# Patient Record
Sex: MALE | Race: WHITE | Employment: OTHER | URBAN - METROPOLITAN AREA
[De-identification: names, ages, dates, MRNs, and addresses within clinical notes are randomized per-mention and may not be internally consistent; named-entity substitution may affect disease eponyms.]

---

## 2019-02-19 ENCOUNTER — TELEPHONE (OUTPATIENT)
Dept: ADMINISTRATIVE | Age: 79
End: 2019-02-19

## 2019-02-19 RX ORDER — METOPROLOL SUCCINATE 25 MG/1
25 TABLET, EXTENDED RELEASE ORAL DAILY
Qty: 90 TABLET | Refills: 3 | Status: SHIPPED | OUTPATIENT
Start: 2019-02-19 | End: 2019-06-17 | Stop reason: SDUPTHER

## 2019-04-02 ENCOUNTER — OFFICE VISIT (OUTPATIENT)
Dept: CARDIOLOGY CLINIC | Age: 79
End: 2019-04-02
Payer: MEDICARE

## 2019-04-02 VITALS
BODY MASS INDEX: 29.78 KG/M2 | SYSTOLIC BLOOD PRESSURE: 140 MMHG | HEART RATE: 69 BPM | HEIGHT: 70 IN | DIASTOLIC BLOOD PRESSURE: 90 MMHG | WEIGHT: 208 LBS | RESPIRATION RATE: 16 BRPM

## 2019-04-02 DIAGNOSIS — I25.10 CORONARY ARTERY DISEASE INVOLVING NATIVE CORONARY ARTERY OF NATIVE HEART WITHOUT ANGINA PECTORIS: Primary | ICD-10-CM

## 2019-04-02 PROCEDURE — 93000 ELECTROCARDIOGRAM COMPLETE: CPT | Performed by: INTERNAL MEDICINE

## 2019-04-02 PROCEDURE — 99213 OFFICE O/P EST LOW 20 MIN: CPT | Performed by: INTERNAL MEDICINE

## 2019-04-02 NOTE — PROGRESS NOTES
Nanda Miramontes  1940  Date of Service: 4/2/2019    Patient Active Problem List    Diagnosis Date Noted    HTN (hypertension)     Aortic stenosis      Overview Note:     A. Bioprosthetic AVR (1/27/09 by Dr. Julio Peoples).  Coronary artery disease      Overview Note:     B. Cardiac catheterization (1/26/09). Left main ostial 80%. i. LAD mid to distal 90%. ii. RCA ostial 80%. C. CABG (1/27/09 by Dr. Julio Peoples). i. MATUTE-LAD. ii. SVG-OM.  iii. SVG-RCA.  Atrial fibrillation (HCC)      Overview Note:     Postop      Abnormal EKG      Overview Note:     EKG demonstrating chronic Q waves in leads III and AVF. Stress test (December 1, 2003) Negative for either ischemia or myocardial infarction. Ejection fraction 60%.       Left atrial dilatation      Overview Note:     Mild      Mitral regurgitation      Overview Note:     Trace      SBE (subacute bacterial endocarditis) prophylaxis candidate        Social History     Socioeconomic History    Marital status:      Spouse name: None    Number of children: None    Years of education: None    Highest education level: None   Occupational History    None   Social Needs    Financial resource strain: None    Food insecurity:     Worry: None     Inability: None    Transportation needs:     Medical: None     Non-medical: None   Tobacco Use    Smoking status: Never Smoker    Smokeless tobacco: Never Used   Substance and Sexual Activity    Alcohol use: Yes     Comment: one drink per day occassionally    Drug use: No    Sexual activity: None   Lifestyle    Physical activity:     Days per week: None     Minutes per session: None    Stress: None   Relationships    Social connections:     Talks on phone: None     Gets together: None     Attends Oriental orthodox service: None     Active member of club or organization: None     Attends meetings of clubs or organizations: None     Relationship status: None    Intimate partner violence:     Fear of bilaterally. No use of accessory muscles. symmetrical excursion. ABDOMEN:  Soft, non-tender. Normal bowel sounds. EXTREMITIES:  Full ROM x 4. No bilateral lower extremity edema. Good distal pulses. EYES:  Extraocular muscles intact. PERRL. Normal lids & conjunctiva. ENT:  Nares are clear & not bleeding. Moist mucosa. Normal lips formation. No external masses   NEURO: no tremors, full ROM x 4, EOMI. SKIN:  Warm, dry and intact. Normal turgor. EKG: Sinus rhythm, 69 bpm, nl axis, nonspecific ST - T wave changes. Inferior q-waves      Assessment:   1. Coronary artery disease as outlined above. No symptoms of recurring ischemia at this time. 2. AS - AVR  3. Paroxysmal atrial fibrillation (post-op). maintaining sinus rhythm at this time. 4. Hypertension, not well controled at this time. He states that he missed his medications yesterday and has not taken them yet today. 5. Hypercholesterolemia  6. 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 Recommendations:  1. He is following the cholesterol with Dr. Naomi Velez. 2. He will resume his medications. Thank you for allowing me to participate in your patient's care.       [id number], [address] [facility]  Interventional Cardiology

## 2019-06-17 RX ORDER — METOPROLOL SUCCINATE 25 MG/1
25 TABLET, EXTENDED RELEASE ORAL DAILY
Qty: 90 TABLET | Refills: 3 | Status: SHIPPED
Start: 2019-06-17 | End: 2020-04-09 | Stop reason: SDUPTHER

## 2020-04-09 RX ORDER — METOPROLOL SUCCINATE 25 MG/1
25 TABLET, EXTENDED RELEASE ORAL DAILY
Qty: 90 TABLET | Refills: 3 | Status: SHIPPED
Start: 2020-04-09 | End: 2021-04-26 | Stop reason: SDUPTHER

## 2020-05-05 ENCOUNTER — TELEPHONE (OUTPATIENT)
Dept: CARDIOLOGY CLINIC | Age: 80
End: 2020-05-05

## 2020-06-12 ENCOUNTER — OFFICE VISIT (OUTPATIENT)
Dept: CARDIOLOGY CLINIC | Age: 80
End: 2020-06-12
Payer: MEDICARE

## 2020-06-12 VITALS
WEIGHT: 214.2 LBS | BODY MASS INDEX: 30.67 KG/M2 | SYSTOLIC BLOOD PRESSURE: 142 MMHG | HEART RATE: 67 BPM | HEIGHT: 70 IN | RESPIRATION RATE: 16 BRPM | DIASTOLIC BLOOD PRESSURE: 85 MMHG

## 2020-06-12 PROCEDURE — 99214 OFFICE O/P EST MOD 30 MIN: CPT | Performed by: INTERNAL MEDICINE

## 2020-06-12 PROCEDURE — 93000 ELECTROCARDIOGRAM COMPLETE: CPT | Performed by: INTERNAL MEDICINE

## 2020-06-12 RX ORDER — HYDROCHLOROTHIAZIDE 25 MG/1
25 TABLET ORAL EVERY MORNING
Qty: 90 TABLET | Refills: 3 | Status: SHIPPED | OUTPATIENT
Start: 2020-06-12 | End: 2021-06-28

## 2020-06-12 NOTE — PROGRESS NOTES
Shakir Harper  1940  Date of Service: 6/12/2020    Patient Active Problem List    Diagnosis Date Noted    HTN (hypertension)     Aortic stenosis      Overview Note:     A. Bioprosthetic AVR (1/27/09 by Dr. Sorin Reveles).  Coronary artery disease      Overview Note:     B. Cardiac catheterization (1/26/09). Left main ostial 80%. i. LAD mid to distal 90%. ii. RCA ostial 80%. C. CABG (1/27/09 by Dr. Sorin Reveles). i. MATUTE-LAD. ii. SVG-OM.  iii. SVG-RCA.  Atrial fibrillation (HCC)      Overview Note:     Postop      Abnormal EKG      Overview Note:     EKG demonstrating chronic Q waves in leads III and AVF. Stress test (December 1, 2003) Negative for either ischemia or myocardial infarction. Ejection fraction 60%.       Left atrial dilatation      Overview Note:     Mild      Mitral regurgitation      Overview Note:     Trace      SBE (subacute bacterial endocarditis) prophylaxis candidate        Social History     Socioeconomic History    Marital status:      Spouse name: None    Number of children: None    Years of education: None    Highest education level: None   Occupational History    None   Social Needs    Financial resource strain: None    Food insecurity     Worry: None     Inability: None    Transportation needs     Medical: None     Non-medical: None   Tobacco Use    Smoking status: Never Smoker    Smokeless tobacco: Never Used   Substance and Sexual Activity    Alcohol use: Yes     Comment: one drink per day occassionally    Drug use: No    Sexual activity: None   Lifestyle    Physical activity     Days per week: None     Minutes per session: None    Stress: None   Relationships    Social connections     Talks on phone: None     Gets together: None     Attends Lutheran service: None     Active member of club or organization: None     Attends meetings of clubs or organizations: None     Relationship status: None    Intimate partner violence     Fear of current or ex partner: None     Emotionally abused: None     Physically abused: None     Forced sexual activity: None   Other Topics Concern    None   Social History Narrative    None       Current Outpatient Medications   Medication Sig Dispense Refill    metoprolol succinate (TOPROL XL) 25 MG extended release tablet Take 1 tablet by mouth daily 90 tablet 3    timolol (TIMOPTIC) 0.5 % ophthalmic solution       aspirin 81 MG EC tablet Take 81 mg by mouth daily.  atorvastatin (LIPITOR) 20 MG tablet Take 20 mg by mouth daily        No current facility-administered medications for this visit. Allergies   Allergen Reactions    Zocor [Simvastatin]      Muscle aches       Chief Complaint:  Zarina Chacon is here today for follow up and management/recomendations for CAD, AS, AVR, HTN. History of Present Illness: Zarina Chacon states that He does house work, yard work, goes up the stairs & goes shopping. He shoveled snow this past winter as well. He denies any chest discomfort, dyspnea on exertion, orthopnea/PND. He has mild chronic lower extremity edema. He denies any palpitations or presyncopal symptoms. REVIEW OF SYSTEMS:  As above. Patient does not complain of any fever, chills, nausea, vomiting or diarrhea. No focal, motor or neurological deficits. No changes in his/her vision, hearing, bowel or bladder habits. He is not known to have a history of thyroid problems. No recent nose bleeds. PHYSICAL EXAM:  Vitals:    06/12/20 0944 06/12/20 0951   BP: (!) 142/70 138/68   Pulse: 67    Resp: 16    Weight: 214 lb 3.2 oz (97.2 kg)    Height: 5' 10\" (1.778 m)        GENERAL:  He is alert and oriented x 3, communicates well, in no distress. NECK:  No masses, trachea is mid position. Supple, full ROM, no JVD or bruits. No palpable thyromegaly or lymphadenopathy. HEART:  distant Regular rate and rhythm. Normal S1 and S2. There is an S4 gallop and a I/VI systolic murmur.     LUNGS:  Clear to auscultation bilaterally. No use of accessory muscles. symmetrical excursion. ABDOMEN:  Soft, non-tender. Normal bowel sounds. EXTREMITIES:  Full ROM x 4. Mild bilateral lower extremity edema. Good distal pulses. EYES:  Extraocular muscles intact. PERRL. Normal lids & conjunctiva. ENT:  Nares are clear & not bleeding. Moist mucosa. Normal lips formation. No external masses   NEURO: no tremors, full ROM x 4, EOMI. SKIN:  Warm, dry and intact. Normal turgor. EKG: Sinus rhythm, 67 bpm, nl axis, nonspecific ST - T wave changes. Inferior q-waves      Assessment:   1. Coronary artery disease as outlined above. No symptoms of recurring ischemia at this time. 2. AS - AVR  3. Paroxysmal atrial fibrillation (post-op). maintaining sinus rhythm at this time. 4. Hypertension, not well controled at this time. 5. Mild LE edema  6. Hypercholesterolemia  7. 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 Recommendations:  1. He is following the cholesterol with Dr. Ana Hudson. 2. HCTZ 25 mg qd  3. BMP      Thank you for allowing me to participate in your patient's care.       [id number], [address] [facility]  Interventional Cardiology

## 2020-06-18 ENCOUNTER — HOSPITAL ENCOUNTER (OUTPATIENT)
Age: 80
Discharge: HOME | End: 2020-06-18
Payer: MEDICARE

## 2020-06-18 LAB
ANION GAP SERPL CALCULATED.3IONS-SCNC: 13 MMOL/L (ref 7–16)
BUN BLDV-MCNC: 25 MG/DL (ref 8–23)
CALCIUM SERPL-MCNC: 9.4 MG/DL (ref 8.6–10.2)
CHLORIDE BLD-SCNC: 103 MMOL/L (ref 98–107)
CO2: 24 MMOL/L (ref 22–29)
CREAT SERPL-MCNC: 1.1 MG/DL (ref 0.7–1.2)
GFR AFRICAN AMERICAN: >60
GFR NON-AFRICAN AMERICAN: >60 ML/MIN/1.73
GLUCOSE BLD-MCNC: 106 MG/DL (ref 74–99)
POTASSIUM SERPL-SCNC: 4 MMOL/L (ref 3.5–5)
SODIUM BLD-SCNC: 140 MMOL/L (ref 132–146)

## 2020-06-18 PROCEDURE — 36415 COLL VENOUS BLD VENIPUNCTURE: CPT

## 2020-06-18 PROCEDURE — 80048 BASIC METABOLIC PNL TOTAL CA: CPT

## 2020-10-14 ENCOUNTER — HOSPITAL ENCOUNTER (OUTPATIENT)
Age: 80
Discharge: HOME | End: 2020-10-16
Payer: MEDICARE

## 2020-10-14 PROCEDURE — 85651 RBC SED RATE NONAUTOMATED: CPT

## 2020-10-14 PROCEDURE — 86140 C-REACTIVE PROTEIN: CPT

## 2020-10-15 LAB
C-REACTIVE PROTEIN: 0.2 MG/DL (ref 0–0.4)
SEDIMENTATION RATE, ERYTHROCYTE: 20 MM/HR (ref 0–15)

## 2021-04-26 RX ORDER — METOPROLOL SUCCINATE 25 MG/1
25 TABLET, EXTENDED RELEASE ORAL DAILY
Qty: 90 TABLET | Refills: 3 | Status: SHIPPED
Start: 2021-04-26 | End: 2022-06-07 | Stop reason: SDUPTHER

## 2021-06-28 RX ORDER — HYDROCHLOROTHIAZIDE 25 MG/1
TABLET ORAL
Qty: 90 TABLET | Refills: 3 | Status: SHIPPED
Start: 2021-06-28 | End: 2022-08-15 | Stop reason: SDUPTHER

## 2022-06-07 RX ORDER — METOPROLOL SUCCINATE 25 MG/1
25 TABLET, EXTENDED RELEASE ORAL DAILY
Qty: 90 TABLET | Refills: 0 | Status: SHIPPED
Start: 2022-06-07 | End: 2022-09-09

## 2022-07-27 ENCOUNTER — OFFICE VISIT (OUTPATIENT)
Dept: CARDIOLOGY CLINIC | Age: 82
End: 2022-07-27
Payer: MEDICARE

## 2022-07-27 VITALS
DIASTOLIC BLOOD PRESSURE: 85 MMHG | HEART RATE: 65 BPM | HEIGHT: 70 IN | SYSTOLIC BLOOD PRESSURE: 140 MMHG | RESPIRATION RATE: 16 BRPM | WEIGHT: 194.6 LBS | BODY MASS INDEX: 27.86 KG/M2

## 2022-07-27 DIAGNOSIS — I25.10 CORONARY ARTERY DISEASE INVOLVING NATIVE CORONARY ARTERY OF NATIVE HEART WITHOUT ANGINA PECTORIS: Primary | ICD-10-CM

## 2022-07-27 DIAGNOSIS — I10 PRIMARY HYPERTENSION: ICD-10-CM

## 2022-07-27 PROCEDURE — 99214 OFFICE O/P EST MOD 30 MIN: CPT | Performed by: INTERNAL MEDICINE

## 2022-07-27 PROCEDURE — 1123F ACP DISCUSS/DSCN MKR DOCD: CPT | Performed by: INTERNAL MEDICINE

## 2022-07-27 PROCEDURE — 93000 ELECTROCARDIOGRAM COMPLETE: CPT | Performed by: INTERNAL MEDICINE

## 2022-07-27 RX ORDER — LOSARTAN POTASSIUM 25 MG/1
12.5 TABLET ORAL DAILY
Qty: 90 TABLET | Refills: 3 | Status: SHIPPED | OUTPATIENT
Start: 2022-07-27

## 2022-07-27 NOTE — PROGRESS NOTES
Veda Elliottden  1940  Date of Service: 7/27/2022    Patient Active Problem List    Diagnosis Date Noted    HTN (hypertension)     Aortic stenosis      Overview Note:     Bioprosthetic AVR (1/27/09 by Dr. Darryle Carton). Coronary artery disease      Overview Note:     Cardiac catheterization (1/26/09). Left main ostial 80%. LAD mid to distal 90%. RCA ostial 80%. CABG (1/27/09 by Dr. Darryle Carton). MATUTE-LAD. SVG-OM. SVG-RCA. Atrial fibrillation ([address]      Overview Note:     Postop      Abnormal EKG      Overview Note:     EKG demonstrating chronic Q waves in leads III and AVF. Stress test (December 1, 2003) Negative for either ischemia or myocardial infarction. Ejection fraction 60%. Left atrial dilatation      Overview Note:     Mild      Mitral regurgitation      Overview Note:     Trace      SBE (subacute bacterial endocarditis) prophylaxis candidate        Social History     Socioeconomic History    Marital status:    Occupational History    Occupation: RETIRED-    Tobacco Use    Smoking status: Never    Smokeless tobacco: Never   Vaping Use    Vaping Use: Never used   Substance and Sexual Activity    Alcohol use: Yes     Comment: one drink per day occassionally    Drug use: No    Sexual activity: Not Currently       Current Outpatient Medications   Medication Sig Dispense Refill    Coenzyme Q10 (CO Q 10 PO) Take by mouth daily      metoprolol succinate (TOPROL XL) 25 MG extended release tablet Take 1 tablet by mouth daily 90 tablet 0    hydroCHLOROthiazide (HYDRODIURIL) 25 MG tablet TAKE ONE TABLET BY MOUTH EVERY DAY 90 tablet 3    ibuprofen (ADVIL;MOTRIN) 200 MG tablet Take 200 mg by mouth as needed for Pain      Omega-3 Fatty Acids (FISH OIL) 1000 MG CAPS Take 3,000 mg by mouth daily      timolol (TIMOPTIC) 0.5 % ophthalmic solution       aspirin 81 MG EC tablet Take 81 mg by mouth daily. pregabalin (LYRICA) 25 MG capsule Take 1 capsule by mouth every 30 days for 30 days. (Patient not taking: Reported on 7/27/2022) 30 capsule 3    predniSONE (DELTASONE) 10 MG tablet 40mg x 7 days, 20mg x 7 days, 10mg x 7 days, 5mg x 7 days (Patient not taking: Reported on 7/27/2022) 53 tablet 0    omeprazole (PRILOSEC) 40 MG delayed release capsule Take 1 capsule by mouth daily (Patient not taking: Reported on 7/27/2022) 30 capsule 3    Tiotropium Bromide-Olodaterol 2.5-2.5 MCG/ACT AERS Inhale 2 puffs into the lungs daily (Patient not taking: Reported on 7/27/2022) 4 g 2    albuterol sulfate HFA (PROAIR HFA) 108 (90 Base) MCG/ACT inhaler Inhale 2 puffs into the lungs every 6 hours as needed for Wheezing (Patient not taking: Reported on 7/27/2022) 1 Inhaler 3     No current facility-administered medications for this visit. Allergies   Allergen Reactions    Zocor [Simvastatin]      Muscle aches       Chief Complaint:  Tree Barker is here today for follow up and management/recomendations for CAD, AS, AVR, HTN. History of Present Illness: Tree Barker states that He does house work, yard work, goes up the stairs & goes shopping. He denies any chest discomfort, dyspnea on exertion, orthopnea/PND. He denies any palpitations or presyncopal symptoms. REVIEW OF SYSTEMS:  As above. Patient does not complain of any fever, chills, nausea, vomiting or diarrhea. No focal, motor or neurological deficits. No changes in his/her vision, hearing, bowel or bladder habits. He is not known to have a history of thyroid problems. No recent nose bleeds. PHYSICAL EXAM:  Vitals:    07/27/22 1036 07/27/22 1112   BP: (!) 138/90 (!) 140/85   Pulse: 65    Resp: 16    Weight: 194 lb 9.6 oz (88.3 kg)    Height: 5' 10\" (1.778 m)        GENERAL:  He is alert and oriented x 3, communicates well, in no distress. NECK:  No masses, trachea is mid position. Supple, full ROM, no JVD or bruits. No palpable thyromegaly or lymphadenopathy. HEART:  Regular rate and rhythm. Normal S1 and S2.  There is an S4 gallop and a I/VI systolic ejection murmur. LUNGS:  Clear to auscultation bilaterally. No use of accessory muscles. symmetrical excursion. Good air movement. ABDOMEN:  Soft, non-tender. Normal bowel sounds. EXTREMITIES:  Full ROM x 4. No bilateral lower extremity edema. Good distal pulses. EYES:  Extraocular muscles intact. PERRL. Normal lids & conjunctiva. ENT:  Nares are clear & not bleeding. Moist mucosa. Normal lips formation. No external masses   NEURO: no tremors, full ROM x 4, EOMI. SKIN:  Warm, dry and intact. Normal turgor. EKG: Sinus rhythm, 65 bpm, nl axis, nonspecific ST - T wave changes. Inferior q-waves      Assessment:   Coronary artery disease as outlined above. No symptoms of recurring ischemia today. AS - AVR  Paroxysmal atrial fibrillation (post-op). maintaining sinus rhythm at this time. Hypertension, not well controled at this time. Mild LE edema  Hypercholesterolemia  Hx of asbestos exposure. Recommendations:  He is following the cholesterol with Dr. Miguel Campos. Losartan 12.5 mg daily  BMP      Thank you for allowing me to participate in your patient's care.       [id number], [address] [facility]  Interventional Cardiology

## 2022-08-15 RX ORDER — HYDROCHLOROTHIAZIDE 25 MG/1
TABLET ORAL
Qty: 90 TABLET | Refills: 3 | Status: SHIPPED | OUTPATIENT
Start: 2022-08-15

## 2022-09-09 RX ORDER — METOPROLOL SUCCINATE 25 MG/1
TABLET, EXTENDED RELEASE ORAL
Qty: 90 TABLET | Refills: 3 | Status: SHIPPED | OUTPATIENT
Start: 2022-09-09

## 2023-08-21 RX ORDER — HYDROCHLOROTHIAZIDE 25 MG/1
TABLET ORAL
Qty: 90 TABLET | Refills: 0 | Status: SHIPPED | OUTPATIENT
Start: 2023-08-21

## 2023-09-25 RX ORDER — METOPROLOL SUCCINATE 25 MG/1
TABLET, EXTENDED RELEASE ORAL
Qty: 90 TABLET | Refills: 3 | Status: SHIPPED | OUTPATIENT
Start: 2023-09-25

## 2023-10-03 RX ORDER — LOSARTAN POTASSIUM 25 MG/1
12.5 TABLET ORAL DAILY
Qty: 45 TABLET | Refills: 0 | Status: SHIPPED | OUTPATIENT
Start: 2023-10-03

## 2023-12-18 RX ORDER — HYDROCHLOROTHIAZIDE 25 MG/1
TABLET ORAL
Qty: 90 TABLET | Refills: 0 | OUTPATIENT
Start: 2023-12-18

## 2024-02-07 ENCOUNTER — APPOINTMENT (OUTPATIENT)
Age: 84
DRG: 871 | End: 2024-02-07
Payer: MEDICARE

## 2024-02-07 ENCOUNTER — APPOINTMENT (OUTPATIENT)
Dept: CT IMAGING | Age: 84
DRG: 871 | End: 2024-02-07
Payer: MEDICARE

## 2024-02-07 ENCOUNTER — APPOINTMENT (OUTPATIENT)
Dept: GENERAL RADIOLOGY | Age: 84
DRG: 871 | End: 2024-02-07
Payer: MEDICARE

## 2024-02-07 ENCOUNTER — HOSPITAL ENCOUNTER (INPATIENT)
Age: 84
LOS: 5 days | Discharge: ANOTHER HOSPITAL | DRG: 871 | End: 2024-02-12
Attending: STUDENT IN AN ORGANIZED HEALTH CARE EDUCATION/TRAINING PROGRAM | Admitting: FAMILY MEDICINE
Payer: MEDICARE

## 2024-02-07 ENCOUNTER — APPOINTMENT (OUTPATIENT)
Dept: ULTRASOUND IMAGING | Age: 84
DRG: 871 | End: 2024-02-07
Payer: MEDICARE

## 2024-02-07 DIAGNOSIS — R93.89 ABNORMAL CT SCAN: ICD-10-CM

## 2024-02-07 DIAGNOSIS — I21.4 NSTEMI (NON-ST ELEVATED MYOCARDIAL INFARCTION) (HCC): ICD-10-CM

## 2024-02-07 DIAGNOSIS — U07.1 COVID-19: ICD-10-CM

## 2024-02-07 DIAGNOSIS — R65.21 SEPTIC SHOCK (HCC): Primary | ICD-10-CM

## 2024-02-07 DIAGNOSIS — J96.01 ACUTE RESPIRATORY FAILURE WITH HYPOXIA (HCC): ICD-10-CM

## 2024-02-07 DIAGNOSIS — J18.9 PNEUMONIA DUE TO INFECTIOUS ORGANISM, UNSPECIFIED LATERALITY, UNSPECIFIED PART OF LUNG: ICD-10-CM

## 2024-02-07 DIAGNOSIS — A41.9 SEPTIC SHOCK (HCC): Primary | ICD-10-CM

## 2024-02-07 PROBLEM — Z95.3 HISTORY OF AORTIC VALVE REPLACEMENT WITH BIOPROSTHETIC VALVE: Status: ACTIVE | Noted: 2024-02-07

## 2024-02-07 PROBLEM — J12.82 PNEUMONIA DUE TO COVID-19 VIRUS: Status: ACTIVE | Noted: 2024-02-07

## 2024-02-07 LAB
ALBUMIN SERPL-MCNC: 3.7 G/DL (ref 3.5–5.2)
ALBUMIN SERPL-MCNC: 3.7 G/DL (ref 3.5–5.2)
ALP SERPL-CCNC: 71 U/L (ref 40–129)
ALP SERPL-CCNC: 85 U/L (ref 40–129)
ALT SERPL-CCNC: 12 U/L (ref 0–40)
ALT SERPL-CCNC: 45 U/L (ref 0–40)
ANION GAP SERPL CALCULATED.3IONS-SCNC: 15 MMOL/L (ref 7–16)
ANION GAP SERPL CALCULATED.3IONS-SCNC: 18 MMOL/L (ref 7–16)
AST SERPL-CCNC: 35 U/L (ref 0–39)
AST SERPL-CCNC: 97 U/L (ref 0–39)
B PARAP IS1001 DNA NPH QL NAA+NON-PROBE: NOT DETECTED
B PERT DNA SPEC QL NAA+PROBE: NOT DETECTED
B.E.: -11 MMOL/L (ref -3–3)
B.E.: -3.2 MMOL/L (ref -3–3)
BACTERIA URNS QL MICRO: ABNORMAL
BASOPHILS # BLD: 0.02 K/UL (ref 0–0.2)
BASOPHILS NFR BLD: 0 % (ref 0–2)
BILIRUB DIRECT SERPL-MCNC: 0.3 MG/DL (ref 0–0.3)
BILIRUB SERPL-MCNC: 1.2 MG/DL (ref 0–1.2)
BILIRUB SERPL-MCNC: 1.6 MG/DL (ref 0–1.2)
BILIRUB UR QL STRIP: NEGATIVE
BNP SERPL-MCNC: 2132 PG/ML (ref 0–450)
BUN SERPL-MCNC: 25 MG/DL (ref 6–23)
BUN SERPL-MCNC: 27 MG/DL (ref 6–23)
C PNEUM DNA NPH QL NAA+NON-PROBE: NOT DETECTED
CA-I BLD-SCNC: 1.05 MMOL/L (ref 1.15–1.33)
CALCIUM SERPL-MCNC: 7.8 MG/DL (ref 8.6–10.2)
CALCIUM SERPL-MCNC: 8.4 MG/DL (ref 8.6–10.2)
CASTS #/AREA URNS LPF: ABNORMAL /LPF
CHLORIDE SERPL-SCNC: 106 MMOL/L (ref 98–107)
CHLORIDE SERPL-SCNC: 108 MMOL/L (ref 98–107)
CHOLEST SERPL-MCNC: 169 MG/DL
CLARITY UR: CLEAR
CO2 SERPL-SCNC: 16 MMOL/L (ref 22–29)
CO2 SERPL-SCNC: 19 MMOL/L (ref 22–29)
COHB: 0.1 % (ref 0–1.5)
COHB: 0.9 % (ref 0–1.5)
COLOR UR: YELLOW
COMMENT: ABNORMAL
CORTIS SERPL-MCNC: 183.7 UG/DL (ref 2.7–18.4)
CREAT SERPL-MCNC: 1.2 MG/DL (ref 0.7–1.2)
CREAT SERPL-MCNC: 1.5 MG/DL (ref 0.7–1.2)
CRITICAL: ABNORMAL
CRITICAL: ABNORMAL
CRP SERPL HS-MCNC: 71 MG/L (ref 0–5)
D DIMER: 1538 NG/ML DDU (ref 0–232)
DATE ANALYZED: ABNORMAL
DATE ANALYZED: ABNORMAL
DATE OF COLLECTION: ABNORMAL
DATE OF COLLECTION: ABNORMAL
ECHO AO ASC DIAM: 3.2 CM
ECHO AO ASCENDING AORTA INDEX: 1.55 CM/M2
ECHO AO ROOT DIAM: 3.5 CM
ECHO AO ROOT INDEX: 1.69 CM/M2
ECHO AO SINUS VALSALVA DIAM: 2.6 CM
ECHO AO SINUS VALSALVA INDEX: 1.26 CM/M2
ECHO AV AREA PEAK VELOCITY: 1.1 CM2
ECHO AV AREA VTI: 1.2 CM2
ECHO AV AREA/BSA PEAK VELOCITY: 0.5 CM2/M2
ECHO AV AREA/BSA VTI: 0.6 CM2/M2
ECHO AV CUSP MM: 1.7 CM
ECHO AV MEAN GRADIENT: 13 MMHG
ECHO AV MEAN VELOCITY: 1.7 M/S
ECHO AV PEAK GRADIENT: 22 MMHG
ECHO AV PEAK VELOCITY: 2.3 M/S
ECHO AV VELOCITY RATIO: 0.39
ECHO AV VTI: 43.6 CM
ECHO BSA: 2.05 M2
ECHO EST RA PRESSURE: 8 MMHG
ECHO LA DIAMETER INDEX: 2.08 CM/M2
ECHO LA DIAMETER: 4.3 CM
ECHO LA TO AORTIC ROOT RATIO: 1.23
ECHO LA VOL A-L A2C: 130 ML (ref 18–58)
ECHO LA VOL A-L A4C: 100 ML (ref 18–58)
ECHO LA VOL MOD A2C: 123 ML (ref 18–58)
ECHO LA VOL MOD A4C: 95 ML (ref 18–58)
ECHO LA VOLUME AREA LENGTH: 114 ML
ECHO LA VOLUME INDEX A-L A2C: 63 ML/M2 (ref 16–34)
ECHO LA VOLUME INDEX A-L A4C: 48 ML/M2 (ref 16–34)
ECHO LA VOLUME INDEX AREA LENGTH: 55 ML/M2 (ref 16–34)
ECHO LA VOLUME INDEX MOD A2C: 59 ML/M2 (ref 16–34)
ECHO LA VOLUME INDEX MOD A4C: 46 ML/M2 (ref 16–34)
ECHO LV E' LATERAL VELOCITY: 11 CM/S
ECHO LV E' SEPTAL VELOCITY: 12 CM/S
ECHO LV EDV A2C: 71 ML
ECHO LV EDV A4C: 36 ML
ECHO LV EDV BP: 54 ML (ref 67–155)
ECHO LV EDV INDEX A4C: 17 ML/M2
ECHO LV EDV INDEX BP: 26 ML/M2
ECHO LV EDV NDEX A2C: 34 ML/M2
ECHO LV EF PHYSICIAN: 60 %
ECHO LV EJECTION FRACTION A2C: 66 %
ECHO LV EJECTION FRACTION A4C: 60 %
ECHO LV EJECTION FRACTION BIPLANE: 66 % (ref 55–100)
ECHO LV ESV A2C: 24 ML
ECHO LV ESV A4C: 14 ML
ECHO LV ESV BP: 18 ML (ref 22–58)
ECHO LV ESV INDEX A2C: 12 ML/M2
ECHO LV ESV INDEX A4C: 7 ML/M2
ECHO LV ESV INDEX BP: 9 ML/M2
ECHO LV FRACTIONAL SHORTENING: 32 % (ref 28–44)
ECHO LV INTERNAL DIMENSION DIASTOLE INDEX: 1.79 CM/M2
ECHO LV INTERNAL DIMENSION DIASTOLIC: 3.7 CM (ref 4.2–5.9)
ECHO LV INTERNAL DIMENSION SYSTOLIC INDEX: 1.21 CM/M2
ECHO LV INTERNAL DIMENSION SYSTOLIC: 2.5 CM
ECHO LV IVSD: 1.4 CM (ref 0.6–1)
ECHO LV IVSS: 1.6 CM
ECHO LV MASS 2D: 156.7 G (ref 88–224)
ECHO LV MASS INDEX 2D: 75.7 G/M2 (ref 49–115)
ECHO LV POSTERIOR WALL DIASTOLIC: 1.1 CM (ref 0.6–1)
ECHO LV POSTERIOR WALL SYSTOLIC: 1.4 CM
ECHO LV RELATIVE WALL THICKNESS RATIO: 0.59
ECHO LVOT AV VTI INDEX: 0.37
ECHO LVOT DIAM: 2 CM
ECHO LVOT MEAN GRADIENT: 2 MMHG
ECHO LVOT PEAK GRADIENT: 3 MMHG
ECHO LVOT PEAK VELOCITY: 0.9 M/S
ECHO LVOT STROKE VOLUME INDEX: 24.7 ML/M2
ECHO LVOT SV: 51.2 ML
ECHO LVOT VTI: 16.3 CM
ECHO MV AREA VTI: 1.6 CM2
ECHO MV E DECELERATION TIME (DT): 115.4 MS
ECHO MV LVOT VTI INDEX: 1.98
ECHO MV MAX VELOCITY: 1.6 M/S
ECHO MV MEAN GRADIENT: 4 MMHG
ECHO MV MEAN VELOCITY: 0.8 M/S
ECHO MV PEAK GRADIENT: 10 MMHG
ECHO MV VTI: 32.3 CM
ECHO PV MAX VELOCITY: 0.9 M/S
ECHO PV MEAN GRADIENT: 2 MMHG
ECHO PV MEAN VELOCITY: 0.6 M/S
ECHO PV PEAK GRADIENT: 3 MMHG
ECHO PV VTI: 14.5 CM
ECHO RIGHT VENTRICULAR SYSTOLIC PRESSURE (RVSP): 33 MMHG
ECHO RV INTERNAL DIMENSION: 3.6 CM
ECHO RV TAPSE: 0.6 CM (ref 1.7–?)
ECHO TV REGURGITANT MAX VELOCITY: 2.52 M/S
ECHO TV REGURGITANT PEAK GRADIENT: 25 MMHG
EOSINOPHIL # BLD: 0.02 K/UL (ref 0.05–0.5)
EOSINOPHILS RELATIVE PERCENT: 0 % (ref 0–6)
ERYTHROCYTE [DISTWIDTH] IN BLOOD BY AUTOMATED COUNT: 15.1 % (ref 11.5–15)
ERYTHROCYTE [DISTWIDTH] IN BLOOD BY AUTOMATED COUNT: 15.3 % (ref 11.5–15)
FLUAV RNA NPH QL NAA+NON-PROBE: NOT DETECTED
FLUBV RNA NPH QL NAA+NON-PROBE: NOT DETECTED
GFR SERPL CREATININE-BSD FRML MDRD: 45 ML/MIN/1.73M2
GFR SERPL CREATININE-BSD FRML MDRD: >60 ML/MIN/1.73M2
GLUCOSE BLD-MCNC: 132 MG/DL (ref 74–99)
GLUCOSE BLD-MCNC: 207 MG/DL (ref 74–99)
GLUCOSE SERPL-MCNC: 117 MG/DL (ref 74–99)
GLUCOSE SERPL-MCNC: 201 MG/DL (ref 74–99)
GLUCOSE UR STRIP-MCNC: NEGATIVE MG/DL
HADV DNA NPH QL NAA+NON-PROBE: NOT DETECTED
HCO3: 11.6 MMOL/L (ref 22–26)
HCO3: 19.6 MMOL/L (ref 22–26)
HCOV 229E RNA NPH QL NAA+NON-PROBE: NOT DETECTED
HCOV HKU1 RNA NPH QL NAA+NON-PROBE: NOT DETECTED
HCOV NL63 RNA NPH QL NAA+NON-PROBE: NOT DETECTED
HCOV OC43 RNA NPH QL NAA+NON-PROBE: NOT DETECTED
HCT VFR BLD AUTO: 37.9 % (ref 37–54)
HCT VFR BLD AUTO: 38.8 % (ref 37–54)
HDLC SERPL-MCNC: 42 MG/DL
HGB BLD-MCNC: 12.3 G/DL (ref 12.5–16.5)
HGB BLD-MCNC: 12.4 G/DL (ref 12.5–16.5)
HGB UR QL STRIP.AUTO: ABNORMAL
HHB: 1.6 % (ref 0–5)
HHB: 3.8 % (ref 0–5)
HMPV RNA NPH QL NAA+NON-PROBE: NOT DETECTED
HPIV1 RNA NPH QL NAA+NON-PROBE: NOT DETECTED
HPIV2 RNA NPH QL NAA+NON-PROBE: NOT DETECTED
HPIV3 RNA NPH QL NAA+NON-PROBE: NOT DETECTED
HPIV4 RNA NPH QL NAA+NON-PROBE: NOT DETECTED
IMM GRANULOCYTES # BLD AUTO: 0.04 K/UL (ref 0–0.58)
IMM GRANULOCYTES NFR BLD: 0 % (ref 0–5)
INFLUENZA A BY PCR: NOT DETECTED
INFLUENZA B BY PCR: NOT DETECTED
KETONES UR STRIP-MCNC: NEGATIVE MG/DL
LAB: ABNORMAL
LAB: ABNORMAL
LACTATE BLDV-SCNC: 2.8 MMOL/L (ref 0.5–1.9)
LACTATE BLDV-SCNC: 2.8 MMOL/L (ref 0.5–1.9)
LACTATE BLDV-SCNC: 3 MMOL/L (ref 0.5–1.9)
LACTATE BLDV-SCNC: 4.3 MMOL/L (ref 0.5–1.9)
LACTATE BLDV-SCNC: 4.7 MMOL/L (ref 0.5–2.2)
LDLC SERPL CALC-MCNC: 118 MG/DL
LEFT VENTRICULAR EJECTION FRACTION HIGH VALUE: 60 %
LEFT VENTRICULAR EJECTION FRACTION MODE: NORMAL
LEUKOCYTE ESTERASE UR QL STRIP: ABNORMAL
LYMPHOCYTES NFR BLD: 0.23 K/UL (ref 1.5–4)
LYMPHOCYTES RELATIVE PERCENT: 2 % (ref 20–42)
Lab: 1055
Lab: 228
M PNEUMO DNA NPH QL NAA+NON-PROBE: NOT DETECTED
MAGNESIUM SERPL-MCNC: 1.5 MG/DL (ref 1.6–2.6)
MCH RBC QN AUTO: 31 PG (ref 26–35)
MCH RBC QN AUTO: 31 PG (ref 26–35)
MCHC RBC AUTO-ENTMCNC: 32 G/DL (ref 32–34.5)
MCHC RBC AUTO-ENTMCNC: 32.5 G/DL (ref 32–34.5)
MCV RBC AUTO: 95.5 FL (ref 80–99.9)
MCV RBC AUTO: 97 FL (ref 80–99.9)
METHB: 0.3 % (ref 0–1.5)
METHB: 0.3 % (ref 0–1.5)
MODE: ABNORMAL
MODE: ABNORMAL
MONOCYTES NFR BLD: 0.16 K/UL (ref 0.1–0.95)
MONOCYTES NFR BLD: 2 % (ref 2–12)
NEUTROPHILS NFR BLD: 96 % (ref 43–80)
NEUTS SEG NFR BLD: 10.04 K/UL (ref 1.8–7.3)
NITRITE UR QL STRIP: POSITIVE
O2 CONTENT: 14.2 ML/DL
O2 CONTENT: 16.2 ML/DL
O2 SATURATION: 96.2 % (ref 92–98.5)
O2 SATURATION: 98.4 % (ref 92–98.5)
O2HB: 95 % (ref 94–97)
O2HB: 98 % (ref 94–97)
OPERATOR ID: 166
OPERATOR ID: ABNORMAL
PARTIAL THROMBOPLASTIN TIME: 29.2 SEC (ref 24.5–35.1)
PARTIAL THROMBOPLASTIN TIME: 57.4 SEC (ref 24.5–35.1)
PARTIAL THROMBOPLASTIN TIME: 65.8 SEC (ref 24.5–35.1)
PATIENT TEMP: 37 C
PATIENT TEMP: 37 C
PCO2: 18.4 MMHG (ref 35–45)
PCO2: 28.7 MMHG (ref 35–45)
PH BLOOD GAS: 7.42 (ref 7.35–7.45)
PH BLOOD GAS: 7.45 (ref 7.35–7.45)
PH UR STRIP: 5.5 [PH] (ref 5–9)
PLATELET # BLD AUTO: 166 K/UL (ref 130–450)
PLATELET # BLD AUTO: 192 K/UL (ref 130–450)
PMV BLD AUTO: 10.5 FL (ref 7–12)
PMV BLD AUTO: 10.6 FL (ref 7–12)
PO2: 133.5 MMHG (ref 75–100)
PO2: 80.5 MMHG (ref 75–100)
POTASSIUM SERPL-SCNC: 3.4 MMOL/L (ref 3.5–5)
POTASSIUM SERPL-SCNC: 4.3 MMOL/L (ref 3.5–5)
PROCALCITONIN SERPL-MCNC: 28.59 NG/ML (ref 0–0.08)
PROT SERPL-MCNC: 6.5 G/DL (ref 6.4–8.3)
PROT SERPL-MCNC: 6.5 G/DL (ref 6.4–8.3)
PROT UR STRIP-MCNC: 100 MG/DL
RBC # BLD AUTO: 3.97 M/UL (ref 3.8–5.8)
RBC # BLD AUTO: 4 M/UL (ref 3.8–5.8)
RBC # BLD: NORMAL 10*6/UL
RBC #/AREA URNS HPF: ABNORMAL /HPF
RSV BY PCR: NOT DETECTED
RSV RNA NPH QL NAA+NON-PROBE: NOT DETECTED
RV+EV RNA NPH QL NAA+NON-PROBE: NOT DETECTED
SARS-COV-2 RDRP RESP QL NAA+PROBE: DETECTED
SARS-COV-2 RNA NPH QL NAA+NON-PROBE: NOT DETECTED
SODIUM SERPL-SCNC: 140 MMOL/L (ref 132–146)
SODIUM SERPL-SCNC: 142 MMOL/L (ref 132–146)
SOURCE, BLOOD GAS: ABNORMAL
SOURCE, BLOOD GAS: ABNORMAL
SP GR UR STRIP: 1.01 (ref 1–1.03)
SPECIMEN DESCRIPTION: ABNORMAL
SPECIMEN DESCRIPTION: NORMAL
SPECIMEN SOURCE: NORMAL
THB: 10.1 G/DL (ref 11.5–16.5)
THB: 12.1 G/DL (ref 11.5–16.5)
TIME ANALYZED: 1059
TIME ANALYZED: 233
TRIGL SERPL-MCNC: 45 MG/DL
TROPONIN I SERPL HS-MCNC: 1036 NG/L (ref 0–11)
TROPONIN I SERPL HS-MCNC: 1270 NG/L (ref 0–11)
TROPONIN I SERPL HS-MCNC: 1287 NG/L (ref 0–11)
TROPONIN I SERPL HS-MCNC: 1338 NG/L (ref 0–11)
TROPONIN I SERPL HS-MCNC: 1458 NG/L (ref 0–11)
UROBILINOGEN UR STRIP-ACNC: 0.2 EU/DL (ref 0–1)
VLDLC SERPL CALC-MCNC: 9 MG/DL
WBC #/AREA URNS HPF: ABNORMAL /HPF
WBC OTHER # BLD: 10.5 K/UL (ref 4.5–11.5)
WBC OTHER # BLD: 26.1 K/UL (ref 4.5–11.5)

## 2024-02-07 PROCEDURE — 82330 ASSAY OF CALCIUM: CPT

## 2024-02-07 PROCEDURE — 93005 ELECTROCARDIOGRAM TRACING: CPT | Performed by: STUDENT IN AN ORGANIZED HEALTH CARE EDUCATION/TRAINING PROGRAM

## 2024-02-07 PROCEDURE — 02HV33Z INSERTION OF INFUSION DEVICE INTO SUPERIOR VENA CAVA, PERCUTANEOUS APPROACH: ICD-10-PCS

## 2024-02-07 PROCEDURE — 2500000003 HC RX 250 WO HCPCS: Performed by: INTERNAL MEDICINE

## 2024-02-07 PROCEDURE — 99291 CRITICAL CARE FIRST HOUR: CPT

## 2024-02-07 PROCEDURE — 82248 BILIRUBIN DIRECT: CPT

## 2024-02-07 PROCEDURE — 82962 GLUCOSE BLOOD TEST: CPT

## 2024-02-07 PROCEDURE — 6360000002 HC RX W HCPCS: Performed by: INTERNAL MEDICINE

## 2024-02-07 PROCEDURE — 6360000004 HC RX CONTRAST MEDICATION: Performed by: NURSE PRACTITIONER

## 2024-02-07 PROCEDURE — 93005 ELECTROCARDIOGRAM TRACING: CPT

## 2024-02-07 PROCEDURE — 51798 US URINE CAPACITY MEASURE: CPT

## 2024-02-07 PROCEDURE — 80061 LIPID PANEL: CPT

## 2024-02-07 PROCEDURE — APPSS180 APP SPLIT SHARED TIME > 60 MINUTES: Performed by: NURSE PRACTITIONER

## 2024-02-07 PROCEDURE — 70450 CT HEAD/BRAIN W/O DYE: CPT

## 2024-02-07 PROCEDURE — 36556 INSERT NON-TUNNEL CV CATH: CPT

## 2024-02-07 PROCEDURE — 87077 CULTURE AEROBIC IDENTIFY: CPT

## 2024-02-07 PROCEDURE — 80053 COMPREHEN METABOLIC PANEL: CPT

## 2024-02-07 PROCEDURE — 3E033XZ INTRODUCTION OF VASOPRESSOR INTO PERIPHERAL VEIN, PERCUTANEOUS APPROACH: ICD-10-PCS

## 2024-02-07 PROCEDURE — 2500000003 HC RX 250 WO HCPCS

## 2024-02-07 PROCEDURE — 81001 URINALYSIS AUTO W/SCOPE: CPT

## 2024-02-07 PROCEDURE — 94664 DEMO&/EVAL PT USE INHALER: CPT

## 2024-02-07 PROCEDURE — 6360000002 HC RX W HCPCS: Performed by: STUDENT IN AN ORGANIZED HEALTH CARE EDUCATION/TRAINING PROGRAM

## 2024-02-07 PROCEDURE — 76705 ECHO EXAM OF ABDOMEN: CPT

## 2024-02-07 PROCEDURE — 84484 ASSAY OF TROPONIN QUANT: CPT

## 2024-02-07 PROCEDURE — 6370000000 HC RX 637 (ALT 250 FOR IP): Performed by: NURSE PRACTITIONER

## 2024-02-07 PROCEDURE — 85379 FIBRIN DEGRADATION QUANT: CPT

## 2024-02-07 PROCEDURE — 84145 PROCALCITONIN (PCT): CPT

## 2024-02-07 PROCEDURE — 82533 TOTAL CORTISOL: CPT

## 2024-02-07 PROCEDURE — 86140 C-REACTIVE PROTEIN: CPT

## 2024-02-07 PROCEDURE — 83880 ASSAY OF NATRIURETIC PEPTIDE: CPT

## 2024-02-07 PROCEDURE — 87502 INFLUENZA DNA AMP PROBE: CPT

## 2024-02-07 PROCEDURE — 6360000002 HC RX W HCPCS: Performed by: NURSE PRACTITIONER

## 2024-02-07 PROCEDURE — 87634 RSV DNA/RNA AMP PROBE: CPT

## 2024-02-07 PROCEDURE — 99222 1ST HOSP IP/OBS MODERATE 55: CPT | Performed by: SURGERY

## 2024-02-07 PROCEDURE — 85025 COMPLETE CBC W/AUTO DIFF WBC: CPT

## 2024-02-07 PROCEDURE — 93005 ELECTROCARDIOGRAM TRACING: CPT | Performed by: NURSE PRACTITIONER

## 2024-02-07 PROCEDURE — 87899 AGENT NOS ASSAY W/OPTIC: CPT

## 2024-02-07 PROCEDURE — 2580000003 HC RX 258: Performed by: NURSE PRACTITIONER

## 2024-02-07 PROCEDURE — 74177 CT ABD & PELVIS W/CONTRAST: CPT

## 2024-02-07 PROCEDURE — 36620 INSERTION CATHETER ARTERY: CPT

## 2024-02-07 PROCEDURE — 51702 INSERT TEMP BLADDER CATH: CPT

## 2024-02-07 PROCEDURE — 6370000000 HC RX 637 (ALT 250 FOR IP): Performed by: STUDENT IN AN ORGANIZED HEALTH CARE EDUCATION/TRAINING PROGRAM

## 2024-02-07 PROCEDURE — 2580000003 HC RX 258

## 2024-02-07 PROCEDURE — 87081 CULTURE SCREEN ONLY: CPT

## 2024-02-07 PROCEDURE — 83735 ASSAY OF MAGNESIUM: CPT

## 2024-02-07 PROCEDURE — 96365 THER/PROPH/DIAG IV INF INIT: CPT

## 2024-02-07 PROCEDURE — 94640 AIRWAY INHALATION TREATMENT: CPT

## 2024-02-07 PROCEDURE — 96374 THER/PROPH/DIAG INJ IV PUSH: CPT

## 2024-02-07 PROCEDURE — 0202U NFCT DS 22 TRGT SARS-COV-2: CPT

## 2024-02-07 PROCEDURE — 85027 COMPLETE CBC AUTOMATED: CPT

## 2024-02-07 PROCEDURE — 82805 BLOOD GASES W/O2 SATURATION: CPT

## 2024-02-07 PROCEDURE — 87086 URINE CULTURE/COLONY COUNT: CPT

## 2024-02-07 PROCEDURE — C8929 TTE W OR WO FOL WCON,DOPPLER: HCPCS

## 2024-02-07 PROCEDURE — C9113 INJ PANTOPRAZOLE SODIUM, VIA: HCPCS | Performed by: NURSE PRACTITIONER

## 2024-02-07 PROCEDURE — 2580000003 HC RX 258: Performed by: STUDENT IN AN ORGANIZED HEALTH CARE EDUCATION/TRAINING PROGRAM

## 2024-02-07 PROCEDURE — 71275 CT ANGIOGRAPHY CHEST: CPT

## 2024-02-07 PROCEDURE — 71045 X-RAY EXAM CHEST 1 VIEW: CPT

## 2024-02-07 PROCEDURE — A4216 STERILE WATER/SALINE, 10 ML: HCPCS | Performed by: NURSE PRACTITIONER

## 2024-02-07 PROCEDURE — 96361 HYDRATE IV INFUSION ADD-ON: CPT

## 2024-02-07 PROCEDURE — 6360000002 HC RX W HCPCS

## 2024-02-07 PROCEDURE — 93306 TTE W/DOPPLER COMPLETE: CPT | Performed by: INTERNAL MEDICINE

## 2024-02-07 PROCEDURE — C1751 CATH, INF, PER/CENT/MIDLINE: HCPCS

## 2024-02-07 PROCEDURE — 85730 THROMBOPLASTIN TIME PARTIAL: CPT

## 2024-02-07 PROCEDURE — 96360 HYDRATION IV INFUSION INIT: CPT

## 2024-02-07 PROCEDURE — 99223 1ST HOSP IP/OBS HIGH 75: CPT | Performed by: INTERNAL MEDICINE

## 2024-02-07 PROCEDURE — 82306 VITAMIN D 25 HYDROXY: CPT

## 2024-02-07 PROCEDURE — 2000000000 HC ICU R&B

## 2024-02-07 PROCEDURE — 87040 BLOOD CULTURE FOR BACTERIA: CPT

## 2024-02-07 PROCEDURE — 87449 NOS EACH ORGANISM AG IA: CPT

## 2024-02-07 PROCEDURE — 36140 INTRO NDL ICATH UPR/LXTR ART: CPT

## 2024-02-07 PROCEDURE — 2500000003 HC RX 250 WO HCPCS: Performed by: STUDENT IN AN ORGANIZED HEALTH CARE EDUCATION/TRAINING PROGRAM

## 2024-02-07 PROCEDURE — 96375 TX/PRO/DX INJ NEW DRUG ADDON: CPT

## 2024-02-07 PROCEDURE — 83605 ASSAY OF LACTIC ACID: CPT

## 2024-02-07 PROCEDURE — 6360000004 HC RX CONTRAST MEDICATION: Performed by: PHYSICIAN ASSISTANT

## 2024-02-07 PROCEDURE — 87635 SARS-COV-2 COVID-19 AMP PRB: CPT

## 2024-02-07 PROCEDURE — 2580000003 HC RX 258: Performed by: INTERNAL MEDICINE

## 2024-02-07 RX ORDER — CALCIUM GLUCONATE 94 MG/ML
1000 INJECTION, SOLUTION INTRAVENOUS ONCE
Status: DISCONTINUED | OUTPATIENT
Start: 2024-02-07 | End: 2024-02-07 | Stop reason: SDUPTHER

## 2024-02-07 RX ORDER — SODIUM CHLORIDE 0.9 % (FLUSH) 0.9 %
5-40 SYRINGE (ML) INJECTION EVERY 12 HOURS SCHEDULED
Status: DISCONTINUED | OUTPATIENT
Start: 2024-02-07 | End: 2024-02-12 | Stop reason: HOSPADM

## 2024-02-07 RX ORDER — ONDANSETRON 4 MG/1
4 TABLET, ORALLY DISINTEGRATING ORAL EVERY 8 HOURS PRN
Status: DISCONTINUED | OUTPATIENT
Start: 2024-02-07 | End: 2024-02-12 | Stop reason: HOSPADM

## 2024-02-07 RX ORDER — INSULIN LISPRO 100 [IU]/ML
0-4 INJECTION, SOLUTION INTRAVENOUS; SUBCUTANEOUS EVERY 6 HOURS
Status: DISCONTINUED | OUTPATIENT
Start: 2024-02-07 | End: 2024-02-11

## 2024-02-07 RX ORDER — DEXAMETHASONE SODIUM PHOSPHATE 10 MG/ML
10 INJECTION INTRAMUSCULAR; INTRAVENOUS EVERY 24 HOURS
Status: DISCONTINUED | OUTPATIENT
Start: 2024-02-12 | End: 2024-02-08 | Stop reason: DRUGHIGH

## 2024-02-07 RX ORDER — HEPARIN SODIUM 1000 [USP'U]/ML
4000 INJECTION, SOLUTION INTRAVENOUS; SUBCUTANEOUS ONCE
Status: COMPLETED | OUTPATIENT
Start: 2024-02-07 | End: 2024-02-07

## 2024-02-07 RX ORDER — ONDANSETRON 2 MG/ML
4 INJECTION INTRAMUSCULAR; INTRAVENOUS EVERY 6 HOURS PRN
Status: DISCONTINUED | OUTPATIENT
Start: 2024-02-07 | End: 2024-02-12 | Stop reason: HOSPADM

## 2024-02-07 RX ORDER — 0.9 % SODIUM CHLORIDE 0.9 %
1000 INTRAVENOUS SOLUTION INTRAVENOUS ONCE
Status: COMPLETED | OUTPATIENT
Start: 2024-02-07 | End: 2024-02-07

## 2024-02-07 RX ORDER — HYDROXYZINE HYDROCHLORIDE 10 MG/1
10 TABLET, FILM COATED ORAL ONCE
Status: DISCONTINUED | OUTPATIENT
Start: 2024-02-07 | End: 2024-02-07

## 2024-02-07 RX ORDER — CHOLECALCIFEROL (VITAMIN D3) 50 MCG
6000 TABLET ORAL DAILY
Status: DISCONTINUED | OUTPATIENT
Start: 2024-02-07 | End: 2024-02-12 | Stop reason: HOSPADM

## 2024-02-07 RX ORDER — ASCORBIC ACID 500 MG
500 TABLET ORAL DAILY
Status: DISCONTINUED | OUTPATIENT
Start: 2024-02-07 | End: 2024-02-12 | Stop reason: HOSPADM

## 2024-02-07 RX ORDER — ACETAMINOPHEN 650 MG/1
650 SUPPOSITORY RECTAL EVERY 6 HOURS PRN
Status: DISCONTINUED | OUTPATIENT
Start: 2024-02-07 | End: 2024-02-12 | Stop reason: HOSPADM

## 2024-02-07 RX ORDER — SODIUM CHLORIDE 0.9 % (FLUSH) 0.9 %
5-40 SYRINGE (ML) INJECTION PRN
Status: DISCONTINUED | OUTPATIENT
Start: 2024-02-07 | End: 2024-02-12 | Stop reason: HOSPADM

## 2024-02-07 RX ORDER — ACETAMINOPHEN 500 MG
1000 TABLET ORAL ONCE
Status: COMPLETED | OUTPATIENT
Start: 2024-02-07 | End: 2024-02-07

## 2024-02-07 RX ORDER — HEPARIN SODIUM 1000 [USP'U]/ML
2000 INJECTION, SOLUTION INTRAVENOUS; SUBCUTANEOUS PRN
Status: DISCONTINUED | OUTPATIENT
Start: 2024-02-07 | End: 2024-02-12 | Stop reason: HOSPADM

## 2024-02-07 RX ORDER — ACETAMINOPHEN 325 MG/1
650 TABLET ORAL EVERY 6 HOURS PRN
Status: DISCONTINUED | OUTPATIENT
Start: 2024-02-07 | End: 2024-02-12 | Stop reason: HOSPADM

## 2024-02-07 RX ORDER — SODIUM CHLORIDE 9 MG/ML
INJECTION, SOLUTION INTRAVENOUS PRN
Status: DISCONTINUED | OUTPATIENT
Start: 2024-02-07 | End: 2024-02-12 | Stop reason: HOSPADM

## 2024-02-07 RX ORDER — HEPARIN SODIUM 1000 [USP'U]/ML
4000 INJECTION, SOLUTION INTRAVENOUS; SUBCUTANEOUS PRN
Status: DISCONTINUED | OUTPATIENT
Start: 2024-02-07 | End: 2024-02-12 | Stop reason: HOSPADM

## 2024-02-07 RX ORDER — BUDESONIDE 0.5 MG/2ML
0.5 INHALANT ORAL
Status: DISCONTINUED | OUTPATIENT
Start: 2024-02-07 | End: 2024-02-12 | Stop reason: HOSPADM

## 2024-02-07 RX ORDER — POLYETHYLENE GLYCOL 3350 17 G/17G
17 POWDER, FOR SOLUTION ORAL DAILY PRN
Status: DISCONTINUED | OUTPATIENT
Start: 2024-02-07 | End: 2024-02-12 | Stop reason: HOSPADM

## 2024-02-07 RX ORDER — HALOPERIDOL 5 MG/ML
5 INJECTION INTRAMUSCULAR EVERY 6 HOURS PRN
Status: DISCONTINUED | OUTPATIENT
Start: 2024-02-07 | End: 2024-02-08

## 2024-02-07 RX ORDER — ASPIRIN 81 MG/1
324 TABLET, CHEWABLE ORAL ONCE
Status: COMPLETED | OUTPATIENT
Start: 2024-02-07 | End: 2024-02-07

## 2024-02-07 RX ORDER — DEXAMETHASONE SODIUM PHOSPHATE 10 MG/ML
10 INJECTION INTRAMUSCULAR; INTRAVENOUS EVERY 24 HOURS
Status: DISCONTINUED | OUTPATIENT
Start: 2024-02-12 | End: 2024-02-07

## 2024-02-07 RX ORDER — MAGNESIUM SULFATE IN WATER 40 MG/ML
2000 INJECTION, SOLUTION INTRAVENOUS ONCE
Status: COMPLETED | OUTPATIENT
Start: 2024-02-07 | End: 2024-02-07

## 2024-02-07 RX ORDER — SODIUM CHLORIDE, SODIUM LACTATE, POTASSIUM CHLORIDE, CALCIUM CHLORIDE 600; 310; 30; 20 MG/100ML; MG/100ML; MG/100ML; MG/100ML
INJECTION, SOLUTION INTRAVENOUS CONTINUOUS
Status: DISCONTINUED | OUTPATIENT
Start: 2024-02-07 | End: 2024-02-11

## 2024-02-07 RX ORDER — HEPARIN SODIUM 10000 [USP'U]/100ML
5-30 INJECTION, SOLUTION INTRAVENOUS CONTINUOUS
Status: DISCONTINUED | OUTPATIENT
Start: 2024-02-07 | End: 2024-02-12 | Stop reason: HOSPADM

## 2024-02-07 RX ORDER — IPRATROPIUM BROMIDE AND ALBUTEROL SULFATE 2.5; .5 MG/3ML; MG/3ML
1 SOLUTION RESPIRATORY (INHALATION)
Status: DISCONTINUED | OUTPATIENT
Start: 2024-02-07 | End: 2024-02-12 | Stop reason: HOSPADM

## 2024-02-07 RX ORDER — DEXAMETHASONE SODIUM PHOSPHATE 10 MG/ML
10 INJECTION INTRAMUSCULAR; INTRAVENOUS 2 TIMES DAILY
Status: DISCONTINUED | OUTPATIENT
Start: 2024-02-07 | End: 2024-02-07

## 2024-02-07 RX ORDER — FENTANYL CITRATE 50 UG/ML
50 INJECTION, SOLUTION INTRAMUSCULAR; INTRAVENOUS ONCE
Status: COMPLETED | OUTPATIENT
Start: 2024-02-07 | End: 2024-02-07

## 2024-02-07 RX ORDER — DEXAMETHASONE SODIUM PHOSPHATE 10 MG/ML
10 INJECTION INTRAMUSCULAR; INTRAVENOUS ONCE
Status: COMPLETED | OUTPATIENT
Start: 2024-02-07 | End: 2024-02-07

## 2024-02-07 RX ORDER — ASPIRIN 81 MG/1
81 TABLET, CHEWABLE ORAL DAILY
Status: DISCONTINUED | OUTPATIENT
Start: 2024-02-08 | End: 2024-02-12 | Stop reason: HOSPADM

## 2024-02-07 RX ORDER — DEXAMETHASONE SODIUM PHOSPHATE 10 MG/ML
10 INJECTION INTRAMUSCULAR; INTRAVENOUS EVERY 12 HOURS
Status: DISCONTINUED | OUTPATIENT
Start: 2024-02-07 | End: 2024-02-08

## 2024-02-07 RX ORDER — CALCIUM GLUCONATE 20 MG/ML
1000 INJECTION, SOLUTION INTRAVENOUS ONCE
Status: COMPLETED | OUTPATIENT
Start: 2024-02-07 | End: 2024-02-07

## 2024-02-07 RX ORDER — CHOLECALCIFEROL (VITAMIN D3) 50 MCG
2000 TABLET ORAL DAILY
Status: DISCONTINUED | OUTPATIENT
Start: 2024-02-14 | End: 2024-02-12 | Stop reason: HOSPADM

## 2024-02-07 RX ORDER — POTASSIUM CHLORIDE 7.45 MG/ML
10 INJECTION INTRAVENOUS ONCE
Status: COMPLETED | OUTPATIENT
Start: 2024-02-07 | End: 2024-02-07

## 2024-02-07 RX ORDER — ARFORMOTEROL TARTRATE 15 UG/2ML
15 SOLUTION RESPIRATORY (INHALATION)
Status: DISCONTINUED | OUTPATIENT
Start: 2024-02-07 | End: 2024-02-12 | Stop reason: HOSPADM

## 2024-02-07 RX ADMIN — IPRATROPIUM BROMIDE AND ALBUTEROL SULFATE 1 DOSE: 2.5; .5 SOLUTION RESPIRATORY (INHALATION) at 17:05

## 2024-02-07 RX ADMIN — PIPERACILLIN AND TAZOBACTAM 4500 MG: 4; .5 INJECTION, POWDER, FOR SOLUTION INTRAVENOUS at 02:11

## 2024-02-07 RX ADMIN — PIPERACILLIN AND TAZOBACTAM 4500 MG: 4; .5 INJECTION, POWDER, FOR SOLUTION INTRAVENOUS at 17:27

## 2024-02-07 RX ADMIN — SODIUM CHLORIDE, PRESERVATIVE FREE 40 MG: 5 INJECTION INTRAVENOUS at 11:19

## 2024-02-07 RX ADMIN — SODIUM BICARBONATE: 84 INJECTION, SOLUTION INTRAVENOUS at 05:03

## 2024-02-07 RX ADMIN — SODIUM CHLORIDE 1000 ML: 9 INJECTION, SOLUTION INTRAVENOUS at 01:52

## 2024-02-07 RX ADMIN — CALCIUM GLUCONATE 1000 MG: 20 INJECTION, SOLUTION INTRAVENOUS at 11:06

## 2024-02-07 RX ADMIN — SODIUM CHLORIDE, POTASSIUM CHLORIDE, SODIUM LACTATE AND CALCIUM CHLORIDE: 600; 310; 30; 20 INJECTION, SOLUTION INTRAVENOUS at 11:54

## 2024-02-07 RX ADMIN — PIPERACILLIN AND TAZOBACTAM 4500 MG: 4; .5 INJECTION, POWDER, FOR SOLUTION INTRAVENOUS at 11:31

## 2024-02-07 RX ADMIN — BUDESONIDE 500 MCG: 0.5 SUSPENSION RESPIRATORY (INHALATION) at 12:41

## 2024-02-07 RX ADMIN — OXYCODONE HYDROCHLORIDE AND ACETAMINOPHEN 500 MG: 500 TABLET ORAL at 11:19

## 2024-02-07 RX ADMIN — SODIUM BICARBONATE 50 MEQ: 84 INJECTION INTRAVENOUS at 02:05

## 2024-02-07 RX ADMIN — ARFORMOTEROL TARTRATE 15 MCG: 15 SOLUTION RESPIRATORY (INHALATION) at 19:55

## 2024-02-07 RX ADMIN — FENTANYL CITRATE 50 MCG: 50 INJECTION, SOLUTION INTRAMUSCULAR; INTRAVENOUS at 09:26

## 2024-02-07 RX ADMIN — IPRATROPIUM BROMIDE AND ALBUTEROL SULFATE 1 DOSE: 2.5; .5 SOLUTION RESPIRATORY (INHALATION) at 12:41

## 2024-02-07 RX ADMIN — SODIUM CHLORIDE 10 MCG/MIN: 9 INJECTION, SOLUTION INTRAVENOUS at 01:49

## 2024-02-07 RX ADMIN — BUDESONIDE 500 MCG: 0.5 SUSPENSION RESPIRATORY (INHALATION) at 19:56

## 2024-02-07 RX ADMIN — HEPARIN SODIUM 4000 UNITS: 1000 INJECTION INTRAVENOUS; SUBCUTANEOUS at 05:00

## 2024-02-07 RX ADMIN — SODIUM CHLORIDE, PRESERVATIVE FREE 10 ML: 5 INJECTION INTRAVENOUS at 11:23

## 2024-02-07 RX ADMIN — ACETAMINOPHEN 1000 MG: 500 TABLET ORAL at 00:59

## 2024-02-07 RX ADMIN — SODIUM CHLORIDE 1000 ML: 9 INJECTION, SOLUTION INTRAVENOUS at 00:58

## 2024-02-07 RX ADMIN — DEXAMETHASONE SODIUM PHOSPHATE 10 MG: 10 INJECTION INTRAMUSCULAR; INTRAVENOUS at 15:42

## 2024-02-07 RX ADMIN — ARFORMOTEROL TARTRATE 15 MCG: 15 SOLUTION RESPIRATORY (INHALATION) at 12:36

## 2024-02-07 RX ADMIN — VANCOMYCIN HYDROCHLORIDE 2000 MG: 10 INJECTION, POWDER, LYOPHILIZED, FOR SOLUTION INTRAVENOUS at 04:51

## 2024-02-07 RX ADMIN — SODIUM CHLORIDE, PRESERVATIVE FREE 10 ML: 5 INJECTION INTRAVENOUS at 20:53

## 2024-02-07 RX ADMIN — DEXMEDETOMIDINE 1.3 MCG/KG/HR: 100 INJECTION, SOLUTION INTRAVENOUS at 20:52

## 2024-02-07 RX ADMIN — DEXAMETHASONE SODIUM PHOSPHATE 10 MG: 10 INJECTION INTRAMUSCULAR; INTRAVENOUS at 03:56

## 2024-02-07 RX ADMIN — IOPAMIDOL 75 ML: 755 INJECTION, SOLUTION INTRAVENOUS at 03:10

## 2024-02-07 RX ADMIN — POTASSIUM CHLORIDE 10 MEQ: 7.46 INJECTION, SOLUTION INTRAVENOUS at 03:49

## 2024-02-07 RX ADMIN — INSULIN LISPRO 1 UNITS: 100 INJECTION, SOLUTION INTRAVENOUS; SUBCUTANEOUS at 11:48

## 2024-02-07 RX ADMIN — SODIUM CHLORIDE, PRESERVATIVE FREE 40 MG: 5 INJECTION INTRAVENOUS at 22:06

## 2024-02-07 RX ADMIN — HEPARIN SODIUM 11 UNITS/KG/HR: 10000 INJECTION, SOLUTION INTRAVENOUS at 05:03

## 2024-02-07 RX ADMIN — DEXMEDETOMIDINE 0.4 MCG/KG/HR: 100 INJECTION, SOLUTION INTRAVENOUS at 18:13

## 2024-02-07 RX ADMIN — WATER 100 MG: 1 INJECTION INTRAMUSCULAR; INTRAVENOUS; SUBCUTANEOUS at 03:43

## 2024-02-07 RX ADMIN — ASPIRIN 81 MG CHEWABLE TABLET 324 MG: 81 TABLET CHEWABLE at 04:50

## 2024-02-07 RX ADMIN — MAGNESIUM SULFATE HEPTAHYDRATE 2000 MG: 40 INJECTION, SOLUTION INTRAVENOUS at 03:52

## 2024-02-07 RX ADMIN — Medication 6000 UNITS: at 11:20

## 2024-02-07 RX ADMIN — PERFLUTREN 1.5 ML: 6.52 INJECTION, SUSPENSION INTRAVENOUS at 14:51

## 2024-02-07 RX ADMIN — IPRATROPIUM BROMIDE AND ALBUTEROL SULFATE 1 DOSE: 2.5; .5 SOLUTION RESPIRATORY (INHALATION) at 19:56

## 2024-02-07 RX ADMIN — HALOPERIDOL LACTATE 5 MG: 5 INJECTION, SOLUTION INTRAMUSCULAR at 17:18

## 2024-02-07 NOTE — ED PROVIDER NOTES
[facility] EMERGENCY DEPARTMENT  EMERGENCY DEPARTMENT ENCOUNTER        Pt Name: Shiv Lee  MRN: [id number]  Birthdate 1940  Date of evaluation: 2/7/2024  Provider: Columba Barnett DO  PCP: Gilson Ramon III, DO  Note Started: 12:17 AM EST 2/7/24    CHIEF COMPLAINT       Chief Complaint   Patient presents with    Shortness of Breath     Hypoxic for ems, arrived on nrb. Possible pneumonia/sepsis? Shortness of breath off and on today, patient states today it felt like he was having a panic attack.        HISTORY OF PRESENT ILLNESS: 1 or more Elements   History From: Patient    Limitations to history : None  Social Determinants : None    Shiv Lee is a 83 y.o. male who presents for SOB. He started having shortness of breath earlier today. He states he felt like he had a panic attack. He was 84% on his normal 2L via nasal cannula. Per EMS he was recently diagnosed with pneumonia, but patient denies being on antibiotics. He admits to some dysuria recently. He was febrile at 101 per EMS.   Denies any chills, n/v, headache, dizziness, vision changes, neck tenderness or stiffness, weakness, cp, palpitations, leg swelling/tenderness, cough, abd pain, hematuria, diarrhea, constipation, bloody stools.    Nursing Notes were all reviewed and agreed with or any disagreements were addressed in the HPI.    ROS:   Pertinent positives and negatives are stated within HPI, all other systems reviewed and are negative.      --------------------------------------------- PAST HISTORY ---------------------------------------------  Past Medical History:  has a past medical history of Abnormal EKG, Aortic stenosis, Atrial fibrillation (HCC), Coronary artery disease, HTN (hypertension), Left atrial dilatation, Mitral regurgitation, and SBE (subacute bacterial endocarditis) prophylaxis candidate.    Past Surgical History:  has a past surgical history that includes Diagnostic Cardiac

## 2024-02-07 NOTE — ACP (ADVANCE CARE PLANNING)
Advance Care Planning   Healthcare Decision Maker:    Primary Decision Maker: Scott Lee spouse - [phone]    Click here to complete Healthcare Decision Makers including selection of the Healthcare Decision Maker Relationship (ie \"Primary\").

## 2024-02-07 NOTE — PATIENT CARE CONFERENCE
.  Intensive Care Daily Quality Rounding Checklist      ICU Team Members: bedside nurse, charge nurse, RT, clinical pharmacist, , residents    ICU Day #: NUMBER: 1    Intubation Date:      Ventilator Day #:     Central Line Insertion Date: February 7        Day #: NUMBER: 1        Indication: CVCIndication: Hemodynamically unstable requiring volume resuscitation     Arterial Line Insertion Date: February 7      Day #: NUMBER: 1    Temporary Hemodialysis Catheter Insertion Date:        Day #     DVT Prophylaxis: heparin drip    GI Prophylaxis:  protonix    Reese Catheter Insertion Date:         Day #:       Indications:       Continued need (if yes, reason documented and discussed with physician): NA    Skin Issues/ Wounds and ordered treatment discussed on rounds: Y    Goals/ Plans for the Day:  labs, sitter at bedside for patient safety    Reviewed plan and goals for day with patient and/or representative: No family at bedside

## 2024-02-07 NOTE — H&P
12:25 AM    LYMPHSABS 0.23 02/07/2024 12:25 AM    EOSABS 0.02 02/07/2024 12:25 AM    BASOSABS 0.02 02/07/2024 12:25 AM     CMP:    Lab Results   Component Value Date/Time     02/07/2024 04:57 AM    K 4.3 02/07/2024 04:57 AM     02/07/2024 04:57 AM    CO2 16 02/07/2024 04:57 AM    BUN 27 02/07/2024 04:57 AM    CREATININE 1.5 02/07/2024 04:57 AM    GFRAA >60 06/18/2020 09:49 AM    LABGLOM 45 02/07/2024 04:57 AM    GLUCOSE 201 02/07/2024 04:57 AM    PROT 6.5 02/07/2024 04:57 AM    LABALBU 3.7 02/07/2024 04:57 AM    CALCIUM 7.8 02/07/2024 04:57 AM    BILITOT 1.6 02/07/2024 04:57 AM    ALKPHOS 85 02/07/2024 04:57 AM    AST 97 02/07/2024 04:57 AM    ALT 45 02/07/2024 04:57 AM       Imaging:  CXR: Atherosclerotic disease and cardiomegaly.  Prominent interstitial markings on left greater than right pleural effusions.  Favor edema however infection could have a similar appearance.  Possible nodular density in the left upper lung.    CT head: No acute intracranial abnormality.    CTA pulmonary abd/pelvis: No evidence for pulmonary artery embolism to the lobar level.  Evaluation beyond this is nondiagnostic due to respiratory motion infarct.  Will advise close clinical follow-up or could consider repeat examination.  Interstitial/groundglass opacities in the lower left lung of questionable significance but may be flecked infectious/inflammatory process.  Gallbladder wall thickening versus pericholecystic fluid.  Correlate for cholecystitis.  Bladder wall thickening with perivesicular stranding.  Findings could reflect cystitis.  Correlate with urinalysis.  Trace free fluid in the pelvis.  Small hiatal hernia.  Severe atherosclerotic calcification which causes narrowing of the major abdominal aortic branch vessels.  There may be a small left ventricle apical aneurysm/pseudoaneurysm.    EKG:  I've personally reviewed the patient's EKG:  Sinus rhythm first-degree    Telemetry:  I've personally reviewed the patient's

## 2024-02-08 ENCOUNTER — APPOINTMENT (OUTPATIENT)
Dept: GENERAL RADIOLOGY | Age: 84
DRG: 871 | End: 2024-02-08
Payer: MEDICARE

## 2024-02-08 ENCOUNTER — APPOINTMENT (OUTPATIENT)
Dept: NUCLEAR MEDICINE | Age: 84
DRG: 871 | End: 2024-02-08
Payer: MEDICARE

## 2024-02-08 PROBLEM — Z51.5 PALLIATIVE CARE ENCOUNTER: Status: ACTIVE | Noted: 2024-02-08

## 2024-02-08 PROBLEM — Z71.89 GOALS OF CARE, COUNSELING/DISCUSSION: Status: ACTIVE | Noted: 2024-02-08

## 2024-02-08 LAB
25(OH)D3 SERPL-MCNC: 22.6 NG/ML (ref 30–100)
ALBUMIN SERPL-MCNC: 3.6 G/DL (ref 3.5–5.2)
ALP SERPL-CCNC: 59 U/L (ref 40–129)
ALT SERPL-CCNC: 45 U/L (ref 0–40)
ANION GAP SERPL CALCULATED.3IONS-SCNC: 17 MMOL/L (ref 7–16)
ANION GAP SERPL CALCULATED.3IONS-SCNC: 18 MMOL/L (ref 7–16)
AST SERPL-CCNC: 84 U/L (ref 0–39)
BASOPHILS # BLD: 0 K/UL (ref 0–0.2)
BASOPHILS NFR BLD: 0 % (ref 0–2)
BILIRUB SERPL-MCNC: 1 MG/DL (ref 0–1.2)
BUN SERPL-MCNC: 37 MG/DL (ref 6–23)
BUN SERPL-MCNC: 44 MG/DL (ref 6–23)
CALCIUM SERPL-MCNC: 8 MG/DL (ref 8.6–10.2)
CALCIUM SERPL-MCNC: 8.3 MG/DL (ref 8.6–10.2)
CHLORIDE SERPL-SCNC: 107 MMOL/L (ref 98–107)
CHLORIDE SERPL-SCNC: 108 MMOL/L (ref 98–107)
CO2 SERPL-SCNC: 18 MMOL/L (ref 22–29)
CO2 SERPL-SCNC: 19 MMOL/L (ref 22–29)
CREAT SERPL-MCNC: 2.2 MG/DL (ref 0.7–1.2)
CREAT SERPL-MCNC: 2.3 MG/DL (ref 0.7–1.2)
EKG ATRIAL RATE: 40 BPM
EKG ATRIAL RATE: 81 BPM
EKG ATRIAL RATE: 98 BPM
EKG P AXIS: 65 DEGREES
EKG P-R INTERVAL: 224 MS
EKG P-R INTERVAL: 320 MS
EKG Q-T INTERVAL: 356 MS
EKG Q-T INTERVAL: 428 MS
EKG Q-T INTERVAL: 488 MS
EKG QRS DURATION: 90 MS
EKG QRS DURATION: 94 MS
EKG QRS DURATION: 94 MS
EKG QTC CALCULATION (BAZETT): 397 MS
EKG QTC CALCULATION (BAZETT): 454 MS
EKG QTC CALCULATION (BAZETT): 497 MS
EKG R AXIS: 49 DEGREES
EKG R AXIS: 64 DEGREES
EKG R AXIS: 84 DEGREES
EKG T AXIS: -30 DEGREES
EKG T AXIS: 14 DEGREES
EKG T AXIS: 49 DEGREES
EKG VENTRICULAR RATE: 40 BPM
EKG VENTRICULAR RATE: 81 BPM
EKG VENTRICULAR RATE: 98 BPM
EOSINOPHIL # BLD: 0 K/UL (ref 0.05–0.5)
EOSINOPHILS RELATIVE PERCENT: 0 % (ref 0–6)
ERYTHROCYTE [DISTWIDTH] IN BLOOD BY AUTOMATED COUNT: 15.5 % (ref 11.5–15)
GFR SERPL CREATININE-BSD FRML MDRD: 27 ML/MIN/1.73M2
GFR SERPL CREATININE-BSD FRML MDRD: 29 ML/MIN/1.73M2
GLUCOSE BLD-MCNC: 159 MG/DL (ref 74–99)
GLUCOSE BLD-MCNC: 167 MG/DL (ref 74–99)
GLUCOSE BLD-MCNC: 168 MG/DL (ref 74–99)
GLUCOSE BLD-MCNC: 191 MG/DL (ref 74–99)
GLUCOSE BLD-MCNC: 193 MG/DL (ref 74–99)
GLUCOSE SERPL-MCNC: 175 MG/DL (ref 74–99)
GLUCOSE SERPL-MCNC: 198 MG/DL (ref 74–99)
HCT VFR BLD AUTO: 36.7 % (ref 37–54)
HGB BLD-MCNC: 12 G/DL (ref 12.5–16.5)
L PNEUMO1 AG UR QL IA.RAPID: NEGATIVE
LACTATE BLDV-SCNC: 2.7 MMOL/L (ref 0.5–1.9)
LACTATE BLDV-SCNC: 3.6 MMOL/L (ref 0.5–1.9)
LACTATE BLDV-SCNC: 4.8 MMOL/L (ref 0.5–1.9)
LYMPHOCYTES NFR BLD: 1.31 K/UL (ref 1.5–4)
LYMPHOCYTES RELATIVE PERCENT: 7 % (ref 20–42)
MAGNESIUM SERPL-MCNC: 2.3 MG/DL (ref 1.6–2.6)
MCH RBC QN AUTO: 30.8 PG (ref 26–35)
MCHC RBC AUTO-ENTMCNC: 32.7 G/DL (ref 32–34.5)
MCV RBC AUTO: 94.3 FL (ref 80–99.9)
MICROORGANISM SPEC CULT: NORMAL
MONOCYTES NFR BLD: 0.33 K/UL (ref 0.1–0.95)
MONOCYTES NFR BLD: 2 % (ref 2–12)
NEUTROPHILS NFR BLD: 91 % (ref 43–80)
NEUTS SEG NFR BLD: 17.26 K/UL (ref 1.8–7.3)
PARTIAL THROMBOPLASTIN TIME: 61.6 SEC (ref 24.5–35.1)
PHOSPHATE SERPL-MCNC: 5 MG/DL (ref 2.5–4.5)
PLATELET # BLD AUTO: 144 K/UL (ref 130–450)
PMV BLD AUTO: 10.3 FL (ref 7–12)
POTASSIUM SERPL-SCNC: 3.8 MMOL/L (ref 3.5–5)
POTASSIUM SERPL-SCNC: 4.1 MMOL/L (ref 3.5–5)
PROT SERPL-MCNC: 6.5 G/DL (ref 6.4–8.3)
RBC # BLD AUTO: 3.89 M/UL (ref 3.8–5.8)
RBC # BLD: ABNORMAL 10*6/UL
S PNEUM AG SPEC QL: POSITIVE
SODIUM SERPL-SCNC: 143 MMOL/L (ref 132–146)
SODIUM SERPL-SCNC: 144 MMOL/L (ref 132–146)
SPECIMEN DESCRIPTION: NORMAL
SPECIMEN SOURCE: ABNORMAL
TROPONIN I SERPL HS-MCNC: 1019 NG/L (ref 0–11)
TROPONIN I SERPL HS-MCNC: 652 NG/L (ref 0–11)
TROPONIN I SERPL HS-MCNC: 779 NG/L (ref 0–11)
TROPONIN I SERPL HS-MCNC: 889 NG/L (ref 0–11)
WBC OTHER # BLD: 18.9 K/UL (ref 4.5–11.5)

## 2024-02-08 PROCEDURE — 84100 ASSAY OF PHOSPHORUS: CPT

## 2024-02-08 PROCEDURE — 99232 SBSQ HOSP IP/OBS MODERATE 35: CPT | Performed by: NURSE PRACTITIONER

## 2024-02-08 PROCEDURE — 2580000003 HC RX 258: Performed by: INTERNAL MEDICINE

## 2024-02-08 PROCEDURE — 85025 COMPLETE CBC W/AUTO DIFF WBC: CPT

## 2024-02-08 PROCEDURE — 6360000002 HC RX W HCPCS

## 2024-02-08 PROCEDURE — 71045 X-RAY EXAM CHEST 1 VIEW: CPT

## 2024-02-08 PROCEDURE — 2700000000 HC OXYGEN THERAPY PER DAY

## 2024-02-08 PROCEDURE — 3430000000 HC RX DIAGNOSTIC RADIOPHARMACEUTICAL: Performed by: RADIOLOGY

## 2024-02-08 PROCEDURE — 85730 THROMBOPLASTIN TIME PARTIAL: CPT

## 2024-02-08 PROCEDURE — 83735 ASSAY OF MAGNESIUM: CPT

## 2024-02-08 PROCEDURE — 2500000003 HC RX 250 WO HCPCS: Performed by: INTERNAL MEDICINE

## 2024-02-08 PROCEDURE — 6370000000 HC RX 637 (ALT 250 FOR IP): Performed by: NURSE PRACTITIONER

## 2024-02-08 PROCEDURE — C9113 INJ PANTOPRAZOLE SODIUM, VIA: HCPCS | Performed by: NURSE PRACTITIONER

## 2024-02-08 PROCEDURE — 2580000003 HC RX 258: Performed by: NURSE PRACTITIONER

## 2024-02-08 PROCEDURE — 93010 ELECTROCARDIOGRAM REPORT: CPT | Performed by: INTERNAL MEDICINE

## 2024-02-08 PROCEDURE — 99233 SBSQ HOSP IP/OBS HIGH 50: CPT | Performed by: INTERNAL MEDICINE

## 2024-02-08 PROCEDURE — 84484 ASSAY OF TROPONIN QUANT: CPT

## 2024-02-08 PROCEDURE — 80048 BASIC METABOLIC PNL TOTAL CA: CPT

## 2024-02-08 PROCEDURE — 99232 SBSQ HOSP IP/OBS MODERATE 35: CPT | Performed by: SURGERY

## 2024-02-08 PROCEDURE — 2000000000 HC ICU R&B

## 2024-02-08 PROCEDURE — 6360000002 HC RX W HCPCS: Performed by: STUDENT IN AN ORGANIZED HEALTH CARE EDUCATION/TRAINING PROGRAM

## 2024-02-08 PROCEDURE — 94640 AIRWAY INHALATION TREATMENT: CPT

## 2024-02-08 PROCEDURE — 78226 HEPATOBILIARY SYSTEM IMAGING: CPT

## 2024-02-08 PROCEDURE — 6360000002 HC RX W HCPCS: Performed by: INTERNAL MEDICINE

## 2024-02-08 PROCEDURE — 83605 ASSAY OF LACTIC ACID: CPT

## 2024-02-08 PROCEDURE — 2580000003 HC RX 258

## 2024-02-08 PROCEDURE — A4216 STERILE WATER/SALINE, 10 ML: HCPCS | Performed by: NURSE PRACTITIONER

## 2024-02-08 PROCEDURE — 82962 GLUCOSE BLOOD TEST: CPT

## 2024-02-08 PROCEDURE — 6360000002 HC RX W HCPCS: Performed by: NURSE PRACTITIONER

## 2024-02-08 PROCEDURE — 6370000000 HC RX 637 (ALT 250 FOR IP)

## 2024-02-08 PROCEDURE — 80053 COMPREHEN METABOLIC PANEL: CPT

## 2024-02-08 PROCEDURE — A9537 TC99M MEBROFENIN: HCPCS | Performed by: RADIOLOGY

## 2024-02-08 RX ORDER — HYDRALAZINE HYDROCHLORIDE 20 MG/ML
10 INJECTION INTRAMUSCULAR; INTRAVENOUS EVERY 6 HOURS PRN
Status: DISCONTINUED | OUTPATIENT
Start: 2024-02-08 | End: 2024-02-12 | Stop reason: HOSPADM

## 2024-02-08 RX ORDER — DEXAMETHASONE SODIUM PHOSPHATE 10 MG/ML
10 INJECTION INTRAMUSCULAR; INTRAVENOUS DAILY
Status: DISCONTINUED | OUTPATIENT
Start: 2024-02-09 | End: 2024-02-09

## 2024-02-08 RX ORDER — TIMOLOL MALEATE 5 MG/ML
1 SOLUTION/ DROPS OPHTHALMIC DAILY
Status: DISCONTINUED | OUTPATIENT
Start: 2024-02-09 | End: 2024-02-12 | Stop reason: HOSPADM

## 2024-02-08 RX ORDER — METOPROLOL SUCCINATE 25 MG/1
25 TABLET, EXTENDED RELEASE ORAL DAILY
Status: DISCONTINUED | OUTPATIENT
Start: 2024-02-08 | End: 2024-02-08

## 2024-02-08 RX ORDER — METOPROLOL SUCCINATE 25 MG/1
25 TABLET, EXTENDED RELEASE ORAL DAILY
Status: DISCONTINUED | OUTPATIENT
Start: 2024-02-08 | End: 2024-02-12 | Stop reason: HOSPADM

## 2024-02-08 RX ORDER — SODIUM CHLORIDE, SODIUM LACTATE, POTASSIUM CHLORIDE, AND CALCIUM CHLORIDE .6; .31; .03; .02 G/100ML; G/100ML; G/100ML; G/100ML
1000 INJECTION, SOLUTION INTRAVENOUS ONCE
Status: COMPLETED | OUTPATIENT
Start: 2024-02-08 | End: 2024-02-08

## 2024-02-08 RX ADMIN — SODIUM CHLORIDE, PRESERVATIVE FREE 10 ML: 5 INJECTION INTRAVENOUS at 08:01

## 2024-02-08 RX ADMIN — SODIUM CHLORIDE, POTASSIUM CHLORIDE, SODIUM LACTATE AND CALCIUM CHLORIDE 1000 ML: 600; 310; 30; 20 INJECTION, SOLUTION INTRAVENOUS at 12:57

## 2024-02-08 RX ADMIN — IPRATROPIUM BROMIDE AND ALBUTEROL SULFATE 1 DOSE: 2.5; .5 SOLUTION RESPIRATORY (INHALATION) at 15:58

## 2024-02-08 RX ADMIN — BUDESONIDE 500 MCG: 0.5 SUSPENSION RESPIRATORY (INHALATION) at 20:52

## 2024-02-08 RX ADMIN — HEPARIN SODIUM 11 UNITS/KG/HR: 10000 INJECTION, SOLUTION INTRAVENOUS at 08:04

## 2024-02-08 RX ADMIN — SODIUM CHLORIDE, POTASSIUM CHLORIDE, SODIUM LACTATE AND CALCIUM CHLORIDE: 600; 310; 30; 20 INJECTION, SOLUTION INTRAVENOUS at 15:03

## 2024-02-08 RX ADMIN — DEXMEDETOMIDINE 1.1 MCG/KG/HR: 100 INJECTION, SOLUTION INTRAVENOUS at 01:28

## 2024-02-08 RX ADMIN — DEXMEDETOMIDINE 0.7 MCG/KG/HR: 100 INJECTION, SOLUTION INTRAVENOUS at 16:44

## 2024-02-08 RX ADMIN — ARFORMOTEROL TARTRATE 15 MCG: 15 SOLUTION RESPIRATORY (INHALATION) at 20:51

## 2024-02-08 RX ADMIN — SODIUM CHLORIDE, POTASSIUM CHLORIDE, SODIUM LACTATE AND CALCIUM CHLORIDE: 600; 310; 30; 20 INJECTION, SOLUTION INTRAVENOUS at 22:12

## 2024-02-08 RX ADMIN — DEXAMETHASONE SODIUM PHOSPHATE 10 MG: 10 INJECTION INTRAMUSCULAR; INTRAVENOUS at 04:10

## 2024-02-08 RX ADMIN — SODIUM CHLORIDE, PRESERVATIVE FREE 10 ML: 5 INJECTION INTRAVENOUS at 21:01

## 2024-02-08 RX ADMIN — IPRATROPIUM BROMIDE AND ALBUTEROL SULFATE 1 DOSE: 2.5; .5 SOLUTION RESPIRATORY (INHALATION) at 20:52

## 2024-02-08 RX ADMIN — PIPERACILLIN AND TAZOBACTAM 4500 MG: 4; .5 INJECTION, POWDER, FOR SOLUTION INTRAVENOUS at 01:29

## 2024-02-08 RX ADMIN — DEXMEDETOMIDINE 1.5 MCG/KG/HR: 100 INJECTION, SOLUTION INTRAVENOUS at 05:04

## 2024-02-08 RX ADMIN — METOPROLOL SUCCINATE 25 MG: 25 TABLET, EXTENDED RELEASE ORAL at 14:35

## 2024-02-08 RX ADMIN — Medication 6 MILLICURIE: at 09:24

## 2024-02-08 RX ADMIN — PIPERACILLIN AND TAZOBACTAM 4500 MG: 4; .5 INJECTION, POWDER, FOR SOLUTION INTRAVENOUS at 07:57

## 2024-02-08 RX ADMIN — PIPERACILLIN AND TAZOBACTAM 4500 MG: 4; .5 INJECTION, POWDER, FOR SOLUTION INTRAVENOUS at 16:45

## 2024-02-08 RX ADMIN — SODIUM CHLORIDE, PRESERVATIVE FREE 40 MG: 5 INJECTION INTRAVENOUS at 20:59

## 2024-02-08 RX ADMIN — IPRATROPIUM BROMIDE AND ALBUTEROL SULFATE 1 DOSE: 2.5; .5 SOLUTION RESPIRATORY (INHALATION) at 13:27

## 2024-02-08 RX ADMIN — HYDRALAZINE HYDROCHLORIDE 10 MG: 20 INJECTION, SOLUTION INTRAMUSCULAR; INTRAVENOUS at 16:48

## 2024-02-08 RX ADMIN — SODIUM CHLORIDE, POTASSIUM CHLORIDE, SODIUM LACTATE AND CALCIUM CHLORIDE: 600; 310; 30; 20 INJECTION, SOLUTION INTRAVENOUS at 05:05

## 2024-02-08 RX ADMIN — DEXMEDETOMIDINE 0.7 MCG/KG/HR: 100 INJECTION, SOLUTION INTRAVENOUS at 22:55

## 2024-02-08 RX ADMIN — DEXMEDETOMIDINE 0.9 MCG/KG/HR: 100 INJECTION, SOLUTION INTRAVENOUS at 12:34

## 2024-02-08 RX ADMIN — SODIUM CHLORIDE, PRESERVATIVE FREE 40 MG: 5 INJECTION INTRAVENOUS at 07:54

## 2024-02-08 RX ADMIN — DEXMEDETOMIDINE 1.5 MCG/KG/HR: 100 INJECTION, SOLUTION INTRAVENOUS at 09:10

## 2024-02-08 NOTE — PATIENT CARE CONFERENCE
Intensive Care Daily Quality Rounding Checklist        ICU Team Members: Dr. Andrade, Resident Dr Greer, Bedside nurse, Charge nurse, Respiratory therapist, Clinical pharmacist       ICU Day #: NUMBER: 2     Intubation Date:  n/a     Ventilator Day #: n/a     Central Line Insertion Date: February 7                                                    Day #: NUMBER: 2                                                    Indication: CVCIndication: Hemodynamically unstable requiring volume resuscitation      Arterial Line Insertion Date: February 7                             Day #: NUMBER: 2     Temporary Hemodialysis Catheter Insertion Date:  n/a                             Day # n/a     DVT Prophylaxis: heparin drip    GI Prophylaxis:  protonix     Reese Catheter Insertion Date:  2/7/2024                                        Day #: 2                             Indications: retention                             Continued need (if yes, reason documented and discussed with physician): n/a     Skin Issues/ Wounds and ordered treatment discussed on rounds: Yes     Goals/ Plans for the Day:  Monitor labs and vitals, treat/replace as needed.  Sitter at bedside for patient safety.  Consult to nephrology for MIGDALIA.  Wean steroids.     Reviewed plan and goals for day with patient and/or representative: No family at bedside

## 2024-02-09 ENCOUNTER — APPOINTMENT (OUTPATIENT)
Dept: GENERAL RADIOLOGY | Age: 84
DRG: 871 | End: 2024-02-09
Payer: MEDICARE

## 2024-02-09 LAB
ALBUMIN SERPL-MCNC: 3.6 G/DL (ref 3.5–5.2)
ALP SERPL-CCNC: 88 U/L (ref 40–129)
ALT SERPL-CCNC: 44 U/L (ref 0–40)
ANION GAP SERPL CALCULATED.3IONS-SCNC: 15 MMOL/L (ref 7–16)
ANION GAP SERPL CALCULATED.3IONS-SCNC: 17 MMOL/L (ref 7–16)
AST SERPL-CCNC: 60 U/L (ref 0–39)
BASOPHILS # BLD: 0 K/UL (ref 0–0.2)
BASOPHILS NFR BLD: 0 % (ref 0–2)
BILIRUB SERPL-MCNC: 0.9 MG/DL (ref 0–1.2)
BUN SERPL-MCNC: 51 MG/DL (ref 6–23)
CALCIUM SERPL-MCNC: 8.1 MG/DL (ref 8.6–10.2)
CALCIUM SERPL-MCNC: 8.5 MG/DL (ref 8.6–10.2)
CHLORIDE SERPL-SCNC: 107 MMOL/L (ref 98–107)
CHLORIDE SERPL-SCNC: 108 MMOL/L (ref 98–107)
CO2 SERPL-SCNC: 19 MMOL/L (ref 22–29)
CO2 SERPL-SCNC: 21 MMOL/L (ref 22–29)
CREAT SERPL-MCNC: 2.3 MG/DL (ref 0.7–1.2)
CREAT SERPL-MCNC: 2.3 MG/DL (ref 0.7–1.2)
EOSINOPHIL # BLD: 0 K/UL (ref 0.05–0.5)
EOSINOPHILS RELATIVE PERCENT: 0 % (ref 0–6)
ERYTHROCYTE [DISTWIDTH] IN BLOOD BY AUTOMATED COUNT: 15.6 % (ref 11.5–15)
GFR SERPL CREATININE-BSD FRML MDRD: 27 ML/MIN/1.73M2
GFR SERPL CREATININE-BSD FRML MDRD: 28 ML/MIN/1.73M2
GLUCOSE BLD-MCNC: 114 MG/DL (ref 74–99)
GLUCOSE BLD-MCNC: 122 MG/DL (ref 74–99)
GLUCOSE BLD-MCNC: 125 MG/DL (ref 74–99)
GLUCOSE BLD-MCNC: 140 MG/DL (ref 74–99)
GLUCOSE SERPL-MCNC: 122 MG/DL (ref 74–99)
GLUCOSE SERPL-MCNC: 147 MG/DL (ref 74–99)
HCT VFR BLD AUTO: 35.1 % (ref 37–54)
HGB BLD-MCNC: 11.7 G/DL (ref 12.5–16.5)
LACTATE BLDV-SCNC: 1.8 MMOL/L (ref 0.5–1.9)
LACTATE BLDV-SCNC: 2.7 MMOL/L (ref 0.5–1.9)
LYMPHOCYTES NFR BLD: 0.46 K/UL (ref 1.5–4)
LYMPHOCYTES RELATIVE PERCENT: 3 % (ref 20–42)
MAGNESIUM SERPL-MCNC: 2.2 MG/DL (ref 1.6–2.6)
MAGNESIUM SERPL-MCNC: 2.4 MG/DL (ref 1.6–2.6)
MCH RBC QN AUTO: 31.2 PG (ref 26–35)
MCHC RBC AUTO-ENTMCNC: 33.3 G/DL (ref 32–34.5)
MCV RBC AUTO: 93.6 FL (ref 80–99.9)
MICROORGANISM SPEC CULT: ABNORMAL
MONOCYTES NFR BLD: 0.77 K/UL (ref 0.1–0.95)
MONOCYTES NFR BLD: 4 % (ref 2–12)
NEUTROPHILS NFR BLD: 93 % (ref 43–80)
NEUTS SEG NFR BLD: 16.47 K/UL (ref 1.8–7.3)
PARTIAL THROMBOPLASTIN TIME: 49.8 SEC (ref 24.5–35.1)
PARTIAL THROMBOPLASTIN TIME: 64.1 SEC (ref 24.5–35.1)
PARTIAL THROMBOPLASTIN TIME: 73 SEC (ref 24.5–35.1)
PHOSPHATE SERPL-MCNC: 3.8 MG/DL (ref 2.5–4.5)
PHOSPHATE SERPL-MCNC: 4.3 MG/DL (ref 2.5–4.5)
PLATELET # BLD AUTO: 150 K/UL (ref 130–450)
PMV BLD AUTO: 11.3 FL (ref 7–12)
POTASSIUM SERPL-SCNC: 3.2 MMOL/L (ref 3.5–5)
PROT SERPL-MCNC: 6.3 G/DL (ref 6.4–8.3)
RBC # BLD AUTO: 3.75 M/UL (ref 3.8–5.8)
RBC # BLD: ABNORMAL 10*6/UL
SODIUM SERPL-SCNC: 143 MMOL/L (ref 132–146)
SODIUM SERPL-SCNC: 144 MMOL/L (ref 132–146)
SPECIMEN DESCRIPTION: ABNORMAL
TROPONIN I SERPL HS-MCNC: 639 NG/L (ref 0–11)
TROPONIN I SERPL HS-MCNC: 665 NG/L (ref 0–11)
WBC OTHER # BLD: 17.7 K/UL (ref 4.5–11.5)

## 2024-02-09 PROCEDURE — 97530 THERAPEUTIC ACTIVITIES: CPT

## 2024-02-09 PROCEDURE — C9113 INJ PANTOPRAZOLE SODIUM, VIA: HCPCS | Performed by: NURSE PRACTITIONER

## 2024-02-09 PROCEDURE — 84484 ASSAY OF TROPONIN QUANT: CPT

## 2024-02-09 PROCEDURE — 80048 BASIC METABOLIC PNL TOTAL CA: CPT

## 2024-02-09 PROCEDURE — 6360000002 HC RX W HCPCS: Performed by: NURSE PRACTITIONER

## 2024-02-09 PROCEDURE — 97165 OT EVAL LOW COMPLEX 30 MIN: CPT

## 2024-02-09 PROCEDURE — 82962 GLUCOSE BLOOD TEST: CPT

## 2024-02-09 PROCEDURE — 6360000002 HC RX W HCPCS

## 2024-02-09 PROCEDURE — 2580000003 HC RX 258: Performed by: NURSE PRACTITIONER

## 2024-02-09 PROCEDURE — 97161 PT EVAL LOW COMPLEX 20 MIN: CPT

## 2024-02-09 PROCEDURE — 99231 SBSQ HOSP IP/OBS SF/LOW 25: CPT | Performed by: NURSE PRACTITIONER

## 2024-02-09 PROCEDURE — 37799 UNLISTED PX VASCULAR SURGERY: CPT

## 2024-02-09 PROCEDURE — 2500000003 HC RX 250 WO HCPCS: Performed by: INTERNAL MEDICINE

## 2024-02-09 PROCEDURE — 2580000003 HC RX 258

## 2024-02-09 PROCEDURE — 85025 COMPLETE CBC W/AUTO DIFF WBC: CPT

## 2024-02-09 PROCEDURE — 6360000002 HC RX W HCPCS: Performed by: INTERNAL MEDICINE

## 2024-02-09 PROCEDURE — 94640 AIRWAY INHALATION TREATMENT: CPT

## 2024-02-09 PROCEDURE — 71045 X-RAY EXAM CHEST 1 VIEW: CPT

## 2024-02-09 PROCEDURE — A4216 STERILE WATER/SALINE, 10 ML: HCPCS | Performed by: NURSE PRACTITIONER

## 2024-02-09 PROCEDURE — 85730 THROMBOPLASTIN TIME PARTIAL: CPT

## 2024-02-09 PROCEDURE — 2000000000 HC ICU R&B

## 2024-02-09 PROCEDURE — 83735 ASSAY OF MAGNESIUM: CPT

## 2024-02-09 PROCEDURE — 99233 SBSQ HOSP IP/OBS HIGH 50: CPT | Performed by: INTERNAL MEDICINE

## 2024-02-09 PROCEDURE — 6370000000 HC RX 637 (ALT 250 FOR IP): Performed by: INTERNAL MEDICINE

## 2024-02-09 PROCEDURE — 83605 ASSAY OF LACTIC ACID: CPT

## 2024-02-09 PROCEDURE — 2580000003 HC RX 258: Performed by: INTERNAL MEDICINE

## 2024-02-09 PROCEDURE — 80053 COMPREHEN METABOLIC PANEL: CPT

## 2024-02-09 PROCEDURE — 6370000000 HC RX 637 (ALT 250 FOR IP): Performed by: NURSE PRACTITIONER

## 2024-02-09 PROCEDURE — 84100 ASSAY OF PHOSPHORUS: CPT

## 2024-02-09 PROCEDURE — 92610 EVALUATE SWALLOWING FUNCTION: CPT | Performed by: SPEECH-LANGUAGE PATHOLOGIST

## 2024-02-09 PROCEDURE — 2700000000 HC OXYGEN THERAPY PER DAY

## 2024-02-09 PROCEDURE — 6360000002 HC RX W HCPCS: Performed by: STUDENT IN AN ORGANIZED HEALTH CARE EDUCATION/TRAINING PROGRAM

## 2024-02-09 RX ORDER — FUROSEMIDE 10 MG/ML
80 INJECTION INTRAMUSCULAR; INTRAVENOUS ONCE
Status: COMPLETED | OUTPATIENT
Start: 2024-02-09 | End: 2024-02-09

## 2024-02-09 RX ORDER — DEXAMETHASONE SODIUM PHOSPHATE 10 MG/ML
6 INJECTION INTRAMUSCULAR; INTRAVENOUS DAILY
Status: DISCONTINUED | OUTPATIENT
Start: 2024-02-10 | End: 2024-02-12 | Stop reason: HOSPADM

## 2024-02-09 RX ORDER — POTASSIUM CHLORIDE 29.8 MG/ML
20 INJECTION INTRAVENOUS
Status: COMPLETED | OUTPATIENT
Start: 2024-02-09 | End: 2024-02-09

## 2024-02-09 RX ADMIN — ARFORMOTEROL TARTRATE 15 MCG: 15 SOLUTION RESPIRATORY (INHALATION) at 20:21

## 2024-02-09 RX ADMIN — HEPARIN SODIUM 13 UNITS/KG/HR: 10000 INJECTION, SOLUTION INTRAVENOUS at 07:20

## 2024-02-09 RX ADMIN — PIPERACILLIN AND TAZOBACTAM 4500 MG: 4; .5 INJECTION, POWDER, FOR SOLUTION INTRAVENOUS at 11:03

## 2024-02-09 RX ADMIN — ARFORMOTEROL TARTRATE 15 MCG: 15 SOLUTION RESPIRATORY (INHALATION) at 08:47

## 2024-02-09 RX ADMIN — POTASSIUM CHLORIDE 20 MEQ: 29.8 INJECTION, SOLUTION INTRAVENOUS at 08:32

## 2024-02-09 RX ADMIN — HEPARIN SODIUM 2000 UNITS: 1000 INJECTION INTRAVENOUS; SUBCUTANEOUS at 05:36

## 2024-02-09 RX ADMIN — POTASSIUM CHLORIDE 20 MEQ: 29.8 INJECTION, SOLUTION INTRAVENOUS at 06:36

## 2024-02-09 RX ADMIN — DEXAMETHASONE SODIUM PHOSPHATE 10 MG: 10 INJECTION INTRAMUSCULAR; INTRAVENOUS at 07:29

## 2024-02-09 RX ADMIN — IPRATROPIUM BROMIDE AND ALBUTEROL SULFATE 1 DOSE: 2.5; .5 SOLUTION RESPIRATORY (INHALATION) at 08:47

## 2024-02-09 RX ADMIN — TIMOLOL MALEATE 1 DROP: 5 SOLUTION OPHTHALMIC at 08:05

## 2024-02-09 RX ADMIN — BUDESONIDE 500 MCG: 0.5 SUSPENSION RESPIRATORY (INHALATION) at 08:47

## 2024-02-09 RX ADMIN — IPRATROPIUM BROMIDE AND ALBUTEROL SULFATE 1 DOSE: 2.5; .5 SOLUTION RESPIRATORY (INHALATION) at 20:21

## 2024-02-09 RX ADMIN — SODIUM CHLORIDE, PRESERVATIVE FREE 10 ML: 5 INJECTION INTRAVENOUS at 07:29

## 2024-02-09 RX ADMIN — SODIUM CHLORIDE, PRESERVATIVE FREE 10 ML: 5 INJECTION INTRAVENOUS at 22:01

## 2024-02-09 RX ADMIN — SODIUM CHLORIDE, PRESERVATIVE FREE 40 MG: 5 INJECTION INTRAVENOUS at 07:29

## 2024-02-09 RX ADMIN — IPRATROPIUM BROMIDE AND ALBUTEROL SULFATE 1 DOSE: 2.5; .5 SOLUTION RESPIRATORY (INHALATION) at 11:07

## 2024-02-09 RX ADMIN — FUROSEMIDE 80 MG: 10 INJECTION, SOLUTION INTRAMUSCULAR; INTRAVENOUS at 12:24

## 2024-02-09 RX ADMIN — PIPERACILLIN AND TAZOBACTAM 4500 MG: 4; .5 INJECTION, POWDER, FOR SOLUTION INTRAVENOUS at 01:47

## 2024-02-09 RX ADMIN — BUDESONIDE 500 MCG: 0.5 SUSPENSION RESPIRATORY (INHALATION) at 20:21

## 2024-02-09 RX ADMIN — SODIUM CHLORIDE, POTASSIUM CHLORIDE, SODIUM LACTATE AND CALCIUM CHLORIDE: 600; 310; 30; 20 INJECTION, SOLUTION INTRAVENOUS at 22:38

## 2024-02-09 RX ADMIN — SODIUM CHLORIDE, PRESERVATIVE FREE 40 MG: 5 INJECTION INTRAVENOUS at 22:01

## 2024-02-09 RX ADMIN — POTASSIUM CHLORIDE 20 MEQ: 29.8 INJECTION, SOLUTION INTRAVENOUS at 07:21

## 2024-02-09 RX ADMIN — DEXMEDETOMIDINE 0.7 MCG/KG/HR: 100 INJECTION, SOLUTION INTRAVENOUS at 05:32

## 2024-02-09 RX ADMIN — WATER 2000 MG: 1 INJECTION INTRAMUSCULAR; INTRAVENOUS; SUBCUTANEOUS at 12:09

## 2024-02-09 NOTE — PATIENT CARE CONFERENCE
.  Intensive Care Daily Quality Rounding Checklist      ICU Team Members: Dr. Andrade, Residents Kerry Moreland, Percy, & Elan, Bedside nurse, Charge nurse, Clinical pharmacist     ICU Day #: NUMBER: 3    Intubation Date:  n/a    Ventilator Day #: n/a    Central Line Insertion Date: February 7        Day #: NUMBER: 3        Indication: CVCIndication: Hemodynamically unstable requiring volume resuscitation     Arterial Line Insertion Date: February 7      Day #: NUMBER: 3    Temporary Hemodialysis Catheter Insertion Date:  n/a      Day # n/a    DVT Prophylaxis: Heparin gtt.    GI Prophylaxis: Protonix    Reese Catheter Insertion Date: February 7       Day #: 3      Indications: Urinary retention      Continued need (if yes, reason documented and discussed with physician): yes,     Skin Issues/ Wounds and ordered treatment discussed on rounds: none, SOS precautions.    Goals/ Plans for the Day: Monitor labs and vitals, treat/replace as needed. Continue sitter at bedside. Start rocephin for UTI/PNA. Wean precedex as able. PT/OT eval. For speech eval. Single dose lasix    Reviewed plan and goals for day with patient and/or representative: Yes

## 2024-02-10 ENCOUNTER — APPOINTMENT (OUTPATIENT)
Dept: GENERAL RADIOLOGY | Age: 84
DRG: 871 | End: 2024-02-10
Payer: MEDICARE

## 2024-02-10 LAB
ALBUMIN SERPL-MCNC: 3.6 G/DL (ref 3.5–5.2)
ALP SERPL-CCNC: 85 U/L (ref 40–129)
ALT SERPL-CCNC: 39 U/L (ref 0–40)
ANION GAP SERPL CALCULATED.3IONS-SCNC: 13 MMOL/L (ref 7–16)
ANION GAP SERPL CALCULATED.3IONS-SCNC: 15 MMOL/L (ref 7–16)
ANION GAP SERPL CALCULATED.3IONS-SCNC: 17 MMOL/L (ref 7–16)
AST SERPL-CCNC: 41 U/L (ref 0–39)
B.E.: -3.4 MMOL/L (ref -3–3)
BASOPHILS # BLD: 0.02 K/UL (ref 0–0.2)
BASOPHILS NFR BLD: 0 % (ref 0–2)
BILIRUB SERPL-MCNC: 0.8 MG/DL (ref 0–1.2)
BUN SERPL-MCNC: 55 MG/DL (ref 6–23)
BUN SERPL-MCNC: 57 MG/DL (ref 6–23)
BUN SERPL-MCNC: 58 MG/DL (ref 6–23)
CALCIUM SERPL-MCNC: 8.3 MG/DL (ref 8.6–10.2)
CALCIUM SERPL-MCNC: 8.6 MG/DL (ref 8.6–10.2)
CALCIUM SERPL-MCNC: 8.7 MG/DL (ref 8.6–10.2)
CHLORIDE SERPL-SCNC: 107 MMOL/L (ref 98–107)
CHLORIDE SERPL-SCNC: 108 MMOL/L (ref 98–107)
CHLORIDE SERPL-SCNC: 108 MMOL/L (ref 98–107)
CO2 SERPL-SCNC: 21 MMOL/L (ref 22–29)
CO2 SERPL-SCNC: 23 MMOL/L (ref 22–29)
CO2 SERPL-SCNC: 24 MMOL/L (ref 22–29)
COHB: 0.7 % (ref 0–1.5)
CREAT SERPL-MCNC: 2.4 MG/DL (ref 0.7–1.2)
CREAT SERPL-MCNC: 2.4 MG/DL (ref 0.7–1.2)
CREAT SERPL-MCNC: 2.5 MG/DL (ref 0.7–1.2)
CRITICAL: ABNORMAL
DATE ANALYZED: ABNORMAL
DATE OF COLLECTION: ABNORMAL
EOSINOPHIL # BLD: 0 K/UL (ref 0.05–0.5)
EOSINOPHILS RELATIVE PERCENT: 0 % (ref 0–6)
ERYTHROCYTE [DISTWIDTH] IN BLOOD BY AUTOMATED COUNT: 15.6 % (ref 11.5–15)
GFR SERPL CREATININE-BSD FRML MDRD: 25 ML/MIN/1.73M2
GFR SERPL CREATININE-BSD FRML MDRD: 26 ML/MIN/1.73M2
GFR SERPL CREATININE-BSD FRML MDRD: 27 ML/MIN/1.73M2
GLUCOSE BLD-MCNC: 124 MG/DL (ref 74–99)
GLUCOSE BLD-MCNC: 138 MG/DL (ref 74–99)
GLUCOSE SERPL-MCNC: 122 MG/DL (ref 74–99)
GLUCOSE SERPL-MCNC: 126 MG/DL (ref 74–99)
GLUCOSE SERPL-MCNC: 151 MG/DL (ref 74–99)
HCO3: 18.6 MMOL/L (ref 22–26)
HCT VFR BLD AUTO: 33.7 % (ref 37–54)
HGB BLD-MCNC: 11.1 G/DL (ref 12.5–16.5)
HHB: 4.4 % (ref 0–5)
IMM GRANULOCYTES # BLD AUTO: 0.39 K/UL (ref 0–0.58)
IMM GRANULOCYTES NFR BLD: 2 % (ref 0–5)
LAB: ABNORMAL
LYMPHOCYTES NFR BLD: 0.66 K/UL (ref 1.5–4)
LYMPHOCYTES RELATIVE PERCENT: 4 % (ref 20–42)
Lab: 2245
MAGNESIUM SERPL-MCNC: 2.2 MG/DL (ref 1.6–2.6)
MCH RBC QN AUTO: 30.8 PG (ref 26–35)
MCHC RBC AUTO-ENTMCNC: 32.9 G/DL (ref 32–34.5)
MCV RBC AUTO: 93.6 FL (ref 80–99.9)
METHB: 0.3 % (ref 0–1.5)
MODE: ABNORMAL
MONOCYTES NFR BLD: 1.02 K/UL (ref 0.1–0.95)
MONOCYTES NFR BLD: 5 % (ref 2–12)
NEUTROPHILS NFR BLD: 89 % (ref 43–80)
NEUTS SEG NFR BLD: 16.66 K/UL (ref 1.8–7.3)
O2 CONTENT: 16.4 ML/DL
O2 SATURATION: 95.6 % (ref 92–98.5)
O2HB: 94.6 % (ref 94–97)
OPERATOR ID: 2485
PARTIAL THROMBOPLASTIN TIME: 49.2 SEC (ref 24.5–35.1)
PARTIAL THROMBOPLASTIN TIME: 60.1 SEC (ref 24.5–35.1)
PARTIAL THROMBOPLASTIN TIME: 62.1 SEC (ref 24.5–35.1)
PATIENT TEMP: 37 C
PCO2: 25.3 MMHG (ref 35–45)
PH BLOOD GAS: 7.48 (ref 7.35–7.45)
PHOSPHATE SERPL-MCNC: 3.5 MG/DL (ref 2.5–4.5)
PLATELET # BLD AUTO: 180 K/UL (ref 130–450)
PMV BLD AUTO: 11.1 FL (ref 7–12)
PO2: 78.3 MMHG (ref 75–100)
POTASSIUM SERPL-SCNC: 3.7 MMOL/L (ref 3.5–5)
POTASSIUM SERPL-SCNC: 3.8 MMOL/L (ref 3.5–5)
POTASSIUM SERPL-SCNC: 4.2 MMOL/L (ref 3.5–5)
PROT SERPL-MCNC: 6.6 G/DL (ref 6.4–8.3)
RBC # BLD AUTO: 3.6 M/UL (ref 3.8–5.8)
SODIUM SERPL-SCNC: 145 MMOL/L (ref 132–146)
SODIUM SERPL-SCNC: 145 MMOL/L (ref 132–146)
SODIUM SERPL-SCNC: 146 MMOL/L (ref 132–146)
SOURCE, BLOOD GAS: ABNORMAL
THB: 12.3 G/DL (ref 11.5–16.5)
TIME ANALYZED: 2251
WBC OTHER # BLD: 18.8 K/UL (ref 4.5–11.5)

## 2024-02-10 PROCEDURE — 85730 THROMBOPLASTIN TIME PARTIAL: CPT

## 2024-02-10 PROCEDURE — 6360000002 HC RX W HCPCS: Performed by: INTERNAL MEDICINE

## 2024-02-10 PROCEDURE — 2580000003 HC RX 258: Performed by: INTERNAL MEDICINE

## 2024-02-10 PROCEDURE — 85025 COMPLETE CBC W/AUTO DIFF WBC: CPT

## 2024-02-10 PROCEDURE — 80053 COMPREHEN METABOLIC PANEL: CPT

## 2024-02-10 PROCEDURE — 6370000000 HC RX 637 (ALT 250 FOR IP): Performed by: NURSE PRACTITIONER

## 2024-02-10 PROCEDURE — 2580000003 HC RX 258: Performed by: NURSE PRACTITIONER

## 2024-02-10 PROCEDURE — 84100 ASSAY OF PHOSPHORUS: CPT

## 2024-02-10 PROCEDURE — 83735 ASSAY OF MAGNESIUM: CPT

## 2024-02-10 PROCEDURE — 6360000002 HC RX W HCPCS: Performed by: NURSE PRACTITIONER

## 2024-02-10 PROCEDURE — 94640 AIRWAY INHALATION TREATMENT: CPT

## 2024-02-10 PROCEDURE — C9113 INJ PANTOPRAZOLE SODIUM, VIA: HCPCS | Performed by: NURSE PRACTITIONER

## 2024-02-10 PROCEDURE — C9113 INJ PANTOPRAZOLE SODIUM, VIA: HCPCS | Performed by: INTERNAL MEDICINE

## 2024-02-10 PROCEDURE — 36592 COLLECT BLOOD FROM PICC: CPT

## 2024-02-10 PROCEDURE — 2060000000 HC ICU INTERMEDIATE R&B

## 2024-02-10 PROCEDURE — 82962 GLUCOSE BLOOD TEST: CPT

## 2024-02-10 PROCEDURE — 6370000000 HC RX 637 (ALT 250 FOR IP)

## 2024-02-10 PROCEDURE — 71045 X-RAY EXAM CHEST 1 VIEW: CPT

## 2024-02-10 PROCEDURE — A4216 STERILE WATER/SALINE, 10 ML: HCPCS | Performed by: INTERNAL MEDICINE

## 2024-02-10 PROCEDURE — 6360000002 HC RX W HCPCS: Performed by: STUDENT IN AN ORGANIZED HEALTH CARE EDUCATION/TRAINING PROGRAM

## 2024-02-10 PROCEDURE — 6360000002 HC RX W HCPCS

## 2024-02-10 PROCEDURE — 93005 ELECTROCARDIOGRAM TRACING: CPT | Performed by: INTERNAL MEDICINE

## 2024-02-10 PROCEDURE — 82805 BLOOD GASES W/O2 SATURATION: CPT

## 2024-02-10 PROCEDURE — 80048 BASIC METABOLIC PNL TOTAL CA: CPT

## 2024-02-10 PROCEDURE — 2700000000 HC OXYGEN THERAPY PER DAY

## 2024-02-10 PROCEDURE — 6370000000 HC RX 637 (ALT 250 FOR IP): Performed by: INTERNAL MEDICINE

## 2024-02-10 PROCEDURE — 2580000003 HC RX 258

## 2024-02-10 RX ORDER — POTASSIUM CHLORIDE 29.8 MG/ML
20 INJECTION INTRAVENOUS
Status: COMPLETED | OUTPATIENT
Start: 2024-02-10 | End: 2024-02-10

## 2024-02-10 RX ORDER — FUROSEMIDE 10 MG/ML
80 INJECTION INTRAMUSCULAR; INTRAVENOUS ONCE
Status: COMPLETED | OUTPATIENT
Start: 2024-02-10 | End: 2024-02-10

## 2024-02-10 RX ADMIN — ARFORMOTEROL TARTRATE 15 MCG: 15 SOLUTION RESPIRATORY (INHALATION) at 19:34

## 2024-02-10 RX ADMIN — WATER 2000 MG: 1 INJECTION INTRAMUSCULAR; INTRAVENOUS; SUBCUTANEOUS at 12:04

## 2024-02-10 RX ADMIN — SODIUM CHLORIDE, PRESERVATIVE FREE 40 MG: 5 INJECTION INTRAVENOUS at 20:59

## 2024-02-10 RX ADMIN — SODIUM CHLORIDE, PRESERVATIVE FREE 40 MG: 5 INJECTION INTRAVENOUS at 09:40

## 2024-02-10 RX ADMIN — SODIUM CHLORIDE, POTASSIUM CHLORIDE, SODIUM LACTATE AND CALCIUM CHLORIDE: 600; 310; 30; 20 INJECTION, SOLUTION INTRAVENOUS at 06:24

## 2024-02-10 RX ADMIN — DEXAMETHASONE SODIUM PHOSPHATE 6 MG: 10 INJECTION INTRAMUSCULAR; INTRAVENOUS at 09:40

## 2024-02-10 RX ADMIN — IPRATROPIUM BROMIDE AND ALBUTEROL SULFATE 1 DOSE: 2.5; .5 SOLUTION RESPIRATORY (INHALATION) at 12:29

## 2024-02-10 RX ADMIN — HEPARIN SODIUM 2000 UNITS: 1000 INJECTION INTRAVENOUS; SUBCUTANEOUS at 05:22

## 2024-02-10 RX ADMIN — TIMOLOL MALEATE 1 DROP: 5 SOLUTION OPHTHALMIC at 09:43

## 2024-02-10 RX ADMIN — IPRATROPIUM BROMIDE AND ALBUTEROL SULFATE 1 DOSE: 2.5; .5 SOLUTION RESPIRATORY (INHALATION) at 19:34

## 2024-02-10 RX ADMIN — HEPARIN SODIUM 13 UNITS/KG/HR: 10000 INJECTION, SOLUTION INTRAVENOUS at 05:14

## 2024-02-10 RX ADMIN — SODIUM CHLORIDE, PRESERVATIVE FREE 10 ML: 5 INJECTION INTRAVENOUS at 20:59

## 2024-02-10 RX ADMIN — SODIUM CHLORIDE, POTASSIUM CHLORIDE, SODIUM LACTATE AND CALCIUM CHLORIDE: 600; 310; 30; 20 INJECTION, SOLUTION INTRAVENOUS at 23:26

## 2024-02-10 RX ADMIN — ASPIRIN 81 MG CHEWABLE TABLET 81 MG: 81 TABLET CHEWABLE at 09:39

## 2024-02-10 RX ADMIN — FUROSEMIDE 80 MG: 10 INJECTION, SOLUTION INTRAMUSCULAR; INTRAVENOUS at 13:59

## 2024-02-10 RX ADMIN — IPRATROPIUM BROMIDE AND ALBUTEROL SULFATE 1 DOSE: 2.5; .5 SOLUTION RESPIRATORY (INHALATION) at 09:35

## 2024-02-10 RX ADMIN — ARFORMOTEROL TARTRATE 15 MCG: 15 SOLUTION RESPIRATORY (INHALATION) at 09:36

## 2024-02-10 RX ADMIN — POTASSIUM CHLORIDE 20 MEQ: 29.8 INJECTION, SOLUTION INTRAVENOUS at 06:28

## 2024-02-10 RX ADMIN — BUDESONIDE 500 MCG: 0.5 SUSPENSION RESPIRATORY (INHALATION) at 19:34

## 2024-02-10 RX ADMIN — OXYCODONE HYDROCHLORIDE AND ACETAMINOPHEN 500 MG: 500 TABLET ORAL at 09:44

## 2024-02-10 RX ADMIN — HYDRALAZINE HYDROCHLORIDE 10 MG: 20 INJECTION, SOLUTION INTRAMUSCULAR; INTRAVENOUS at 12:04

## 2024-02-10 RX ADMIN — Medication 6000 UNITS: at 09:39

## 2024-02-10 RX ADMIN — METOPROLOL SUCCINATE 25 MG: 25 TABLET, EXTENDED RELEASE ORAL at 09:40

## 2024-02-10 RX ADMIN — IPRATROPIUM BROMIDE AND ALBUTEROL SULFATE 1 DOSE: 2.5; .5 SOLUTION RESPIRATORY (INHALATION) at 16:38

## 2024-02-10 RX ADMIN — BUDESONIDE 500 MCG: 0.5 SUSPENSION RESPIRATORY (INHALATION) at 09:36

## 2024-02-10 NOTE — PATIENT CARE CONFERENCE
Intensive Care Daily Quality Rounding Checklist        ICU Team Members: Dr. Andrade, Residents Kerry Moreland, Percy, & Elan, Bedside nurse, Charge nurse, Clinical pharmacist      ICU Day #: NUMBER: 4     Intubation Date:  n/a     Ventilator Day #: n/a     Central Line Insertion Date: February 7                                                    Day #: NUMBER: 4                                                    Indication: CVCIndication: Hemodynamically unstable requiring volume resuscitation      Arterial Line Insertion Date: February 7                             Day #: NUMBER: 4     Temporary Hemodialysis Catheter Insertion Date:  n/a                             Day # n/a     DVT Prophylaxis: Heparin gtt.    GI Prophylaxis: Protonix     Reese Catheter Insertion Date: February 7                                        Day #: 4                             Indications: Urinary retention                             Continued need (if yes, reason documented and discussed with physician): yes,      Skin Issues/ Wounds and ordered treatment discussed on rounds: none, SOS precautions.     Goals/ Plans for the Day: Monitor labs and vitals, treat/replace as needed. rocephin for UTI/PNA. PT/OT eval. Order chest x-ray, transfer to IM     Reviewed plan and goals for day with patient and/or representative: Yes

## 2024-02-11 LAB
ALBUMIN SERPL-MCNC: 3.9 G/DL (ref 3.5–5.2)
ALP SERPL-CCNC: 58 U/L (ref 40–129)
ALT SERPL-CCNC: 34 U/L (ref 0–40)
ANION GAP SERPL CALCULATED.3IONS-SCNC: 16 MMOL/L (ref 7–16)
AST SERPL-CCNC: 32 U/L (ref 0–39)
BASOPHILS # BLD: 0.04 K/UL (ref 0–0.2)
BASOPHILS NFR BLD: 0 % (ref 0–2)
BILIRUB SERPL-MCNC: 1 MG/DL (ref 0–1.2)
BNP SERPL-MCNC: ABNORMAL PG/ML (ref 0–450)
BUN SERPL-MCNC: 58 MG/DL (ref 6–23)
CALCIUM SERPL-MCNC: 9 MG/DL (ref 8.6–10.2)
CHLORIDE SERPL-SCNC: 107 MMOL/L (ref 98–107)
CO2 SERPL-SCNC: 25 MMOL/L (ref 22–29)
CREAT SERPL-MCNC: 2.4 MG/DL (ref 0.7–1.2)
CRP SERPL HS-MCNC: 49 MG/L (ref 0–5)
EOSINOPHIL # BLD: 0 K/UL (ref 0.05–0.5)
EOSINOPHILS RELATIVE PERCENT: 0 % (ref 0–6)
ERYTHROCYTE [DISTWIDTH] IN BLOOD BY AUTOMATED COUNT: 15.8 % (ref 11.5–15)
GFR SERPL CREATININE-BSD FRML MDRD: 27 ML/MIN/1.73M2
GLUCOSE BLD-MCNC: 121 MG/DL (ref 74–99)
GLUCOSE SERPL-MCNC: 124 MG/DL (ref 74–99)
HCT VFR BLD AUTO: 33.3 % (ref 37–54)
HGB BLD-MCNC: 10.9 G/DL (ref 12.5–16.5)
IMM GRANULOCYTES # BLD AUTO: 0.42 K/UL (ref 0–0.58)
IMM GRANULOCYTES NFR BLD: 3 % (ref 0–5)
LACTATE BLDV-SCNC: 1.7 MMOL/L (ref 0.5–1.9)
LYMPHOCYTES NFR BLD: 0.83 K/UL (ref 1.5–4)
LYMPHOCYTES RELATIVE PERCENT: 5 % (ref 20–42)
MAGNESIUM SERPL-MCNC: 2.2 MG/DL (ref 1.6–2.6)
MCH RBC QN AUTO: 30.5 PG (ref 26–35)
MCHC RBC AUTO-ENTMCNC: 32.7 G/DL (ref 32–34.5)
MCV RBC AUTO: 93.3 FL (ref 80–99.9)
MONOCYTES NFR BLD: 1.17 K/UL (ref 0.1–0.95)
MONOCYTES NFR BLD: 7 % (ref 2–12)
NEUTROPHILS NFR BLD: 85 % (ref 43–80)
NEUTS SEG NFR BLD: 14.14 K/UL (ref 1.8–7.3)
PARTIAL THROMBOPLASTIN TIME: 65.1 SEC (ref 24.5–35.1)
PHOSPHATE SERPL-MCNC: 4.3 MG/DL (ref 2.5–4.5)
PLATELET # BLD AUTO: 204 K/UL (ref 130–450)
PMV BLD AUTO: 11.3 FL (ref 7–12)
POTASSIUM SERPL-SCNC: 3.6 MMOL/L (ref 3.5–5)
PROT SERPL-MCNC: 6.8 G/DL (ref 6.4–8.3)
RBC # BLD AUTO: 3.57 M/UL (ref 3.8–5.8)
SODIUM SERPL-SCNC: 148 MMOL/L (ref 132–146)
TROPONIN I SERPL HS-MCNC: 686 NG/L (ref 0–11)
WBC OTHER # BLD: 16.6 K/UL (ref 4.5–11.5)

## 2024-02-11 PROCEDURE — 2060000000 HC ICU INTERMEDIATE R&B

## 2024-02-11 PROCEDURE — 83605 ASSAY OF LACTIC ACID: CPT

## 2024-02-11 PROCEDURE — 2580000003 HC RX 258: Performed by: INTERNAL MEDICINE

## 2024-02-11 PROCEDURE — 93005 ELECTROCARDIOGRAM TRACING: CPT | Performed by: INTERNAL MEDICINE

## 2024-02-11 PROCEDURE — 6360000002 HC RX W HCPCS: Performed by: INTERNAL MEDICINE

## 2024-02-11 PROCEDURE — 84484 ASSAY OF TROPONIN QUANT: CPT

## 2024-02-11 PROCEDURE — 83880 ASSAY OF NATRIURETIC PEPTIDE: CPT

## 2024-02-11 PROCEDURE — 99291 CRITICAL CARE FIRST HOUR: CPT | Performed by: INTERNAL MEDICINE

## 2024-02-11 PROCEDURE — 6370000000 HC RX 637 (ALT 250 FOR IP): Performed by: INTERNAL MEDICINE

## 2024-02-11 PROCEDURE — A4216 STERILE WATER/SALINE, 10 ML: HCPCS | Performed by: INTERNAL MEDICINE

## 2024-02-11 PROCEDURE — C9113 INJ PANTOPRAZOLE SODIUM, VIA: HCPCS | Performed by: INTERNAL MEDICINE

## 2024-02-11 PROCEDURE — 85730 THROMBOPLASTIN TIME PARTIAL: CPT

## 2024-02-11 PROCEDURE — 2700000000 HC OXYGEN THERAPY PER DAY

## 2024-02-11 PROCEDURE — 82962 GLUCOSE BLOOD TEST: CPT

## 2024-02-11 PROCEDURE — 80053 COMPREHEN METABOLIC PANEL: CPT

## 2024-02-11 PROCEDURE — 94640 AIRWAY INHALATION TREATMENT: CPT

## 2024-02-11 PROCEDURE — 83735 ASSAY OF MAGNESIUM: CPT

## 2024-02-11 PROCEDURE — 86140 C-REACTIVE PROTEIN: CPT

## 2024-02-11 PROCEDURE — 36592 COLLECT BLOOD FROM PICC: CPT

## 2024-02-11 PROCEDURE — 85025 COMPLETE CBC W/AUTO DIFF WBC: CPT

## 2024-02-11 PROCEDURE — 84100 ASSAY OF PHOSPHORUS: CPT

## 2024-02-11 RX ORDER — CLOPIDOGREL BISULFATE 75 MG/1
300 TABLET ORAL DAILY
Status: DISCONTINUED | OUTPATIENT
Start: 2024-02-11 | End: 2024-02-11

## 2024-02-11 RX ORDER — POTASSIUM CHLORIDE 20 MEQ/1
20 TABLET, EXTENDED RELEASE ORAL ONCE
Status: COMPLETED | OUTPATIENT
Start: 2024-02-11 | End: 2024-02-11

## 2024-02-11 RX ORDER — CLOPIDOGREL BISULFATE 75 MG/1
75 TABLET ORAL DAILY
Status: DISCONTINUED | OUTPATIENT
Start: 2024-02-12 | End: 2024-02-12 | Stop reason: HOSPADM

## 2024-02-11 RX ORDER — CLOPIDOGREL BISULFATE 75 MG/1
300 TABLET ORAL ONCE
Status: COMPLETED | OUTPATIENT
Start: 2024-02-11 | End: 2024-02-11

## 2024-02-11 RX ADMIN — ARFORMOTEROL TARTRATE 15 MCG: 15 SOLUTION RESPIRATORY (INHALATION) at 08:43

## 2024-02-11 RX ADMIN — ARFORMOTEROL TARTRATE 15 MCG: 15 SOLUTION RESPIRATORY (INHALATION) at 20:02

## 2024-02-11 RX ADMIN — Medication 6000 UNITS: at 09:18

## 2024-02-11 RX ADMIN — HEPARIN SODIUM 15 UNITS/KG/HR: 10000 INJECTION, SOLUTION INTRAVENOUS at 01:39

## 2024-02-11 RX ADMIN — BUDESONIDE 500 MCG: 0.5 SUSPENSION RESPIRATORY (INHALATION) at 08:43

## 2024-02-11 RX ADMIN — DEXAMETHASONE SODIUM PHOSPHATE 6 MG: 10 INJECTION INTRAMUSCULAR; INTRAVENOUS at 09:18

## 2024-02-11 RX ADMIN — BUDESONIDE 500 MCG: 0.5 SUSPENSION RESPIRATORY (INHALATION) at 20:02

## 2024-02-11 RX ADMIN — METOPROLOL SUCCINATE 25 MG: 25 TABLET, EXTENDED RELEASE ORAL at 09:18

## 2024-02-11 RX ADMIN — HYDRALAZINE HYDROCHLORIDE 10 MG: 20 INJECTION, SOLUTION INTRAMUSCULAR; INTRAVENOUS at 01:27

## 2024-02-11 RX ADMIN — SODIUM CHLORIDE, PRESERVATIVE FREE 10 ML: 5 INJECTION INTRAVENOUS at 09:21

## 2024-02-11 RX ADMIN — IPRATROPIUM BROMIDE AND ALBUTEROL SULFATE 1 DOSE: 2.5; .5 SOLUTION RESPIRATORY (INHALATION) at 12:30

## 2024-02-11 RX ADMIN — ASPIRIN 81 MG CHEWABLE TABLET 81 MG: 81 TABLET CHEWABLE at 09:18

## 2024-02-11 RX ADMIN — IPRATROPIUM BROMIDE AND ALBUTEROL SULFATE 1 DOSE: 2.5; .5 SOLUTION RESPIRATORY (INHALATION) at 16:52

## 2024-02-11 RX ADMIN — CLOPIDOGREL BISULFATE 300 MG: 75 TABLET ORAL at 17:12

## 2024-02-11 RX ADMIN — SODIUM CHLORIDE, PRESERVATIVE FREE 40 MG: 5 INJECTION INTRAVENOUS at 21:45

## 2024-02-11 RX ADMIN — HEPARIN SODIUM 15 UNITS/KG/HR: 10000 INJECTION, SOLUTION INTRAVENOUS at 23:49

## 2024-02-11 RX ADMIN — IPRATROPIUM BROMIDE AND ALBUTEROL SULFATE 1 DOSE: 2.5; .5 SOLUTION RESPIRATORY (INHALATION) at 20:02

## 2024-02-11 RX ADMIN — IPRATROPIUM BROMIDE AND ALBUTEROL SULFATE 1 DOSE: 2.5; .5 SOLUTION RESPIRATORY (INHALATION) at 08:43

## 2024-02-11 RX ADMIN — SODIUM CHLORIDE, PRESERVATIVE FREE 10 ML: 5 INJECTION INTRAVENOUS at 21:46

## 2024-02-11 RX ADMIN — WATER 2000 MG: 1 INJECTION INTRAMUSCULAR; INTRAVENOUS; SUBCUTANEOUS at 12:09

## 2024-02-11 RX ADMIN — SODIUM CHLORIDE, PRESERVATIVE FREE 40 MG: 5 INJECTION INTRAVENOUS at 09:21

## 2024-02-11 RX ADMIN — POTASSIUM CHLORIDE 20 MEQ: 1500 TABLET, EXTENDED RELEASE ORAL at 15:09

## 2024-02-11 RX ADMIN — OXYCODONE HYDROCHLORIDE AND ACETAMINOPHEN 500 MG: 500 TABLET ORAL at 09:18

## 2024-02-11 RX ADMIN — TIMOLOL MALEATE 1 DROP: 5 SOLUTION OPHTHALMIC at 09:20

## 2024-02-12 ENCOUNTER — APPOINTMENT (OUTPATIENT)
Dept: GENERAL RADIOLOGY | Age: 84
End: 2024-02-12
Attending: INTERNAL MEDICINE
Payer: MEDICARE

## 2024-02-12 ENCOUNTER — HOSPITAL ENCOUNTER (INPATIENT)
Age: 84
LOS: 18 days | Discharge: HOME | End: 2024-03-01
Attending: INTERNAL MEDICINE | Admitting: INTERNAL MEDICINE
Payer: MEDICARE

## 2024-02-12 ENCOUNTER — APPOINTMENT (OUTPATIENT)
Dept: GENERAL RADIOLOGY | Age: 84
DRG: 871 | End: 2024-02-12
Payer: MEDICARE

## 2024-02-12 VITALS
HEART RATE: 100 BPM | BODY MASS INDEX: 28.55 KG/M2 | TEMPERATURE: 98.1 F | OXYGEN SATURATION: 93 % | RESPIRATION RATE: 18 BRPM | WEIGHT: 188.4 LBS | SYSTOLIC BLOOD PRESSURE: 125 MMHG | DIASTOLIC BLOOD PRESSURE: 78 MMHG | HEIGHT: 68 IN

## 2024-02-12 DIAGNOSIS — I21.3 ST ELEVATION MYOCARDIAL INFARCTION (STEMI), UNSPECIFIED ARTERY (HCC): ICD-10-CM

## 2024-02-12 DIAGNOSIS — R07.9 CHEST PAIN, UNSPECIFIED TYPE: ICD-10-CM

## 2024-02-12 LAB
ALBUMIN SERPL-MCNC: 3.7 G/DL (ref 3.5–5.2)
ALP SERPL-CCNC: 62 U/L (ref 40–129)
ALT SERPL-CCNC: 32 U/L (ref 0–40)
ANION GAP SERPL CALCULATED.3IONS-SCNC: 15 MMOL/L (ref 7–16)
AST SERPL-CCNC: 29 U/L (ref 0–39)
BASOPHILS # BLD: 0.05 K/UL (ref 0–0.2)
BASOPHILS NFR BLD: 0 % (ref 0–2)
BILIRUB SERPL-MCNC: 1 MG/DL (ref 0–1.2)
BNP SERPL-MCNC: 7966 PG/ML (ref 0–450)
BUN SERPL-MCNC: 61 MG/DL (ref 6–23)
CALCIUM SERPL-MCNC: 8.9 MG/DL (ref 8.6–10.2)
CHLORIDE SERPL-SCNC: 109 MMOL/L (ref 98–107)
CO2 SERPL-SCNC: 24 MMOL/L (ref 22–29)
CREAT SERPL-MCNC: 2.1 MG/DL (ref 0.7–1.2)
EKG ATRIAL RATE: 102 BPM
EKG ATRIAL RATE: 127 BPM
EKG P-R INTERVAL: 280 MS
EKG Q-T INTERVAL: 360 MS
EKG Q-T INTERVAL: 380 MS
EKG QRS DURATION: 106 MS
EKG QRS DURATION: 112 MS
EKG QTC CALCULATION (BAZETT): 469 MS
EKG QTC CALCULATION (BAZETT): 499 MS
EKG R AXIS: 68 DEGREES
EKG R AXIS: 98 DEGREES
EKG T AXIS: 109 DEGREES
EKG T AXIS: 99 DEGREES
EKG VENTRICULAR RATE: 102 BPM
EKG VENTRICULAR RATE: 104 BPM
EOSINOPHIL # BLD: 0.02 K/UL (ref 0.05–0.5)
EOSINOPHILS RELATIVE PERCENT: 0 % (ref 0–6)
ERYTHROCYTE [DISTWIDTH] IN BLOOD BY AUTOMATED COUNT: 15.9 % (ref 11.5–15)
GFR SERPL CREATININE-BSD FRML MDRD: 31 ML/MIN/1.73M2
GLUCOSE SERPL-MCNC: 121 MG/DL (ref 74–99)
HCT VFR BLD AUTO: 35 % (ref 37–54)
HGB BLD-MCNC: 11.2 G/DL (ref 12.5–16.5)
IMM GRANULOCYTES # BLD AUTO: 0.53 K/UL (ref 0–0.58)
IMM GRANULOCYTES NFR BLD: 4 % (ref 0–5)
LACTATE BLDV-SCNC: 1.4 MMOL/L (ref 0.5–1.9)
LYMPHOCYTES NFR BLD: 0.78 K/UL (ref 1.5–4)
LYMPHOCYTES RELATIVE PERCENT: 5 % (ref 20–42)
MAGNESIUM SERPL-MCNC: 2.4 MG/DL (ref 1.6–2.6)
MCH RBC QN AUTO: 30.8 PG (ref 26–35)
MCHC RBC AUTO-ENTMCNC: 32 G/DL (ref 32–34.5)
MCV RBC AUTO: 96.2 FL (ref 80–99.9)
MICROORGANISM SPEC CULT: NORMAL
MICROORGANISM SPEC CULT: NORMAL
MONOCYTES NFR BLD: 1.04 K/UL (ref 0.1–0.95)
MONOCYTES NFR BLD: 7 % (ref 2–12)
NEUTROPHILS NFR BLD: 84 % (ref 43–80)
NEUTS SEG NFR BLD: 12.31 K/UL (ref 1.8–7.3)
PARTIAL THROMBOPLASTIN TIME: 62.5 SEC (ref 24.5–35.1)
PHOSPHATE SERPL-MCNC: 3.9 MG/DL (ref 2.5–4.5)
PLATELET # BLD AUTO: 225 K/UL (ref 130–450)
PMV BLD AUTO: 11.3 FL (ref 7–12)
POTASSIUM SERPL-SCNC: 4 MMOL/L (ref 3.5–5)
PROT SERPL-MCNC: 6.8 G/DL (ref 6.4–8.3)
RBC # BLD AUTO: 3.64 M/UL (ref 3.8–5.8)
SERVICE CMNT-IMP: NORMAL
SERVICE CMNT-IMP: NORMAL
SODIUM SERPL-SCNC: 148 MMOL/L (ref 132–146)
SPECIMEN DESCRIPTION: NORMAL
SPECIMEN DESCRIPTION: NORMAL
TROPONIN I SERPL HS-MCNC: 657 NG/L (ref 0–11)
TROPONIN I SERPL HS-MCNC: 683 NG/L (ref 0–11)
TROPONIN I SERPL HS-MCNC: 731 NG/L (ref 0–11)
WBC OTHER # BLD: 14.7 K/UL (ref 4.5–11.5)

## 2024-02-12 PROCEDURE — 2000000000 HC ICU R&B

## 2024-02-12 PROCEDURE — 6360000002 HC RX W HCPCS: Performed by: INTERNAL MEDICINE

## 2024-02-12 PROCEDURE — 93010 ELECTROCARDIOGRAM REPORT: CPT | Performed by: INTERNAL MEDICINE

## 2024-02-12 PROCEDURE — 2700000000 HC OXYGEN THERAPY PER DAY

## 2024-02-12 PROCEDURE — 84484 ASSAY OF TROPONIN QUANT: CPT

## 2024-02-12 PROCEDURE — C9113 INJ PANTOPRAZOLE SODIUM, VIA: HCPCS | Performed by: FAMILY MEDICINE

## 2024-02-12 PROCEDURE — 80053 COMPREHEN METABOLIC PANEL: CPT

## 2024-02-12 PROCEDURE — 83735 ASSAY OF MAGNESIUM: CPT

## 2024-02-12 PROCEDURE — 36415 COLL VENOUS BLD VENIPUNCTURE: CPT

## 2024-02-12 PROCEDURE — 2580000003 HC RX 258: Performed by: FAMILY MEDICINE

## 2024-02-12 PROCEDURE — 97530 THERAPEUTIC ACTIVITIES: CPT

## 2024-02-12 PROCEDURE — 94640 AIRWAY INHALATION TREATMENT: CPT

## 2024-02-12 PROCEDURE — 99291 CRITICAL CARE FIRST HOUR: CPT | Performed by: INTERNAL MEDICINE

## 2024-02-12 PROCEDURE — 83605 ASSAY OF LACTIC ACID: CPT

## 2024-02-12 PROCEDURE — 71045 X-RAY EXAM CHEST 1 VIEW: CPT

## 2024-02-12 PROCEDURE — 6360000002 HC RX W HCPCS: Performed by: FAMILY MEDICINE

## 2024-02-12 PROCEDURE — A4216 STERILE WATER/SALINE, 10 ML: HCPCS | Performed by: FAMILY MEDICINE

## 2024-02-12 PROCEDURE — 6370000000 HC RX 637 (ALT 250 FOR IP): Performed by: FAMILY MEDICINE

## 2024-02-12 PROCEDURE — 93005 ELECTROCARDIOGRAM TRACING: CPT | Performed by: INTERNAL MEDICINE

## 2024-02-12 PROCEDURE — 97535 SELF CARE MNGMENT TRAINING: CPT

## 2024-02-12 PROCEDURE — 85730 THROMBOPLASTIN TIME PARTIAL: CPT

## 2024-02-12 PROCEDURE — 85025 COMPLETE CBC W/AUTO DIFF WBC: CPT

## 2024-02-12 PROCEDURE — 6370000000 HC RX 637 (ALT 250 FOR IP): Performed by: INTERNAL MEDICINE

## 2024-02-12 PROCEDURE — 2580000003 HC RX 258: Performed by: INTERNAL MEDICINE

## 2024-02-12 PROCEDURE — A4216 STERILE WATER/SALINE, 10 ML: HCPCS | Performed by: INTERNAL MEDICINE

## 2024-02-12 PROCEDURE — 83880 ASSAY OF NATRIURETIC PEPTIDE: CPT

## 2024-02-12 PROCEDURE — 84100 ASSAY OF PHOSPHORUS: CPT

## 2024-02-12 PROCEDURE — C9113 INJ PANTOPRAZOLE SODIUM, VIA: HCPCS | Performed by: INTERNAL MEDICINE

## 2024-02-12 RX ORDER — HEPARIN SODIUM 1000 [USP'U]/ML
4000 INJECTION, SOLUTION INTRAVENOUS; SUBCUTANEOUS PRN
Status: DISCONTINUED | OUTPATIENT
Start: 2024-02-12 | End: 2024-02-14

## 2024-02-12 RX ORDER — HEPARIN SODIUM 1000 [USP'U]/ML
2000 INJECTION, SOLUTION INTRAVENOUS; SUBCUTANEOUS PRN
Status: CANCELLED | OUTPATIENT
Start: 2024-02-12

## 2024-02-12 RX ORDER — ARFORMOTEROL TARTRATE 15 UG/2ML
15 SOLUTION RESPIRATORY (INHALATION)
Status: DISCONTINUED | OUTPATIENT
Start: 2024-02-12 | End: 2024-03-01 | Stop reason: HOSPADM

## 2024-02-12 RX ORDER — ONDANSETRON 4 MG/1
4 TABLET, ORALLY DISINTEGRATING ORAL EVERY 8 HOURS PRN
Status: CANCELLED | OUTPATIENT
Start: 2024-02-12

## 2024-02-12 RX ORDER — SODIUM CHLORIDE 9 MG/ML
INJECTION, SOLUTION INTRAVENOUS PRN
Status: CANCELLED | OUTPATIENT
Start: 2024-02-12

## 2024-02-12 RX ORDER — SODIUM CHLORIDE 0.9 % (FLUSH) 0.9 %
5-40 SYRINGE (ML) INJECTION PRN
Status: DISCONTINUED | OUTPATIENT
Start: 2024-02-12 | End: 2024-03-01 | Stop reason: HOSPADM

## 2024-02-12 RX ORDER — DEXAMETHASONE SODIUM PHOSPHATE 10 MG/ML
6 INJECTION INTRAMUSCULAR; INTRAVENOUS DAILY
Status: CANCELLED | OUTPATIENT
Start: 2024-02-13

## 2024-02-12 RX ORDER — DEXAMETHASONE SODIUM PHOSPHATE 10 MG/ML
6 INJECTION INTRAMUSCULAR; INTRAVENOUS DAILY
Status: DISCONTINUED | OUTPATIENT
Start: 2024-02-13 | End: 2024-02-14

## 2024-02-12 RX ORDER — ACETAMINOPHEN 325 MG/1
650 TABLET ORAL EVERY 6 HOURS PRN
Status: CANCELLED | OUTPATIENT
Start: 2024-02-12

## 2024-02-12 RX ORDER — SODIUM CHLORIDE 0.9 % (FLUSH) 0.9 %
5-40 SYRINGE (ML) INJECTION EVERY 12 HOURS SCHEDULED
Status: DISCONTINUED | OUTPATIENT
Start: 2024-02-12 | End: 2024-03-01 | Stop reason: HOSPADM

## 2024-02-12 RX ORDER — SODIUM CHLORIDE 0.9 % (FLUSH) 0.9 %
5-40 SYRINGE (ML) INJECTION EVERY 12 HOURS SCHEDULED
Status: CANCELLED | OUTPATIENT
Start: 2024-02-12

## 2024-02-12 RX ORDER — CHOLECALCIFEROL (VITAMIN D3) 50 MCG
6000 TABLET ORAL DAILY
Status: COMPLETED | OUTPATIENT
Start: 2024-02-13 | End: 2024-02-13

## 2024-02-12 RX ORDER — HYDRALAZINE HYDROCHLORIDE 20 MG/ML
10 INJECTION INTRAMUSCULAR; INTRAVENOUS EVERY 6 HOURS PRN
Status: DISCONTINUED | OUTPATIENT
Start: 2024-02-12 | End: 2024-03-01 | Stop reason: HOSPADM

## 2024-02-12 RX ORDER — BUDESONIDE 0.5 MG/2ML
0.5 INHALANT ORAL
Status: CANCELLED | OUTPATIENT
Start: 2024-02-12

## 2024-02-12 RX ORDER — CHOLECALCIFEROL (VITAMIN D3) 50 MCG
6000 TABLET ORAL DAILY
Status: CANCELLED | OUTPATIENT
Start: 2024-02-13 | End: 2024-02-14

## 2024-02-12 RX ORDER — CHOLECALCIFEROL (VITAMIN D3) 50 MCG
2000 TABLET ORAL DAILY
Status: DISCONTINUED | OUTPATIENT
Start: 2024-02-14 | End: 2024-02-20

## 2024-02-12 RX ORDER — IPRATROPIUM BROMIDE AND ALBUTEROL SULFATE 2.5; .5 MG/3ML; MG/3ML
1 SOLUTION RESPIRATORY (INHALATION)
Status: DISCONTINUED | OUTPATIENT
Start: 2024-02-12 | End: 2024-02-25

## 2024-02-12 RX ORDER — SODIUM CHLORIDE 0.9 % (FLUSH) 0.9 %
5-40 SYRINGE (ML) INJECTION PRN
Status: CANCELLED | OUTPATIENT
Start: 2024-02-12

## 2024-02-12 RX ORDER — CHOLECALCIFEROL (VITAMIN D3) 50 MCG
2000 TABLET ORAL DAILY
Status: CANCELLED | OUTPATIENT
Start: 2024-02-14

## 2024-02-12 RX ORDER — ONDANSETRON 4 MG/1
4 TABLET, ORALLY DISINTEGRATING ORAL EVERY 8 HOURS PRN
Status: DISCONTINUED | OUTPATIENT
Start: 2024-02-12 | End: 2024-03-01 | Stop reason: HOSPADM

## 2024-02-12 RX ORDER — CLOPIDOGREL BISULFATE 75 MG/1
75 TABLET ORAL DAILY
Status: CANCELLED | OUTPATIENT
Start: 2024-02-13

## 2024-02-12 RX ORDER — ASCORBIC ACID 500 MG
500 TABLET ORAL DAILY
Status: CANCELLED | OUTPATIENT
Start: 2024-02-13

## 2024-02-12 RX ORDER — CLOPIDOGREL BISULFATE 75 MG/1
75 TABLET ORAL DAILY
Status: DISCONTINUED | OUTPATIENT
Start: 2024-02-13 | End: 2024-03-01 | Stop reason: HOSPADM

## 2024-02-12 RX ORDER — ASPIRIN 81 MG/1
81 TABLET, CHEWABLE ORAL DAILY
Status: CANCELLED | OUTPATIENT
Start: 2024-02-13

## 2024-02-12 RX ORDER — METOPROLOL SUCCINATE 25 MG/1
25 TABLET, EXTENDED RELEASE ORAL DAILY
Status: DISCONTINUED | OUTPATIENT
Start: 2024-02-13 | End: 2024-03-01 | Stop reason: HOSPADM

## 2024-02-12 RX ORDER — ACETAMINOPHEN 325 MG/1
650 TABLET ORAL EVERY 6 HOURS PRN
Status: DISCONTINUED | OUTPATIENT
Start: 2024-02-12 | End: 2024-02-15

## 2024-02-12 RX ORDER — BUDESONIDE 0.5 MG/2ML
0.5 INHALANT ORAL
Status: DISCONTINUED | OUTPATIENT
Start: 2024-02-12 | End: 2024-03-01 | Stop reason: HOSPADM

## 2024-02-12 RX ORDER — METOPROLOL SUCCINATE 25 MG/1
25 TABLET, EXTENDED RELEASE ORAL DAILY
Status: CANCELLED | OUTPATIENT
Start: 2024-02-13

## 2024-02-12 RX ORDER — ASPIRIN 81 MG/1
81 TABLET, CHEWABLE ORAL DAILY
Status: DISCONTINUED | OUTPATIENT
Start: 2024-02-13 | End: 2024-03-01 | Stop reason: HOSPADM

## 2024-02-12 RX ORDER — ACETAMINOPHEN 650 MG/1
650 SUPPOSITORY RECTAL EVERY 6 HOURS PRN
Status: CANCELLED | OUTPATIENT
Start: 2024-02-12

## 2024-02-12 RX ORDER — ONDANSETRON 2 MG/ML
4 INJECTION INTRAMUSCULAR; INTRAVENOUS EVERY 6 HOURS PRN
Status: DISCONTINUED | OUTPATIENT
Start: 2024-02-12 | End: 2024-03-01 | Stop reason: HOSPADM

## 2024-02-12 RX ORDER — ASCORBIC ACID 500 MG
500 TABLET ORAL DAILY
Status: DISCONTINUED | OUTPATIENT
Start: 2024-02-13 | End: 2024-02-19

## 2024-02-12 RX ORDER — IPRATROPIUM BROMIDE AND ALBUTEROL SULFATE 2.5; .5 MG/3ML; MG/3ML
1 SOLUTION RESPIRATORY (INHALATION)
Status: CANCELLED | OUTPATIENT
Start: 2024-02-12

## 2024-02-12 RX ORDER — ONDANSETRON 2 MG/ML
4 INJECTION INTRAMUSCULAR; INTRAVENOUS EVERY 6 HOURS PRN
Status: CANCELLED | OUTPATIENT
Start: 2024-02-12

## 2024-02-12 RX ORDER — ARFORMOTEROL TARTRATE 15 UG/2ML
15 SOLUTION RESPIRATORY (INHALATION)
Status: CANCELLED | OUTPATIENT
Start: 2024-02-12

## 2024-02-12 RX ORDER — ACETAMINOPHEN 650 MG/1
650 SUPPOSITORY RECTAL EVERY 6 HOURS PRN
Status: DISCONTINUED | OUTPATIENT
Start: 2024-02-12 | End: 2024-02-15

## 2024-02-12 RX ORDER — TIMOLOL MALEATE 5 MG/ML
1 SOLUTION/ DROPS OPHTHALMIC DAILY
Status: DISCONTINUED | OUTPATIENT
Start: 2024-02-13 | End: 2024-03-01 | Stop reason: HOSPADM

## 2024-02-12 RX ORDER — TIMOLOL MALEATE 5 MG/ML
1 SOLUTION/ DROPS OPHTHALMIC DAILY
Status: CANCELLED | OUTPATIENT
Start: 2024-02-13

## 2024-02-12 RX ORDER — HEPARIN SODIUM 10000 [USP'U]/100ML
5-30 INJECTION, SOLUTION INTRAVENOUS CONTINUOUS
Status: DISCONTINUED | OUTPATIENT
Start: 2024-02-12 | End: 2024-02-14

## 2024-02-12 RX ORDER — POLYETHYLENE GLYCOL 3350 17 G/17G
17 POWDER, FOR SOLUTION ORAL DAILY PRN
Status: DISCONTINUED | OUTPATIENT
Start: 2024-02-12 | End: 2024-02-15

## 2024-02-12 RX ORDER — HEPARIN SODIUM 1000 [USP'U]/ML
2000 INJECTION, SOLUTION INTRAVENOUS; SUBCUTANEOUS PRN
Status: DISCONTINUED | OUTPATIENT
Start: 2024-02-12 | End: 2024-02-14

## 2024-02-12 RX ORDER — POLYETHYLENE GLYCOL 3350 17 G/17G
17 POWDER, FOR SOLUTION ORAL DAILY PRN
Status: CANCELLED | OUTPATIENT
Start: 2024-02-12

## 2024-02-12 RX ORDER — HYDRALAZINE HYDROCHLORIDE 20 MG/ML
10 INJECTION INTRAMUSCULAR; INTRAVENOUS EVERY 6 HOURS PRN
Status: CANCELLED | OUTPATIENT
Start: 2024-02-12

## 2024-02-12 RX ORDER — HEPARIN SODIUM 10000 [USP'U]/100ML
5-30 INJECTION, SOLUTION INTRAVENOUS CONTINUOUS
Status: CANCELLED | OUTPATIENT
Start: 2024-02-12

## 2024-02-12 RX ORDER — SODIUM CHLORIDE 9 MG/ML
INJECTION, SOLUTION INTRAVENOUS PRN
Status: DISCONTINUED | OUTPATIENT
Start: 2024-02-12 | End: 2024-03-01 | Stop reason: HOSPADM

## 2024-02-12 RX ORDER — HEPARIN SODIUM 1000 [USP'U]/ML
4000 INJECTION, SOLUTION INTRAVENOUS; SUBCUTANEOUS PRN
Status: CANCELLED | OUTPATIENT
Start: 2024-02-12

## 2024-02-12 RX ADMIN — ARFORMOTEROL TARTRATE 15 MCG: 15 SOLUTION RESPIRATORY (INHALATION) at 08:20

## 2024-02-12 RX ADMIN — BUDESONIDE 500 MCG: 0.5 SUSPENSION RESPIRATORY (INHALATION) at 08:20

## 2024-02-12 RX ADMIN — CLOPIDOGREL BISULFATE 75 MG: 75 TABLET ORAL at 08:04

## 2024-02-12 RX ADMIN — WATER 2000 MG: 1 INJECTION INTRAMUSCULAR; INTRAVENOUS; SUBCUTANEOUS at 10:28

## 2024-02-12 RX ADMIN — ARFORMOTEROL TARTRATE 15 MCG: 15 SOLUTION RESPIRATORY (INHALATION) at 20:07

## 2024-02-12 RX ADMIN — PANTOPRAZOLE SODIUM 40 MG: 40 INJECTION, POWDER, FOR SOLUTION INTRAVENOUS at 21:39

## 2024-02-12 RX ADMIN — Medication 6000 UNITS: at 08:04

## 2024-02-12 RX ADMIN — DEXAMETHASONE SODIUM PHOSPHATE 6 MG: 10 INJECTION INTRAMUSCULAR; INTRAVENOUS at 08:04

## 2024-02-12 RX ADMIN — HEPARIN SODIUM 15 UNITS/KG/HR: 10000 INJECTION, SOLUTION INTRAVENOUS at 19:30

## 2024-02-12 RX ADMIN — OXYCODONE HYDROCHLORIDE AND ACETAMINOPHEN 500 MG: 500 TABLET ORAL at 08:04

## 2024-02-12 RX ADMIN — HEPARIN SODIUM 15 UNITS/KG/HR: 10000 INJECTION, SOLUTION INTRAVENOUS at 17:33

## 2024-02-12 RX ADMIN — Medication 10 ML: at 21:40

## 2024-02-12 RX ADMIN — IPRATROPIUM BROMIDE AND ALBUTEROL SULFATE 1 DOSE: 2.5; .5 SOLUTION RESPIRATORY (INHALATION) at 12:23

## 2024-02-12 RX ADMIN — SODIUM CHLORIDE, PRESERVATIVE FREE 10 ML: 5 INJECTION INTRAVENOUS at 08:04

## 2024-02-12 RX ADMIN — IPRATROPIUM BROMIDE AND ALBUTEROL SULFATE 1 DOSE: 2.5; .5 SOLUTION RESPIRATORY (INHALATION) at 08:20

## 2024-02-12 RX ADMIN — BUDESONIDE INHALATION 500 MCG: 0.5 SUSPENSION RESPIRATORY (INHALATION) at 20:07

## 2024-02-12 RX ADMIN — IPRATROPIUM BROMIDE AND ALBUTEROL SULFATE 1 DOSE: 2.5; .5 SOLUTION RESPIRATORY (INHALATION) at 15:50

## 2024-02-12 RX ADMIN — SODIUM CHLORIDE, PRESERVATIVE FREE 40 MG: 5 INJECTION INTRAVENOUS at 08:04

## 2024-02-12 RX ADMIN — ASPIRIN 81 MG CHEWABLE TABLET 81 MG: 81 TABLET CHEWABLE at 08:04

## 2024-02-12 RX ADMIN — IPRATROPIUM BROMIDE AND ALBUTEROL SULFATE 1 DOSE: 2.5; .5 SOLUTION RESPIRATORY (INHALATION) at 20:07

## 2024-02-12 ASSESSMENT — PAIN SCALES - GENERAL
PAINLEVEL_OUTOF10: 0
PAINLEVEL_OUTOF10: 0

## 2024-02-12 NOTE — CARE COORDINATION
Social Work/Discharge Planning:  Chart reviewed.  Patient on 15 liters high flow oxygen.  Patient waiting for a bed at [facility].  Ambulance form in soft chart.  Will continue to follow.  Electronically signed by NENO Guevara on 2/12/2024 at 2:27 PM    
Transition of Care-Call placed to wife Scott d/t patient being in droplet plus isolation. Patient is independent from home in a raised ranch style home, several steps up to main floor. Patient wears 2L of oxygen from [facility]-also has a nebulizer. He follows Dr. Zaman outpatient. He does own a cane-does not use per wife. PCP is Dr. Gilson Ramon III, preferred pharmacy is [facility]. No history of HHC or SNF. Discharge plan is home once medically stable. Patient weaned from Levo, requiring 3L of oxygen.    Stephanie GARNICAN, RN  [facility]     
Transition of Care-Patient required sedation last night, remains on Precedex, still requiring Heparin gtt. No further intervention from Gen Surgery. Nephrology consulted today for MIGDALIA. Therapy to see once medically stable.    Stephanie VAUGHN, RN  [facility]     
Transition of Care-Rocephin started for pneumonia. Speech to see, remains NPO. Sitter continues. PT/OT asked to see patient today-sedation was stopped at 1143 today. Patient is from home, anticipate need for placement. CM following.    Stephanie GARNICAN, RN  [facility]     
Normal rate, regular rhythm.  Heart sounds S1, S2.  No murmurs, rubs or gallops.

## 2024-02-12 NOTE — DISCHARGE INSTRUCTIONS
weight gain of 3 pounds or more in 1 day         OR 5 pounds or more in one week  More shortness of breath  More swelling of your stomach, legs, ankles or feet  Feeling more tired, No energy  Dry hacky cough  Dizziness  More chest pain / discomfort       (CALL 564 IF ANY OF THE FOLLOWING OCCURS  Chest pain (not relieved with nitroglycerine, if you have been prescribed this medication)  Severe shortness of breath  Faint / Pass out  Confusion / cannot think clearly  If symptoms get worse           SMOKING - TOBACCO USE:  * IF YOU SMOKE - STOP!  Kick the habit.  [facility] is offered at [facility] and [facility]. Call [phone] extension [phone] for more information.             ACTIVITY:   (Ask your doctor when you will be able to return to work and before starting any exercise program.  Do not drive unless unless your doctor has given you permission to do so).  Start light exercise.  Even if you can only do a small amount, exercise will help you get stronger, have more energy, help manage your weight and decrease  stress. Walking is an easy way to get exercise. Start out slowly and  increase the amount you walk as tolerated  If you become short of breath, dizzy or have chest pain; stop, sit down, and rest.  If you feel \"wiped out\" the day after you exercise, walk at a slower pace or for a shorter distance. You can gradually increase the pace or amount of time.            (Do not exercise right after a meal or in extreme temperatures, such as above 85 degrees, if the air is really humid, or wind chill is less than 20 degrees)                                             ADDITIONAL INFORMATION:  Avoid getting sick from colds and the flu. Stay away from friends or family that you know may have a contagious illness  Get plenty of rest   Get a flu shot each year.  Get a pneumococcal vaccine shot. If you have had one before, ask your

## 2024-02-12 NOTE — CONSULTS
[facility] [place]   Inpatient CHF Nurse Navigator Consult      Cardiologist: Dr Esther Lee is a 83 y.o. (1940) male with a history of HFpEF, most recent EF:    02/07/2024 EF 60%     Patient was awake and alert, sitting in the chair during the consultation and is agreeable to heart failure education. He is on a heparin drip. He is waiting for urgent transfer to [place] for cardiac intervention. Full consultation will be completed upon transfer.      Barriers identified during consult contributing to HF Hospitalization:  [] Limited medication adherence   [] Poor health literacy, education regarding HF medications provided   [] Pill box provided to patient  [] Difficulty affording medications  [] Difficulty obtaining/ managing medications  [] Prescription assistance information given     [] Not weighing themselves daily  [x] Weight log provided for easy monitoring  [] Scale provided     [] Not following low sodium diet  [] Food insecurity   [x] 2 gram sodium diet education provided   [] Low sodium recipes provided  [] Sodium free seasoning provided   [] Low sodium meal delivery options given to patient  [] Dietician consulted     [] Lack of transportation to appointments     [] Depression, given chronic illness  [] Primary team notified     [] Goals of care need addressed  [] Palliative care consulted     [] CHF CHW consulted, to assist with         Chart Reviewed:  Diet: ADULT DIET; Dysphagia - Soft and Bite Sized   Daily Weights: Patient Vitals for the past 96 hrs (Last 3 readings):   Weight   02/12/24 0800 85.5 kg (188 lb 6.4 oz)   02/11/24 0439 90.6 kg (199 lb 11.8 oz)   02/10/24 0200 95.4 kg (210 lb 5.1 oz)     I/O:   Intake/Output Summary (Last 24 hours) at 2/12/2024 1218  Last data filed at 2/12/2024 0639  Gross per 24 hour   Intake 2000 ml   Output 3050 ml   Net -1050 ml       [] Nursing staff/manager notified of inaccurate guillen weights or I/O    Discharge 
   Please note the following information was obtained from extensive chart review and from the patient via telephone due to patient being in strict isolation due to COVID-19 in efforts to preserve PPE.      INPATIENT CARDIOLOGY CONSULT     Reason for Consult:  NSTEMI    Cardiologist: Dr. Og    Requesting Physician:  Dr. Barnett    Date of Consultation: 2/7/2024    HISTORY OF PRESENT ILLNESS:     Shiv Lee is an 83-year-old  male known to [facility] cardiology.  His last office visit was 7/20/2022 with Dr. Santana.    PMH:Paroxysmal atrial fibrillation, CAD, s/p CABG with LIMA-LAD, SVG-OM, SVG- RCA, AS, s/p AVR with Bioprosthetic AVR (1/27/09 by Dr. Villa), HTN, Hypercholesterolemia.     Patient presented to the emergency department via EMS from home for shortness of breath and concern for possible sepsis.  Per EMS patient was saturating at 84 to 85% on room air.  He was placed on a nonrebreather.  He was tachycardic, febrile, and hypotensive with blood pressure in the 80s to 90s systolic.  Patient continued to be extremely hypotensive despite treatment with IV fluids and became bradycardic.  Patient was resuscitated with further IV fluids, given antibiotics, and several doses of atropine, epi, bicarb and a Levophed drip was started.  Central line and arterial line were placed.  Admitted to ICU.     [facility] ED: 2/7/2024, 0000, via EMS, arrival complaint pneumonia, possible sepsis.  VITALS: T99.5, RR 15, P108, BP 91/62, SpO2 96% on 10 L of oxygen with nonrebreather.  BMI 29.6.   LABS: , K4.3, BUN 26 7, CR 1.5, mag 1.5, lactic acid 4.7, glucose 207, ALT 45, AST 97, bilirubin 1.6, WBCs to 26.1, Hgb 12.4, H CT 38.8, .  Pro-BNP: 2,132  Troponin: 1,458  COVID-positive  ER treatments: Tylenol 1000 mg oral,  mg oral, Rocephin 2000 mg IV, Decadron 10 mg IV, Vibramycin 100 mg IV heparin 4000 units IV, heparin drip at 11 units/kg/h IV, Solu-Cortef 100 mg IV, magnesium sulfate 2000 mg IV, Levophed 
  Palliative Care Department  [phone]  Palliative Care Initial Consult  Provider ANA Rich CNP    Shiv Lee  [phone]  Hospital Day: 1  Date of Initial Consult: 2/7/2024  Referring Provider: LAIZA Wagner  Palliative Medicine was consulted for assistance with: Goals of care    HPI:   Shiv Lee is a 83 y.o. with a medical history of aortic stenosis, A-fib, CAD, hypertension, mitral regurgitation and subacute bacterial endocarditis who was admitted on 2/7/2024 from home with a CHIEF COMPLAINT of shortness of breath.  Patient wears 2 L O2 via NC at baseline.  Patient was found to be 84% in ED.  Patient has recent diagnosis of pneumonia but was not on any antibiotics.  Patient was noted to be extremely hypotensive despite treatment with IV fluids and became bradycardic.  Patient was started on Levophed and admitted to ICU for further medical management.  Ultrasound gallbladder showing gallbladder wall thickening with no evidence of cholelithiasis.  CTA pulmonary revealing interstitial/groundglass opacities in the lower left lung.  CT head negative for any acute intracranial abnormality.  Surgery consulted for gallbladder thickening concerning for cholecystitis.  HIDA pending.  Per surgery patient is a very poor surgical candidate secondary to his extensive heart history alone. Cardiology consulted for NSTEMI.  Heparin drip started.  Palliative medicine was consulted for goals of care.  ASSESSMENT/PLAN:     Pertinent Hospital Diagnoses     Septic shock-likely related to COVID-19  NSTEMI  MIGDALIA  Cholecystitis      Palliative Care Encounter / Counseling Regarding Goals of Care  Please see detailed goals of care discussion as below  At this time, Shiv Lee, Does Not have capacity for medical decision-making.  Capacity is time limited and situation/question specific  During encounter Scott was surrogate medical decision-maker  Outcome of goals of care meeting:   Continue full code 
Critical Care Admit/Consult Note     Patient - Shiv Lee   MRN -  [id number]   Acct # - [id number]   - 1940      Date of Admission -  2024 12:00 AM  Date of evaluation -  2024   Hospital Day - 0    ADMIT/CONSULT DETAILS     Reason for Admit/Consult   Septic Shock    Consulting Service/Physician   Consulting - Karyn Brock MD  Primary Care Physician - Gilson Ramon III, DO         HPI   Mr. Lee presented to the ED today for evaluation of dyspnea that has been progressive since [place] reporting that he was diagnosed with pneumonia around that time. 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 He had previously been followed by Dr. Lazar last seen by him in 2020 for evaluation of dyspnea/chronic cough noted to have drop in lung volumes and referred for chest CT but he was lost to follow up after numerous attempts to reach him.  He re-established with Dr. Zaman on 24 reporting ongoing non-productive coughing fits, nasal congestion and exertional dyspnea since his pneumonia diagnosis. PFTs done showing spirometry was normal with signs of moderate restrictive lung disease with moderate decrease of diffusion capacity. He was found to be hypoxic to 86% after 1 minute walk test and set up for home oxygen, continued on breztri, and albuterol with flutter valve.     His wife reports that he has been increasingly dyspneic for the past few days. His inhalers failed to improve his symptoms.  She grew concerned when he was too weak to stand and called EMS to transport him for evaluation who found him febrile 101F hypoxic at 84% on his normal 2L placing him on NRB for transport. Patient was distressed reporting it felt like he was having a panic attack.  In the ED he became bradycardic to 40s and hypotensive 59/35 treated with atropine and epi push. 
GENERAL SURGERY  CONSULT NOTE  2/7/2024    Physician Consulted: Dr. Contreras  Reason for Consult: Cholecystitis  Referring Physician: Dr. Moreland    HPI  Shiv Lee is a 83 y.o. male with PMH A-fib, aortic stenosis s/p AVR w/ bioprosthetic valve (2009), CAD s/p CABG  who presented to the ED with chief complaint shortness of breath. Stated he felt like he had a panic attack. He was hypotensive, hypoxic, tachycardic, and febrile on arrival. He was administered IV fluids with minimal response and started becoming bradycardic. He received several doses of atropine, epi, and bicarb and was initiated on levophed. Troponins were elevated concerning for NSTEMI and patient was started on heparin drip as well. He received vanc/zosyn and was admitted to the ICU for further management. Patient somewhat confused but answers questions appropriately. Reports he does not have any vomiting, abdominal pain, change in his bowel habits or abdominal pain with eating.     CT A/P obtained on arrival demonstrating thickened gallbladder wall. RUQ US with same findings, however difficult to tell if due to inflammation. There is no cholelithiasis. HIDA has been ordered. LA 2.8 from 4.7. Trops remain elevated, most recently 1287. ALT 45, AST 97, Tbili 1.6, fractionated bili is pending. WBC 26.1.     Past Medical History:   Diagnosis Date    Abnormal EKG     Aortic stenosis     Atrial fibrillation (HCC)     Postop    Coronary artery disease     HTN (hypertension)     Left atrial dilatation     Mild    Mitral regurgitation     Trace    SBE (subacute bacterial endocarditis) prophylaxis candidate        Past Surgical History:   Procedure Laterality Date    AORTIC VALVE SURGERY  1/27/09    Dr. Villa    CORONARY ARTERY BYPASS GRAFT  1/27/09    DIAGNOSTIC CARDIAC CATH LAB PROCEDURE  1/26/09    EYE SURGERY  2016       Medications Prior to Admission:    Prior to Admission medications    Medication Sig Start Date End Date Taking? Authorizing Provider 
ARB administration versus ischemic ATN due to profound hypotension due to hemodynamic shock and possibly contrast associated MIGDALIA  To obtain NICOM and to increase IV fluids    Lactic acidosis, 2/2 septic shock  Hemodynamic shock, septic and hypovolemic, resolved, off vasopressors  -----------------------------------------------------------------------  CAD status post CABG, now with NSTEMI  Respiratory alkalosis with high anion gap metabolic acidosis (lactic acidosis)  Pneumonia, on piperacillin-tazobactam  S/p AVR  COVID-19, on dexamethasone  Nutrition, n.p.o.    Plan:    Obtain NICOM  Increase lactated Ringer's to 125 cc/hour  Obtain urine indices  Monitor renal function    Thank very much Kaelyn Miller, and Dr. Andrade for allowing us to participate in the care of Mr. Lee.

## 2024-02-12 NOTE — PROGRESS NOTES
[facility]   Progress note      Subjective:    More interactive  Denies any acute complaints  Still feeling cold but improved over the past couple of days  No sitter at bedside today    Objective:    BP (!) 157/82   Pulse 96   Temp 97.3 °F (36.3 °C) (Infrared)   Resp 24   Ht 1.727 m (5' 8\")   Wt 93.6 kg (206 lb 5.6 oz)   SpO2 (!) 87%   BMI 31.38 kg/m²     In: 4264.9 [I.V.:3866.5]  Out: 3415   In: 4264.9   Out: 3415 [Urine:3415]    General appearance: NAD, more interactive  HEENT: AT/NC, MMM  Neck: FROM, supple  Lungs: Coarse breath sounds bilateral lung fields  CV: RRR, no MRGs  Vasc: Radial pulses 2+  Abdomen: Soft, non-tender; no masses or HSM  Extremities: No peripheral edema or digital cyanosis  Skin: no rash, lesions or ulcers    Recent Labs     02/07/24  0457 02/08/24  0412 02/09/24  0412   WBC 26.1* 18.9* 17.7*   HGB 12.4* 12.0* 11.7*   HCT 38.8 36.7* 35.1*    144 150       Recent Labs     02/08/24  1652 02/09/24  0412 02/09/24  1445    144 143   K 3.8 3.2* 4.8   * 108* 107   CO2 19* 19* 21*   BUN 44* 45* 51*   CREATININE 2.2* 2.3* 2.3*   CALCIUM 8.3* 8.1* 8.5*       Assessment:    Principal Problem:    Septic shock (HCC)  Active Problems:    NSTEMI (non-ST elevated myocardial infarction) (HCC)    Pneumonia due to organism    COVID-19    Acute respiratory failure with hypoxia (HCC)    History of aortic valve replacement with bioprosthetic valve    Palliative care encounter    Goals of care, counseling/discussion  Resolved Problems:    * No resolved hospital problems. *      Plan:    83-year-old male with a history of aortic stenosis, A-fib and hypertension presents to the ED with complaints of shortness of breath and is admitted to telemetry unit with     Septic shock-likely related to COVID-19  -Decadron 6 mg daily  -Vitamin D, zinc, vitamin C  -Monitor O2 saturations and supplement oxygen to keep saturations greater than 93%, wean as necessary, incentive 
    INPATIENT CARDIOLOGY FOLLOW-UP    Name: Shiv Lee    Age: 83 y.o.    Date of Admission: 2/7/2024 12:00 AM    Date of Service: 2/11/2024    Primary Cardiologist: Dr. Santana    Chief Complaint: Follow-up for NSTEMI    Interim History:  Last night had episode of hypoxia followed by bradycardia and accelerated idioventricular rhythm around 11:30 PM that spontaneously recovered.  Had an EKG done at that time that showed inferoposterior STEMI.  I was not notified of these findings.  This morning around 620 patient had another episode of bradycardia with brief AIVR and in recovery.    Patient denies chest pain or shortness of breath.    Review of Systems:   Negative except as described above    Problem List:  Patient Active Problem List   Diagnosis    Aortic stenosis    Coronary artery disease    Atrial fibrillation (HCC)    Abnormal EKG    Left atrial dilatation    Mitral regurgitation    SBE (subacute bacterial endocarditis) prophylaxis candidate    HTN (hypertension)    Septic shock (HCC)    NSTEMI (non-ST elevated myocardial infarction) (HCC)    Pneumonia due to organism    COVID-19    Acute respiratory failure with hypoxia (HCC)    History of aortic valve replacement with bioprosthetic valve    Palliative care encounter    Goals of care, counseling/discussion       Current Medications:    Current Facility-Administered Medications:     potassium chloride (KLOR-CON M) extended release tablet 20 mEq, 20 mEq, Oral, Once, Erin Kc MD    cefTRIAXone (ROCEPHIN) 2,000 mg in sterile water 20 mL IV syringe, 2,000 mg, IntraVENous, Q24H, Yudith Andrade MD, 2,000 mg at 02/10/24 1204    dexAMETHasone (DECADRON) injection 6 mg, 6 mg, IntraVENous, Daily, 6 mg at 02/11/24 0918 **FOLLOWED BY** [DISCONTINUED] dexAMETHasone (DECADRON) injection 10 mg, 10 mg, IntraVENous, Q24H, Yudith Andrade MD    timolol (TIMOPTIC) 0.5 % ophthalmic solution 1 drop, 1 drop, Both Eyes, Daily, Yudith Andrade MD, 1 drop at 
    INPATIENT CARDIOLOGY FOLLOW-UP    Name: Shiv Lee    Age: 83 y.o.    Date of Admission: 2/7/2024 12:00 AM    Date of Service: 2/12/2024    Primary Cardiologist: Dr. Santana    Chief Complaint: Follow-up for NSTEMI    Interim History:  Was transferred out of ICU yesterday.  Denies chest pain or shortness of breath.  On 15 L high flow.  Repeat EKG today still showing inferior ST elevations and lateral ST depressions.  Troponins went from 686 yesterday to 731 today.    Review of Systems:   Negative except as described above    Problem List:  Patient Active Problem List   Diagnosis    Aortic stenosis    Coronary artery disease    Atrial fibrillation (HCC)    Abnormal EKG    Left atrial dilatation    Mitral regurgitation    SBE (subacute bacterial endocarditis) prophylaxis candidate    HTN (hypertension)    Septic shock ([facility])    NSTEMI (non-ST elevated myocardial infarction) ([facility])    Pneumonia due to organism    COVID-19    Acute respiratory failure with hypoxia ([facility])    History of aortic valve replacement with bioprosthetic valve    Palliative care encounter    Goals of care, counseling/discussion       Current Medications:    Current Facility-Administered Medications:     cefTRIAXone (ROCEPHIN) 2,000 mg in sterile water 20 mL IV syringe, 2,000 mg, IntraVENous, Q24H, Mireya Coley DO, 2,000 mg at 02/12/24 1028    clopidogrel (PLAVIX) tablet 75 mg, 75 mg, Oral, Daily, Fredy Hutson MD, 75 mg at 02/12/24 0804    dexAMETHasone (DECADRON) injection 6 mg, 6 mg, IntraVENous, Daily, 6 mg at 02/12/24 0804 **FOLLOWED BY** [DISCONTINUED] dexAMETHasone (DECADRON) injection 10 mg, 10 mg, IntraVENous, Q24H, Yudith Andrade MD    timolol (TIMOPTIC) 0.5 % ophthalmic solution 1 drop, 1 drop, Both Eyes, Daily, Yudith Andrade MD, 1 drop at 02/11/24 0920    metoprolol succinate (TOPROL XL) extended release tablet 25 mg, 25 mg, Oral, Daily, Yudith Andrade MD, 25 mg at 02/11/24 0918    hydrALAZINE 
    INPATIENT CARDIOLOGY FOLLOW-UP    Name: Shiv Lee    Age: 83 y.o.    Date of Admission: 2/7/2024 12:00 AM    Date of Service: 2/8/2024    Chief Complaint: Follow-up for NSTEMI, shock, CAD, AVR    Interim History:  No new overnight cardiac complaints. Currently with no chest pain, respiratory distress, or palpitations. SR on EKG and telemetry.    Review of Systems:   Cardiac: As per HPI  General: No fever, chills  Pulmonary: As per HPI  HEENT: No visual disturbances, difficult swallowing  GI: No nausea, vomiting  : No dysuria, hematuria  Endocrine: No thyroid disease or DM  Musculoskeletal: TOM x 4, no focal motor deficits  Skin: Intact, no rashes  Neuro: No headache, seizures  Psych: Currently with no depression, anxiety    Problem List:  Patient Active Problem List   Diagnosis    Aortic stenosis    Coronary artery disease    Atrial fibrillation (HCC)    Abnormal EKG    Left atrial dilatation    Mitral regurgitation    SBE (subacute bacterial endocarditis) prophylaxis candidate    HTN (hypertension)    Septic shock (HCC)    NSTEMI (non-ST elevated myocardial infarction) ([facility])    Pneumonia due to organism    COVID-19    Acute respiratory failure with hypoxia (HCC)    History of aortic valve replacement with bioprosthetic valve    Palliative care encounter    Goals of care, counseling/discussion       Allergies:  Allergies   Allergen Reactions    Ativan [Lorazepam]      Causes altered mental status    Zocor [Simvastatin]      Muscle aches       Current Medications:  Current Facility-Administered Medications   Medication Dose Route Frequency Provider Last Rate Last Admin    [START ON 2/9/2024] dexAMETHasone (DECADRON) injection 10 mg  10 mg IntraVENous Daily Kaelyn Greer MD        [START ON 2/9/2024] timolol (TIMOPTIC) 0.5 % ophthalmic solution 1 drop  1 drop Both Eyes Daily Yudith Andrade MD        metoprolol succinate (TOPROL XL) extended release tablet 25 mg  25 mg Oral Daily Kei Ansari MD   25 
    [facility]   Progress note      Subjective:    More interactive, on oxygen. States he doesn't feel well today. States he was up all night. Didn't sleep at all. He is tolerating his diet, but doesn't like what he is getting.   Denies any acute complaints  No sitter at bedside today    Objective:    /68   Pulse (!) 128   Temp 97.3 °F (36.3 °C) (Infrared)   Resp (!) 36   Ht 1.727 m (5' 8\")   Wt 95.4 kg (210 lb 5.1 oz)   SpO2 96%   BMI 31.98 kg/m²     In: 2794.8 [I.V.:2602.2]  Out: 5500   In: 2794.8   Out: 5500 [Urine:5500]    General appearance: NAD, more interactive  HEENT: AT/NC, MMM  Neck: FROM, supple  Lungs: Coarse breath sounds bilateral lung fields, on o2 via nc  CV: tachy, systolic murmur present  Vasc: Radial pulses 2+  Abdomen: Soft, non-tender; no masses or HSM  Extremities: No peripheral edema or digital cyanosis  Skin: no rash, lesions or ulcers    Recent Labs     02/08/24  0412 02/09/24  0412 02/10/24  0428   WBC 18.9* 17.7* 18.8*   HGB 12.0* 11.7* 11.1*   HCT 36.7* 35.1* 33.7*    150 180       Recent Labs     02/09/24  0412 02/09/24  1445 02/10/24  0428    143 146   K 3.2* 4.8 3.7   * 107 108*   CO2 19* 21* 21*   BUN 45* 51* 55*   CREATININE 2.3* 2.3* 2.5*   CALCIUM 8.1* 8.5* 8.3*       Assessment:    Principal Problem:    Septic shock (HCC)  Active Problems:    NSTEMI (non-ST elevated myocardial infarction) (HCC)    Pneumonia due to organism    COVID-19    Acute respiratory failure with hypoxia (HCC)    History of aortic valve replacement with bioprosthetic valve    Palliative care encounter    Goals of care, counseling/discussion  Resolved Problems:    * No resolved hospital problems. *      Plan:    83-year-old male with a history of aortic stenosis, A-fib and hypertension presents to the ED with complaints of shortness of breath and is admitted to telemetry unit with     Septic shock-likely related to COVID-19  -Decadron 6 mg daily  -Vitamin D, 
    [facility]   Progress note      Subjective:  Seen at bedside. Had lengthy discussion with both he and his wife regarding need for transfer.       Objective:    BP (!) 90/56   Pulse 99   Temp 98.1 °F (36.7 °C) (Oral)   Resp 18   Ht 1.727 m (5' 8\")   Wt 85.5 kg (188 lb 6.4 oz)   SpO2 94%   BMI 28.65 kg/m²     In: 2000 [I.V.:2000]  Out: 3050   In: 2000   Out: 3050 [Urine:3050]    General appearance: NAD, more interactive  HEENT: AT/NC, MMM, very [place]  Neck: FROM, supple  Lungs: Coarse breath sounds bilateral lung fields, on o2 via HFnc  CV: tachy, systolic murmur present  Vasc: Radial pulses 2+  Abdomen: Soft, non-tender; no masses or HSM  Extremities: No peripheral edema or digital cyanosis  Skin: no rash, lesions or ulcers    Recent Labs     02/10/24  0428 02/11/24  0533 02/12/24  0513   WBC 18.8* 16.6* 14.7*   HGB 11.1* 10.9* 11.2*   HCT 33.7* 33.3* 35.0*    204 225       Recent Labs     02/10/24  1730 02/11/24  0533 02/12/24  0513    148* 148*   K 3.8 3.6 4.0    107 109*   CO2 23 25 24   BUN 58* 58* 61*   CREATININE 2.4* 2.4* 2.1*   CALCIUM 8.7 9.0 8.9       Assessment:    Principal Problem:    Septic shock (HCC)  Active Problems:    NSTEMI (non-ST elevated myocardial infarction) (HCC)    Pneumonia due to organism    COVID-19    Acute respiratory failure with hypoxia (HCC)    History of aortic valve replacement with bioprosthetic valve    Palliative care encounter    Goals of care, counseling/discussion  Resolved Problems:    * No resolved hospital problems. *      Plan:    83-year-old male with a history of aortic stenosis, A-fib and hypertension presents to the ED with complaints of shortness of breath and is admitted to telemetry unit with     Septic shock-likely related to COVID-19  -Decadron 6 mg daily  -Vitamin D, zinc, vitamin C  -Monitor O2 saturations and supplement oxygen to keep saturations greater than 93%, wean as necessary, incentive spirometer, no 
    [facility]   Progress note      Subjective:    More interactive, on oxygen. States he doesn't feel well today. States he was up all night. Didn't sleep at all. He did request sleep aid, but wasn't given one by overnight np given oxygenation status. He is tolerating his diet, but doesn't like what he is getting.   Denies any acute complaints  No sitter at bedside today    Objective:    BP (!) 170/86   Pulse (!) 108   Temp 98.9 °F (37.2 °C) (Bladder)   Resp 24   Ht 1.727 m (5' 8\")   Wt 90.6 kg (199 lb 11.8 oz)   SpO2 94%   BMI 30.37 kg/m²     In: 5333.1 [I.V.:5281.7]  Out: 6950   In: 5333.1   Out: 6950 [Urine:6950]    General appearance: NAD, more interactive  HEENT: AT/NC, MMM  Neck: FROM, supple  Lungs: Coarse breath sounds bilateral lung fields, on o2 via nc  CV: tachy, systolic murmur present  Vasc: Radial pulses 2+  Abdomen: Soft, non-tender; no masses or HSM  Extremities: No peripheral edema or digital cyanosis  Skin: no rash, lesions or ulcers    Recent Labs     02/09/24  0412 02/10/24  0428 02/11/24  0533   WBC 17.7* 18.8* 16.6*   HGB 11.7* 11.1* 10.9*   HCT 35.1* 33.7* 33.3*    180 204       Recent Labs     02/10/24  1100 02/10/24  1730 02/11/24  0533    145 148*   K 4.2 3.8 3.6   * 107 107   CO2 24 23 25   BUN 57* 58* 58*   CREATININE 2.4* 2.4* 2.4*   CALCIUM 8.6 8.7 9.0       Assessment:    Principal Problem:    Septic shock (HCC)  Active Problems:    NSTEMI (non-ST elevated myocardial infarction) (HCC)    Pneumonia due to organism    COVID-19    Acute respiratory failure with hypoxia (HCC)    History of aortic valve replacement with bioprosthetic valve    Palliative care encounter    Goals of care, counseling/discussion  Resolved Problems:    * No resolved hospital problems. *      Plan:    83-year-old male with a history of aortic stenosis, A-fib and hypertension presents to the ED with complaints of shortness of breath and is admitted to telemetry unit with   
  Palliative Care Department  [phone]  Palliative Care Progress Note  Provider ANA Rich CNP    Shiv Lee  [id number]  Hospital Day: 3  Date of Initial Consult: 2/7/2024  Referring Provider: ALIZA Wagner  Palliative Medicine was consulted for assistance with: Goals of care    HPI:   Shiv Lee is a 83 y.o. with a medical history of aortic stenosis, A-fib, CAD, hypertension, mitral regurgitation and subacute bacterial endocarditis who was admitted on 2/7/2024 from home with a CHIEF COMPLAINT of shortness of breath. Patient wears 2 L O2 via NC at baseline.  Patient was found to be 84% in ED. Patient has recent diagnosis of pneumonia but was not on any antibiotics. Patient was noted to be extremely hypotensive despite treatment with IV fluids and became bradycardic. Patient was started on Levophed and admitted to ICU for further medical management. Ultrasound gallbladder showing gallbladder wall thickening with no evidence of cholelithiasis. CTA pulmonary revealing interstitial/groundglass opacities in the lower left lung. CT head negative for any acute intracranial abnormality. Surgery consulted for gallbladder thickening concerning for cholecystitis. HIDA pending.  Per surgery patient is a very poor surgical candidate secondary to his extensive heart history alone. Cardiology consulted for NSTEMI. Heparin drip started. Palliative medicine was consulted for goals of care.  ASSESSMENT/PLAN:     Pertinent Hospital Diagnoses     Septic shock-likely related to COVID-19  NSTEMI  MIGDALIA  Cholecystitis    Palliative Care Encounter / Counseling Regarding Goals of Care  Please see detailed goals of care discussion as below  At this time, Shiv Lee, Does Not have capacity for medical decision-making.  Capacity is time limited and situation/question specific  During encounter Scott was surrogate medical decision-maker  Outcome of goals of care meeting:   Continue full code and all 
  Palliative Care Department  [phone]  Palliative Care Progress Note  Provider ANA Rich CNP    Shiv Lee  [id number]  Hospital Day: 2  Date of Initial Consult: 2/7/2024  Referring Provider: ALIZA Wagner  Palliative Medicine was consulted for assistance with: Goals of care    HPI:   Shiv Lee is a 83 y.o. with a medical history of aortic stenosis, A-fib, CAD, hypertension, mitral regurgitation and subacute bacterial endocarditis who was admitted on 2/7/2024 from home with a CHIEF COMPLAINT of shortness of breath. Patient wears 2 L O2 via NC at baseline.  Patient was found to be 84% in ED. Patient has recent diagnosis of pneumonia but was not on any antibiotics. Patient was noted to be extremely hypotensive despite treatment with IV fluids and became bradycardic. Patient was started on Levophed and admitted to ICU for further medical management. Ultrasound gallbladder showing gallbladder wall thickening with no evidence of cholelithiasis. CTA pulmonary revealing interstitial/groundglass opacities in the lower left lung. CT head negative for any acute intracranial abnormality. Surgery consulted for gallbladder thickening concerning for cholecystitis. HIDA pending.  Per surgery patient is a very poor surgical candidate secondary to his extensive heart history alone. Cardiology consulted for NSTEMI. Heparin drip started. Palliative medicine was consulted for goals of care.  ASSESSMENT/PLAN:     Pertinent Hospital Diagnoses     Septic shock-likely related to COVID-19  NSTEMI  MIGDALIA  Cholecystitis    Palliative Care Encounter / Counseling Regarding Goals of Care  Please see detailed goals of care discussion as below  At this time, Shiv Lee, Does Not have capacity for medical decision-making.  Capacity is time limited and situation/question specific  During encounter Scott was surrogate medical decision-maker  Outcome of goals of care meeting:   Continue full code and all 
 CI HR MAP TPRI SVI TFC   Baseline 1.9 59 - - 32 58.6   Test 2.3 66 - - 37 60.0   % Change 21 10.9 - - 15.8 2.3   noninvasive -  start     Fluid responsive: 15.8%  
Comprehensive Nutrition Assessment    Type and Reason for Visit:  Initial, RD Nutrition Re-Screen/LOS (ICU LOS)    Nutrition Recommendations/Plan:   Continue NPO, monitor for recommendation per SLP, advance diet as medically feasible. Monitor for nutrition progression.     Malnutrition Assessment:  Malnutrition Status:  At risk for malnutrition (Comment) (02/09/24 4556)    Context:  Acute Illness     Findings of the 6 clinical characteristics of malnutrition:  Energy Intake:  Mild decrease in energy intake (Comment)  Weight Loss:  Unable to assess (d/t lack of actual wt hx per EMR)     Body Fat Loss:  Unable to assess (covid isolation)     Muscle Mass Loss:  Unable to assess (covid isolation)    Fluid Accumulation:  No significant fluid accumulation     Strength:  Not Performed    Nutrition Assessment:    Pt w/ septic shock likely r/t COVID-19, NSTEMI, MIGDALIA, acute metabolic encephalopathy. Hx CAD. Pt currently NPO, SLP consult pending, ADAT & will monitor for nutrition progression.    Nutrition Related Findings:    A&O X3, +2.4L, no edema, abd soft/obese, +BS, K+ 3.2, BUN/Cr 45/2.3   Wound Type: None       Current Nutrition Intake & Therapies:    Average Meal Intake: NPO  Average Supplements Intake: NPO  Diet NPO    Anthropometric Measures:  Height: 172.7 cm (5' 8\")  Ideal Body Weight (IBW): 154 lbs (70 kg)       Current Body Weight: 93.6 kg (206 lb 5.6 oz) (2/7), 134 % IBW. Weight Source: Not Specified  Current BMI (kg/m2): 31.4  Usual Body Weight:  (no actual EMR wt hx <1 year, 1/25/24 198# OV no method)                       BMI Categories: Obese Class 1 (BMI 30.0-34.9)    Estimated Daily Nutrient Needs:  Energy Requirements Based On: Formula  Weight Used for Energy Requirements: Current  Energy (kcal/day):   Weight Used for Protein Requirements: Ideal  Protein (g/day): 80-90 (1.2-1.3 as tolerated w/ MIGDALIA)  Method Used for Fluid Requirements: Other (Comment)  Fluid (ml/day): per critical care 
Department of Internal Medicine  Nephrology Attending Progress Note      Events reviewed.    SUBJECTIVE: We are following Mr. Lee for MIGDALIA.  Reports feeling better.    PHYSICAL EXAM:      Vitals:    VITALS:  /68   Pulse (!) 106   Temp 97.3 °F (36.3 °C) (Infrared)   Resp (!) 40   Ht 1.727 m (5' 8\")   Wt 95.4 kg (210 lb 5.1 oz)   SpO2 94%   BMI 31.98 kg/m²   24HR INTAKE/OUTPUT:    Intake/Output Summary (Last 24 hours) at 2/10/2024 1323  Last data filed at 2/10/2024 0628  Gross per 24 hour   Intake 2794.78 ml   Output 5500 ml   Net -2705.22 ml         Constitutional: Patient is lethargic  HEENT: Pupils equal reactive, mucous membranes are dry  Respiratory: Lungs are clear  Cardiovascular/Edema: Heart sounds are tachycardic  Gastrointestinal: Abdomen is soft  Neurologic: Patient lethargic, nonfocal  Skin: No lesions  Other: No edema    Scheduled Meds:   cefTRIAXone (ROCEPHIN) IV  2,000 mg IntraVENous Q24H    dexAMETHasone  6 mg IntraVENous Daily    timolol  1 drop Both Eyes Daily    metoprolol succinate  25 mg Oral Daily    aspirin  81 mg Oral Daily    sodium chloride flush  5-40 mL IntraVENous 2 times per day    pantoprazole (PROTONIX) 40 mg in sodium chloride (PF) 0.9 % 10 mL injection  40 mg IntraVENous Q12H    ipratropium 0.5 mg-albuterol 2.5 mg  1 Dose Inhalation Q4H WA RT    vitamin D  6,000 Units Oral Daily    Followed by    [START ON 2/14/2024] vitamin D  2,000 Units Oral Daily    insulin lispro  0-4 Units SubCUTAneous Q6H    ascorbic acid  500 mg Oral Daily    budesonide  0.5 mg Nebulization BID RT    arformoterol tartrate  15 mcg Nebulization BID RT     Continuous Infusions:   heparin (PORCINE) Infusion 15 Units/kg/hr (02/10/24 0527)    sodium chloride      lactated ringers IV soln 125 mL/hr at 02/10/24 0624     PRN Meds:.hydrALAZINE, heparin (porcine), heparin (porcine), polyethylene glycol, acetaminophen **OR** acetaminophen, ondansetron **OR** ondansetron, sodium chloride flush, sodium 
Full consultation to follow    MIGDALIA stage I, probably volume responsive prerenal MIGDALIA (poor intake, diuretics) in the setting of ARB administration versus ischemic ATN due to profound hypotension due to hemodynamic shock and possibly contrast associated MIGDALIA  To obtain NICOM and to increase IV fluids    Lactic acidosis, 2/2 septic shock  Hemodynamic shock, septic and hypovolemic, resolved, off vasopressors    Rashard Mathew MD    
GENERAL SURGERY  DAILY PROGRESS NOTE  2/8/2024  Chief Complaint   Patient presents with    Shortness of Breath     Hypoxic for ems, arrived on nrb. Possible pneumonia/sepsis? Shortness of breath off and on today, patient states today it felt like he was having a panic attack.          Subjective:  HIDA today. Patient required Haldol yesterday secondary to agitation and hallucinations. Now on Precedex.     Objective:  /70   Pulse 67   Temp 97.5 °F (36.4 °C) (Bladder)   Resp 20   Ht 1.727 m (5' 8\")   Wt 93.6 kg (206 lb 5.6 oz)   SpO2 93%   BMI 31.38 kg/m²       General Appearance:  confused, hallucinating  Skin:  Skin color, texture, turgor normal. No rashes or lesions.  Head/face:  NCAT  Eyes:  No gross abnormalities., PERRL, EOMI, and Sclera nonicteric  Lungs:  regular, no distress  Heart:  Heart regular rate and rhythm  Abdomen:  soft, non-tender, negative blanton sign, non-distended  Extremities: pulses present in all extremities    Assessment/Plan:  83 y.o. male  who presented with SOB, found to be in septic shock with possible NSTEMI on Hep gtt. Imaging performed with concern for possible cholecystitis secondary to gallbladder wall thickening.      - HIDA pending  - lactic acid has been uptrending; now 4.8 from 3  - Leukocytosis improved to 18.9 from 26  -Fractionated bili shows Tbili 1.6 with Dbili 0.3 making this likely an intrinsic liver problem  - Will continue to follow; pending HIDA results patient could require perc saulo tube but feels this is less likely since patient is asymptomatic  - GB wall thickening could be secondary to his cardiac disease and not primary thickening from cholecystitis    Discussed with Dr. Contreras    Electronically signed by Muna Oglesby DO on 2/8/2024 at 4:40 AM    Attending Physician Statement:    Chief Complaint:   Chief Complaint   Patient presents with    Shortness of Breath     Hypoxic for ems, arrived on nrb. Possible pneumonia/sepsis? Shortness of breath off 
HIDA reviewed. GB visualized. No signs of symptoms of acute cholecystitis. No need for surgical intervention at this time. Please call back with any questions.    Electronically signed by René Cohn MD on 2/8/2024 at 12:08 PM    
Message sent to Dr. Coley in regards to patient transferring downtown.   
NELLY Bhandari, was notified by ICU nurse manager Zoila that there is a bed available downtown.     Notified Dr. Coley to contact access center to facilitate transfer.   
Nurse to nurse called to Heike in CVICU. Patient and his wife updated on new room number and PAS to pickup at 1800.  
Occupational Therapy  OT BEDSIDE TREATMENT NOTE      Date:2024  Patient Name: Shiv Lee  MRN: [id number]  : 1940  Room: [address] [place] [employer], [place] [address]     Evaluating OT: Arelis Cintron, OTR/JASMINA - [id number]     Referring Provider: Flory Moreland DO  Specific Provider Orders/Date: \"OT eval and treat\" - 2024     Diagnosis: Abnormal CT scan [R93.89]  NSTEMI (non-ST elevated myocardial infarction) (Formerly [facility]) [I21.4]  Acute respiratory failure with hypoxia (Formerly [facility]) [J96.01]  Septic shock (Formerly [facility]) [A41.9, R65.21]  Pneumonia due to infectious organism, unspecified laterality, unspecified part of lung [J18.9]  COVID-19 [U07.1]      Pertinent Medical History: HTN, a-fib, CAD      Precautions: fall risk,  O2  high flow     Assessment of Current Deficits:    [x] Functional mobility             [x] ADLs          [x] Strength                  [] Cognition   [x] Functional transfers           [x] IADLs         [x] Safety Awareness   [x] Endurance   [] Fine Motor Coordination    [x] Balance      [] Vision/Perception   [x] Sensation    [] Gross Motor Coordination [] ROM          [] Delirium                  [] Motor Control      OT PLAN OF CARE   OT POC is based on physician orders, patient diagnosis, and results of clinical assessment.  Frequency/Duration 2-5 days/week for 2 weeks PRN   Specific OT Treatment Interventions to Include:   * Instruction/training on adapted ADL techniques and AE recommendations to increase functional independence within precautions       * Training on energy conservation strategies, correct breathing pattern and techniques to improve independence/tolerance for self-care routine  * Functional transfer/mobility training/DME recommendations for increased independence, safety, and fall prevention  * Patient/Family education to increase follow through with safety techniques and functional independence  * Recommendation of environmental modifications for increased safety with functional transfers/mobility and ADLs  * 
Patient admitted from 436 to , with the following belongings; 2 pillows, placed on monitor, patient oriented to room and unit visiting hours.  Patient guide at bedside, reviewed patient rights and responsibilities. MRSA nasal swab obtained.  Bed alarm on.    
Patient admitted from ER to 212, with the following belongings; none, placed on monitor, patient oriented to room and unit visiting hours.  Patient guide at bedside, reviewed patient rights and responsibilities. MRSA nasal swab obtained.  Bed alarm on.  Call light within reach.  Wife updated at bedside. Patient is confused and anxious. Repeatedly tries to pull at lines and tubes.   
Patient beginning to become agitated/combative with staff and attempts to pull at triple lumen, arterial line, carvalho catheter, and all monitor wires. Pt appears to be having visual hallucinations, as patient states that he is reaching for things that do not exist in room, such as \"dryer sheets\". Family and sitter present at bedside. Haldol administered per order for agitation. Within 15 minutes of administering medication, patient appears to have worsened agitation. Patient continuously attempting to pull at triple lumen, arterial line, carvalho catheter, and monitor wires, while also attempting to swing at and kick staff and family, while also attempting to get out of bed. Patient states \"I am having a panic attack.\" Family escorted out of room for safety. Additional staff members arrive to room to help de-escalade situation and prevent patient from harming self. Perfect serve message sent to Dr. Doe to notify of situation. Order obtained for precedex infusion. Precedex initiated, increased per Dr. Doe. Family return to room and updated on current plan of care. Patient repositioned in bed. Pt frequently scoots self to bottom of bed and swings legs out of bed with attempt to get out of bed. Frequent attempts to reorient patient unsuccessful. RN and sitter remain at bedside for patient safety.   
Physical Therapy  Facility/Department: [address] ICU  Physical Therapy Initial Assessment    Name: Shiv Lee  : 1940  MRN: [id number]  Date of Service: 2024        Patient Diagnosis(es): The primary encounter diagnosis was Septic shock (HCC). Diagnoses of COVID-19, Acute respiratory failure with hypoxia (HCC), Pneumonia due to infectious organism, unspecified laterality, unspecified part of lung, NSTEMI (non-ST elevated myocardial infarction) (HCC), and Abnormal CT scan were also pertinent to this visit.  Past Medical History:  has a past medical history of Abnormal EKG, Aortic stenosis, Atrial fibrillation (HCC), Coronary artery disease, HTN (hypertension), Left atrial dilatation, Mitral regurgitation, and SBE (subacute bacterial endocarditis) prophylaxis candidate.  Past Surgical History:  has a past surgical history that includes Diagnostic Cardiac Cath Lab Procedure (09); Coronary artery bypass graft (09); Aortic valve surgery (09); and eye surgery ().    Evaluating Therapist: Doreen Mcghee PT    Room #:  0212/0212-A  Diagnosis:  Abnormal CT scan [R93.89]  NSTEMI (non-ST elevated myocardial infarction) (HCC) [I21.4]  Acute respiratory failure with hypoxia (HCC) [J96.01]  Septic shock (HCC) [A41.9, R65.21]  Pneumonia due to infectious organism, unspecified laterality, unspecified part of lung [J18.9]  COVID-19 [U07.1]  PMHx/PSHx:  Afib, HTN  Precautions:  falls, O2, hard of hearing,      Social:  Pt [place]. Showers in basement level. Independent without device. Uses home O2.     Initial Evaluation  Date: 24 Treatment      Short Term/ Long Term   Goals   Was pt agreeable to Eval/treatment? Yes      Does pt have pain? No c/o pain     Bed Mobility  Rolling: mod assist  Supine to sit: mod assist  Sit to supine: mod assist  Scooting: mod assist  SBA   Transfers NT secondary to low O2 sat seated edge of bed  Min assist   Ambulation    NT  50 feet with ww with 
SPIRITUAL HEALTH SERVICES - [facility] Encounter    Name: Shiv Lee                  Referral: Routine Visit    Sacraments  Anointed (Last Rites): Yes  Apostolic [place]: Yes  Confession: No  Communion: No     Assessment:  Patient receptive to  visit.      Intervention:   provided spiritual support and sacramental ministry for patient.     Outcome:  Patient expressed gratitude and asked to keep them in prayer.    Plan:  Chaplains will remain available to offer spiritual and emotional support as needed.      Electronically signed by Chaplain Adrian, on 2/9/2024 at 1:00 PM.  Spiritual Care Department  [facility]  [phone]   
SPIRITUAL HEALTH SERVICES - [facility]  PROGRESS NOTE    Name: Shiv Lee                Confucianism: Holiness  Anointed (Last Rites): No    Referral: Routine Visit    Assessment:  Unable to assess patient.      Intervention:  Provided  contact information and prayer card.    Outcome:  None    Plan:  Chaplains will remain available to offer spiritual and emotional support as needed.      Electronically signed by Chaplain Kj, on 2/8/2024 at 12:23 PM.  Spiritual Care Department  [facility]  [phone]      
Spoke w/ Leonidas at [facility]. Patient to be transferred to CVICU room 3822. Provided phone number to call report [phone]. Informed RN of update and patient.   
nonrebreather for saturation 96%  Consult pulmonary  Consult ID  -Droplet plus isolation  -Encourage ISP use/self proning  -Follow inflammatory markers  Follow CXR:   Levophed drip with titration parameters to keep MAP greater than 65  Continue to trend lactic acid     NSTEMI  Monitor labs-troponins-1338>1458  Heparin drip  Consult cardiology  Nitro as needed chest pain     Acute kidney injury  Monitor labs-BUN/creatinine 27/1.5  Nephrology following  Sodium bicarb drip  Hold nephrotoxins     Cholecystitis  Continue IV antibiotics  Right upper quadrant ultrasound-pending  Monitor LFTs  -surgery following, for HIDA    2/8/2024  Sitter at bedside  Blood pressure is elevated on evaluation today  Lactate still 3.6, HIDA scan without evidence of cholecystitis  Strep pneumonia antigen positive and urine consistent with strep pneumoniae pneumonia-continue IV antibiotic treatment at discretion of infectious disease  Urine with gram-negative rods  Aggressive IV fluid hydration for sepsis  Caution with fluids in setting of NSTEMI, echocardiogram with ejection fraction 60%, bioprosthetic valve  Continue ICU care      Code Status: Full code  Consultants: Critical care, infectious disease, general surgery, palliative care, renal  DVT Prophylaxis   PT/OT  Discharge planning         I have spent a total time of 25 minutes of this patient encounter reviewing chart, labs, radiological reports coordinating care with interdisciplinary teams, face to face encounter with patient, providing counseling/education to patient/family, and formulating plan.       Karyn Brock MD  3:43 PM  2/8/2024   
A  Sit-to-Supine: Mod A   SBA in order to maximize patient's independence with ADLs, re-positioning, and other functional tasks.   Functional Transfers Not attempted secondary to low O2 saturations while seated at EOB (RN aware).  SBA   Functional Mobility Not assessed.  Min A with functional mobility (with device, as needed/appropriate) in order to maximize independence with ADLs/IADLs and other functional tasks.   Balance Sitting: Fair (at EOB)  Fair dynamic standing balance during completion of ADLs/IADLs and other functional tasks.   Activity Tolerance Limited secondary to respiratory status.  Patient will demonstrate Good understanding and consistent implementation of energy conservation techniques and work simplification techniques into ADL/IADL routines.   Visual/  Perceptual WFL     N/A   B UE Strength 4-/5  Patient will demonstrate 4+/5 B UE strength in order to maximize independence with ADLs/IADLs and functional transfers.     Additional Long-Term Goal: Patient will increase functional independence to PLOF in order to allow patient to live in least restrictive environment.      ROM: Additional Information:    R UE  WFL    L UE WFL      Hearing: [place]  Sensation: No c/o numbness/tingling in B UEs.  Tone: WFL  Edema: No    Comments: RN approved patient's participation in EOB/OOB activities. Upon arrival, patient supine in bed. Patient agreeable to participate in EOB/OOB activities. Once seated at EOB, patient's O2 saturation was initially 87%, but then decreased to 83% (while wearing 6L of O2 via high flow nasal cannula); RN was immediately notified and requested that O2 be increased to 10L via high flow nasal cannula. Patient's O2 saturations initially improved slightly/intermittently, but then decreased and remained in the low 80's; RN was again notified and patient was assisted back into bed. Once supine in bed, patient's O2 saturation was noted to be 78% (while still wearing 10L of O2 via high flow nasal 
Also note that there is bilateral  perinephric stranding which is a nonspecific finding.     Trace free fluid in the pelvis.     Small hiatal hernia.     Severe atherosclerotic calcification which causes narrowing of the major  abdominal aortic branch vessels.     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      BRIEF SUMMARY OF INITIAL CONSULT:    Briefly Mr. Lee he is a 83-year-old man with history of HTN, CAD status post CABG, aortic stenosis status post AVR with bioprosthetic valve, hyperlipidemia, PAF, asbestos exposure, who was recently admitted on December 2023 with pneumonia, who was admitted on February 7, 2024 after he was brought to the ER by EMS due to shortness of breath.  In the ER he was found to be hypotensive and septic shock as well as NSTEMI and COVID-19 positive.  On admission his creatinine level was 1.2 mg/dL but since then has rapidly increased up to 2.3 mg/dL, reason for this consultation.  His medications prior to admission included losartan, HCTZ.    Problems resolved:    Lactic acidosis, 2/2 septic shock    IMPRESSION/RECOMMENDATIONS:        MIGDALIA stage I, probably volume responsive prerenal MIGDALIA (poor intake, diuretics) in the setting of ARB administration versus ischemic ATN due to profound hypotension due to hemodynamic shock and possibly contrast associated MIGDALIA.  Nonoliguric renal function stable, creatinine has plateaued at 2.3-2.4 mg/dL.    Hemodynamic shock, septic and hypovolemic, resolved, off vasopressors  -----------------------------------------------------------------------  CAD status post CABG, now with NSTEMI  Respiratory alkalosis with high anion gap metabolic acidosis (lactic acidosis)  Strep Pneumonia, on piperacillin-tazobactam  S/p AVR  COVID-19, on dexamethasone  Worsening respiratory interstitial infiltrates?fluid vs infection      Plan:    Continue present medications  Replace K  Discontinue LR today  May use thiazide diuretic prn to avoid 
organism, unspecified laterality, unspecified part of lung     J18.9    Abnormal CT scan     R93.89             PATIENT REPORT/COMPLAINT: at times states he can't swallow but did not exhibit any issues eats quickly at times   RN cleared patient for participation in assessment     yes     PRIOR LEVEL OF SWALLOW FUNCTION:    PAST HISTORY OF OROPHARYNGEAL DYSPHAGIA?: none reported    Home diet: Regular consistency solids (IDDSI level 7) with  thin liquids (IDDSI level 0)  Current Diet Order:  Diet NPO    PROCEDURE:  Consistencies Administered During the Evaluation   Liquids: thin liquid   Solids:  Pureed  and Hard solid      Method of Intake:   cup, spoon  Self fed      Position:   Sitting in bed with head elevated above 75 degrees    CLINICAL ASSESSMENT:  Oral Stage:       Pt is able to achieve adequate cohesive bolus prep as evidenced by satisfactory mastication patterns, timely A-P bolus propulsion, and minimal oral residuals.      Pharyngeal Stage:    No signs of aspiration were noted during this evaluation however, silent aspiration cannot be ruled out at bedside.  If silent aspiration is suspected, a Videofluoroscopic Study of Swallowing (MBS) is recommended and requires a physician order.    Cognition:   Alert & Oriented x 2 and Follows 2 - step directions appropriate for this assessment    Oral Peripheral Examination   Generalized oral weakness    Current Respiratory Status    6 liters O2 via nasal cannula     Parameters of Speech Production  Respiration:  Adequate for speech production  Quality:   Within functional limits  Intensity: Within functional limits    Volitional Swallow: Present     Volitional Cough:  Present     Pain: No pain reported.    EDUCATION:   The Speech Language Pathologist (SLP) completed education regarding results of evaluation and that intervention is warranted at this time.  Learner: Patient  Education: Reviewed results and recommendations of this evaluation  Evaluation of Education:  
Decreased BS B/L, no wheezing  Cardiac: Regular rate and rhythm, +S1S2, 2/6 systolic murmur  Abdomen: Soft, nontender, +bowel sounds  Extremities: Moves all extremities x 4, no lower extremity edema  Neurologic: No focal motor deficits apparent, normal mood and affect, alert and oriented x 3    Intake/Output:    Intake/Output Summary (Last 24 hours) at 2/9/2024 1705  Last data filed at 2/9/2024 1600  Gross per 24 hour   Intake 4264.91 ml   Output 3415 ml   Net 849.91 ml     I/O this shift:  In: -   Out: 1800 [Urine:1800]    Laboratory Tests:  Recent Labs     02/08/24  1652 02/09/24  0412 02/09/24  1445    144 143   K 3.8 3.2* 4.8   * 108* 107   CO2 19* 19* 21*   BUN 44* 45* 51*   CREATININE 2.2* 2.3* 2.3*   GLUCOSE 175* 147* 122*   CALCIUM 8.3* 8.1* 8.5*     Lab Results   Component Value Date/Time    MG 2.4 02/09/2024 02:45 PM     Recent Labs     02/07/24  0457 02/07/24  1410 02/08/24  0412 02/09/24 0412   ALKPHOS 85  --  59 88   ALT 45*  --  45* 44*   AST 97*  --  84* 60*   PROT 6.5  --  6.5 6.3*   BILITOT 1.6*  --  1.0 0.9   BILIDIR  --  0.3  --   --    LABALBU 3.7  --  3.6 3.6     Recent Labs     02/07/24  0457 02/08/24  0412 02/09/24  0412   WBC 26.1* 18.9* 17.7*   RBC 4.00 3.89 3.75*   HGB 12.4* 12.0* 11.7*   HCT 38.8 36.7* 35.1*   MCV 97.0 94.3 93.6   MCH 31.0 30.8 31.2   MCHC 32.0 32.7 33.3   RDW 15.3* 15.5* 15.6*    144 150   MPV 10.6 10.3 11.3     No results found for: \"CKTOTAL\", \"CKMB\", \"CKMBINDEX\", \"TROPONINI\"  Recent Labs     02/08/24  0805 02/08/24  1440 02/08/24  2100 02/09/24  0245 02/09/24  0800   TROPHS 889* 779* 652* 639* 665*     No results found for: \"INR\", \"PROTIME\"  No results found for: \"TSHFT4\", \"TSH\"  No results found for: \"LABA1C\"  No results found for: \"EAG\"  Lab Results   Component Value Date    CHOL 169 02/07/2024     Lab Results   Component Value Date    TRIG 45 02/07/2024     Lab Results   Component Value Date    HDL 42 02/07/2024     Lab Results   Component Value 
dexamethasone      Plan:    Discontinue LR  Lasix 80 mg IV x 1 given  Continue to monitor renal function    Rashard Mathew MD    
present medications  Replace K  Discontinue LR today  May use thiazide diuretic prn to avoid hypernatremia  Continue to monitor renal function      Disscussed with RN  Erin Kc MD    
fluid in the pelvis.      Small hiatal hernia.      Severe atherosclerotic calcification which causes narrowing of the major   abdominal aortic branch vessels.      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         CT HEAD WO CONTRAST   Final Result   No acute intracranial abnormality.         XR CHEST PORTABLE   Final Result   1.  Atherosclerotic disease and cardiomegaly.  Prominent interstitial   markings and left greater than right pleural effusions.  Favor edema however   infection could have a similar appearance.      2.  Possible nodular density in the left upper lung.      RECOMMENDATION:   Routine chest CT suggested to evaluate the lung parenchyma after patient   recovers from current medical issues.               ASSESSMENT:     Shock - Septic Vs Cardiogenic  Acute Hypoxic Respiratory Failure  NSTEMI  Lactic Acidosis  MIGDALIA        PLAN:     Neuro  Altered Mental Status - Agitation  Acute Metabolic Encephalopathy'  CT Head 2/7 - No acute abnormalities  2/7 Halodol PRN,  Precedex infusion for agitation  2/9 Precedex 0.5 - wean off. More Alert and oriented.   2/10 off Precedex; awake, alert and oriented    Respiratory  Acute Hypoxic Respiratory Failure  COVID negative and Strep Pneumoniae positive  2/7 CTA Chest - No PE, LLL ground glass opacities  Wean oxygen as tolerated. Keep O2 sat 90-92%   Continue Brovana, Pulmicort, Duo Nebs  2/7 - Decadron 10 mg daily started, Vitamin C, Vitamin D  2/9 Worsening infiltrates on CXR, Decrease dexamethasone dose  2/10 chest x-ray revealing increased interstitial prominence    Cardiovascular  Shock  Septic vs cardiogenic  s/p 2 L bolus in ED - check NICOM  s/p epi/atropine push   Started on levo in ED - wean to MAP 65, Dexamethasone  2/7 Norepinephrine weaned off  NICOM  2/8 Continue Maintenance fluids - LR   2/10 on heparin drip and LR infusion  Lactic Acidosis-resolved  2/9 - 1.8 < 2.7 < 2.7 < 3.6 < 4.8  NSTEMI  EKG without ACS  Trop 
reviewed by me independently. I spoke with bedside nursing, therapists and consultants. Critical care services and times documented are independent of procedures and multidisciplinary rounds with Residents. Additionally comprehensive, multidisciplinary rounds were conducted with the MICU team. The case was discussed in detail and plans for care were established. Review of Residents documentation was conducted and revisions were made as appropriate. I agree with the above documented exam, problem list and plan of care.   Yudith Andrade MD   CCT excluding procedures :36'  
appropriate. I agree with the above documented exam, problem list and plan of care.I performed the substantive portion of the visit.   Yudith Andrade MD   CCT excluding procedures :38'

## 2024-02-12 NOTE — PLAN OF CARE
Problem: Discharge Planning  Goal: Discharge to home or other facility with appropriate resources  2/7/2024 2125 by Heike Samuel RN  Outcome: Not Progressing  2/7/2024 1942 by Radha Beatty RN  Outcome: Progressing     Problem: Safety - Adult  Goal: Free from fall injury  2/7/2024 2125 by Heike Samuel RN  Outcome: Not Progressing  2/7/2024 1942 by Radha Beatty RN  Outcome: Progressing  Flowsheets (Taken 2/7/2024 0800 by Heike Samuel RN)  Free From Fall Injury: Instruct family/caregiver on patient safety     Problem: Confusion  Goal: Confusion, delirium, dementia, or psychosis is improved or at baseline  2/7/2024 2125 by Heike Samuel RN  Outcome: Not Progressing  Flowsheets (Taken 2/7/2024 2000 by Radha Beatty RN)  Effect of thought disturbance (confusion, delirium, dementia, or psychosis) are managed with adequate functional status: Assess for contributors to thought disturbance, including medications, impaired vision or hearing, underlying metabolic abnormalities, dehydration, psychiatric diagnoses, notify LIP  2/7/2024 1942 by Radha Beatty RN  Outcome: Progressing  Flowsheets (Taken 2/7/2024 1630 by Heike Samuel RN)  Effect of thought disturbance (confusion, delirium, dementia, or psychosis) are managed with adequate functional status:   Assess for contributors to thought disturbance, including medications, impaired vision or hearing, underlying metabolic abnormalities, dehydration, psychiatric diagnoses, notify LIP   Provide frequent short contacts to provide reality reorientation, refocusing and direction   Decrease environmental stimuli, including noise as appropriate   If unable to ensure safety without constant attention obtain sitter and review sitter guidelines with assigned personnel   [place] high risk fall precautions, as indicated   Monitor and intervene to maintain adequate nutrition, hydration, elimination, sleep and activity     Problem: Discharge 
  Problem: Discharge Planning  Goal: Discharge to home or other facility with appropriate resources  2/7/2024 2125 by Heike Samuel RN  Outcome: Not Progressing  2/7/2024 1942 by Radha Beatty RN  Outcome: Progressing     Problem: Safety - Adult  Goal: Free from fall injury  2/7/2024 2125 by Heike Samuel RN  Outcome: Not Progressing  2/7/2024 1942 by Radha Beatty RN  Outcome: Progressing  Flowsheets (Taken 2/7/2024 0800 by Heike Samuel RN)  Free From Fall Injury: Instruct family/caregiver on patient safety     Problem: Confusion  Goal: Confusion, delirium, dementia, or psychosis is improved or at baseline  Description: INTERVENTIONS:  1. Assess for possible contributors to thought disturbance, including medications, impaired vision or hearing, underlying metabolic abnormalities, dehydration, psychiatric diagnoses, and notify attending LIP  2. [place] high risk fall precautions, as indicated  3. Provide frequent short contacts to provide reality reorientation, refocusing and direction  4. Decrease environmental stimuli, including noise as appropriate  5. Monitor and intervene to maintain adequate nutrition, hydration, elimination, sleep and activity  6. If unable to ensure safety without constant attention obtain sitter and review sitter guidelines with assigned personnel  7. Initiate Psychosocial CNS and Spiritual Care consult, as indicated  2/7/2024 2125 by Heike Samuel RN  Outcome: Not Progressing  Flowsheets (Taken 2/7/2024 2000 by Radha Beatty, RN)  Effect of thought disturbance (confusion, delirium, dementia, or psychosis) are managed with adequate functional status: Assess for contributors to thought disturbance, including medications, impaired vision or hearing, underlying metabolic abnormalities, dehydration, psychiatric diagnoses, notify LIP  2/7/2024 1942 by Radha Beatty RN  Outcome: Progressing  Flowsheets (Taken 2/7/2024 1630 by Heike Samuel, NELLY)  Effect of 
  Problem: Discharge Planning  Goal: Discharge to home or other facility with appropriate resources  2/9/2024 0102 by Chantell Adam RN  Outcome: Not Progressing  2/8/2024 2213 by Radha Beatty RN  Outcome: Progressing  2/8/2024 1700 by Vanessa Horne RN  Outcome: Progressing     Problem: Safety - Adult  Goal: Free from fall injury  2/9/2024 0102 by Chantell Adam RN  Outcome: Progressing  2/8/2024 2213 by Radha Beatty RN  Outcome: Progressing  Flowsheets (Taken 2/8/2024 2200)  Free From Fall Injury: Instruct family/caregiver on patient safety  2/8/2024 1700 by Vanessa Horne RN  Outcome: Progressing     Problem: Skin/Tissue Integrity  Goal: Absence of new skin breakdown  Description: 1.  Monitor for areas of redness and/or skin breakdown  2.  Assess vascular access sites hourly  3.  Every 4-6 hours minimum:  Change oxygen saturation probe site  4.  Every 4-6 hours:  If on nasal continuous positive airway pressure, respiratory therapy assess nares and determine need for appliance change or resting period.  2/9/2024 0102 by Chantell Adam RN  Outcome: Progressing  2/8/2024 2213 by Radha Beatty RN  Outcome: Progressing  2/8/2024 1700 by Vanessa Horne RN  Outcome: Progressing     Problem: Confusion  Goal: Confusion, delirium, dementia, or psychosis is improved or at baseline  Description: INTERVENTIONS:  1. Assess for possible contributors to thought disturbance, including medications, impaired vision or hearing, underlying metabolic abnormalities, dehydration, psychiatric diagnoses, and notify attending LIP  2. [place] high risk fall precautions, as indicated  3. Provide frequent short contacts to provide reality reorientation, refocusing and direction  4. Decrease environmental stimuli, including noise as appropriate  5. Monitor and intervene to maintain adequate nutrition, hydration, elimination, sleep and activity  6. If unable to ensure safety without constant attention obtain sitter and review 
  Problem: Discharge Planning  Goal: Discharge to home or other facility with appropriate resources  Outcome: Progressing     Problem: Safety - Adult  Goal: Free from fall injury  Outcome: Progressing     Problem: Skin/Tissue Integrity  Goal: Absence of new skin breakdown  Description: 1.  Monitor for areas of redness and/or skin breakdown  2.  Assess vascular access sites hourly  3.  Every 4-6 hours minimum:  Change oxygen saturation probe site  4.  Every 4-6 hours:  If on nasal continuous positive airway pressure, respiratory therapy assess nares and determine need for appliance change or resting period.  Outcome: Progressing     Problem: Confusion  Goal: Confusion, delirium, dementia, or psychosis is improved or at baseline  Description: INTERVENTIONS:  1. Assess for possible contributors to thought disturbance, including medications, impaired vision or hearing, underlying metabolic abnormalities, dehydration, psychiatric diagnoses, and notify attending LIP  2. [place] high risk fall precautions, as indicated  3. Provide frequent short contacts to provide reality reorientation, refocusing and direction  4. Decrease environmental stimuli, including noise as appropriate  5. Monitor and intervene to maintain adequate nutrition, hydration, elimination, sleep and activity  6. If unable to ensure safety without constant attention obtain sitter and review sitter guidelines with assigned personnel  7. Initiate Psychosocial CNS and Spiritual Care consult, as indicated  Outcome: Progressing     Problem: ABCDS Injury Assessment  Goal: Absence of physical injury  Outcome: Progressing     Problem: Pain  Goal: Verbalizes/displays adequate comfort level or baseline comfort level  Outcome: Progressing     Problem: Nutrition Deficit:  Goal: Optimize nutritional status  Outcome: Progressing     
  Problem: Discharge Planning  Goal: Discharge to home or other facility with appropriate resources  Outcome: Progressing     Problem: Safety - Adult  Goal: Free from fall injury  Outcome: Progressing     Problem: Skin/Tissue Integrity  Goal: Absence of new skin breakdown  Description: 1.  Monitor for areas of redness and/or skin breakdown  2.  Assess vascular access sites hourly  3.  Every 4-6 hours minimum:  Change oxygen saturation probe site  4.  Every 4-6 hours:  If on nasal continuous positive airway pressure, respiratory therapy assess nares and determine need for appliance change or resting period.  Outcome: Progressing     Problem: Confusion  Goal: Confusion, delirium, dementia, or psychosis is improved or at baseline  Description: INTERVENTIONS:  1. Assess for possible contributors to thought disturbance, including medications, impaired vision or hearing, underlying metabolic abnormalities, dehydration, psychiatric diagnoses, and notify attending LIP  2. [place] high risk fall precautions, as indicated  3. Provide frequent short contacts to provide reality reorientation, refocusing and direction  4. Decrease environmental stimuli, including noise as appropriate  5. Monitor and intervene to maintain adequate nutrition, hydration, elimination, sleep and activity  6. If unable to ensure safety without constant attention obtain sitter and review sitter guidelines with assigned personnel  7. Initiate Psychosocial CNS and Spiritual Care consult, as indicated  Outcome: Progressing     Problem: ABCDS Injury Assessment  Goal: Absence of physical injury  Outcome: Progressing     
  Problem: Discharge Planning  Goal: Discharge to home or other facility with appropriate resources  Outcome: Progressing     Problem: Safety - Adult  Goal: Free from fall injury  Outcome: Progressing     Problem: Skin/Tissue Integrity  Goal: Absence of new skin breakdown  Description: 1.  Monitor for areas of redness and/or skin breakdown  2.  Assess vascular access sites hourly  3.  Every 4-6 hours minimum:  Change oxygen saturation probe site  4.  Every 4-6 hours:  If on nasal continuous positive airway pressure, respiratory therapy assess nares and determine need for appliance change or resting period.  Outcome: Progressing     Problem: Confusion  Goal: Confusion, delirium, dementia, or psychosis is improved or at baseline  Description: INTERVENTIONS:  1. Assess for possible contributors to thought disturbance, including medications, impaired vision or hearing, underlying metabolic abnormalities, dehydration, psychiatric diagnoses, and notify attending LIP  2. [place] high risk fall precautions, as indicated  3. Provide frequent short contacts to provide reality reorientation, refocusing and direction  4. Decrease environmental stimuli, including noise as appropriate  5. Monitor and intervene to maintain adequate nutrition, hydration, elimination, sleep and activity  6. If unable to ensure safety without constant attention obtain sitter and review sitter guidelines with assigned personnel  7. Initiate Psychosocial CNS and Spiritual Care consult, as indicated  Outcome: Progressing     Problem: ABCDS Injury Assessment  Goal: Absence of physical injury  Outcome: Progressing     Problem: Pain  Goal: Verbalizes/displays adequate comfort level or baseline comfort level  Outcome: Progressing     Problem: Nutrition Deficit:  Goal: Optimize nutritional status  Outcome: Progressing     
  Problem: Discharge Planning  Goal: Discharge to home or other facility with appropriate resources  Outcome: Progressing     Problem: Safety - Adult  Goal: Free from fall injury  Outcome: Progressing     Problem: Skin/Tissue Integrity  Goal: Absence of new skin breakdown  Description: 1.  Monitor for areas of redness and/or skin breakdown  2.  Assess vascular access sites hourly  3.  Every 4-6 hours minimum:  Change oxygen saturation probe site  4.  Every 4-6 hours:  If on nasal continuous positive airway pressure, respiratory therapy assess nares and determine need for appliance change or resting period.  Outcome: Progressing     Problem: Confusion  Goal: Confusion, delirium, dementia, or psychosis is improved or at baseline  Description: INTERVENTIONS:  1. Assess for possible contributors to thought disturbance, including medications, impaired vision or hearing, underlying metabolic abnormalities, dehydration, psychiatric diagnoses, and notify attending LIP  2. [place] high risk fall precautions, as indicated  3. Provide frequent short contacts to provide reality reorientation, refocusing and direction  4. Decrease environmental stimuli, including noise as appropriate  5. Monitor and intervene to maintain adequate nutrition, hydration, elimination, sleep and activity  6. If unable to ensure safety without constant attention obtain sitter and review sitter guidelines with assigned personnel  7. Initiate Psychosocial CNS and Spiritual Care consult, as indicated  Outcome: Progressing     Problem: ABCDS Injury Assessment  Goal: Absence of physical injury  Outcome: Progressing     Problem: Pain  Goal: Verbalizes/displays adequate comfort level or baseline comfort level  Outcome: Progressing     Problem: Nutrition Deficit:  Goal: Optimize nutritional status  Outcome: Progressing     
Patient's chart updated to reflect:      .    - HF care plan, HF education points and HF discharge instructions.  -Orders: 2 gram sodium diet, daily weights, I/O.  -PCP and cardiology follow up appointments to be scheduled within 7 days of hospital discharge.  -CHF education session will be provided to the patient prior to hospital discharge.    Pooja Diamond RN   Heart Failure Navigator   
injury  2/10/2024 0008 by Jeet Myers RN  Outcome: Progressing  2/9/2024 1743 by Vanessa Horne, RN  Outcome: Progressing     Problem: Pain  Goal: Verbalizes/displays adequate comfort level or baseline comfort level  2/10/2024 0008 by Jeet Myers RN  Outcome: Progressing  2/9/2024 1743 by Vanessa Horne, RN  Outcome: Progressing     Problem: Nutrition Deficit:  Goal: Optimize nutritional status  2/10/2024 0008 by Jeet Myers, RN  Outcome: Progressing  2/9/2024 1743 by Vanessa Horne, RN  Outcome: Progressing

## 2024-02-13 PROBLEM — N18.9 ACUTE KIDNEY INJURY SUPERIMPOSED ON CHRONIC KIDNEY DISEASE (HCC): Status: ACTIVE | Noted: 2024-02-13

## 2024-02-13 PROBLEM — N17.9 ACUTE KIDNEY INJURY SUPERIMPOSED ON CHRONIC KIDNEY DISEASE (HCC): Status: ACTIVE | Noted: 2024-02-13

## 2024-02-13 PROBLEM — D64.9 CHRONIC ANEMIA: Status: ACTIVE | Noted: 2024-02-13

## 2024-02-13 PROBLEM — Z95.1 STATUS POST CORONARY ARTERY BYPASS GRAFT: Status: ACTIVE | Noted: 2024-02-13

## 2024-02-13 LAB
ALBUMIN SERPL-MCNC: 3.8 G/DL (ref 3.5–5.2)
ALP SERPL-CCNC: 70 U/L (ref 40–129)
ALT SERPL-CCNC: 39 U/L (ref 0–40)
ANION GAP SERPL CALCULATED.3IONS-SCNC: 16 MMOL/L (ref 7–16)
AST SERPL-CCNC: 34 U/L (ref 0–39)
BASOPHILS # BLD: 0 K/UL (ref 0–0.2)
BASOPHILS NFR BLD: 0 % (ref 0–2)
BILIRUB DIRECT SERPL-MCNC: 0.3 MG/DL (ref 0–0.3)
BILIRUB SERPL-MCNC: 1 MG/DL (ref 0–1.2)
BUN SERPL-MCNC: 59 MG/DL (ref 6–23)
CALCIUM SERPL-MCNC: 8.6 MG/DL (ref 8.6–10.2)
CHLORIDE SERPL-SCNC: 110 MMOL/L (ref 98–107)
CO2 SERPL-SCNC: 23 MMOL/L (ref 22–29)
CORTIS SERPL-MCNC: 1.3 UG/DL (ref 2.7–18.4)
CREAT SERPL-MCNC: 2 MG/DL (ref 0.7–1.2)
EKG ATRIAL RATE: 101 BPM
EKG P-R INTERVAL: 248 MS
EKG Q-T INTERVAL: 374 MS
EKG QRS DURATION: 116 MS
EKG QTC CALCULATION (BAZETT): 484 MS
EKG R AXIS: 61 DEGREES
EKG T AXIS: 123 DEGREES
EKG VENTRICULAR RATE: 101 BPM
EOSINOPHIL # BLD: 0 K/UL (ref 0.05–0.5)
EOSINOPHILS RELATIVE PERCENT: 0 % (ref 0–6)
ERYTHROCYTE [DISTWIDTH] IN BLOOD BY AUTOMATED COUNT: 16.2 % (ref 11.5–15)
GFR SERPL CREATININE-BSD FRML MDRD: 33 ML/MIN/1.73M2
GLUCOSE SERPL-MCNC: 127 MG/DL (ref 74–99)
HCT VFR BLD AUTO: 34.6 % (ref 37–54)
HGB BLD-MCNC: 11.5 G/DL (ref 12.5–16.5)
LACTATE BLDV-SCNC: 1.8 MMOL/L (ref 0.5–1.9)
LYMPHOCYTES NFR BLD: 0.87 K/UL (ref 1.5–4)
LYMPHOCYTES RELATIVE PERCENT: 5 % (ref 20–42)
MAGNESIUM SERPL-MCNC: 2.5 MG/DL (ref 1.6–2.6)
MCH RBC QN AUTO: 31.3 PG (ref 26–35)
MCHC RBC AUTO-ENTMCNC: 33.2 G/DL (ref 32–34.5)
MCV RBC AUTO: 94 FL (ref 80–99.9)
MONOCYTES NFR BLD: 1.6 K/UL (ref 0.1–0.95)
MONOCYTES NFR BLD: 10 % (ref 2–12)
NEUTROPHILS NFR BLD: 85 % (ref 43–80)
NEUTS SEG NFR BLD: 14.23 K/UL (ref 1.8–7.3)
NUCLEATED RED BLOOD CELLS: 2 PER 100 WBC
PARTIAL THROMBOPLASTIN TIME: 66.4 SEC (ref 24.5–35.1)
PHOSPHATE SERPL-MCNC: 4.4 MG/DL (ref 2.5–4.5)
PLATELET # BLD AUTO: 258 K/UL (ref 130–450)
PMV BLD AUTO: 11 FL (ref 7–12)
POTASSIUM SERPL-SCNC: 4 MMOL/L (ref 3.5–5)
PROT SERPL-MCNC: 6.8 G/DL (ref 6.4–8.3)
RBC # BLD AUTO: 3.68 M/UL (ref 3.8–5.8)
RBC # BLD: ABNORMAL 10*6/UL
SODIUM SERPL-SCNC: 149 MMOL/L (ref 132–146)
TROPONIN I SERPL HS-MCNC: 624 NG/L (ref 0–11)
WBC OTHER # BLD: 16.7 K/UL (ref 4.5–11.5)

## 2024-02-13 PROCEDURE — 6370000000 HC RX 637 (ALT 250 FOR IP): Performed by: FAMILY MEDICINE

## 2024-02-13 PROCEDURE — 80053 COMPREHEN METABOLIC PANEL: CPT

## 2024-02-13 PROCEDURE — 84100 ASSAY OF PHOSPHORUS: CPT

## 2024-02-13 PROCEDURE — A4216 STERILE WATER/SALINE, 10 ML: HCPCS | Performed by: FAMILY MEDICINE

## 2024-02-13 PROCEDURE — 2000000000 HC ICU R&B

## 2024-02-13 PROCEDURE — 94640 AIRWAY INHALATION TREATMENT: CPT

## 2024-02-13 PROCEDURE — 82248 BILIRUBIN DIRECT: CPT

## 2024-02-13 PROCEDURE — 85730 THROMBOPLASTIN TIME PARTIAL: CPT

## 2024-02-13 PROCEDURE — 84484 ASSAY OF TROPONIN QUANT: CPT

## 2024-02-13 PROCEDURE — 85025 COMPLETE CBC W/AUTO DIFF WBC: CPT

## 2024-02-13 PROCEDURE — 2580000003 HC RX 258: Performed by: FAMILY MEDICINE

## 2024-02-13 PROCEDURE — 6360000002 HC RX W HCPCS: Performed by: FAMILY MEDICINE

## 2024-02-13 PROCEDURE — 93010 ELECTROCARDIOGRAM REPORT: CPT | Performed by: INTERNAL MEDICINE

## 2024-02-13 PROCEDURE — 2700000000 HC OXYGEN THERAPY PER DAY

## 2024-02-13 PROCEDURE — APPSS180 APP SPLIT SHARED TIME > 60 MINUTES: Performed by: NURSE PRACTITIONER

## 2024-02-13 PROCEDURE — 82533 TOTAL CORTISOL: CPT

## 2024-02-13 PROCEDURE — C9113 INJ PANTOPRAZOLE SODIUM, VIA: HCPCS | Performed by: FAMILY MEDICINE

## 2024-02-13 PROCEDURE — 83735 ASSAY OF MAGNESIUM: CPT

## 2024-02-13 PROCEDURE — 83605 ASSAY OF LACTIC ACID: CPT

## 2024-02-13 PROCEDURE — 99222 1ST HOSP IP/OBS MODERATE 55: CPT | Performed by: INTERNAL MEDICINE

## 2024-02-13 RX ADMIN — IPRATROPIUM BROMIDE AND ALBUTEROL SULFATE 1 DOSE: 2.5; .5 SOLUTION RESPIRATORY (INHALATION) at 13:32

## 2024-02-13 RX ADMIN — BUDESONIDE INHALATION 500 MCG: 0.5 SUSPENSION RESPIRATORY (INHALATION) at 09:15

## 2024-02-13 RX ADMIN — BUDESONIDE INHALATION 500 MCG: 0.5 SUSPENSION RESPIRATORY (INHALATION) at 22:45

## 2024-02-13 RX ADMIN — Medication 500 MG: at 08:28

## 2024-02-13 RX ADMIN — IPRATROPIUM BROMIDE AND ALBUTEROL SULFATE 1 DOSE: 2.5; .5 SOLUTION RESPIRATORY (INHALATION) at 09:15

## 2024-02-13 RX ADMIN — Medication 10 ML: at 20:23

## 2024-02-13 RX ADMIN — IPRATROPIUM BROMIDE AND ALBUTEROL SULFATE 1 DOSE: 2.5; .5 SOLUTION RESPIRATORY (INHALATION) at 22:45

## 2024-02-13 RX ADMIN — PANTOPRAZOLE SODIUM 40 MG: 40 INJECTION, POWDER, FOR SOLUTION INTRAVENOUS at 08:29

## 2024-02-13 RX ADMIN — DEXAMETHASONE SODIUM PHOSPHATE 6 MG: 10 INJECTION INTRAMUSCULAR; INTRAVENOUS at 08:29

## 2024-02-13 RX ADMIN — PANTOPRAZOLE SODIUM 40 MG: 40 INJECTION, POWDER, FOR SOLUTION INTRAVENOUS at 20:23

## 2024-02-13 RX ADMIN — TIMOLOL MALEATE 1 DROP: 5 SOLUTION OPHTHALMIC at 08:29

## 2024-02-13 RX ADMIN — ARFORMOTEROL TARTRATE 15 MCG: 15 SOLUTION RESPIRATORY (INHALATION) at 09:15

## 2024-02-13 RX ADMIN — Medication 6000 UNITS: at 11:36

## 2024-02-13 RX ADMIN — Medication 10 ML: at 08:29

## 2024-02-13 RX ADMIN — ARFORMOTEROL TARTRATE 15 MCG: 15 SOLUTION RESPIRATORY (INHALATION) at 22:45

## 2024-02-13 RX ADMIN — CEFTRIAXONE SODIUM 2000 MG: 2 INJECTION, POWDER, FOR SOLUTION INTRAMUSCULAR; INTRAVENOUS at 11:36

## 2024-02-13 RX ADMIN — ASPIRIN 81 MG: 81 TABLET, CHEWABLE ORAL at 08:28

## 2024-02-13 RX ADMIN — CLOPIDOGREL BISULFATE 75 MG: 75 TABLET ORAL at 08:28

## 2024-02-13 RX ADMIN — IPRATROPIUM BROMIDE AND ALBUTEROL SULFATE 1 DOSE: 2.5; .5 SOLUTION RESPIRATORY (INHALATION) at 18:34

## 2024-02-13 RX ADMIN — METOPROLOL SUCCINATE 25 MG: 25 TABLET, EXTENDED RELEASE ORAL at 08:28

## 2024-02-13 RX ADMIN — HEPARIN SODIUM 15 UNITS/KG/HR: 10000 INJECTION, SOLUTION INTRAVENOUS at 13:42

## 2024-02-13 ASSESSMENT — PAIN SCALES - GENERAL
PAINLEVEL_OUTOF10: 0

## 2024-02-13 NOTE — PROGRESS NOTES
4 Eyes Skin Assessment     NAME:  Shiv Lee  YOB: 1940  MEDICAL RECORD NUMBER:  [id number]    The patient is being assessed for  Admission    I agree that at least one RN has performed a thorough Head to Toe Skin Assessment on the patient. ALL assessment sites listed below have been assessed.      Areas assessed by both nurses:    Head, Face, Ears, Shoulders, Back, Chest, Arms, Elbows, Hands, Sacrum. Buttock, Coccyx, Ischium, Legs. Feet and Heels, and Under Medical Devices         Does the Patient have a Wound? No noted wound(s)       Bulmaro Prevention initiated by RN: Yes  Wound Care Orders initiated by RN: No    Pressure Injury (Stage 3,4, Unstageable, DTI, NWPT, and Complex wounds) if present, place Wound referral order by RN under : No    New Ostomies, if present place, Ostomy referral order under : No     Nurse 1 eSignature: Electronically signed by Alessio Aaron RN on 2/13/24 at 4:26 AM EST    **SHARE this note so that the co-signing nurse can place an eSignature**    Nurse 2 eSignature: Electronically signed by Shabana Ashraf RN on 2/13/24 at 4:29 AM EST    used

## 2024-02-13 NOTE — PROGRESS NOTES
Pt admitted to [facility] room 3822. Pt on 15L high flow nasal cannula, heparin gtt running at 15 units/kg/hr. Pt A&Ox4, moves all extremities, follows commands. Attached to monitor, vital signs obtained.     Pt arrived with carvalho. Attached to bed, secured to leg, clipped to bed, no dependent loops, off of the floor, line is patent and draining

## 2024-02-13 NOTE — CONSULTS
effort was made to ensure accuracy; however, inadvertent computerized transcription errors may be present

## 2024-02-13 NOTE — CARE COORDINATION
2/13 Care Coordination: Pt was a Transfer from [facility] on 2/12.Dx STEMI.On IV Hep drip. On High Flow O2 15 liters. Cardiology consult.Plan for Cath on 2/14. Per note from CM at [facility]. Patient is independent from home in a raised ranch style home, several steps up to main floor. Patient wears 2L of oxygen from [facility] [place]-also has a nebulizer. He follows Dr. Zaman outpatient. He does own a cane-does not use per wife. PCP is Dr. Gilson Ramon III, preferred pharmacy is [facility] on [address]. No history of HHC or SNF. Discharge plan is home once medically stable CM/SW will continue to follow for discharge planning.   Danilo VAUGHN,RN--BC  [phone]

## 2024-02-13 NOTE — PLAN OF CARE
Problem: Safety - Adult  Goal: Free from fall injury  Outcome: Progressing     Problem: Neurosensory - Adult  Goal: Achieves stable or improved neurological status  Outcome: Progressing     Problem: Respiratory - Adult  Goal: Achieves optimal ventilation and oxygenation  Outcome: Progressing     Problem: Cardiovascular - Adult  Goal: Maintains optimal cardiac output and hemodynamic stability  Outcome: Progressing     Problem: Musculoskeletal - Adult  Goal: Maintain proper alignment of affected body part  Outcome: Progressing     Problem: Gastrointestinal - Adult  Goal: Minimal or absence of nausea and vomiting  Outcome: Progressing     Problem: Genitourinary - Adult  Goal: Absence of urinary retention  Outcome: Progressing  Goal: Urinary catheter remains patent  Outcome: Progressing     Problem: Metabolic/Fluid and Electrolytes - Adult  Goal: Electrolytes maintained within normal limits  Outcome: Progressing

## 2024-02-13 NOTE — PROGRESS NOTES
Sharmila Nino NP notified of patient admission via perfect serve    Dr. Figueredo notified of patient admission via perfect serve. Orders for EKG and CXR received. Dr. Figueredo also notified of EKG results.

## 2024-02-13 NOTE — CONSULTS
Department of Internal Medicine  Nephrology Attending Progress Note      Events reviewed.  No need for a full consultation, patient is well-known to us and he has being followed by us at [facility].    SUBJECTIVE: We are following Mr. Lee for MIGDALIA.  Reports no new complaints.     PHYSICAL EXAM:      Vitals:    VITALS:  BP (!) 159/94   Pulse 94   Temp 98.4 °F (36.9 °C)   Resp (!) 32   Ht 1.727 m (5' 8\")   Wt 88.4 kg (194 lb 14.2 oz)   SpO2 (!) 86%   BMI 29.63 kg/m²   24HR INTAKE/OUTPUT:    Intake/Output Summary (Last 24 hours) at 2/13/2024 1010  Last data filed at 2/13/2024 0900  Gross per 24 hour   Intake 511.8 ml   Output 1960 ml   Net -1448.2 ml         Constitutional: Patient is alert   HEENT: Pupils equal reactive, mucous membranes are dry  Respiratory: Lungs are coarse  Cardiovascular/Edema: Heart sounds are tachycardic  Gastrointestinal: Abdomen is soft  Neurologic: Patient lethargic, nonfocal  Skin: No lesions  Other: + edema    Scheduled Meds:   aspirin  81 mg Oral Daily    sodium chloride flush  5-40 mL IntraVENous 2 times per day    pantoprazole (PROTONIX) 40 mg in sodium chloride (PF) 0.9 % 10 mL injection  40 mg IntraVENous Q12H    ipratropium 0.5 mg-albuterol 2.5 mg  1 Dose Inhalation Q4H WA RT    vitamin D  6,000 Units Oral Daily    Followed by    [START ON 2/14/2024] vitamin D  2,000 Units Oral Daily    ascorbic acid  500 mg Oral Daily    budesonide  0.5 mg Nebulization BID RT    arformoterol tartrate  15 mcg Nebulization BID RT    timolol  1 drop Both Eyes Daily    metoprolol succinate  25 mg Oral Daily    dexAMETHasone  6 mg IntraVENous Daily    clopidogrel  75 mg Oral Daily    cefTRIAXone (ROCEPHIN) IV  2,000 mg IntraVENous Q24H     Continuous Infusions:   heparin (PORCINE) Infusion 15 Units/kg/hr (02/13/24 0700)    sodium chloride       PRN Meds:.heparin (porcine), heparin (porcine), polyethylene glycol, acetaminophen **OR** acetaminophen, ondansetron **OR** ondansetron, sodium  chloride flush, sodium chloride, hydrALAZINE      DATA:    CBC:   Lab Results   Component Value Date/Time    WBC 16.7 02/13/2024 03:14 AM    RBC 3.68 02/13/2024 03:14 AM    HGB 11.5 02/13/2024 03:14 AM    HCT 34.6 02/13/2024 03:14 AM    MCV 94.0 02/13/2024 03:14 AM    MCH 31.3 02/13/2024 03:14 AM    MCHC 33.2 02/13/2024 03:14 AM    RDW 16.2 02/13/2024 03:14 AM     02/13/2024 03:14 AM    MPV 11.0 02/13/2024 03:14 AM     CMP:    Lab Results   Component Value Date/Time     02/13/2024 03:14 AM    K 4.0 02/13/2024 03:14 AM     02/13/2024 03:14 AM    CO2 23 02/13/2024 03:14 AM    BUN 59 02/13/2024 03:14 AM    CREATININE 2.0 02/13/2024 03:14 AM    GFRAA >60 06/18/2020 09:49 AM    LABGLOM 33 02/13/2024 03:14 AM    GLUCOSE 127 02/13/2024 03:14 AM    PROT 6.8 02/13/2024 03:14 AM    LABALBU 3.8 02/13/2024 03:14 AM    CALCIUM 8.6 02/13/2024 03:14 AM    BILITOT 1.0 02/13/2024 03:14 AM    ALKPHOS 70 02/13/2024 03:14 AM    AST 34 02/13/2024 03:14 AM    ALT 39 02/13/2024 03:14 AM     Magnesium:    Lab Results   Component Value Date/Time    MG 2.5 02/13/2024 03:14 AM     Phosphorus:    Lab Results   Component Value Date/Time    PHOS 4.4 02/13/2024 03:14 AM     Radiology Review:      CT ABDOMEN PELVIS W IV CONTRAST Additional Contrast? None 2/7/24    IMPRESSION:  No evidence for pulmonary artery embolism to the lobar level.  Evaluation  beyond this is nondiagnostic due to respiratory motion artifact.  Would  advise close clinical follow-up or could consider repeat examination.     Interstitial/ground-glass opacities in the lower left lung, of questionable  significance but might reflect infectious/inflammatory process.     Gallbladder wall thickening versus pericholecystic fluid. Correlate for  cholecystitis.  Could obtain ultrasound or HIDA for further evaluation.     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 Recommend follow-up every 2 years.     Bladder wall thickening with

## 2024-02-13 NOTE — H&P
Hospital Medicine History & Physical      PCP: Gilson Ramon III, DO    Date of Admission: 2/12/2024    Date of Service: .FEB 13, 2024    Chief Complaint:    STEMI       History Of Present Illness:     83 y.o. male presented with STEMI, , HE DOES NOT KNOW WHY HE IS HERE OR WHAT HAPPENED.    HE WAS INITIALLY ADMITTED TO [place], , HIS TT WAS ELEVATED 1338 .       Past Medical History:          Diagnosis Date    Abnormal EKG     Aortic stenosis     Atrial fibrillation (HCC)     Postop    Coronary artery disease     HTN (hypertension)     Left atrial dilatation     Mild    Mitral regurgitation     Trace    SBE (subacute bacterial endocarditis) prophylaxis candidate        Past Surgical History:          Procedure Laterality Date    AORTIC VALVE SURGERY  1/27/09    Dr. Villa    CORONARY ARTERY BYPASS GRAFT  1/27/09    DIAGNOSTIC CARDIAC CATH LAB PROCEDURE  1/26/09    EYE SURGERY  2016       Medications Prior to Admission:      Prior to Admission medications    Medication Sig Start Date End Date Taking? Authorizing Provider   losartan (COZAAR) 25 MG tablet Take 0.5 tablets by mouth daily 10/3/23   Amadou Santana DO   metoprolol succinate (TOPROL XL) 25 MG extended release tablet TAKE 1 TABLET BY MOUTH ONCE DAILY 9/25/23   Erikca Figueredo MD   hydroCHLOROthiazide (HYDRODIURIL) 25 MG tablet TAKE ONE TABLET BY MOUTH EVERY DAY 8/21/23   Amadou Santana DO   hydroCHLOROthiazide (HYDRODIURIL) 25 MG tablet TAKE ONE TABLET BY MOUTH EVERY DAY 8/15/22   Amadou Santana DO   Coenzyme Q10 (CO Q 10 PO) Take by mouth daily    ProviderLin MD   ibuprofen (ADVIL;MOTRIN) 200 MG tablet Take 200 mg by mouth as needed for Pain    ProviderLin MD   Omega-3 Fatty Acids (FISH OIL) 1000 MG CAPS Take 3,000 mg by mouth daily    ProviderLin MD   timolol (TIMOPTIC) 0.5 % ophthalmic

## 2024-02-13 NOTE — PROGRESS NOTES
Patient admitted to [facility] with the following belongings:  Other blanket . The following belongings admitted with the patient, None, were sent home with the patient's family.

## 2024-02-14 ENCOUNTER — APPOINTMENT (OUTPATIENT)
Dept: ULTRASOUND IMAGING | Age: 84
End: 2024-02-14
Attending: INTERNAL MEDICINE
Payer: MEDICARE

## 2024-02-14 ENCOUNTER — APPOINTMENT (OUTPATIENT)
Dept: GENERAL RADIOLOGY | Age: 84
End: 2024-02-14
Attending: INTERNAL MEDICINE
Payer: MEDICARE

## 2024-02-14 ENCOUNTER — TELEPHONE (OUTPATIENT)
Dept: CARDIOLOGY CLINIC | Age: 84
End: 2024-02-14

## 2024-02-14 LAB
AADO2: 426.8 MMHG
ACTIVATED CLOTTING TIME, LOW RANGE: 228 SEC
ALBUMIN SERPL-MCNC: 3.9 G/DL (ref 3.5–5.2)
ALP SERPL-CCNC: 70 U/L (ref 40–129)
ALT SERPL-CCNC: 43 U/L (ref 0–40)
ANION GAP SERPL CALCULATED.3IONS-SCNC: 11 MMOL/L (ref 7–16)
ANION GAP SERPL CALCULATED.3IONS-SCNC: 13 MMOL/L (ref 7–16)
AST SERPL-CCNC: 32 U/L (ref 0–39)
B.E.: -2 MMOL/L (ref -3–3)
BASOPHILS # BLD: 0 K/UL (ref 0–0.2)
BASOPHILS # BLD: 0 K/UL (ref 0–0.2)
BASOPHILS NFR BLD: 0 % (ref 0–2)
BASOPHILS NFR BLD: 0 % (ref 0–2)
BILIRUB SERPL-MCNC: 1 MG/DL (ref 0–1.2)
BUN SERPL-MCNC: 58 MG/DL (ref 6–23)
BUN SERPL-MCNC: 60 MG/DL (ref 6–23)
CALCIUM SERPL-MCNC: 8 MG/DL (ref 8.6–10.2)
CALCIUM SERPL-MCNC: 8.3 MG/DL (ref 8.6–10.2)
CHLORIDE SERPL-SCNC: 107 MMOL/L (ref 98–107)
CHLORIDE SERPL-SCNC: 108 MMOL/L (ref 98–107)
CO2 SERPL-SCNC: 24 MMOL/L (ref 22–29)
CO2 SERPL-SCNC: 25 MMOL/L (ref 22–29)
COHB: 0.3 % (ref 0–1.5)
CREAT SERPL-MCNC: 1.7 MG/DL (ref 0.7–1.2)
CREAT SERPL-MCNC: 1.8 MG/DL (ref 0.7–1.2)
CRITICAL: ABNORMAL
DATE ANALYZED: ABNORMAL
DATE OF COLLECTION: ABNORMAL
ECHO BSA: 2.06 M2
EOSINOPHIL # BLD: 0 K/UL (ref 0.05–0.5)
EOSINOPHIL # BLD: 0 K/UL (ref 0.05–0.5)
EOSINOPHILS RELATIVE PERCENT: 0 % (ref 0–6)
EOSINOPHILS RELATIVE PERCENT: 0 % (ref 0–6)
ERYTHROCYTE [DISTWIDTH] IN BLOOD BY AUTOMATED COUNT: 16.4 % (ref 11.5–15)
ERYTHROCYTE [DISTWIDTH] IN BLOOD BY AUTOMATED COUNT: 16.5 % (ref 11.5–15)
FIO2: 90 %
GFR SERPL CREATININE-BSD FRML MDRD: 36 ML/MIN/1.73M2
GFR SERPL CREATININE-BSD FRML MDRD: 40 ML/MIN/1.73M2
GLUCOSE SERPL-MCNC: 111 MG/DL (ref 74–99)
GLUCOSE SERPL-MCNC: 144 MG/DL (ref 74–99)
HCO3: 20.9 MMOL/L (ref 22–26)
HCT VFR BLD AUTO: 30.2 % (ref 37–54)
HCT VFR BLD AUTO: 34.5 % (ref 37–54)
HGB BLD-MCNC: 11.1 G/DL (ref 12.5–16.5)
HGB BLD-MCNC: 9.3 G/DL (ref 12.5–16.5)
HHB: 1.5 % (ref 0–5)
LAB: ABNORMAL
LYMPHOCYTES NFR BLD: 0.46 K/UL (ref 1.5–4)
LYMPHOCYTES NFR BLD: 1.2 K/UL (ref 1.5–4)
LYMPHOCYTES RELATIVE PERCENT: 3 % (ref 20–42)
LYMPHOCYTES RELATIVE PERCENT: 7 % (ref 20–42)
Lab: 2200
MAGNESIUM SERPL-MCNC: 2.4 MG/DL (ref 1.6–2.6)
MCH RBC QN AUTO: 30.1 PG (ref 26–35)
MCH RBC QN AUTO: 30.8 PG (ref 26–35)
MCHC RBC AUTO-ENTMCNC: 30.8 G/DL (ref 32–34.5)
MCHC RBC AUTO-ENTMCNC: 32.2 G/DL (ref 32–34.5)
MCV RBC AUTO: 95.8 FL (ref 80–99.9)
MCV RBC AUTO: 97.7 FL (ref 80–99.9)
METHB: 0.3 % (ref 0–1.5)
MODE: ABNORMAL
MONOCYTES NFR BLD: 0.46 K/UL (ref 0.1–0.95)
MONOCYTES NFR BLD: 1.2 K/UL (ref 0.1–0.95)
MONOCYTES NFR BLD: 3 % (ref 2–12)
MONOCYTES NFR BLD: 7 % (ref 2–12)
MYELOCYTES ABSOLUTE COUNT: 0.46 K/UL
MYELOCYTES: 3 %
NEUTROPHILS NFR BLD: 86 % (ref 43–80)
NEUTROPHILS NFR BLD: 92 % (ref 43–80)
NEUTS SEG NFR BLD: 14.81 K/UL (ref 1.8–7.3)
NEUTS SEG NFR BLD: 16.12 K/UL (ref 1.8–7.3)
O2 SATURATION: 98.5 % (ref 92–98.5)
O2HB: 97.9 % (ref 94–97)
OPERATOR ID: ABNORMAL
OSMOLALITY UR: 419 MOSM/KG (ref 300–900)
PARTIAL THROMBOPLASTIN TIME: 72.6 SEC (ref 24.5–35.1)
PATIENT TEMP: 37 C
PCO2: 29.5 MMHG (ref 35–45)
PFO2: 1.8 MMHG/%
PH BLOOD GAS: 7.47 (ref 7.35–7.45)
PHOSPHATE SERPL-MCNC: 4.1 MG/DL (ref 2.5–4.5)
PLATELET # BLD AUTO: 260 K/UL (ref 130–450)
PLATELET # BLD AUTO: 263 K/UL (ref 130–450)
PMV BLD AUTO: 11 FL (ref 7–12)
PMV BLD AUTO: 11 FL (ref 7–12)
PO2: 162.2 MMHG (ref 75–100)
POTASSIUM SERPL-SCNC: 4 MMOL/L (ref 3.5–5)
POTASSIUM SERPL-SCNC: 5.2 MMOL/L (ref 3.5–5)
POTASSIUM, UR: 17.3 MMOL/L
PROT SERPL-MCNC: 6.7 G/DL (ref 6.4–8.3)
RBC # BLD AUTO: 3.09 M/UL (ref 3.8–5.8)
RBC # BLD AUTO: 3.6 M/UL (ref 3.8–5.8)
RBC # BLD: ABNORMAL 10*6/UL
RI(T): 2.63
SODIUM SERPL-SCNC: 144 MMOL/L (ref 132–146)
SODIUM SERPL-SCNC: 144 MMOL/L (ref 132–146)
SODIUM UR-SCNC: 103 MMOL/L
SOURCE, BLOOD GAS: ABNORMAL
THB: 10.3 G/DL (ref 11.5–16.5)
TIME ANALYZED: 2204
WBC OTHER # BLD: 17.2 K/UL (ref 4.5–11.5)
WBC OTHER # BLD: 17.5 K/UL (ref 4.5–11.5)

## 2024-02-14 PROCEDURE — 2500000003 HC RX 250 WO HCPCS: Performed by: INTERNAL MEDICINE

## 2024-02-14 PROCEDURE — C1894 INTRO/SHEATH, NON-LASER: HCPCS | Performed by: INTERNAL MEDICINE

## 2024-02-14 PROCEDURE — 2000000000 HC ICU R&B

## 2024-02-14 PROCEDURE — C1769 GUIDE WIRE: HCPCS | Performed by: INTERNAL MEDICINE

## 2024-02-14 PROCEDURE — 99152 MOD SED SAME PHYS/QHP 5/>YRS: CPT | Performed by: INTERNAL MEDICINE

## 2024-02-14 PROCEDURE — 71045 X-RAY EXAM CHEST 1 VIEW: CPT

## 2024-02-14 PROCEDURE — 80053 COMPREHEN METABOLIC PANEL: CPT

## 2024-02-14 PROCEDURE — 93455 CORONARY ART/GRFT ANGIO S&I: CPT | Performed by: INTERNAL MEDICINE

## 2024-02-14 PROCEDURE — 76770 US EXAM ABDO BACK WALL COMP: CPT

## 2024-02-14 PROCEDURE — 2580000003 HC RX 258: Performed by: FAMILY MEDICINE

## 2024-02-14 PROCEDURE — C9113 INJ PANTOPRAZOLE SODIUM, VIA: HCPCS | Performed by: FAMILY MEDICINE

## 2024-02-14 PROCEDURE — 85025 COMPLETE CBC W/AUTO DIFF WBC: CPT

## 2024-02-14 PROCEDURE — A4216 STERILE WATER/SALINE, 10 ML: HCPCS | Performed by: FAMILY MEDICINE

## 2024-02-14 PROCEDURE — 2709999900 HC NON-CHARGEABLE SUPPLY: Performed by: INTERNAL MEDICINE

## 2024-02-14 PROCEDURE — 6370000000 HC RX 637 (ALT 250 FOR IP): Performed by: FAMILY MEDICINE

## 2024-02-14 PROCEDURE — 84300 ASSAY OF URINE SODIUM: CPT

## 2024-02-14 PROCEDURE — C1887 CATHETER, GUIDING: HCPCS | Performed by: INTERNAL MEDICINE

## 2024-02-14 PROCEDURE — 94640 AIRWAY INHALATION TREATMENT: CPT

## 2024-02-14 PROCEDURE — 85730 THROMBOPLASTIN TIME PARTIAL: CPT

## 2024-02-14 PROCEDURE — 6360000002 HC RX W HCPCS: Performed by: FAMILY MEDICINE

## 2024-02-14 PROCEDURE — B2111ZZ FLUOROSCOPY OF MULTIPLE CORONARY ARTERIES USING LOW OSMOLAR CONTRAST: ICD-10-PCS | Performed by: INTERNAL MEDICINE

## 2024-02-14 PROCEDURE — 3E033XZ INTRODUCTION OF VASOPRESSOR INTO PERIPHERAL VEIN, PERCUTANEOUS APPROACH: ICD-10-PCS | Performed by: INTERNAL MEDICINE

## 2024-02-14 PROCEDURE — 2700000000 HC OXYGEN THERAPY PER DAY

## 2024-02-14 PROCEDURE — 4A023N7 MEASUREMENT OF CARDIAC SAMPLING AND PRESSURE, LEFT HEART, PERCUTANEOUS APPROACH: ICD-10-PCS | Performed by: INTERNAL MEDICINE

## 2024-02-14 PROCEDURE — 83735 ASSAY OF MAGNESIUM: CPT

## 2024-02-14 PROCEDURE — 80048 BASIC METABOLIC PNL TOTAL CA: CPT

## 2024-02-14 PROCEDURE — 02H633Z INSERTION OF INFUSION DEVICE INTO RIGHT ATRIUM, PERCUTANEOUS APPROACH: ICD-10-PCS | Performed by: INTERNAL MEDICINE

## 2024-02-14 PROCEDURE — 84100 ASSAY OF PHOSPHORUS: CPT

## 2024-02-14 PROCEDURE — 83935 ASSAY OF URINE OSMOLALITY: CPT

## 2024-02-14 PROCEDURE — C1760 CLOSURE DEV, VASC: HCPCS | Performed by: INTERNAL MEDICINE

## 2024-02-14 PROCEDURE — 85347 COAGULATION TIME ACTIVATED: CPT

## 2024-02-14 PROCEDURE — 6360000004 HC RX CONTRAST MEDICATION: Performed by: INTERNAL MEDICINE

## 2024-02-14 PROCEDURE — 82805 BLOOD GASES W/O2 SATURATION: CPT

## 2024-02-14 PROCEDURE — 6360000002 HC RX W HCPCS: Performed by: INTERNAL MEDICINE

## 2024-02-14 PROCEDURE — 6360000002 HC RX W HCPCS

## 2024-02-14 PROCEDURE — 84133 ASSAY OF URINE POTASSIUM: CPT

## 2024-02-14 RX ORDER — FUROSEMIDE 10 MG/ML
40 INJECTION INTRAMUSCULAR; INTRAVENOUS ONCE
Status: DISCONTINUED | OUTPATIENT
Start: 2024-02-14 | End: 2024-02-14

## 2024-02-14 RX ORDER — MIDAZOLAM HYDROCHLORIDE 1 MG/ML
INJECTION INTRAMUSCULAR; INTRAVENOUS PRN
Status: DISCONTINUED | OUTPATIENT
Start: 2024-02-14 | End: 2024-02-14 | Stop reason: HOSPADM

## 2024-02-14 RX ORDER — NOREPINEPHRINE BITARTRATE 0.06 MG/ML
1-100 INJECTION, SOLUTION INTRAVENOUS CONTINUOUS
Status: DISCONTINUED | OUTPATIENT
Start: 2024-02-14 | End: 2024-02-14 | Stop reason: SDUPTHER

## 2024-02-14 RX ORDER — NOREPINEPHRINE BITARTRATE 0.06 MG/ML
1-100 INJECTION, SOLUTION INTRAVENOUS CONTINUOUS
Status: DISCONTINUED | OUTPATIENT
Start: 2024-02-14 | End: 2024-02-19

## 2024-02-14 RX ORDER — HEPARIN SODIUM 10000 [USP'U]/ML
INJECTION, SOLUTION INTRAVENOUS; SUBCUTANEOUS PRN
Status: DISCONTINUED | OUTPATIENT
Start: 2024-02-14 | End: 2024-02-14 | Stop reason: HOSPADM

## 2024-02-14 RX ORDER — FUROSEMIDE 10 MG/ML
INJECTION INTRAMUSCULAR; INTRAVENOUS
Status: COMPLETED
Start: 2024-02-14 | End: 2024-02-14

## 2024-02-14 RX ORDER — FENTANYL CITRATE 50 UG/ML
INJECTION, SOLUTION INTRAMUSCULAR; INTRAVENOUS PRN
Status: DISCONTINUED | OUTPATIENT
Start: 2024-02-14 | End: 2024-02-14 | Stop reason: HOSPADM

## 2024-02-14 RX ORDER — FUROSEMIDE 10 MG/ML
40 INJECTION INTRAMUSCULAR; INTRAVENOUS ONCE
Status: COMPLETED | OUTPATIENT
Start: 2024-02-14 | End: 2024-02-14

## 2024-02-14 RX ADMIN — Medication 5 MCG/MIN: at 18:02

## 2024-02-14 RX ADMIN — METOPROLOL SUCCINATE 25 MG: 25 TABLET, EXTENDED RELEASE ORAL at 08:04

## 2024-02-14 RX ADMIN — Medication 2000 UNITS: at 08:04

## 2024-02-14 RX ADMIN — TIMOLOL MALEATE 1 DROP: 5 SOLUTION OPHTHALMIC at 08:05

## 2024-02-14 RX ADMIN — IPRATROPIUM BROMIDE AND ALBUTEROL SULFATE 1 DOSE: 2.5; .5 SOLUTION RESPIRATORY (INHALATION) at 09:15

## 2024-02-14 RX ADMIN — BUDESONIDE INHALATION 500 MCG: 0.5 SUSPENSION RESPIRATORY (INHALATION) at 09:15

## 2024-02-14 RX ADMIN — ARFORMOTEROL TARTRATE 15 MCG: 15 SOLUTION RESPIRATORY (INHALATION) at 09:15

## 2024-02-14 RX ADMIN — Medication 10 ML: at 08:04

## 2024-02-14 RX ADMIN — FUROSEMIDE 40 MG: 10 INJECTION, SOLUTION INTRAMUSCULAR; INTRAVENOUS at 17:53

## 2024-02-14 RX ADMIN — BUDESONIDE INHALATION 500 MCG: 0.5 SUSPENSION RESPIRATORY (INHALATION) at 21:12

## 2024-02-14 RX ADMIN — Medication 500 MG: at 08:04

## 2024-02-14 RX ADMIN — ARFORMOTEROL TARTRATE 15 MCG: 15 SOLUTION RESPIRATORY (INHALATION) at 21:12

## 2024-02-14 RX ADMIN — PANTOPRAZOLE SODIUM 40 MG: 40 INJECTION, POWDER, FOR SOLUTION INTRAVENOUS at 21:21

## 2024-02-14 RX ADMIN — PANTOPRAZOLE SODIUM 40 MG: 40 INJECTION, POWDER, FOR SOLUTION INTRAVENOUS at 08:04

## 2024-02-14 RX ADMIN — IPRATROPIUM BROMIDE AND ALBUTEROL SULFATE 1 DOSE: 2.5; .5 SOLUTION RESPIRATORY (INHALATION) at 21:12

## 2024-02-14 RX ADMIN — ASPIRIN 81 MG: 81 TABLET, CHEWABLE ORAL at 08:04

## 2024-02-14 RX ADMIN — Medication 10 ML: at 21:26

## 2024-02-14 RX ADMIN — CLOPIDOGREL BISULFATE 75 MG: 75 TABLET ORAL at 08:04

## 2024-02-14 RX ADMIN — HEPARIN SODIUM 15 UNITS/KG/HR: 10000 INJECTION, SOLUTION INTRAVENOUS at 06:32

## 2024-02-14 RX ADMIN — DEXAMETHASONE SODIUM PHOSPHATE 6 MG: 10 INJECTION INTRAMUSCULAR; INTRAVENOUS at 08:04

## 2024-02-14 RX ADMIN — IPRATROPIUM BROMIDE AND ALBUTEROL SULFATE 1 DOSE: 2.5; .5 SOLUTION RESPIRATORY (INHALATION) at 12:13

## 2024-02-14 RX ADMIN — FUROSEMIDE 40 MG: 10 INJECTION INTRAMUSCULAR; INTRAVENOUS at 17:53

## 2024-02-14 ASSESSMENT — PAIN SCALES - GENERAL
PAINLEVEL_OUTOF10: 0

## 2024-02-14 NOTE — PLAN OF CARE
Problem: Safety - Adult  Goal: Free from fall injury  Outcome: Progressing     Problem: Neurosensory - Adult  Goal: Achieves stable or improved neurological status  Outcome: Progressing  Goal: Absence of seizures  Outcome: Progressing     Problem: Respiratory - Adult  Goal: Achieves optimal ventilation and oxygenation  Outcome: Progressing     Problem: Cardiovascular - Adult  Goal: Maintains optimal cardiac output and hemodynamic stability  Outcome: Progressing     Problem: Skin/Tissue Integrity - Adult  Goal: Skin integrity remains intact  Outcome: Progressing     Problem: Musculoskeletal - Adult  Goal: Return mobility to safest level of function  Outcome: Progressing     Problem: Gastrointestinal - Adult  Goal: Minimal or absence of nausea and vomiting  Outcome: Progressing     Problem: Genitourinary - Adult  Goal: Absence of urinary retention  Outcome: Progressing  Goal: Urinary catheter remains patent  Outcome: Progressing     Problem: Infection - Adult  Goal: Absence of infection at discharge  Outcome: Progressing     Problem: Metabolic/Fluid and Electrolytes - Adult  Goal: Electrolytes maintained within normal limits  Outcome: Progressing     Problem: ABCDS Injury Assessment  Goal: Absence of physical injury  Outcome: Progressing

## 2024-02-14 NOTE — PROGRESS NOTES
Department of Internal Medicine  Nephrology Attending Progress Note      Events reviewed.      SUBJECTIVE: We are following Mr. Lee for MIGDALIA.  Reports no new complaints.     PHYSICAL EXAM:      Vitals:    VITALS:  /78   Pulse 83   Temp 98 °F (36.7 °C) (Oral)   Resp 26   Ht 1.727 m (5' 8\")   Wt 88.6 kg (195 lb 5.2 oz)   SpO2 94%   BMI 29.70 kg/m²   24HR INTAKE/OUTPUT:    Intake/Output Summary (Last 24 hours) at 2/14/2024 1327  Last data filed at 2/14/2024 1300  Gross per 24 hour   Intake 676.66 ml   Output 3525 ml   Net -2848.34 ml         Constitutional: Patient is alert   HEENT: Pupils equal reactive, mucous membranes are dry  Respiratory: Lungs are coarse  Cardiovascular/Edema: Heart sounds are tachycardic  Gastrointestinal: Abdomen is soft  Neurologic: Patient lethargic, nonfocal  Skin: No lesions  Other: + edema    Scheduled Meds:   aspirin  81 mg Oral Daily    sodium chloride flush  5-40 mL IntraVENous 2 times per day    pantoprazole (PROTONIX) 40 mg in sodium chloride (PF) 0.9 % 10 mL injection  40 mg IntraVENous Q12H    ipratropium 0.5 mg-albuterol 2.5 mg  1 Dose Inhalation Q4H WA RT    vitamin D  2,000 Units Oral Daily    ascorbic acid  500 mg Oral Daily    budesonide  0.5 mg Nebulization BID RT    arformoterol tartrate  15 mcg Nebulization BID RT    timolol  1 drop Both Eyes Daily    metoprolol succinate  25 mg Oral Daily    dexAMETHasone  6 mg IntraVENous Daily    clopidogrel  75 mg Oral Daily     Continuous Infusions:   heparin (PORCINE) Infusion 15 Units/kg/hr (02/14/24 0700)    sodium chloride       PRN Meds:.heparin (porcine), heparin (porcine), polyethylene glycol, acetaminophen **OR** acetaminophen, ondansetron **OR** ondansetron, sodium chloride flush, sodium chloride, hydrALAZINE      DATA:    CBC:   Lab Results   Component Value Date/Time    WBC 17.2 02/14/2024 04:00 AM    RBC 3.60 02/14/2024 04:00 AM    HGB 11.1 02/14/2024 04:00 AM    HCT 34.5 02/14/2024 04:00 AM    MCV 95.8    Severe atherosclerotic calcification which causes narrowing of the major  abdominal aortic branch vessels.     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    XR CHEST PORTABLE  2/12/24      FINDINGS:  There is mild improvement in aeration with persistent bilateral mid to lower  lung zone hazy airspace opacities.  Blunting of the bilateral costophrenic  angles left, greater than right again seen suggestive of pleural effusions.  Heart is enlarged, but stable.     IMPRESSION:  Slight improvement in lung aeration.  Otherwise, no significant change with  pulmonary edema and bilateral pleural effusions noted.          BRIEF SUMMARY OF INITIAL CONSULT:    Briefly Mr. Lee he is a 83-year-old man with history of HTN, CAD status post CABG, aortic stenosis status post AVR with bioprosthetic valve, hyperlipidemia, PAF, asbestos exposure, who was recently admitted on December 2023 with pneumonia, who was admitted on February 7, 2024 after he was brought to the ER by EMS due to shortness of breath.  In the ER he was found to be hypotensive and septic shock as well as NSTEMI and COVID-19 positive.  On admission his creatinine level was 1.2 mg/dL but since then has rapidly increased up to 2.3 mg/dL, reason for this consultation.  His medications prior to admission included losartan, HCTZ.  Addendum: The patient was transferred to [facility] on February 12, 2024 for possible catheterization due to STEMI, persistent elevated troponin levels.    Problems resolved:    Lactic acidosis, 2/2 septic shock  Hemodynamic shock, septic and hypovolemic, resolved, off vasopressors  Pneumonia, on ceftriaxone    IMPRESSION/RECOMMENDATIONS:      MIGDALIA stage I,  ischemic ATN due to profound hypotension due to hemodynamic shock and possibly contrast associated MIGDALIA.  Renal function continues to slowly improve, creatinine down to 1.8 mg/dL with excellent urine output renal sign 3.7 L.    Hypernatremia, with

## 2024-02-14 NOTE — ACP (ADVANCE CARE PLANNING)
Advance Care Planning   Healthcare Decision Maker:    Primary Decision Maker: KatiaadrielScott - Spouse - [phone]    Secondary Decision Maker: RosaTruong - Cora - [phone]    Click here to complete Healthcare Decision Makers including selection of the Healthcare Decision Maker Relationship (ie \"Primary\").

## 2024-02-14 NOTE — PROGRESS NOTES
Pt returned from the cath lab hypotensive, respirations in the 30s, Spo2 84% on a simple mask @8L. Smal  hematoma with bruising at right femoral cath site, manual pressure held. Respiratory called and placed on Airvo per order. Dr Curtis notified and orders obtained. Stat labs sent. Levophed initiated.

## 2024-02-14 NOTE — PROGRESS NOTES
Hospitalist Progress Note      PCP: Gilson Ramon III, DO    Date of Admission: 2/12/2024        Hospital Course:    83 y.o. male presented with STEMI, , HE DOES NOT KNOW WHY HE IS HERE OR WHAT HAPPENED.    HE WAS INITIALLY ADMITTED TO [facility], , HIS TT WAS ELEVATED 1338 .       Subjective:  STILL SOB            Medications:  Reviewed    Infusion Medications    heparin (PORCINE) Infusion 15 Units/kg/hr (02/14/24 1400)    sodium chloride       Scheduled Medications    aspirin  81 mg Oral Daily    sodium chloride flush  5-40 mL IntraVENous 2 times per day    pantoprazole (PROTONIX) 40 mg in sodium chloride (PF) 0.9 % 10 mL injection  40 mg IntraVENous Q12H    ipratropium 0.5 mg-albuterol 2.5 mg  1 Dose Inhalation Q4H WA RT    vitamin D  2,000 Units Oral Daily    ascorbic acid  500 mg Oral Daily    budesonide  0.5 mg Nebulization BID RT    arformoterol tartrate  15 mcg Nebulization BID RT    timolol  1 drop Both Eyes Daily    metoprolol succinate  25 mg Oral Daily    dexAMETHasone  6 mg IntraVENous Daily    clopidogrel  75 mg Oral Daily     PRN Meds: heparin (porcine), heparin (porcine), polyethylene glycol, acetaminophen **OR** acetaminophen, ondansetron **OR** ondansetron, sodium chloride flush, sodium chloride, hydrALAZINE      Intake/Output Summary (Last 24 hours) at 2/14/2024 1550  Last data filed at 2/14/2024 1400  Gross per 24 hour   Intake 556.62 ml   Output 3300 ml   Net -2743.38 ml       Exam:    /86   Pulse 76   Temp 97.9 °F (36.6 °C) (Temporal)   Resp 15   Ht 1.727 m (5' 8\")   Wt 88.6 kg (195 lb 5.2 oz)   SpO2 95%   BMI 29.70 kg/m²       General appearance:  No apparent distress, .  HEENT:  Normal cephalic,   Neck: Supple, with full range of motion. No jugular venous distention. Trachea midline.  Respiratory:  Normal respiratory effort. Clear to auscultation,   Cardiovascular:  RRR  Abdomen: Soft, non-tender, non-distended with normal bowel sounds.  Musculoskeletal:  No clubbing,

## 2024-02-15 ENCOUNTER — APPOINTMENT (OUTPATIENT)
Dept: GENERAL RADIOLOGY | Age: 84
End: 2024-02-15
Attending: INTERNAL MEDICINE
Payer: MEDICARE

## 2024-02-15 LAB
AADO2: 354.6 MMHG
ALBUMIN SERPL-MCNC: 3.2 G/DL (ref 3.5–5.2)
ALP SERPL-CCNC: 63 U/L (ref 40–129)
ALT SERPL-CCNC: 35 U/L (ref 0–40)
ANION GAP SERPL CALCULATED.3IONS-SCNC: 17 MMOL/L (ref 7–16)
ANION GAP SERPL CALCULATED.3IONS-SCNC: 18 MMOL/L (ref 7–16)
AST SERPL-CCNC: 25 U/L (ref 0–39)
B.E.: -4.1 MMOL/L (ref -3–3)
BACTERIA URNS QL MICRO: ABNORMAL
BASOPHILS # BLD: 0.08 K/UL (ref 0–0.2)
BASOPHILS NFR BLD: 0 % (ref 0–2)
BILIRUB SERPL-MCNC: 1.7 MG/DL (ref 0–1.2)
BILIRUB UR QL STRIP: NEGATIVE
BUN SERPL-MCNC: 72 MG/DL (ref 6–23)
BUN SERPL-MCNC: 76 MG/DL (ref 6–23)
CA-I BLD-SCNC: 1.06 MMOL/L (ref 1.15–1.33)
CALCIUM SERPL-MCNC: 7.7 MG/DL (ref 8.6–10.2)
CALCIUM SERPL-MCNC: 8 MG/DL (ref 8.6–10.2)
CHLORIDE SERPL-SCNC: 106 MMOL/L (ref 98–107)
CHLORIDE SERPL-SCNC: 109 MMOL/L (ref 98–107)
CLARITY UR: CLEAR
CO2 SERPL-SCNC: 20 MMOL/L (ref 22–29)
CO2 SERPL-SCNC: 20 MMOL/L (ref 22–29)
COHB: 0.3 % (ref 0–1.5)
COLOR UR: YELLOW
CORTIS SERPL-MCNC: 15.5 UG/DL (ref 2.7–18.4)
CORTISOL COLLECTION INFO: NORMAL
CREAT SERPL-MCNC: 2.1 MG/DL (ref 0.7–1.2)
CREAT SERPL-MCNC: 2.2 MG/DL (ref 0.7–1.2)
CRITICAL: ABNORMAL
DATE ANALYZED: ABNORMAL
DATE OF COLLECTION: ABNORMAL
EOSINOPHIL # BLD: 0 K/UL (ref 0.05–0.5)
EOSINOPHILS RELATIVE PERCENT: 0 % (ref 0–6)
EPI CELLS #/AREA URNS HPF: ABNORMAL /HPF
ERYTHROCYTE [DISTWIDTH] IN BLOOD BY AUTOMATED COUNT: 16.4 % (ref 11.5–15)
FIO2: 65 %
GFR SERPL CREATININE-BSD FRML MDRD: 29 ML/MIN/1.73M2
GFR SERPL CREATININE-BSD FRML MDRD: 31 ML/MIN/1.73M2
GLUCOSE BLD-MCNC: 146 MG/DL (ref 74–99)
GLUCOSE BLD-MCNC: 192 MG/DL (ref 74–99)
GLUCOSE SERPL-MCNC: 156 MG/DL (ref 74–99)
GLUCOSE SERPL-MCNC: 210 MG/DL (ref 74–99)
GLUCOSE UR STRIP-MCNC: NEGATIVE MG/DL
HBA1C MFR BLD: 5.2 % (ref 4–5.6)
HCO3: 18 MMOL/L (ref 22–26)
HCT VFR BLD AUTO: 26.2 % (ref 37–54)
HCT VFR BLD AUTO: 27.3 % (ref 37–54)
HCT VFR BLD AUTO: 27.6 % (ref 37–54)
HGB BLD-MCNC: 8.3 G/DL (ref 12.5–16.5)
HGB BLD-MCNC: 8.9 G/DL (ref 12.5–16.5)
HGB BLD-MCNC: 9.2 G/DL (ref 12.5–16.5)
HGB UR QL STRIP.AUTO: ABNORMAL
HHB: 7 % (ref 0–5)
IMM GRANULOCYTES # BLD AUTO: 1.39 K/UL (ref 0–0.58)
IMM GRANULOCYTES NFR BLD: 5 % (ref 0–5)
KETONES UR STRIP-MCNC: NEGATIVE MG/DL
LAB: ABNORMAL
LEUKOCYTE ESTERASE UR QL STRIP: ABNORMAL
LYMPHOCYTES NFR BLD: 1.39 K/UL (ref 1.5–4)
LYMPHOCYTES RELATIVE PERCENT: 5 % (ref 20–42)
Lab: 620
MAGNESIUM SERPL-MCNC: 2.3 MG/DL (ref 1.6–2.6)
MCH RBC QN AUTO: 30.7 PG (ref 26–35)
MCHC RBC AUTO-ENTMCNC: 31.7 G/DL (ref 32–34.5)
MCV RBC AUTO: 97 FL (ref 80–99.9)
METHB: 0.4 % (ref 0–1.5)
MODE: ABNORMAL
MONOCYTES NFR BLD: 1.47 K/UL (ref 0.1–0.95)
MONOCYTES NFR BLD: 6 % (ref 2–12)
NEUTROPHILS NFR BLD: 84 % (ref 43–80)
NEUTS SEG NFR BLD: 22.41 K/UL (ref 1.8–7.3)
NITRITE UR QL STRIP: NEGATIVE
O2 SATURATION: 93 % (ref 92–98.5)
O2HB: 92.3 % (ref 94–97)
OPERATOR ID: 8219
PATIENT TEMP: 37 C
PCO2: 23.3 MMHG (ref 35–45)
PFO2: 1.04 MMHG/%
PH BLOOD GAS: 7.51 (ref 7.35–7.45)
PH UR STRIP: 5.5 [PH] (ref 5–9)
PHOSPHATE SERPL-MCNC: 6.2 MG/DL (ref 2.5–4.5)
PLATELET # BLD AUTO: 252 K/UL (ref 130–450)
PMV BLD AUTO: 11.1 FL (ref 7–12)
PO2: 67.3 MMHG (ref 75–100)
POTASSIUM SERPL-SCNC: 4.1 MMOL/L (ref 3.5–5)
POTASSIUM SERPL-SCNC: 4.4 MMOL/L (ref 3.5–5)
PROT SERPL-MCNC: 5.5 G/DL (ref 6.4–8.3)
PROT UR STRIP-MCNC: NEGATIVE MG/DL
RBC # BLD AUTO: 2.7 M/UL (ref 3.8–5.8)
RBC #/AREA URNS HPF: ABNORMAL /HPF
RI(T): 5.27
SODIUM SERPL-SCNC: 144 MMOL/L (ref 132–146)
SODIUM SERPL-SCNC: 146 MMOL/L (ref 132–146)
SOURCE, BLOOD GAS: ABNORMAL
SP GR UR STRIP: 1.01 (ref 1–1.03)
THB: 8.7 G/DL (ref 11.5–16.5)
TIME ANALYZED: 627
TSH SERPL DL<=0.05 MIU/L-ACNC: 0.5 UIU/ML (ref 0.27–4.2)
UROBILINOGEN UR STRIP-ACNC: 0.2 EU/DL (ref 0–1)
WBC #/AREA URNS HPF: ABNORMAL /HPF
WBC OTHER # BLD: 26.7 K/UL (ref 4.5–11.5)

## 2024-02-15 PROCEDURE — 51702 INSERT TEMP BLADDER CATH: CPT

## 2024-02-15 PROCEDURE — 82805 BLOOD GASES W/O2 SATURATION: CPT

## 2024-02-15 PROCEDURE — P9016 RBC LEUKOCYTES REDUCED: HCPCS

## 2024-02-15 PROCEDURE — 4A133J1 MONITORING OF ARTERIAL PULSE, PERIPHERAL, PERCUTANEOUS APPROACH: ICD-10-PCS | Performed by: INTERNAL MEDICINE

## 2024-02-15 PROCEDURE — 94640 AIRWAY INHALATION TREATMENT: CPT

## 2024-02-15 PROCEDURE — 99291 CRITICAL CARE FIRST HOUR: CPT | Performed by: INTERNAL MEDICINE

## 2024-02-15 PROCEDURE — 6360000002 HC RX W HCPCS: Performed by: STUDENT IN AN ORGANIZED HEALTH CARE EDUCATION/TRAINING PROGRAM

## 2024-02-15 PROCEDURE — 93005 ELECTROCARDIOGRAM TRACING: CPT | Performed by: INTERNAL MEDICINE

## 2024-02-15 PROCEDURE — 2700000000 HC OXYGEN THERAPY PER DAY

## 2024-02-15 PROCEDURE — 5A09357 ASSISTANCE WITH RESPIRATORY VENTILATION, LESS THAN 24 CONSECUTIVE HOURS, CONTINUOUS POSITIVE AIRWAY PRESSURE: ICD-10-PCS | Performed by: INTERNAL MEDICINE

## 2024-02-15 PROCEDURE — 85018 HEMOGLOBIN: CPT

## 2024-02-15 PROCEDURE — 83036 HEMOGLOBIN GLYCOSYLATED A1C: CPT

## 2024-02-15 PROCEDURE — 80048 BASIC METABOLIC PNL TOTAL CA: CPT

## 2024-02-15 PROCEDURE — 86850 RBC ANTIBODY SCREEN: CPT

## 2024-02-15 PROCEDURE — 99291 CRITICAL CARE FIRST HOUR: CPT | Performed by: SURGERY

## 2024-02-15 PROCEDURE — 71045 X-RAY EXAM CHEST 1 VIEW: CPT

## 2024-02-15 PROCEDURE — 37799 UNLISTED PX VASCULAR SURGERY: CPT

## 2024-02-15 PROCEDURE — 6370000000 HC RX 637 (ALT 250 FOR IP)

## 2024-02-15 PROCEDURE — 83735 ASSAY OF MAGNESIUM: CPT

## 2024-02-15 PROCEDURE — 84443 ASSAY THYROID STIM HORMONE: CPT

## 2024-02-15 PROCEDURE — 94660 CPAP INITIATION&MGMT: CPT

## 2024-02-15 PROCEDURE — 2000000000 HC ICU R&B

## 2024-02-15 PROCEDURE — 81001 URINALYSIS AUTO W/SCOPE: CPT

## 2024-02-15 PROCEDURE — A4216 STERILE WATER/SALINE, 10 ML: HCPCS | Performed by: FAMILY MEDICINE

## 2024-02-15 PROCEDURE — 04HY32Z INSERTION OF MONITORING DEVICE INTO LOWER ARTERY, PERCUTANEOUS APPROACH: ICD-10-PCS | Performed by: INTERNAL MEDICINE

## 2024-02-15 PROCEDURE — 86901 BLOOD TYPING SEROLOGIC RH(D): CPT

## 2024-02-15 PROCEDURE — 82533 TOTAL CORTISOL: CPT

## 2024-02-15 PROCEDURE — 6370000000 HC RX 637 (ALT 250 FOR IP): Performed by: STUDENT IN AN ORGANIZED HEALTH CARE EDUCATION/TRAINING PROGRAM

## 2024-02-15 PROCEDURE — 6370000000 HC RX 637 (ALT 250 FOR IP): Performed by: FAMILY MEDICINE

## 2024-02-15 PROCEDURE — 82962 GLUCOSE BLOOD TEST: CPT

## 2024-02-15 PROCEDURE — C9113 INJ PANTOPRAZOLE SODIUM, VIA: HCPCS | Performed by: FAMILY MEDICINE

## 2024-02-15 PROCEDURE — 84100 ASSAY OF PHOSPHORUS: CPT

## 2024-02-15 PROCEDURE — 86923 COMPATIBILITY TEST ELECTRIC: CPT

## 2024-02-15 PROCEDURE — 2580000003 HC RX 258: Performed by: FAMILY MEDICINE

## 2024-02-15 PROCEDURE — 36430 TRANSFUSION BLD/BLD COMPNT: CPT

## 2024-02-15 PROCEDURE — 80053 COMPREHEN METABOLIC PANEL: CPT

## 2024-02-15 PROCEDURE — 2580000003 HC RX 258: Performed by: STUDENT IN AN ORGANIZED HEALTH CARE EDUCATION/TRAINING PROGRAM

## 2024-02-15 PROCEDURE — 03HY32Z INSERTION OF MONITORING DEVICE INTO UPPER ARTERY, PERCUTANEOUS APPROACH: ICD-10-PCS | Performed by: INTERNAL MEDICINE

## 2024-02-15 PROCEDURE — 6360000002 HC RX W HCPCS

## 2024-02-15 PROCEDURE — 6360000002 HC RX W HCPCS: Performed by: INTERNAL MEDICINE

## 2024-02-15 PROCEDURE — 30233N1 TRANSFUSION OF NONAUTOLOGOUS RED BLOOD CELLS INTO PERIPHERAL VEIN, PERCUTANEOUS APPROACH: ICD-10-PCS | Performed by: INTERNAL MEDICINE

## 2024-02-15 PROCEDURE — 6360000002 HC RX W HCPCS: Performed by: FAMILY MEDICINE

## 2024-02-15 PROCEDURE — 85014 HEMATOCRIT: CPT

## 2024-02-15 PROCEDURE — 85025 COMPLETE CBC W/AUTO DIFF WBC: CPT

## 2024-02-15 PROCEDURE — 82330 ASSAY OF CALCIUM: CPT

## 2024-02-15 PROCEDURE — 86900 BLOOD TYPING SEROLOGIC ABO: CPT

## 2024-02-15 PROCEDURE — 4A133B1 MONITORING OF ARTERIAL PRESSURE, PERIPHERAL, PERCUTANEOUS APPROACH: ICD-10-PCS | Performed by: INTERNAL MEDICINE

## 2024-02-15 RX ORDER — ACETAMINOPHEN 650 MG/1
650 SUPPOSITORY RECTAL EVERY 6 HOURS PRN
Status: DISCONTINUED | OUTPATIENT
Start: 2024-02-15 | End: 2024-03-01 | Stop reason: HOSPADM

## 2024-02-15 RX ORDER — ACETAMINOPHEN 325 MG/1
650 TABLET ORAL EVERY 4 HOURS PRN
Status: CANCELLED | OUTPATIENT
Start: 2024-02-15

## 2024-02-15 RX ORDER — DEXTROSE MONOHYDRATE 100 MG/ML
INJECTION, SOLUTION INTRAVENOUS CONTINUOUS PRN
Status: DISCONTINUED | OUTPATIENT
Start: 2024-02-15 | End: 2024-03-01 | Stop reason: HOSPADM

## 2024-02-15 RX ORDER — SODIUM CHLORIDE 0.9 % (FLUSH) 0.9 %
5-40 SYRINGE (ML) INJECTION PRN
Status: CANCELLED | OUTPATIENT
Start: 2024-02-15

## 2024-02-15 RX ORDER — SODIUM CHLORIDE, SODIUM LACTATE, POTASSIUM CHLORIDE, AND CALCIUM CHLORIDE .6; .31; .03; .02 G/100ML; G/100ML; G/100ML; G/100ML
500 INJECTION, SOLUTION INTRAVENOUS ONCE
Status: COMPLETED | OUTPATIENT
Start: 2024-02-15 | End: 2024-02-15

## 2024-02-15 RX ORDER — POLYETHYLENE GLYCOL 3350 17 G/17G
17 POWDER, FOR SOLUTION ORAL DAILY
Status: DISCONTINUED | OUTPATIENT
Start: 2024-02-15 | End: 2024-03-01 | Stop reason: HOSPADM

## 2024-02-15 RX ORDER — SODIUM CHLORIDE 9 MG/ML
INJECTION, SOLUTION INTRAVENOUS PRN
Status: CANCELLED | OUTPATIENT
Start: 2024-02-15

## 2024-02-15 RX ORDER — SODIUM CHLORIDE, SODIUM LACTATE, POTASSIUM CHLORIDE, CALCIUM CHLORIDE 600; 310; 30; 20 MG/100ML; MG/100ML; MG/100ML; MG/100ML
INJECTION, SOLUTION INTRAVENOUS CONTINUOUS
Status: DISCONTINUED | OUTPATIENT
Start: 2024-02-15 | End: 2024-02-16

## 2024-02-15 RX ORDER — SODIUM CHLORIDE 9 MG/ML
INJECTION, SOLUTION INTRAVENOUS PRN
Status: DISCONTINUED | OUTPATIENT
Start: 2024-02-15 | End: 2024-03-01 | Stop reason: HOSPADM

## 2024-02-15 RX ORDER — FUROSEMIDE 10 MG/ML
40 INJECTION INTRAMUSCULAR; INTRAVENOUS 3 TIMES DAILY
Status: DISCONTINUED | OUTPATIENT
Start: 2024-02-15 | End: 2024-02-17

## 2024-02-15 RX ORDER — GLUCAGON 1 MG/ML
1 KIT INJECTION PRN
Status: DISCONTINUED | OUTPATIENT
Start: 2024-02-15 | End: 2024-03-01 | Stop reason: HOSPADM

## 2024-02-15 RX ORDER — CALCIUM GLUCONATE 10 MG/ML
1000 INJECTION, SOLUTION INTRAVENOUS ONCE
Status: COMPLETED | OUTPATIENT
Start: 2024-02-15 | End: 2024-02-15

## 2024-02-15 RX ORDER — ACETAMINOPHEN 500 MG
1000 TABLET ORAL EVERY 8 HOURS PRN
Status: DISCONTINUED | OUTPATIENT
Start: 2024-02-15 | End: 2024-02-23 | Stop reason: SDUPTHER

## 2024-02-15 RX ORDER — SODIUM CHLORIDE, SODIUM LACTATE, POTASSIUM CHLORIDE, AND CALCIUM CHLORIDE .6; .31; .03; .02 G/100ML; G/100ML; G/100ML; G/100ML
500 INJECTION, SOLUTION INTRAVENOUS ONCE
Status: DISCONTINUED | OUTPATIENT
Start: 2024-02-15 | End: 2024-02-15

## 2024-02-15 RX ORDER — HEPARIN SODIUM 10000 [USP'U]/ML
5000 INJECTION, SOLUTION INTRAVENOUS; SUBCUTANEOUS EVERY 8 HOURS SCHEDULED
Status: DISCONTINUED | OUTPATIENT
Start: 2024-02-15 | End: 2024-03-01 | Stop reason: HOSPADM

## 2024-02-15 RX ORDER — SODIUM CHLORIDE 0.9 % (FLUSH) 0.9 %
5-40 SYRINGE (ML) INJECTION EVERY 12 HOURS SCHEDULED
Status: CANCELLED | OUTPATIENT
Start: 2024-02-15

## 2024-02-15 RX ORDER — INSULIN LISPRO 100 [IU]/ML
0-4 INJECTION, SOLUTION INTRAVENOUS; SUBCUTANEOUS EVERY 4 HOURS
Status: DISCONTINUED | OUTPATIENT
Start: 2024-02-15 | End: 2024-02-21

## 2024-02-15 RX ADMIN — SODIUM CHLORIDE, POTASSIUM CHLORIDE, SODIUM LACTATE AND CALCIUM CHLORIDE: 600; 310; 30; 20 INJECTION, SOLUTION INTRAVENOUS at 01:19

## 2024-02-15 RX ADMIN — Medication 10 ML: at 08:13

## 2024-02-15 RX ADMIN — FUROSEMIDE 40 MG: 10 INJECTION, SOLUTION INTRAMUSCULAR; INTRAVENOUS at 14:02

## 2024-02-15 RX ADMIN — CALCIUM GLUCONATE 1000 MG: 10 INJECTION, SOLUTION INTRAVENOUS at 10:51

## 2024-02-15 RX ADMIN — TIMOLOL MALEATE 1 DROP: 5 SOLUTION OPHTHALMIC at 08:13

## 2024-02-15 RX ADMIN — ARFORMOTEROL TARTRATE 15 MCG: 15 SOLUTION RESPIRATORY (INHALATION) at 20:16

## 2024-02-15 RX ADMIN — IPRATROPIUM BROMIDE AND ALBUTEROL SULFATE 1 DOSE: 2.5; .5 SOLUTION RESPIRATORY (INHALATION) at 20:16

## 2024-02-15 RX ADMIN — SODIUM CHLORIDE, POTASSIUM CHLORIDE, SODIUM LACTATE AND CALCIUM CHLORIDE 500 ML: 600; 310; 30; 20 INJECTION, SOLUTION INTRAVENOUS at 01:40

## 2024-02-15 RX ADMIN — ACETAMINOPHEN 650 MG: 325 TABLET ORAL at 12:03

## 2024-02-15 RX ADMIN — FUROSEMIDE 40 MG: 10 INJECTION, SOLUTION INTRAMUSCULAR; INTRAVENOUS at 09:18

## 2024-02-15 RX ADMIN — Medication 2000 UNITS: at 08:08

## 2024-02-15 RX ADMIN — ASPIRIN 81 MG: 81 TABLET, CHEWABLE ORAL at 08:07

## 2024-02-15 RX ADMIN — VASOPRESSIN 0.02 UNITS/MIN: 20 INJECTION INTRAVENOUS at 11:17

## 2024-02-15 RX ADMIN — BUDESONIDE INHALATION 500 MCG: 0.5 SUSPENSION RESPIRATORY (INHALATION) at 20:16

## 2024-02-15 RX ADMIN — Medication 10 ML: at 19:56

## 2024-02-15 RX ADMIN — SODIUM CHLORIDE, POTASSIUM CHLORIDE, SODIUM LACTATE AND CALCIUM CHLORIDE 500 ML: 600; 310; 30; 20 INJECTION, SOLUTION INTRAVENOUS at 05:15

## 2024-02-15 RX ADMIN — HYDROCORTISONE SODIUM SUCCINATE 100 MG: 100 INJECTION, POWDER, FOR SOLUTION INTRAMUSCULAR; INTRAVENOUS at 01:54

## 2024-02-15 RX ADMIN — SODIUM CHLORIDE, POTASSIUM CHLORIDE, SODIUM LACTATE AND CALCIUM CHLORIDE: 600; 310; 30; 20 INJECTION, SOLUTION INTRAVENOUS at 07:12

## 2024-02-15 RX ADMIN — HEPARIN SODIUM 5000 UNITS: 10000 INJECTION INTRAVENOUS; SUBCUTANEOUS at 13:27

## 2024-02-15 RX ADMIN — PANTOPRAZOLE SODIUM 40 MG: 40 INJECTION, POWDER, FOR SOLUTION INTRAVENOUS at 19:56

## 2024-02-15 RX ADMIN — BUDESONIDE INHALATION 500 MCG: 0.5 SUSPENSION RESPIRATORY (INHALATION) at 08:17

## 2024-02-15 RX ADMIN — IPRATROPIUM BROMIDE AND ALBUTEROL SULFATE 1 DOSE: 2.5; .5 SOLUTION RESPIRATORY (INHALATION) at 11:40

## 2024-02-15 RX ADMIN — FUROSEMIDE 40 MG: 10 INJECTION, SOLUTION INTRAMUSCULAR; INTRAVENOUS at 19:56

## 2024-02-15 RX ADMIN — HEPARIN SODIUM 5000 UNITS: 10000 INJECTION INTRAVENOUS; SUBCUTANEOUS at 07:58

## 2024-02-15 RX ADMIN — POLYETHYLENE GLYCOL 3350 17 G: 17 POWDER, FOR SOLUTION ORAL at 08:07

## 2024-02-15 RX ADMIN — IPRATROPIUM BROMIDE AND ALBUTEROL SULFATE 1 DOSE: 2.5; .5 SOLUTION RESPIRATORY (INHALATION) at 16:09

## 2024-02-15 RX ADMIN — HEPARIN SODIUM 5000 UNITS: 10000 INJECTION INTRAVENOUS; SUBCUTANEOUS at 22:11

## 2024-02-15 RX ADMIN — PANTOPRAZOLE SODIUM 40 MG: 40 INJECTION, POWDER, FOR SOLUTION INTRAVENOUS at 08:12

## 2024-02-15 RX ADMIN — CLOPIDOGREL BISULFATE 75 MG: 75 TABLET ORAL at 08:07

## 2024-02-15 RX ADMIN — HYDROCORTISONE SODIUM SUCCINATE 100 MG: 100 INJECTION, POWDER, FOR SOLUTION INTRAMUSCULAR; INTRAVENOUS at 18:05

## 2024-02-15 RX ADMIN — IPRATROPIUM BROMIDE AND ALBUTEROL SULFATE 1 DOSE: 2.5; .5 SOLUTION RESPIRATORY (INHALATION) at 08:17

## 2024-02-15 RX ADMIN — HYDROCORTISONE SODIUM SUCCINATE 100 MG: 100 INJECTION, POWDER, FOR SOLUTION INTRAMUSCULAR; INTRAVENOUS at 09:42

## 2024-02-15 RX ADMIN — Medication 500 MG: at 08:03

## 2024-02-15 RX ADMIN — VASOPRESSIN 0.03 UNITS/MIN: 20 INJECTION INTRAVENOUS at 01:54

## 2024-02-15 RX ADMIN — ACETAMINOPHEN 1000 MG: 500 TABLET ORAL at 15:37

## 2024-02-15 RX ADMIN — SODIUM CHLORIDE, POTASSIUM CHLORIDE, SODIUM LACTATE AND CALCIUM CHLORIDE 500 ML: 600; 310; 30; 20 INJECTION, SOLUTION INTRAVENOUS at 00:00

## 2024-02-15 RX ADMIN — ARFORMOTEROL TARTRATE 15 MCG: 15 SOLUTION RESPIRATORY (INHALATION) at 08:17

## 2024-02-15 ASSESSMENT — PAIN DESCRIPTION - LOCATION
LOCATION: HIP;FLANK
LOCATION: HIP;FLANK
LOCATION: FLANK
LOCATION: FLANK
LOCATION: HIP;FLANK

## 2024-02-15 ASSESSMENT — PAIN DESCRIPTION - ONSET: ONSET: AWAKENED FROM SLEEP

## 2024-02-15 ASSESSMENT — PAIN SCALES - GENERAL
PAINLEVEL_OUTOF10: 2
PAINLEVEL_OUTOF10: 10
PAINLEVEL_OUTOF10: 0
PAINLEVEL_OUTOF10: 7
PAINLEVEL_OUTOF10: 5
PAINLEVEL_OUTOF10: 0
PAINLEVEL_OUTOF10: 0
PAINLEVEL_OUTOF10: 8

## 2024-02-15 ASSESSMENT — PAIN DESCRIPTION - DESCRIPTORS
DESCRIPTORS: ACHING
DESCRIPTORS: ACHING;STABBING
DESCRIPTORS: ACHING;STABBING

## 2024-02-15 ASSESSMENT — PAIN DESCRIPTION - PAIN TYPE
TYPE: ACUTE PAIN

## 2024-02-15 ASSESSMENT — PAIN DESCRIPTION - FREQUENCY
FREQUENCY: INTERMITTENT
FREQUENCY: INTERMITTENT

## 2024-02-15 ASSESSMENT — PAIN DESCRIPTION - ORIENTATION
ORIENTATION: RIGHT

## 2024-02-15 NOTE — PROGRESS NOTES
[facility]  SURGICAL INTENSIVE CARE UNIT    CRITICAL CARE ATTENDING PROGRESS NOTE     I have examined the patient, reviewed the record, and discussed the case with the APN/  Resident. I have reviewed all relevant labs and imaging data.    Please refer to the  APN/ resident's note. I agree with the  assessment and plan with the following corrections/ additions. The following summarizes my clinical findings and independent assessment.      CC: STEMI    Reason for consult: Assistance with supplemental O2 management and pressor therapy      HOSPITAL COURSE:  2/75-82-fybf-old mated [facility] for shortness of breath.  Patient was found to have a COPD exacerbation as well as a STEMI.  2/12 transferred to [facility]  2/14 cardiac cath  2/15-critical care consulted for hypotension and O2 desaturation.  This morning Levo and supplemental O2 is being weaned.     EXAM:  /75   Pulse 97   Temp 98.9 °F (37.2 °C) (Oral)   Resp 28   Ht 1.727 m (5' 8\")   Wt 88.6 kg (195 lb 5.2 oz)   SpO2 96%   BMI 29.70 kg/m²   On Airvo  Awake and alert x 3  NSR on monitor   Abdomen soft nontender nondistended  Right groin stick incision clean dry intact    I/O: 1.4/1.5  UOP: 1.5 (carvalho)     LABS/ IMAGING:  -I personally reviewed the patient's Labs from 2/15/2024  CBC:   Lab Results   Component Value Date/Time    WBC 26.7 02/15/2024 04:16 AM    RBC 2.70 02/15/2024 04:16 AM    HGB 8.3 02/15/2024 04:16 AM    HCT 26.2 02/15/2024 04:16 AM    MCV 97.0 02/15/2024 04:16 AM    MCH 30.7 02/15/2024 04:16 AM    MCHC 31.7 02/15/2024 04:16 AM    RDW 16.4 02/15/2024 04:16 AM     02/15/2024 04:16 AM    MPV 11.1 02/15/2024 04:16 AM     CMP:    Lab Results   Component Value Date/Time     02/15/2024 04:16 AM    K 4.4 02/15/2024 04:16 AM     02/15/2024 04:16 AM    CO2 20 02/15/2024 04:16 AM    BUN 72 02/15/2024 04:16 AM    CREATININE 2.1 02/15/2024 04:16 AM    GFRAA >60 06/18/2020 09:49 AM

## 2024-02-15 NOTE — PROGRESS NOTES
02/15/24 0915   NIV Type   $NIV $Daily Charge   NIV Started/Stopped On   Equipment Type V60   Mode Bilevel   Mask Type Full face mask   Mask Size Medium   Assessment   Pulse 86   Respirations 27   SpO2 100 %   Comfort Level Good   Using Accessory Muscles No   Mask Compliance Good   Skin Assessment Clean, dry, & intact   Skin Protection for O2 Device Yes   Orientation Middle   Location Nose   Intervention(s) Skin Barrier   Settings/Measurements   PIP Observed 11 cm H20   IPAP 10 cmH20   CPAP/EPAP 5 cmH2O   Vt (Measured) 721 mL   Rate Ordered 12   Insp Rise Time (%) 3 %   FiO2  65 %   I Time/ I Time % 0.9 s   Minute Volume (L/min) 19.9 Liters   Mask Leak (lpm) 29 lpm   Patient's Home Machine No   Alarm Settings   Alarms On Y   Low Pressure (cmH2O) 8 cmH2O   High Pressure (cmH2O) 30 cmH2O   Apnea (secs) 20 secs   RR Low (bpm) 10   RR High (bpm) 35 br/min     Date: 2/15/2024    Time: 9:49 AM    Patient Placed On BIPAP/CPAP/ Non-Invasive Ventilation? Yes  Facial area red/color change? No     If YES are Blister/Lesion present?No   If yes must notify nursing staff  BIPAP/CPAP skin barrier?  Yes   Skin barrier type:mepilexlite     Comments: Placed on 10/5/65%    Arelis Du RCP RRT-ACCS

## 2024-02-15 NOTE — PROCEDURES
Shiv Lee is a 83 y.o. male patient.  1. ST elevation myocardial infarction (STEMI), unspecified artery (HCC)      Past Medical History:   Diagnosis Date    Abnormal EKG     Aortic stenosis     Atrial fibrillation (HCC)     Postop    Coronary artery disease     HTN (hypertension)     Left atrial dilatation     Mild    Mitral regurgitation     Trace    SBE (subacute bacterial endocarditis) prophylaxis candidate      Blood pressure (!) 89/74, pulse (!) 108, temperature 97.6 °F (36.4 °C), resp. rate 29, height 1.727 m (5' 8\"), weight 88.6 kg (195 lb 5.2 oz), SpO2 96 %.    Insert Arterial Line    Date/Time: 2/15/2024 5:50 AM    Performed by: Shiela Nielsen MD  Authorized by: Shiela Nielsen MD  Consent: Verbal consent obtained.  Risks and benefits: risks, benefits and alternatives were discussed  Consent given by: patient  Patient understanding: patient states understanding of the procedure being performed  Patient consent: the patient's understanding of the procedure matches consent given  Procedure consent: procedure consent matches procedure scheduled  Relevant documents: relevant documents present and verified  Test results: test results available and properly labeled  Site marked: the operative site was marked  Imaging studies: imaging studies available  Required items: required blood products, implants, devices, and special equipment available  Patient identity confirmed: verbally with patient  Time out: Immediately prior to procedure a \"time out\" was called to verify the correct patient, procedure, equipment, support staff and site/side marked as required.  Preparation: Patient was prepped and draped in the usual sterile fashion.  Indications: multiple ABGs, respiratory failure and hemodynamic monitoring  Location: left femoral (Right radial arterial line malfunctioned, severely calcified radial artery, right femoral was catheter access site.)  Needle gauge: 18  Seldinger technique: Seldinger

## 2024-02-15 NOTE — PROGRESS NOTES
Department of Internal Medicine  Nephrology Attending Progress Note      Events reviewed.   Heart catheterization yesterday.  Became hypotensive this morning, started on Levophed      SUBJECTIVE: We are following Mr. Lee for MIGDALIA.  Reports no new complaints.     PHYSICAL EXAM:      Vitals:    VITALS:  BP 93/64   Pulse 86   Temp 97.7 °F (36.5 °C) (Temporal)   Resp 24   Ht 1.727 m (5' 8\")   Wt 91.3 kg (201 lb 4.5 oz)   SpO2 98%   BMI 30.60 kg/m²   24HR INTAKE/OUTPUT:    Intake/Output Summary (Last 24 hours) at 2/15/2024 1052  Last data filed at 2/15/2024 0900  Gross per 24 hour   Intake 1332.97 ml   Output 1315 ml   Net 17.97 ml         Constitutional: Patient is alert   HEENT: Pupils equal reactive, mucous membranes are dry  Respiratory: Lungs are coarse  Cardiovascular/Edema: Heart sounds are tachycardic  Gastrointestinal: Abdomen is soft  Neurologic: Patient lethargic, nonfocal  Skin: No lesions  Other: + edema    Scheduled Meds:   polyethylene glycol  17 g Oral Daily    hydrocortisone sodium succinate PF  100 mg IntraVENous Q8H    heparin (porcine)  5,000 Units SubCUTAneous 3 times per day    furosemide  40 mg IntraVENous TID    calcium gluconate  1,000 mg IntraVENous Once    [Held by provider] aspirin  81 mg Oral Daily    sodium chloride flush  5-40 mL IntraVENous 2 times per day    pantoprazole (PROTONIX) 40 mg in sodium chloride (PF) 0.9 % 10 mL injection  40 mg IntraVENous Q12H    ipratropium 0.5 mg-albuterol 2.5 mg  1 Dose Inhalation Q4H WA RT    vitamin D  2,000 Units Oral Daily    ascorbic acid  500 mg Oral Daily    budesonide  0.5 mg Nebulization BID RT    arformoterol tartrate  15 mcg Nebulization BID RT    timolol  1 drop Both Eyes Daily    [Held by provider] metoprolol succinate  25 mg Oral Daily    [Held by provider] clopidogrel  75 mg Oral Daily     Continuous Infusions:   lactated ringers IV soln 50 mL/hr at 02/15/24 0841    vasopressin 20 Units in sodium chloride 0.9 % 100 mL infusion 0.02  troponin levels.    Problems resolved:    Lactic acidosis, 2/2 septic shock  Hemodynamic shock, septic and hypovolemic, resolved, off vasopressors  Pneumonia, on ceftriaxone    IMPRESSION/RECOMMENDATIONS:      MIGDALIA stage I,  ischemic ATN due to profound hypotension due to hemodynamic shock and possibly contrast associated MIGDALIA.  Creatinine level has increased last 24 hours possibly due to contrast-induced MIGDALIA and recurrent hypotension.  Remains nonoliguric.    Hypernatremia, with hypervolemia (elevated proBNP, chest x-ray with pulmonary edema).  Urine sodium 103, urine osmolality 419.  Sodium levels improved.    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

## 2024-02-15 NOTE — PROGRESS NOTES
Notified Sin Amaya MD of patient's hypotension and increased need of vasopressor support. New orders placed.

## 2024-02-15 NOTE — CARE COORDINATION
2/15 Care Coordination: Pt remains in [facility]. Cardiac Cath 2/14.S/P STEMI, Transfer from [facility].Remains on IV Levophed, IV Vaso. 65%/ 60 liters.  O2 Heated High flow. With Current status unclear on discharge needs.     CM/SW will continue to follow for discharge planning.   Danilo VAUGHN,RN-CV-BC  [phone]

## 2024-02-15 NOTE — PROGRESS NOTES
Dr. Curtis notified of a drop in heart rate to the 40s (CHB) a drop in bp and sats. Pt symptomatic and fidgety in bed.

## 2024-02-15 NOTE — PROGRESS NOTES
Surgical Intensive Care Unit   Daily Progress Note     Patient's name:  Shiv Lee  Age/Gender: 83 y.o. male  Date of Admission: 2/12/2024  6:49 PM  Length of Stay: 3    Reason for ICU:     HPI:   2/7: Patient presented to [facility] for dyspnea, history of COPD, prior CABG, concern for STEMI.  At that time there was also concern for possible cholecystitis.  HIDA was done and the gallbladder was visualized.  Speech was consulted during that time as well and recommendations for diet size since the solids was made.  Underwent TTE EF 60%    Overnight Events:   Patient alert and oriented, is on Airvo, soft pressures on levo at 10 and 0.03 of vaso.  He was given 3 500 cc boluses overnight and is on 75 maintenance fluids.  Patient arterial catheter was not functioning properly and removed    Hospital Course:   2/12: patient was transferred to [facility]  2/13: Nephrology and cardiology consulted, on 15 L high flow  2/14: Underwent cardiac catheterization    Problem List:   Patient Active Problem List   Diagnosis    Aortic stenosis    Coronary artery disease    Atrial fibrillation ([facility])    Abnormal EKG    Left atrial dilatation    Mitral regurgitation    SBE (subacute bacterial endocarditis) prophylaxis candidate    HTN (hypertension)    Septic shock ([facility])    NSTEMI (non-ST elevated myocardial infarction) ([facility])    Pneumonia due to organism    COVID-19    Acute hypoxic respiratory failure ([facility])    History of aortic valve replacement with bioprosthetic valve    Palliative care encounter    Goals of care, counseling/discussion    STEMI (ST elevation myocardial infarction) ([facility])    Status post coronary artery bypass graft    Acute kidney injury superimposed on chronic kidney disease (HCC)    Chronic anemia       Surgical/Interventional Procedures:   Cardiac catheterization    Vent Settings: Additional Respiratory Assessments  Pulse: (!) 108  Respirations: 29  SpO2: 96 %  ABG:   Recent Labs      24  2200   PH 7.469*   PCO2 29.5*   PO2 162.2*   HCO3 20.9*   BE -2.0   O2SAT 98.5       I/O:  I/O last 3 completed shifts:  In: 1129.1 [P.O.:720; I.V.:409.1]  Out: 5020 [Urine:5000; Blood:20]  I/O this shift:  In: 199.9 [I.V.:199.9]  Out: 280 [Urine:280]  Urinary Catheter 24-Output (mL): 45 mL          Lines:   Right IJ CVC, left peripheral IV    Tubes:   Carvalho catheter    Drains:   None    Drips:   lactated ringers IV soln 75 mL/hr at 02/15/24 0226    vasopressin 20 Units in sodium chloride 0.9 % 100 mL infusion 0.03 Units/min (02/15/24 0226)    norepinephrine 12 mcg/min (02/15/24 0226)    sodium chloride         Physical Exam:   BP (!) 89/74   Pulse (!) 108   Temp 97.6 °F (36.4 °C)   Resp 29   Ht 1.727 m (5' 8\")   Wt 88.6 kg (195 lb 5.2 oz)   SpO2 96%   BMI 29.70 kg/m²     Average, Min, and Max for last 24 hours Vitals:  Temp:  Temp  Av °F (36.7 °C)  Min: 97.6 °F (36.4 °C)  Max: 98.5 °F (36.9 °C)  RR: Resp  Av.7  Min: 15  Max: 34  HR: Pulse  Av.6  Min: 74  Max: 108  BP:  Systolic (24hrs), Av , Min:61 , Max:153   ; Diastolic (24hrs), Av, Min:44, Max:103    SpO2: SpO2  Av.8 %  Min: 84 %  Max: 100 %        GCS:    4 - Opens eyes on own   6 - Follows simple motor commands  5 - Alert and oriented    Pupil size:  Left 3 mm    Right 3 mm    Pupil reaction: Yes    Wiggles fingers: Left Yes Right Yes    Hand grasp:   Left normal       Right normal    Wiggles toes: Left Yes    Right Yes    Plantar flexion: Left normal     Right normal      CONSTITUTIONAL: Frail-appearing, comfortable  NEUROLOGIC: PERRL, alert and oriented time place and self  CARDIOVASCULAR: Sternotomy scar, hypotension on pressors  PULMONARY: no rhonchi/rales/wheezes, no use of accessory muscles  RENAL: carvalho to gravity, clear yellow urine  ABDOMEN: soft, mild left-sided lower right flank tenderness, no obvious ecchymosis, nondistended, nontympanic, no masses, no organomegaly, normal bowel sounds

## 2024-02-15 NOTE — PLAN OF CARE
Problem: Safety - Adult  Goal: Free from fall injury  Outcome: Progressing     Problem: Neurosensory - Adult  Goal: Achieves stable or improved neurological status  2/15/2024 1042 by Shanthi Mccall RN  Outcome: Progressing  2/15/2024 0433 by Alessio Aaron RN  Outcome: Progressing     Problem: Cardiovascular - Adult  Goal: Maintains optimal cardiac output and hemodynamic stability  2/15/2024 1042 by Shanthi Mccall RN  Outcome: Progressing  Flowsheets (Taken 2/15/2024 0800)  Maintains optimal cardiac output and hemodynamic stability:   Monitor blood pressure and heart rate   Monitor urine output and notify Licensed Independent Practitioner for values outside of normal range  2/15/2024 0433 by Alessio Aaron RN  Outcome: Progressing  Goal: Absence of cardiac dysrhythmias or at baseline  Recent Flowsheet Documentation  Taken 2/15/2024 0800 by Shanthi Mccall RN  Absence of cardiac dysrhythmias or at baseline: Monitor cardiac rate and rhythm     Problem: Skin/Tissue Integrity - Adult  Goal: Skin integrity remains intact  2/15/2024 0433 by Alessio Aaron RN  Outcome: Progressing

## 2024-02-15 NOTE — PROGRESS NOTES
Hospitalist Progress Note      PCP: Gilson Ramon III, DO    Date of Admission: 2/12/2024        Hospital Course:  83 y.o. male presented with STEMI, , HE DOES NOT KNOW WHY HE IS HERE OR WHAT HAPPENED.    HE WAS INITIALLY ADMITTED TO [place], , HIS TT WAS ELEVATED 1338 .      CATH REPORT PENDING      Subjective:  NO COMPLAINTS           Medications:  Reviewed    Infusion Medications    lactated ringers IV soln 50 mL/hr at 02/15/24 0841    vasopressin 20 Units in sodium chloride 0.9 % 100 mL infusion 0.02 Units/min (02/15/24 1117)    sodium chloride      norepinephrine 6 mcg/min (02/15/24 1216)    sodium chloride       Scheduled Medications    polyethylene glycol  17 g Oral Daily    hydrocortisone sodium succinate PF  100 mg IntraVENous Q8H    heparin (porcine)  5,000 Units SubCUTAneous 3 times per day    furosemide  40 mg IntraVENous TID    [Held by provider] aspirin  81 mg Oral Daily    sodium chloride flush  5-40 mL IntraVENous 2 times per day    pantoprazole (PROTONIX) 40 mg in sodium chloride (PF) 0.9 % 10 mL injection  40 mg IntraVENous Q12H    ipratropium 0.5 mg-albuterol 2.5 mg  1 Dose Inhalation Q4H WA RT    vitamin D  2,000 Units Oral Daily    ascorbic acid  500 mg Oral Daily    budesonide  0.5 mg Nebulization BID RT    arformoterol tartrate  15 mcg Nebulization BID RT    timolol  1 drop Both Eyes Daily    [Held by provider] metoprolol succinate  25 mg Oral Daily    [Held by provider] clopidogrel  75 mg Oral Daily     PRN Meds: sodium chloride, acetaminophen **OR** acetaminophen, ondansetron **OR** ondansetron, sodium chloride flush, sodium chloride, hydrALAZINE      Intake/Output Summary (Last 24 hours) at 2/15/2024 1500  Last data filed at 2/15/2024 1410  Gross per 24 hour   Intake 3586.48 ml   Output 2470 ml   Net 1116.48 ml       Exam:    BP 93/64   Pulse 90   Temp 97.8 °F (36.6 °C) (Temporal)   Resp 28   Ht 1.727 m (5' 8\")   Wt 91.3 kg (201 lb 4.5 oz)   SpO2 96%   BMI 30.60 kg/m²

## 2024-02-15 NOTE — PLAN OF CARE
Problem: Neurosensory - Adult  Goal: Achieves stable or improved neurological status  Outcome: Progressing     Problem: Respiratory - Adult  Goal: Achieves optimal ventilation and oxygenation  Outcome: Progressing     Problem: Cardiovascular - Adult  Goal: Maintains optimal cardiac output and hemodynamic stability  Outcome: Progressing     Problem: Skin/Tissue Integrity - Adult  Goal: Skin integrity remains intact  Outcome: Progressing     Problem: Musculoskeletal - Adult  Goal: Return mobility to safest level of function  Outcome: Progressing     Problem: Gastrointestinal - Adult  Goal: Minimal or absence of nausea and vomiting  Outcome: Progressing

## 2024-02-15 NOTE — DISCHARGE SUMMARY
[facility]   Discharge summary   Patient ID:  Shiv Lee  [id number]  83 y.o.  1940    Admit date: 2/7/2024    Discharge date and time: 2/12/2024    Admission Diagnoses:   Patient Active Problem List   Diagnosis    Aortic stenosis    Coronary artery disease    Atrial fibrillation (HCC)    Abnormal EKG    Left atrial dilatation    Mitral regurgitation    SBE (subacute bacterial endocarditis) prophylaxis candidate    HTN (hypertension)    Septic shock (HCC)    NSTEMI (non-ST elevated myocardial infarction) ([facility])    Pneumonia due to organism    COVID-19    Acute hypoxic respiratory failure ([facility])    History of aortic valve replacement with bioprosthetic valve    Palliative care encounter    Goals of care, counseling/discussion    STEMI (ST elevation myocardial infarction) ([facility])    Status post coronary artery bypass graft    Acute kidney injury superimposed on chronic kidney disease ([facility])    Chronic anemia       Discharge Diagnoses: septic shock, complicated UTI, STEMI, NSTEMI, elevated troponin, acute renal failure, acute hypoxic respiratory failure, cholecystitis, MIGDALIA    Consults: cardiology, pulmonary/intensive care, and nephrology    Procedures: none    Hospital Course: Patient is an 83-year-old male with a past medical history of aortic stenosis, A-fib, coronary artery disease, hypertension, mitral regurg, subacute bacterial endocarditis.  Patient presented to the ED with complaints of shortness of breath.  Patient states he felt like he had a panic attack.  Was 84% on his normal 2 L O2.  Per EMS patient was recently diagnosed with pneumonia however patient was not on any antibiotics.  Patient was febrile in the ED with a temperature of 101.  Patient was hypotensive in the ED.  Sepsis workup was initiated and patient was noted to be extremely hypotensive despite treatment with IV fluids and became bradycardic.  Patient did receive several doses of atropine, epi, bicarb and was started on

## 2024-02-15 NOTE — PROGRESS NOTES
Physical Therapy  Physical Therapy Attempt    Name: Shiv Lee  : 1940  MRN: [id number]      Date of Service: 2/15/2024  Chart reviewed.  Spoke with RN - pt is not medically appropriate for skilled PT at this time.  Will re-attempt as able.    Rancho Ramos, PT, DPT  [id number]

## 2024-02-15 NOTE — CONSULTS
SURGICAL INTENSIVE CARE  CONSULT NOTE    Date of admission:  2/12/2024  Reason for ICU transfer:  hemodynamic and respiratory instability    SUBJECTIVE:  Patient currently comfortable and resting in bed. On 12 of levophed.     HPI:  Shiv Lee is a 83 year old male who was admitted to [facility] on 2/7/24 for shortness of breath. He was found to have a COPD exacerbation as well a STEMI. He was transferred to [facility] on 2/12 for further evaluation. Patient was taken for cardiac catheterization on 2/14. Unknown what was found during procedure or if interventions were performed. Upon return from his procedure, he was found to be hypotensive and on 12 of Levophed. He was put on Airvo due to poor O2 saturation.     Cardiac history includes previous cardiac catheterization, CABG, aortic valve surgery, aortic stenosis, and Afib.       PHYSICAL EXAM:  BP (!) 75/60   Pulse 90   Temp 98 °F (36.7 °C) (Temporal)   Resp 27   Ht 1.727 m (5' 8\")   Wt 88.6 kg (195 lb 5.2 oz)   SpO2 100%   BMI 29.70 kg/m²     GENERAL:  NAD. A&Ox3.  HEAD:  Normocephalic, atraumatic.   EYES:   No scleral icterus. PERRLA.  LUNGS:  No increased work of breathing. On Airvo. Breath sounds clear and equal bilaterally.  CARDIOVASCULAR: RR, hypotensive. On 12 of levophed.  ABDOMEN:  Soft, non-distended, non-tender. No guarding, rigidity, rebound.  EXTREMITIES:   MAEx4. Atraumatic. No LE edema.  SKIN:  Warm and dry  NEUROLOGIC:  GCS 15    ASSESSMENT / PLAN:  Neuro:     1. Acute pain: Tylenol prn.  CV:    1. STEMI- cardiology following. S/p cardiac catheterization 2/14. On ASA/Plavix.    2. Hypovolemic vs cardiogenic shock- place NICOM. Fluid challenge. Will check TSH and cortisol. MAP goal > 65. Wean levophed as able.   Pulm:    1. Acute respiratory failure. Finished course of steroids. Encourage deep breathing, IS, and pulmonary hygiene. Breathing treatments. Wean O2 as able. SpO2 goal > 88%.  GI:    1. No acute issues.  NPO. Bowel regimen. Zofran prn.   Renal:    1. MIGDALIA- nephrology following. IVF. Trend BUN/Cr/UOP.    2. Electrolyte abnormalities. Correct prn.   Endocrine:    1. No acute issues. Maintain blood glucose < 180 in the ICU.  MSK:    1. No acute issues. PT/OT when able.  Heme:    1. Acute blood loss anemia- continue daily CBC. Trend HgB.  ID:    1. No acute issues. No indications for antibiotics.    Pain/Analgesia: Tylenol  Total fluid rate: 100 cc/hr  Bowel regimen: Glycolax  DVT proph:  SCDs, ASA/Plavix.   GI proph:  PPI   Glucose protocol: Not indicated    Mouth/eye care: As needed per patient  Reese:   Present, 2/7/24. Keep in place for fluid monitoring.  CVC sites:  R IJ CVC 2/14/24, R radial arterial line 2/14/24    Ancillary consults: Cardiology, nephrology, internal medicine  Family Update: As available    Disposition: CVICU    I attempted to call the patient's wife to clarify code status and update her on patient's condition, but I was unable to reach her.     Shiela Nielsen MD  Resident, PGY-3  2/14/2024  11:56 PM

## 2024-02-15 NOTE — PROCEDURES
Shiv Lee is a 83 y.o. male patient.  1. ST elevation myocardial infarction (STEMI), unspecified artery (HCC)      Past Medical History:   Diagnosis Date    Abnormal EKG     Aortic stenosis     Atrial fibrillation (HCC)     Postop    Coronary artery disease     HTN (hypertension)     Left atrial dilatation     Mild    Mitral regurgitation     Trace    SBE (subacute bacterial endocarditis) prophylaxis candidate      Blood pressure (!) 75/60, pulse 90, temperature 98 °F (36.7 °C), temperature source Temporal, resp. rate 27, height 1.727 m (5' 8\"), weight 88.6 kg (195 lb 5.2 oz), SpO2 100 %.    Central Line    Date/Time: 2/14/2024 11:53 PM    Performed by: Shiela Nielsen MD  Authorized by: Shiela Nielsen MD  Consent: Verbal consent obtained.  Risks and benefits: risks, benefits and alternatives were discussed  Consent given by: patient  Patient understanding: patient states understanding of the procedure being performed  Patient consent: the patient's understanding of the procedure matches consent given  Procedure consent: procedure consent matches procedure scheduled  Relevant documents: relevant documents present and verified  Test results: test results available and properly labeled  Site marked: the operative site was marked  Imaging studies: imaging studies available  Required items: required blood products, implants, devices, and special equipment available  Patient identity confirmed: verbally with patient  Time out: Immediately prior to procedure a \"time out\" was called to verify the correct patient, procedure, equipment, support staff and site/side marked as required.  Indications: vascular access  Anesthesia: local infiltration    Anesthesia:  Local Anesthetic: lidocaine 1% with epinephrine    Sedation:  Patient sedated: no    Preparation: skin prepped with 2% chlorhexidine  Skin prep agent dried: skin prep agent completely dried prior to procedure  Sterile barriers: all five maximum sterile

## 2024-02-16 ENCOUNTER — APPOINTMENT (OUTPATIENT)
Dept: GENERAL RADIOLOGY | Age: 84
End: 2024-02-16
Attending: INTERNAL MEDICINE
Payer: MEDICARE

## 2024-02-16 LAB
AADO2: 322.8 MMHG
ALBUMIN SERPL-MCNC: 3.3 G/DL (ref 3.5–5.2)
ALP SERPL-CCNC: 61 U/L (ref 40–129)
ALT SERPL-CCNC: 31 U/L (ref 0–40)
ANION GAP SERPL CALCULATED.3IONS-SCNC: 12 MMOL/L (ref 7–16)
ANION GAP SERPL CALCULATED.3IONS-SCNC: 20 MMOL/L (ref 7–16)
AST SERPL-CCNC: 28 U/L (ref 0–39)
B.E.: 2.6 MMOL/L (ref -3–3)
BASOPHILS # BLD: 0 K/UL (ref 0–0.2)
BASOPHILS NFR BLD: 0 % (ref 0–2)
BILIRUB SERPL-MCNC: 1.7 MG/DL (ref 0–1.2)
BUN SERPL-MCNC: 63 MG/DL (ref 6–23)
BUN SERPL-MCNC: 71 MG/DL (ref 6–23)
CA-I BLD-SCNC: 1.01 MMOL/L (ref 1.15–1.33)
CALCIUM SERPL-MCNC: 7.5 MG/DL (ref 8.6–10.2)
CALCIUM SERPL-MCNC: 7.9 MG/DL (ref 8.6–10.2)
CHLORIDE SERPL-SCNC: 108 MMOL/L (ref 98–107)
CHLORIDE SERPL-SCNC: 113 MMOL/L (ref 98–107)
CO2 SERPL-SCNC: 21 MMOL/L (ref 22–29)
CO2 SERPL-SCNC: 25 MMOL/L (ref 22–29)
COHB: 0.1 % (ref 0–1.5)
COMMENT: ABNORMAL
CREAT SERPL-MCNC: 1.8 MG/DL (ref 0.7–1.2)
CREAT SERPL-MCNC: 2 MG/DL (ref 0.7–1.2)
CRITICAL: ABNORMAL
DATE ANALYZED: ABNORMAL
DATE OF COLLECTION: ABNORMAL
EOSINOPHIL # BLD: 0 K/UL (ref 0.05–0.5)
EOSINOPHILS RELATIVE PERCENT: 0 % (ref 0–6)
ERYTHROCYTE [DISTWIDTH] IN BLOOD BY AUTOMATED COUNT: 18.2 % (ref 11.5–15)
FIO2: 60 %
GFR SERPL CREATININE-BSD FRML MDRD: 33 ML/MIN/1.73M2
GFR SERPL CREATININE-BSD FRML MDRD: 38 ML/MIN/1.73M2
GLUCOSE BLD-MCNC: 125 MG/DL (ref 74–99)
GLUCOSE BLD-MCNC: 134 MG/DL (ref 74–99)
GLUCOSE BLD-MCNC: 140 MG/DL (ref 74–99)
GLUCOSE BLD-MCNC: 143 MG/DL (ref 74–99)
GLUCOSE BLD-MCNC: 160 MG/DL (ref 74–99)
GLUCOSE BLD-MCNC: 173 MG/DL (ref 74–99)
GLUCOSE SERPL-MCNC: 125 MG/DL (ref 74–99)
GLUCOSE SERPL-MCNC: 145 MG/DL (ref 74–99)
HCO3: 24.5 MMOL/L (ref 22–26)
HCT VFR BLD AUTO: 23.1 % (ref 37–54)
HCT VFR BLD AUTO: 23.7 % (ref 37–54)
HCT VFR BLD AUTO: 25.7 % (ref 37–54)
HGB BLD-MCNC: 7.4 G/DL (ref 12.5–16.5)
HGB BLD-MCNC: 8.2 G/DL (ref 12.5–16.5)
HGB BLD-MCNC: 8.7 G/DL (ref 12.5–16.5)
HHB: 8.1 % (ref 0–5)
LAB: ABNORMAL
LYMPHOCYTES NFR BLD: 1.09 K/UL (ref 1.5–4)
LYMPHOCYTES RELATIVE PERCENT: 4 % (ref 20–42)
Lab: 415
MAGNESIUM SERPL-MCNC: 2 MG/DL (ref 1.6–2.6)
MCH RBC QN AUTO: 31.7 PG (ref 26–35)
MCHC RBC AUTO-ENTMCNC: 34.6 G/DL (ref 32–34.5)
MCV RBC AUTO: 91.5 FL (ref 80–99.9)
METHB: 0.3 % (ref 0–1.5)
MODE: ABNORMAL
MONOCYTES NFR BLD: 0.82 K/UL (ref 0.1–0.95)
MONOCYTES NFR BLD: 3 % (ref 2–12)
NEUTROPHILS NFR BLD: 94 % (ref 43–80)
NEUTS SEG NFR BLD: 29.1 K/UL (ref 1.8–7.3)
O2 SATURATION: 91.9 % (ref 92–98.5)
O2HB: 91.5 % (ref 94–97)
OPERATOR ID: 914
PATIENT TEMP: 37 C
PCO2: 28.1 MMHG (ref 35–45)
PFO2: 0.98 MMHG/%
PH BLOOD GAS: 7.56 (ref 7.35–7.45)
PHOSPHATE SERPL-MCNC: 4.1 MG/DL (ref 2.5–4.5)
PLATELET # BLD AUTO: 195 K/UL (ref 130–450)
PMV BLD AUTO: 11.8 FL (ref 7–12)
PO2: 59.1 MMHG (ref 75–100)
POTASSIUM SERPL-SCNC: 3.2 MMOL/L (ref 3.5–5)
POTASSIUM SERPL-SCNC: 3.5 MMOL/L (ref 3.5–5)
PROT SERPL-MCNC: 5.5 G/DL (ref 6.4–8.3)
RBC # BLD AUTO: 2.59 M/UL (ref 3.8–5.8)
RBC # BLD: ABNORMAL 10*6/UL
RI(T): 5.46
SODIUM SERPL-SCNC: 149 MMOL/L (ref 132–146)
SODIUM SERPL-SCNC: 150 MMOL/L (ref 132–146)
SOURCE, BLOOD GAS: ABNORMAL
THB: 9.1 G/DL (ref 11.5–16.5)
TIME ANALYZED: 419
WBC OTHER # BLD: 31 K/UL (ref 4.5–11.5)

## 2024-02-16 PROCEDURE — 6370000000 HC RX 637 (ALT 250 FOR IP): Performed by: FAMILY MEDICINE

## 2024-02-16 PROCEDURE — 80048 BASIC METABOLIC PNL TOTAL CA: CPT

## 2024-02-16 PROCEDURE — 6360000002 HC RX W HCPCS: Performed by: STUDENT IN AN ORGANIZED HEALTH CARE EDUCATION/TRAINING PROGRAM

## 2024-02-16 PROCEDURE — 82805 BLOOD GASES W/O2 SATURATION: CPT

## 2024-02-16 PROCEDURE — 82330 ASSAY OF CALCIUM: CPT

## 2024-02-16 PROCEDURE — 80053 COMPREHEN METABOLIC PANEL: CPT

## 2024-02-16 PROCEDURE — 84100 ASSAY OF PHOSPHORUS: CPT

## 2024-02-16 PROCEDURE — 82962 GLUCOSE BLOOD TEST: CPT

## 2024-02-16 PROCEDURE — 94660 CPAP INITIATION&MGMT: CPT

## 2024-02-16 PROCEDURE — 6360000002 HC RX W HCPCS: Performed by: FAMILY MEDICINE

## 2024-02-16 PROCEDURE — C9113 INJ PANTOPRAZOLE SODIUM, VIA: HCPCS | Performed by: FAMILY MEDICINE

## 2024-02-16 PROCEDURE — 6360000002 HC RX W HCPCS

## 2024-02-16 PROCEDURE — 99233 SBSQ HOSP IP/OBS HIGH 50: CPT | Performed by: INTERNAL MEDICINE

## 2024-02-16 PROCEDURE — A4216 STERILE WATER/SALINE, 10 ML: HCPCS | Performed by: FAMILY MEDICINE

## 2024-02-16 PROCEDURE — 2580000003 HC RX 258: Performed by: FAMILY MEDICINE

## 2024-02-16 PROCEDURE — 85025 COMPLETE CBC W/AUTO DIFF WBC: CPT

## 2024-02-16 PROCEDURE — 71045 X-RAY EXAM CHEST 1 VIEW: CPT

## 2024-02-16 PROCEDURE — 99291 CRITICAL CARE FIRST HOUR: CPT | Performed by: STUDENT IN AN ORGANIZED HEALTH CARE EDUCATION/TRAINING PROGRAM

## 2024-02-16 PROCEDURE — 2700000000 HC OXYGEN THERAPY PER DAY

## 2024-02-16 PROCEDURE — 2580000003 HC RX 258: Performed by: INTERNAL MEDICINE

## 2024-02-16 PROCEDURE — P9016 RBC LEUKOCYTES REDUCED: HCPCS

## 2024-02-16 PROCEDURE — 6370000000 HC RX 637 (ALT 250 FOR IP)

## 2024-02-16 PROCEDURE — 94640 AIRWAY INHALATION TREATMENT: CPT

## 2024-02-16 PROCEDURE — 36430 TRANSFUSION BLD/BLD COMPNT: CPT

## 2024-02-16 PROCEDURE — 2580000003 HC RX 258

## 2024-02-16 PROCEDURE — 2000000000 HC ICU R&B

## 2024-02-16 PROCEDURE — 83735 ASSAY OF MAGNESIUM: CPT

## 2024-02-16 PROCEDURE — 37799 UNLISTED PX VASCULAR SURGERY: CPT

## 2024-02-16 RX ORDER — POTASSIUM CHLORIDE 7.45 MG/ML
10 INJECTION INTRAVENOUS
Status: DISCONTINUED | OUTPATIENT
Start: 2024-02-16 | End: 2024-02-16

## 2024-02-16 RX ORDER — MIDODRINE HYDROCHLORIDE 10 MG/1
10 TABLET ORAL
Status: DISCONTINUED | OUTPATIENT
Start: 2024-02-16 | End: 2024-02-17

## 2024-02-16 RX ORDER — DEXTROSE MONOHYDRATE 50 MG/ML
INJECTION, SOLUTION INTRAVENOUS CONTINUOUS
Status: DISCONTINUED | OUTPATIENT
Start: 2024-02-16 | End: 2024-02-21

## 2024-02-16 RX ORDER — SODIUM CHLORIDE 9 MG/ML
INJECTION, SOLUTION INTRAVENOUS PRN
Status: DISCONTINUED | OUTPATIENT
Start: 2024-02-16 | End: 2024-03-01 | Stop reason: HOSPADM

## 2024-02-16 RX ORDER — POTASSIUM CHLORIDE 29.8 MG/ML
20 INJECTION INTRAVENOUS ONCE
Status: COMPLETED | OUTPATIENT
Start: 2024-02-16 | End: 2024-02-16

## 2024-02-16 RX ADMIN — ARFORMOTEROL TARTRATE 15 MCG: 15 SOLUTION RESPIRATORY (INHALATION) at 09:08

## 2024-02-16 RX ADMIN — HYDROCORTISONE SODIUM SUCCINATE 100 MG: 100 INJECTION, POWDER, FOR SOLUTION INTRAMUSCULAR; INTRAVENOUS at 17:43

## 2024-02-16 RX ADMIN — HEPARIN SODIUM 5000 UNITS: 10000 INJECTION INTRAVENOUS; SUBCUTANEOUS at 14:05

## 2024-02-16 RX ADMIN — HEPARIN SODIUM 5000 UNITS: 10000 INJECTION INTRAVENOUS; SUBCUTANEOUS at 22:17

## 2024-02-16 RX ADMIN — PANTOPRAZOLE SODIUM 40 MG: 40 INJECTION, POWDER, FOR SOLUTION INTRAVENOUS at 08:21

## 2024-02-16 RX ADMIN — IPRATROPIUM BROMIDE AND ALBUTEROL SULFATE 1 DOSE: 2.5; .5 SOLUTION RESPIRATORY (INHALATION) at 09:08

## 2024-02-16 RX ADMIN — HYDROCORTISONE SODIUM SUCCINATE 100 MG: 100 INJECTION, POWDER, FOR SOLUTION INTRAMUSCULAR; INTRAVENOUS at 02:50

## 2024-02-16 RX ADMIN — BUDESONIDE INHALATION 500 MCG: 0.5 SUSPENSION RESPIRATORY (INHALATION) at 09:08

## 2024-02-16 RX ADMIN — SODIUM CHLORIDE, POTASSIUM CHLORIDE, SODIUM LACTATE AND CALCIUM CHLORIDE: 600; 310; 30; 20 INJECTION, SOLUTION INTRAVENOUS at 01:23

## 2024-02-16 RX ADMIN — Medication 2000 UNITS: at 09:51

## 2024-02-16 RX ADMIN — IPRATROPIUM BROMIDE AND ALBUTEROL SULFATE 1 DOSE: 2.5; .5 SOLUTION RESPIRATORY (INHALATION) at 20:44

## 2024-02-16 RX ADMIN — HYDROCORTISONE SODIUM SUCCINATE 100 MG: 100 INJECTION, POWDER, FOR SOLUTION INTRAMUSCULAR; INTRAVENOUS at 10:25

## 2024-02-16 RX ADMIN — CALCIUM GLUCONATE 2000 MG: 98 INJECTION, SOLUTION INTRAVENOUS at 09:27

## 2024-02-16 RX ADMIN — MIDODRINE HYDROCHLORIDE 10 MG: 10 TABLET ORAL at 17:43

## 2024-02-16 RX ADMIN — Medication 10 ML: at 08:32

## 2024-02-16 RX ADMIN — Medication 500 MG: at 09:51

## 2024-02-16 RX ADMIN — IPRATROPIUM BROMIDE AND ALBUTEROL SULFATE 1 DOSE: 2.5; .5 SOLUTION RESPIRATORY (INHALATION) at 12:28

## 2024-02-16 RX ADMIN — TIMOLOL MALEATE 1 DROP: 5 SOLUTION OPHTHALMIC at 08:33

## 2024-02-16 RX ADMIN — POTASSIUM CHLORIDE 20 MEQ: 29.8 INJECTION, SOLUTION INTRAVENOUS at 08:28

## 2024-02-16 RX ADMIN — BUDESONIDE INHALATION 500 MCG: 0.5 SUSPENSION RESPIRATORY (INHALATION) at 20:44

## 2024-02-16 RX ADMIN — MIDODRINE HYDROCHLORIDE 10 MG: 10 TABLET ORAL at 11:27

## 2024-02-16 RX ADMIN — HEPARIN SODIUM 5000 UNITS: 10000 INJECTION INTRAVENOUS; SUBCUTANEOUS at 06:52

## 2024-02-16 RX ADMIN — ARFORMOTEROL TARTRATE 15 MCG: 15 SOLUTION RESPIRATORY (INHALATION) at 20:44

## 2024-02-16 RX ADMIN — POTASSIUM CHLORIDE 20 MEQ: 29.8 INJECTION, SOLUTION INTRAVENOUS at 14:04

## 2024-02-16 RX ADMIN — IPRATROPIUM BROMIDE AND ALBUTEROL SULFATE 1 DOSE: 2.5; .5 SOLUTION RESPIRATORY (INHALATION) at 15:10

## 2024-02-16 RX ADMIN — DEXTROSE MONOHYDRATE: 50 INJECTION, SOLUTION INTRAVENOUS at 15:24

## 2024-02-16 RX ADMIN — Medication 10 ML: at 20:26

## 2024-02-16 RX ADMIN — PANTOPRAZOLE SODIUM 40 MG: 40 INJECTION, POWDER, FOR SOLUTION INTRAVENOUS at 20:26

## 2024-02-16 ASSESSMENT — PAIN SCALES - GENERAL
PAINLEVEL_OUTOF10: 0

## 2024-02-16 NOTE — PROGRESS NOTES
Department of Internal Medicine  Nephrology Attending Progress Note      Events reviewed.       SUBJECTIVE: We are following Mr. Lee for MIGDALIA.  Reports no new complaints.     PHYSICAL EXAM:      Vitals:    VITALS:  BP 93/64   Pulse 99   Temp 98.8 °F (37.1 °C) (Temporal)   Resp (!) 34   Ht 1.727 m (5' 8\")   Wt 91.3 kg (201 lb 4.5 oz)   SpO2 97%   BMI 30.60 kg/m²   24HR INTAKE/OUTPUT:    Intake/Output Summary (Last 24 hours) at 2/16/2024 1023  Last data filed at 2/16/2024 0900  Gross per 24 hour   Intake 3264.26 ml   Output 5495 ml   Net -2230.74 ml         Constitutional: Patient is alert   HEENT: Pupils equal reactive, mucous membranes are dry  Respiratory: Lungs are coarse  Cardiovascular/Edema: Heart sounds are tachycardic  Gastrointestinal: Abdomen is soft  Neurologic: Patient lethargic, nonfocal  Skin: No lesions  Other: + edema    Scheduled Meds:   calcium gluconate  2,000 mg IntraVENous Once    midodrine  10 mg Oral TID WC    polyethylene glycol  17 g Oral Daily    hydrocortisone sodium succinate PF  100 mg IntraVENous Q8H    heparin (porcine)  5,000 Units SubCUTAneous 3 times per day    [Held by provider] furosemide  40 mg IntraVENous TID    insulin lispro  0-4 Units SubCUTAneous Q4H    [Held by provider] aspirin  81 mg Oral Daily    sodium chloride flush  5-40 mL IntraVENous 2 times per day    pantoprazole (PROTONIX) 40 mg in sodium chloride (PF) 0.9 % 10 mL injection  40 mg IntraVENous Q12H    ipratropium 0.5 mg-albuterol 2.5 mg  1 Dose Inhalation Q4H WA RT    vitamin D  2,000 Units Oral Daily    ascorbic acid  500 mg Oral Daily    budesonide  0.5 mg Nebulization BID RT    arformoterol tartrate  15 mcg Nebulization BID RT    timolol  1 drop Both Eyes Daily    [Held by provider] metoprolol succinate  25 mg Oral Daily    [Held by provider] clopidogrel  75 mg Oral Daily     Continuous Infusions:   lactated ringers IV soln 50 mL/hr at 02/16/24 0648    sodium chloride      dextrose      norepinephrine  3 mcg/min (02/16/24 0648)    sodium chloride       PRN Meds:.sodium chloride, glucose, dextrose bolus **OR** dextrose bolus, glucagon (rDNA), dextrose, acetaminophen **OR** acetaminophen, ondansetron **OR** ondansetron, sodium chloride flush, sodium chloride, hydrALAZINE      DATA:    CBC:   Lab Results   Component Value Date/Time    WBC 31.0 02/16/2024 04:07 AM    RBC 2.59 02/16/2024 04:07 AM    HGB 8.2 02/16/2024 04:07 AM    HCT 23.7 02/16/2024 04:07 AM    MCV 91.5 02/16/2024 04:07 AM    MCH 31.7 02/16/2024 04:07 AM    MCHC 34.6 02/16/2024 04:07 AM    RDW 18.2 02/16/2024 04:07 AM     02/16/2024 04:07 AM    MPV 11.8 02/16/2024 04:07 AM     CMP:    Lab Results   Component Value Date/Time     02/16/2024 04:07 AM    K 3.2 02/16/2024 04:07 AM     02/16/2024 04:07 AM    CO2 21 02/16/2024 04:07 AM    BUN 71 02/16/2024 04:07 AM    CREATININE 2.0 02/16/2024 04:07 AM    GFRAA >60 06/18/2020 09:49 AM    LABGLOM 33 02/16/2024 04:07 AM    GLUCOSE 125 02/16/2024 04:07 AM    PROT 5.5 02/16/2024 04:07 AM    LABALBU 3.3 02/16/2024 04:07 AM    CALCIUM 7.5 02/16/2024 04:07 AM    BILITOT 1.7 02/16/2024 04:07 AM    ALKPHOS 61 02/16/2024 04:07 AM    AST 28 02/16/2024 04:07 AM    ALT 31 02/16/2024 04:07 AM     Magnesium:    Lab Results   Component Value Date/Time    MG 2.0 02/16/2024 04:07 AM     Phosphorus:    Lab Results   Component Value Date/Time    PHOS 4.1 02/16/2024 04:07 AM     Radiology Review:      CT ABDOMEN PELVIS W IV CONTRAST Additional Contrast? None 2/7/24    IMPRESSION:  No evidence for pulmonary artery embolism to the lobar level.  Evaluation  beyond this is nondiagnostic due to respiratory motion artifact.  Would  advise close clinical follow-up or could consider repeat examination.     Interstitial/ground-glass opacities in the lower left lung, of questionable  significance but might reflect infectious/inflammatory process.     Gallbladder wall thickening versus pericholecystic fluid. Correlate

## 2024-02-16 NOTE — PROGRESS NOTES
Hospitalist Progress Note      PCP: Gilson Ramon III,     Date of Admission: 2/12/2024        Hospital Course:  83 y.o. male presented with STEMI, , HE DOES NOT KNOW WHY HE IS HERE OR WHAT HAPPENED.    HE WAS INITIALLY ADMITTED TO [place], , HIS TT WAS ELEVATED 1338 .      2/16  STILL ON LEVOPHED.   TRYING TO WEAN AIRVO         Subjective:    ASLEEP          Medications:  Reviewed    Infusion Medications    dextrose 60 mL/hr at 02/16/24 1524    sodium chloride      dextrose      norepinephrine Stopped (02/16/24 1217)    sodium chloride       Scheduled Medications    midodrine  10 mg Oral TID WC    polyethylene glycol  17 g Oral Daily    hydrocortisone sodium succinate PF  100 mg IntraVENous Q8H    heparin (porcine)  5,000 Units SubCUTAneous 3 times per day    [Held by provider] furosemide  40 mg IntraVENous TID    insulin lispro  0-4 Units SubCUTAneous Q4H    [Held by provider] aspirin  81 mg Oral Daily    sodium chloride flush  5-40 mL IntraVENous 2 times per day    pantoprazole (PROTONIX) 40 mg in sodium chloride (PF) 0.9 % 10 mL injection  40 mg IntraVENous Q12H    ipratropium 0.5 mg-albuterol 2.5 mg  1 Dose Inhalation Q4H WA RT    vitamin D  2,000 Units Oral Daily    ascorbic acid  500 mg Oral Daily    budesonide  0.5 mg Nebulization BID RT    arformoterol tartrate  15 mcg Nebulization BID RT    timolol  1 drop Both Eyes Daily    [Held by provider] metoprolol succinate  25 mg Oral Daily    [Held by provider] clopidogrel  75 mg Oral Daily     PRN Meds: sodium chloride, glucose, dextrose bolus **OR** dextrose bolus, glucagon (rDNA), dextrose, acetaminophen **OR** acetaminophen, ondansetron **OR** ondansetron, sodium chloride flush, sodium chloride, hydrALAZINE      Intake/Output Summary (Last 24 hours) at 2/16/2024 1532  Last data filed at 2/16/2024 1400  Gross per 24 hour   Intake 1763.1 ml   Output 4510 ml   Net -2746.9 ml       Exam:    BP 93/64   Pulse 88   Temp 98.8 °F (37.1 °C) (Temporal)

## 2024-02-16 NOTE — PROGRESS NOTES
Patient is seen in follow-up for STEMI    Subjective:     Mr. Lee feels okay  Lying in bed mild respiratory distress    ROS:  Limited due to patient's altered mental status: No nausea no vomiting, no chest pain, no lightheadedness or dizziness, no abdominal pain    Medication side effects: none    Scheduled Meds:   midodrine  10 mg Oral TID WC    polyethylene glycol  17 g Oral Daily    hydrocortisone sodium succinate PF  100 mg IntraVENous Q8H    heparin (porcine)  5,000 Units SubCUTAneous 3 times per day    [Held by provider] furosemide  40 mg IntraVENous TID    insulin lispro  0-4 Units SubCUTAneous Q4H    [Held by provider] aspirin  81 mg Oral Daily    sodium chloride flush  5-40 mL IntraVENous 2 times per day    pantoprazole (PROTONIX) 40 mg in sodium chloride (PF) 0.9 % 10 mL injection  40 mg IntraVENous Q12H    ipratropium 0.5 mg-albuterol 2.5 mg  1 Dose Inhalation Q4H WA RT    vitamin D  2,000 Units Oral Daily    ascorbic acid  500 mg Oral Daily    budesonide  0.5 mg Nebulization BID RT    arformoterol tartrate  15 mcg Nebulization BID RT    timolol  1 drop Both Eyes Daily    [Held by provider] metoprolol succinate  25 mg Oral Daily    [Held by provider] clopidogrel  75 mg Oral Daily     Continuous Infusions:   dextrose 60 mL/hr at 02/16/24 1524    sodium chloride      sodium chloride      dextrose      norepinephrine Stopped (02/16/24 1217)    sodium chloride       PRN Meds:sodium chloride, sodium chloride, glucose, dextrose bolus **OR** dextrose bolus, glucagon (rDNA), dextrose, acetaminophen **OR** acetaminophen, ondansetron **OR** ondansetron, sodium chloride flush, sodium chloride, hydrALAZINE    I/O last 3 completed shifts:  In: 4504.8 [P.O.:480; I.V.:2032.1; Blood:350; IV Piggyback:1642.8]  Out: 6115 [Urine:6115]  I/O this shift:  In: 844.8 [P.O.:335; I.V.:373; IV Piggyback:136.8]  Out: 1165 [Urine:1165]      Objective:      Physical Exam:   BP 93/64   Pulse 97   Temp 98.9 °F (37.2 °C) (Temporal)

## 2024-02-16 NOTE — PLAN OF CARE
Problem: Safety - Adult  Goal: Free from fall injury  2/15/2024 2008 by Adarsh Melo RN  Outcome: Progressing  2/15/2024 1042 by Shanthi Mccall RN  Outcome: Progressing     Problem: Discharge Planning  Goal: Discharge to home or other facility with appropriate resources  Outcome: Progressing     Problem: Neurosensory - Adult  Goal: Achieves stable or improved neurological status  2/15/2024 2008 by Adarsh Melo RN  Outcome: Progressing  2/15/2024 1042 by Shanthi Mccall RN  Outcome: Progressing  Goal: Absence of seizures  Outcome: Progressing  Goal: Remains free of injury related to seizures activity  Outcome: Progressing  Goal: Achieves maximal functionality and self care  Outcome: Progressing     Problem: Respiratory - Adult  Goal: Achieves optimal ventilation and oxygenation  Outcome: Progressing     Problem: Cardiovascular - Adult  Goal: Maintains optimal cardiac output and hemodynamic stability  2/15/2024 2008 by Adarsh Melo RN  Outcome: Progressing  2/15/2024 1042 by Shanthi Mccall RN  Outcome: Progressing  Flowsheets (Taken 2/15/2024 0800)  Maintains optimal cardiac output and hemodynamic stability:   Monitor blood pressure and heart rate   Monitor urine output and notify Licensed Independent Practitioner for values outside of normal range  Goal: Absence of cardiac dysrhythmias or at baseline  Outcome: Progressing  Flowsheets (Taken 2/15/2024 0800 by Shanthi Mccall RN)  Absence of cardiac dysrhythmias or at baseline: Monitor cardiac rate and rhythm     Problem: Skin/Tissue Integrity - Adult  Goal: Skin integrity remains intact  Outcome: Progressing  Goal: Incisions, wounds, or drain sites healing without S/S of infection  Outcome: Progressing  Goal: Oral mucous membranes remain intact  Outcome: Progressing     Problem: Musculoskeletal - Adult  Goal: Return mobility to safest level of function  Outcome: Progressing  Goal: Maintain proper alignment of affected body part  Outcome: Progressing  Goal: Return  ADL status to a safe level of function  Outcome: Progressing     Problem: Gastrointestinal - Adult  Goal: Minimal or absence of nausea and vomiting  Outcome: Progressing  Goal: Maintains or returns to baseline bowel function  Outcome: Progressing  Goal: Maintains adequate nutritional intake  Outcome: Progressing  Goal: Establish and maintain optimal ostomy function  Outcome: Progressing     Problem: Genitourinary - Adult  Goal: Absence of urinary retention  Outcome: Progressing  Goal: Urinary catheter remains patent  Outcome: Progressing     Problem: Infection - Adult  Goal: Absence of infection at discharge  Outcome: Progressing  Goal: Absence of infection during hospitalization  Outcome: Progressing  Goal: Absence of fever/infection during anticipated neutropenic period  Outcome: Progressing     Problem: Metabolic/Fluid and Electrolytes - Adult  Goal: Electrolytes maintained within normal limits  Outcome: Progressing  Goal: Hemodynamic stability and optimal renal function maintained  Outcome: Progressing  Goal: Glucose maintained within prescribed range  Outcome: Progressing     Problem: Hematologic - Adult  Goal: Maintains hematologic stability  Outcome: Progressing     Problem: ABCDS Injury Assessment  Goal: Absence of physical injury  Outcome: Progressing     Problem: Skin/Tissue Integrity  Goal: Absence of new skin breakdown  Description: 1.  Monitor for areas of redness and/or skin breakdown  2.  Assess vascular access sites hourly  3.  Every 4-6 hours minimum:  Change oxygen saturation probe site  4.  Every 4-6 hours:  If on nasal continuous positive airway pressure, respiratory therapy assess nares and determine need for appliance change or resting period.  Outcome: Progressing     Problem: Chronic Conditions and Co-morbidities  Goal: Patient's chronic conditions and co-morbidity symptoms are monitored and maintained or improved  Outcome: Progressing     Problem: Pain  Goal: Verbalizes/displays adequate

## 2024-02-16 NOTE — PROGRESS NOTES
Physical Therapy  Physical Therapy Attempt    Name: Shiv Lee  : 1940  MRN: [id number]      Date of Service: 2024  Chart reviewed.  Spoke with RN - pt is still not medically appropriate for skilled PT at this time.  Will re-attempt as able.    Rancho Ramos, PT, DPT  [id number]

## 2024-02-16 NOTE — PROGRESS NOTES
Patient is seen in follow-up for STEMI    Subjective:     Mr. Lee feels okay, shortness of breath is better  Lying in bed mild respiratory distress    ROS:  Limited due to patient's poor respiratory status: No nausea no vomiting, no chest pain, no lightheadedness or dizziness, no abdominal pain    Medication side effects: none    Scheduled Meds:   polyethylene glycol  17 g Oral Daily    hydrocortisone sodium succinate PF  100 mg IntraVENous Q8H    heparin (porcine)  5,000 Units SubCUTAneous 3 times per day    [Held by provider] furosemide  40 mg IntraVENous TID    insulin lispro  0-4 Units SubCUTAneous Q4H    [Held by provider] aspirin  81 mg Oral Daily    sodium chloride flush  5-40 mL IntraVENous 2 times per day    pantoprazole (PROTONIX) 40 mg in sodium chloride (PF) 0.9 % 10 mL injection  40 mg IntraVENous Q12H    ipratropium 0.5 mg-albuterol 2.5 mg  1 Dose Inhalation Q4H WA RT    vitamin D  2,000 Units Oral Daily    ascorbic acid  500 mg Oral Daily    budesonide  0.5 mg Nebulization BID RT    arformoterol tartrate  15 mcg Nebulization BID RT    timolol  1 drop Both Eyes Daily    [Held by provider] metoprolol succinate  25 mg Oral Daily    [Held by provider] clopidogrel  75 mg Oral Daily     Continuous Infusions:   lactated ringers IV soln 50 mL/hr at 02/15/24 0841    vasopressin 20 Units in sodium chloride 0.9 % 100 mL infusion Stopped (02/15/24 1616)    sodium chloride      dextrose      norepinephrine 6 mcg/min (02/15/24 1216)    sodium chloride       PRN Meds:sodium chloride, glucose, dextrose bolus **OR** dextrose bolus, glucagon (rDNA), dextrose, acetaminophen **OR** acetaminophen, ondansetron **OR** ondansetron, sodium chloride flush, sodium chloride, hydrALAZINE    I/O last 3 completed shifts:  In: 3918.9 [P.O.:720; I.V.:1206.1; Blood:350; IV Piggyback:1642.8]  Out: 4585 [Urine:4565; Blood:20]  I/O this shift:  In: -   Out: 150 [Urine:150]      Objective:      Physical Exam:   BP 93/64   Pulse 94    PORTABLE   Final Result   1.  Stable reticular opacities in the lung bases greater on the left.      2.  Unchanged right IJ central venous catheter in good position         XR CHEST PORTABLE   Final Result   1. Right jugular central venous catheter tip with good positioning overlying   the central right atrium.   2. No pneumothorax.   3. Unchanged left basilar infiltrate.      RECOMMENDATION:   Careful clinical correlation and follow up recommended.         US RETROPERITONEAL COMPLETE   Final Result   No sign of any retroperitoneal fluid collection or free fluid in the abdomen   or pelvis.         XR CHEST PORTABLE   Final Result   1. No change in mild left lower lobe and lingular pneumonia.   2. Stable mild pulmonary fibrosis.         XR CHEST PORTABLE   Final Result   Slight improvement in lung aeration.  Otherwise, no significant change with   pulmonary edema and bilateral pleural effusions noted.             Lab Review   Lab Results   Component Value Date/Time     02/15/2024 01:42 PM    K 4.1 02/15/2024 01:42 PM     02/15/2024 01:42 PM    CO2 20 02/15/2024 01:42 PM    BUN 76 02/15/2024 01:42 PM    CREATININE 2.2 02/15/2024 01:42 PM    GLUCOSE 210 02/15/2024 01:42 PM    CALCIUM 7.7 02/15/2024 01:42 PM     Lab Results   Component Value Date/Time    WBC 26.7 02/15/2024 04:16 AM    HGB 9.2 02/15/2024 11:26 AM    HCT 27.6 02/15/2024 11:26 AM    MCV 97.0 02/15/2024 04:16 AM     02/15/2024 04:16 AM     I have personally reviewed the laboratory, cardiac diagnostic and radiographic testing as outlined above:    Assessment:     STEMI.  Persistent inferior ST elevations/lateral and anterior ST depressions remains chest pain-free, cardiac catheterization showed moderate disease of SVG to OM, subtotal occlusion of SVG to RCA, will continue current medical therapy for now  Episodes of bradycardia/junctional rhythm/AIVR: Resolved, BB resumed 02/13/2024  CAD/CABG LIMA-LAD, V-OM, V-RCA with bioprosthetic AVR

## 2024-02-16 NOTE — PROGRESS NOTES
Surgical Intensive Care Unit   Daily Progress Note     Patient's name:  Shiv Lee  Age/Gender: 83 y.o. male  Date of Admission: 2/12/2024  6:49 PM  Length of Stay: 4    Reason for ICU:     HPI:   2/7: Patient presented to [facility] for dyspnea, history of COPD, prior CABG, concern for STEMI.  At that time there was also concern for possible cholecystitis.  HIDA was done and the gallbladder was visualized.  Speech was consulted during that time as well and recommendations for diet size since the solids was made.  Underwent TTE EF 60%    Overnight Events:   Mild agitation overnight, requiring a sitter otherwise no acute events.    Hospital Course:   2/12: patient was transferred to [facility]  2/13: Nephrology and cardiology consulted, on 15 L high flow  2/14: Underwent cardiac catheterization  2/15: Patient was on vaso and levo, vaso was removed on Airvo    Problem List:   Patient Active Problem List   Diagnosis    Aortic stenosis    Coronary artery disease    Atrial fibrillation (HCC)    Abnormal EKG    Left atrial dilatation    Mitral regurgitation    SBE (subacute bacterial endocarditis) prophylaxis candidate    HTN (hypertension)    Septic shock (HCC)    NSTEMI (non-ST elevated myocardial infarction) ([facility])    Pneumonia due to organism    COVID-19    Acute hypoxic respiratory failure (HCC)    History of aortic valve replacement with bioprosthetic valve    Palliative care encounter    Goals of care, counseling/discussion    STEMI (ST elevation myocardial infarction) ([facility])    Status post coronary artery bypass graft    Acute kidney injury superimposed on chronic kidney disease (HCC)    Chronic anemia       Surgical/Interventional Procedures:   Cardiac catheterization    Vent Settings: Additional Respiratory Assessments  Pulse: 90  Respirations: 25  SpO2: 91 %  Humidification Source: Heated wire  Humidification Temp: 34  ABG:   Recent Labs     02/16/24  0415   PH 7.558*   PCO2 28.1*   PO2

## 2024-02-17 ENCOUNTER — APPOINTMENT (OUTPATIENT)
Dept: GENERAL RADIOLOGY | Age: 84
End: 2024-02-17
Attending: INTERNAL MEDICINE
Payer: MEDICARE

## 2024-02-17 LAB
AADO2: 372.2 MMHG
ABO/RH: NORMAL
ALBUMIN SERPL-MCNC: 3 G/DL (ref 3.5–5.2)
ALP SERPL-CCNC: 64 U/L (ref 40–129)
ALT SERPL-CCNC: 24 U/L (ref 0–40)
ANION GAP SERPL CALCULATED.3IONS-SCNC: 12 MMOL/L (ref 7–16)
ANTIBODY SCREEN: NEGATIVE
ARM BAND NUMBER: NORMAL
AST SERPL-CCNC: 26 U/L (ref 0–39)
B.E.: 2.2 MMOL/L (ref -3–3)
BASOPHILS # BLD: 0 K/UL (ref 0–0.2)
BASOPHILS NFR BLD: 0 % (ref 0–2)
BILIRUB SERPL-MCNC: 1.5 MG/DL (ref 0–1.2)
BLOOD BANK BLOOD PRODUCT EXPIRATION DATE: NORMAL
BLOOD BANK BLOOD PRODUCT EXPIRATION DATE: NORMAL
BLOOD BANK DISPENSE STATUS: NORMAL
BLOOD BANK DISPENSE STATUS: NORMAL
BLOOD BANK ISBT PRODUCT BLOOD TYPE: 5100
BLOOD BANK ISBT PRODUCT BLOOD TYPE: 5100
BLOOD BANK PRODUCT CODE: NORMAL
BLOOD BANK PRODUCT CODE: NORMAL
BLOOD BANK SAMPLE EXPIRATION: NORMAL
BLOOD BANK UNIT TYPE AND RH: NORMAL
BLOOD BANK UNIT TYPE AND RH: NORMAL
BPU ID: NORMAL
BPU ID: NORMAL
BUN SERPL-MCNC: 52 MG/DL (ref 6–23)
CA-I BLD-SCNC: 1.06 MMOL/L (ref 1.15–1.33)
CALCIUM SERPL-MCNC: 7.7 MG/DL (ref 8.6–10.2)
CHLORIDE SERPL-SCNC: 111 MMOL/L (ref 98–107)
CO2 SERPL-SCNC: 25 MMOL/L (ref 22–29)
COHB: 0.3 % (ref 0–1.5)
COMPONENT: NORMAL
COMPONENT: NORMAL
CREAT SERPL-MCNC: 1.5 MG/DL (ref 0.7–1.2)
CRITICAL: ABNORMAL
CROSSMATCH RESULT: NORMAL
CROSSMATCH RESULT: NORMAL
DATE ANALYZED: ABNORMAL
DATE OF COLLECTION: ABNORMAL
EOSINOPHIL # BLD: 0 K/UL (ref 0.05–0.5)
EOSINOPHILS RELATIVE PERCENT: 0 % (ref 0–6)
ERYTHROCYTE [DISTWIDTH] IN BLOOD BY AUTOMATED COUNT: 17.7 % (ref 11.5–15)
FIO2: 70 %
GFR SERPL CREATININE-BSD FRML MDRD: 46 ML/MIN/1.73M2
GLUCOSE BLD-MCNC: 140 MG/DL (ref 74–99)
GLUCOSE BLD-MCNC: 153 MG/DL (ref 74–99)
GLUCOSE BLD-MCNC: 155 MG/DL (ref 74–99)
GLUCOSE BLD-MCNC: 156 MG/DL (ref 74–99)
GLUCOSE BLD-MCNC: 173 MG/DL (ref 74–99)
GLUCOSE BLD-MCNC: 178 MG/DL (ref 74–99)
GLUCOSE SERPL-MCNC: 127 MG/DL (ref 74–99)
HCO3: 24.6 MMOL/L (ref 22–26)
HCT VFR BLD AUTO: 25.7 % (ref 37–54)
HCT VFR BLD AUTO: 26.3 % (ref 37–54)
HGB BLD-MCNC: 8.1 G/DL (ref 12.5–16.5)
HGB BLD-MCNC: 8.6 G/DL (ref 12.5–16.5)
HHB: 4.6 % (ref 0–5)
LAB: ABNORMAL
LYMPHOCYTES NFR BLD: 0.88 K/UL (ref 1.5–4)
LYMPHOCYTES RELATIVE PERCENT: 4 % (ref 20–42)
Lab: 545
MAGNESIUM SERPL-MCNC: 2 MG/DL (ref 1.6–2.6)
MCH RBC QN AUTO: 30.6 PG (ref 26–35)
MCHC RBC AUTO-ENTMCNC: 32.7 G/DL (ref 32–34.5)
MCV RBC AUTO: 93.6 FL (ref 80–99.9)
METHB: 0.4 % (ref 0–1.5)
MODE: ABNORMAL
MONOCYTES NFR BLD: 0 % (ref 2–12)
MONOCYTES NFR BLD: 0 K/UL (ref 0.1–0.95)
NEUTROPHILS NFR BLD: 97 % (ref 43–80)
NEUTS SEG NFR BLD: 24.52 K/UL (ref 1.8–7.3)
NUCLEATED RED BLOOD CELLS: 1 PER 100 WBC
O2 SATURATION: 95.4 % (ref 92–98.5)
O2HB: 94.7 % (ref 94–97)
OPERATOR ID: ABNORMAL
PATIENT TEMP: 37 C
PCO2: 29.8 MMHG (ref 35–45)
PFO2: 1.11 MMHG/%
PH BLOOD GAS: 7.53 (ref 7.35–7.45)
PHOSPHATE SERPL-MCNC: 3 MG/DL (ref 2.5–4.5)
PLATELET # BLD AUTO: 178 K/UL (ref 130–450)
PMV BLD AUTO: 11.5 FL (ref 7–12)
PO2: 77.4 MMHG (ref 75–100)
POTASSIUM SERPL-SCNC: 3.2 MMOL/L (ref 3.5–5)
PROT SERPL-MCNC: 5.2 G/DL (ref 6.4–8.3)
RBC # BLD AUTO: 2.81 M/UL (ref 3.8–5.8)
RBC # BLD: ABNORMAL 10*6/UL
RI(T): 4.81
SODIUM SERPL-SCNC: 148 MMOL/L (ref 132–146)
SOURCE, BLOOD GAS: ABNORMAL
THB: 9.1 G/DL (ref 11.5–16.5)
TIME ANALYZED: 554
TRANSFUSION STATUS: NORMAL
TRANSFUSION STATUS: NORMAL
UNIT DIVISION: 0
UNIT DIVISION: 0
UNIT ISSUE DATE/TIME: NORMAL
UNIT ISSUE DATE/TIME: NORMAL
WBC # BLD: ABNORMAL 10*3/UL
WBC OTHER # BLD: 25.4 K/UL (ref 4.5–11.5)

## 2024-02-17 PROCEDURE — 85014 HEMATOCRIT: CPT

## 2024-02-17 PROCEDURE — 2580000003 HC RX 258: Performed by: FAMILY MEDICINE

## 2024-02-17 PROCEDURE — 6360000002 HC RX W HCPCS

## 2024-02-17 PROCEDURE — 6370000000 HC RX 637 (ALT 250 FOR IP): Performed by: STUDENT IN AN ORGANIZED HEALTH CARE EDUCATION/TRAINING PROGRAM

## 2024-02-17 PROCEDURE — 83735 ASSAY OF MAGNESIUM: CPT

## 2024-02-17 PROCEDURE — 6370000000 HC RX 637 (ALT 250 FOR IP): Performed by: FAMILY MEDICINE

## 2024-02-17 PROCEDURE — 2000000000 HC ICU R&B

## 2024-02-17 PROCEDURE — 71045 X-RAY EXAM CHEST 1 VIEW: CPT

## 2024-02-17 PROCEDURE — 82805 BLOOD GASES W/O2 SATURATION: CPT

## 2024-02-17 PROCEDURE — 6360000002 HC RX W HCPCS: Performed by: STUDENT IN AN ORGANIZED HEALTH CARE EDUCATION/TRAINING PROGRAM

## 2024-02-17 PROCEDURE — 2700000000 HC OXYGEN THERAPY PER DAY

## 2024-02-17 PROCEDURE — C9113 INJ PANTOPRAZOLE SODIUM, VIA: HCPCS | Performed by: FAMILY MEDICINE

## 2024-02-17 PROCEDURE — 6360000002 HC RX W HCPCS: Performed by: FAMILY MEDICINE

## 2024-02-17 PROCEDURE — 99233 SBSQ HOSP IP/OBS HIGH 50: CPT | Performed by: INTERNAL MEDICINE

## 2024-02-17 PROCEDURE — 6370000000 HC RX 637 (ALT 250 FOR IP): Performed by: INTERNAL MEDICINE

## 2024-02-17 PROCEDURE — 94660 CPAP INITIATION&MGMT: CPT

## 2024-02-17 PROCEDURE — A4216 STERILE WATER/SALINE, 10 ML: HCPCS | Performed by: FAMILY MEDICINE

## 2024-02-17 PROCEDURE — 37799 UNLISTED PX VASCULAR SURGERY: CPT

## 2024-02-17 PROCEDURE — 82962 GLUCOSE BLOOD TEST: CPT

## 2024-02-17 PROCEDURE — 85018 HEMOGLOBIN: CPT

## 2024-02-17 PROCEDURE — 6370000000 HC RX 637 (ALT 250 FOR IP)

## 2024-02-17 PROCEDURE — 82330 ASSAY OF CALCIUM: CPT

## 2024-02-17 PROCEDURE — 85025 COMPLETE CBC W/AUTO DIFF WBC: CPT

## 2024-02-17 PROCEDURE — 80053 COMPREHEN METABOLIC PANEL: CPT

## 2024-02-17 PROCEDURE — 2580000003 HC RX 258: Performed by: INTERNAL MEDICINE

## 2024-02-17 PROCEDURE — 84100 ASSAY OF PHOSPHORUS: CPT

## 2024-02-17 PROCEDURE — 94640 AIRWAY INHALATION TREATMENT: CPT

## 2024-02-17 RX ORDER — POTASSIUM CHLORIDE 20 MEQ/1
40 TABLET, EXTENDED RELEASE ORAL ONCE
Status: COMPLETED | OUTPATIENT
Start: 2024-02-17 | End: 2024-02-17

## 2024-02-17 RX ORDER — QUETIAPINE FUMARATE 25 MG/1
25 TABLET, FILM COATED ORAL ONCE
Status: COMPLETED | OUTPATIENT
Start: 2024-02-18 | End: 2024-02-17

## 2024-02-17 RX ORDER — ISOSORBIDE MONONITRATE 30 MG/1
30 TABLET, EXTENDED RELEASE ORAL DAILY
Status: DISCONTINUED | OUTPATIENT
Start: 2024-02-17 | End: 2024-03-01 | Stop reason: HOSPADM

## 2024-02-17 RX ORDER — ROSUVASTATIN CALCIUM 5 MG/1
5 TABLET, COATED ORAL NIGHTLY
Status: DISCONTINUED | OUTPATIENT
Start: 2024-02-17 | End: 2024-03-01 | Stop reason: HOSPADM

## 2024-02-17 RX ORDER — CALCIUM GLUCONATE 10 MG/ML
1000 INJECTION, SOLUTION INTRAVENOUS ONCE
Status: COMPLETED | OUTPATIENT
Start: 2024-02-17 | End: 2024-02-17

## 2024-02-17 RX ADMIN — HYDROCORTISONE SODIUM SUCCINATE 100 MG: 100 INJECTION, POWDER, FOR SOLUTION INTRAMUSCULAR; INTRAVENOUS at 02:51

## 2024-02-17 RX ADMIN — ACETAMINOPHEN 1000 MG: 500 TABLET ORAL at 10:53

## 2024-02-17 RX ADMIN — Medication 500 MG: at 08:30

## 2024-02-17 RX ADMIN — PANTOPRAZOLE SODIUM 40 MG: 40 INJECTION, POWDER, FOR SOLUTION INTRAVENOUS at 21:58

## 2024-02-17 RX ADMIN — BUDESONIDE INHALATION 500 MCG: 0.5 SUSPENSION RESPIRATORY (INHALATION) at 09:03

## 2024-02-17 RX ADMIN — CALCIUM GLUCONATE 1000 MG: 10 INJECTION, SOLUTION INTRAVENOUS at 08:36

## 2024-02-17 RX ADMIN — HEPARIN SODIUM 5000 UNITS: 10000 INJECTION INTRAVENOUS; SUBCUTANEOUS at 06:14

## 2024-02-17 RX ADMIN — QUETIAPINE FUMARATE 25 MG: 25 TABLET ORAL at 23:44

## 2024-02-17 RX ADMIN — ARFORMOTEROL TARTRATE 15 MCG: 15 SOLUTION RESPIRATORY (INHALATION) at 20:14

## 2024-02-17 RX ADMIN — DEXTROSE MONOHYDRATE: 50 INJECTION, SOLUTION INTRAVENOUS at 08:12

## 2024-02-17 RX ADMIN — PANTOPRAZOLE SODIUM 40 MG: 40 INJECTION, POWDER, FOR SOLUTION INTRAVENOUS at 08:38

## 2024-02-17 RX ADMIN — ARFORMOTEROL TARTRATE 15 MCG: 15 SOLUTION RESPIRATORY (INHALATION) at 09:03

## 2024-02-17 RX ADMIN — BUDESONIDE INHALATION 500 MCG: 0.5 SUSPENSION RESPIRATORY (INHALATION) at 20:15

## 2024-02-17 RX ADMIN — MIDODRINE HYDROCHLORIDE 10 MG: 10 TABLET ORAL at 08:30

## 2024-02-17 RX ADMIN — ROSUVASTATIN CALCIUM 5 MG: 5 TABLET, COATED ORAL at 22:00

## 2024-02-17 RX ADMIN — Medication 10 ML: at 08:49

## 2024-02-17 RX ADMIN — IPRATROPIUM BROMIDE AND ALBUTEROL SULFATE 1 DOSE: 2.5; .5 SOLUTION RESPIRATORY (INHALATION) at 20:14

## 2024-02-17 RX ADMIN — MIDODRINE HYDROCHLORIDE 10 MG: 10 TABLET ORAL at 12:14

## 2024-02-17 RX ADMIN — HYDROCORTISONE SODIUM SUCCINATE 100 MG: 100 INJECTION, POWDER, FOR SOLUTION INTRAMUSCULAR; INTRAVENOUS at 09:59

## 2024-02-17 RX ADMIN — ISOSORBIDE MONONITRATE 30 MG: 30 TABLET, EXTENDED RELEASE ORAL at 15:53

## 2024-02-17 RX ADMIN — HYDROCORTISONE SODIUM SUCCINATE 100 MG: 100 INJECTION, POWDER, FOR SOLUTION INTRAMUSCULAR; INTRAVENOUS at 17:52

## 2024-02-17 RX ADMIN — TIMOLOL MALEATE 1 DROP: 5 SOLUTION OPHTHALMIC at 08:37

## 2024-02-17 RX ADMIN — POTASSIUM CHLORIDE 40 MEQ: 1500 TABLET, EXTENDED RELEASE ORAL at 08:37

## 2024-02-17 RX ADMIN — ACETAMINOPHEN 1000 MG: 500 TABLET ORAL at 19:45

## 2024-02-17 RX ADMIN — HEPARIN SODIUM 5000 UNITS: 10000 INJECTION INTRAVENOUS; SUBCUTANEOUS at 21:58

## 2024-02-17 RX ADMIN — IPRATROPIUM BROMIDE AND ALBUTEROL SULFATE 1 DOSE: 2.5; .5 SOLUTION RESPIRATORY (INHALATION) at 12:10

## 2024-02-17 RX ADMIN — IPRATROPIUM BROMIDE AND ALBUTEROL SULFATE 1 DOSE: 2.5; .5 SOLUTION RESPIRATORY (INHALATION) at 09:03

## 2024-02-17 RX ADMIN — Medication 10 ML: at 22:00

## 2024-02-17 RX ADMIN — IPRATROPIUM BROMIDE AND ALBUTEROL SULFATE 1 DOSE: 2.5; .5 SOLUTION RESPIRATORY (INHALATION) at 16:53

## 2024-02-17 RX ADMIN — HEPARIN SODIUM 5000 UNITS: 10000 INJECTION INTRAVENOUS; SUBCUTANEOUS at 13:39

## 2024-02-17 RX ADMIN — Medication 2000 UNITS: at 08:30

## 2024-02-17 ASSESSMENT — PAIN DESCRIPTION - DESCRIPTORS: DESCRIPTORS: ACHING

## 2024-02-17 ASSESSMENT — PAIN SCALES - GENERAL
PAINLEVEL_OUTOF10: 3
PAINLEVEL_OUTOF10: 7
PAINLEVEL_OUTOF10: 0

## 2024-02-17 ASSESSMENT — PAIN DESCRIPTION - PAIN TYPE: TYPE: ACUTE PAIN

## 2024-02-17 ASSESSMENT — PAIN DESCRIPTION - LOCATION: LOCATION: GENERALIZED

## 2024-02-17 NOTE — PLAN OF CARE
Problem: Safety - Adult  Goal: Free from fall injury  2/17/2024 0917 by Staci Renee RN  Outcome: Progressing  2/16/2024 2034 by Adarsh Melo RN  Outcome: Progressing     Problem: Discharge Planning  Goal: Discharge to home or other facility with appropriate resources  2/17/2024 0917 by Staci Renee RN  Outcome: Progressing  2/16/2024 2034 by Adarsh Melo RN  Outcome: Progressing     Problem: Neurosensory - Adult  Goal: Achieves stable or improved neurological status  2/17/2024 0917 by Staci Renee RN  Outcome: Progressing  2/16/2024 2034 by Adarsh Melo RN  Outcome: Progressing  Goal: Absence of seizures  2/17/2024 0917 by Staci Renee RN  Outcome: Progressing  2/16/2024 2034 by Adarsh Melo RN  Outcome: Progressing  Goal: Remains free of injury related to seizures activity  2/17/2024 0917 by Staci Renee RN  Outcome: Progressing  2/16/2024 2034 by Adarsh Melo RN  Outcome: Progressing  Goal: Achieves maximal functionality and self care  2/17/2024 0917 by Staci Renee RN  Outcome: Progressing  2/16/2024 2034 by Adarsh Melo RN  Outcome: Progressing     Problem: Respiratory - Adult  Goal: Achieves optimal ventilation and oxygenation  2/17/2024 0917 by Staci Renee RN  Outcome: Progressing  2/16/2024 2034 by Adarsh Melo RN  Outcome: Progressing     Problem: Cardiovascular - Adult  Goal: Maintains optimal cardiac output and hemodynamic stability  2/17/2024 0917 by Staci Renee RN  Outcome: Progressing  2/16/2024 2034 by Adarsh Melo RN  Outcome: Progressing  Goal: Absence of cardiac dysrhythmias or at baseline  2/17/2024 0917 by Staci Renee RN  Outcome: Progressing  2/16/2024 2034 by Adarsh Melo RN  Outcome: Progressing     Problem: Skin/Tissue Integrity - Adult  Goal: Skin integrity remains intact  2/17/2024 0917 by Staci Renee RN  Outcome: Progressing  2/16/2024 2034 by Adarsh Melo RN  Outcome: Progressing  Goal: Incisions, wounds, or drain sites healing

## 2024-02-17 NOTE — PROGRESS NOTES
Surgical Intensive Care Unit   Daily Progress Note     Patient's name:  Shiv Lee  Age/Gender: 83 y.o. male  Date of Admission: 2/12/2024  6:49 PM  Length of Stay: 5    Reason for ICU:     HPI:   2/7: Patient presented to [facility] for dyspnea, history of COPD, prior CABG, concern for STEMI.  At that time there was also concern for possible cholecystitis.  HIDA was done and the gallbladder was visualized.  Speech was consulted during that time as well and recommendations for diet size since the solids was made.  Underwent TTE EF 60%    Hospital Course:   2/12: patient was transferred to [facility]  2/13: Nephrology and cardiology consulted, on 15 L high flow  2/14: Underwent cardiac catheterization  2/15: Patient was on vaso and levo, vaso was removed on Airvo  2/16: Mild agitation overnight, requiring a sitter otherwise no acute events.  2/17: Patient off levophed, remains on airvo. Calm and cooperative.     Problem List:   Patient Active Problem List   Diagnosis    Aortic stenosis    Coronary artery disease    Atrial fibrillation (HCC)    Abnormal EKG    Left atrial dilatation    Mitral regurgitation    SBE (subacute bacterial endocarditis) prophylaxis candidate    HTN (hypertension)    Septic shock (HCC)    NSTEMI (non-ST elevated myocardial infarction) ([facility])    Pneumonia due to organism    COVID-19    Acute hypoxic respiratory failure (HCC)    History of aortic valve replacement with bioprosthetic valve    Palliative care encounter    Goals of care, counseling/discussion    STEMI (ST elevation myocardial infarction) ([facility])    Status post coronary artery bypass graft    Acute kidney injury superimposed on chronic kidney disease (HCC)    Chronic anemia       Surgical/Interventional Procedures:   Cardiac catheterization    Vent Settings: Additional Respiratory Assessments  Pulse: 75  Respirations: 23  SpO2: 95 %  Humidification Source: Heated wire  Humidification Temp: 34  ABG:   Recent

## 2024-02-17 NOTE — PROGRESS NOTES
Inpatient Cardiology Progress note     PATIENT IS BEING FOLLOWED FOR: STEMI,    Shiv Lee is a 83 y.o. male elderly  male known to Dr Santana    [facility]: Paroxysmal atrial fibrillation, CAD, s/p CABG with LIMA-LAD, SVG-OM, SVG- RCA, AS, s/p AVR with Bioprosthetic AVR (1/27/09 by Dr. Villa), HTN, Hypercholesterolemia.       Presented to [facility] 2/7/2024 with SOB and possible sepsis. O2 saturation 84-85% along with hypotension>several doses of atropine, epi, bicarb and a Levophed drip >IV Heparin gtt> sent to CVICU    2/7/2024 + COVID-19, troponin 1338>1458>1287>1270>1036>1019>889>779>652>639, p-BNP 2132, Bun/Cr 26/1.5, lactic acid 4.7, ALT 45, AST 97    Was transferred out of ICU 02/11/2024.  Denies chest pain or shortness of breath.  On 15 L high flow.  Repeat EKG 02/12/2024 still showing inferior ST elevations and lateral ST depressions. Troponins went from 686-->731-->683-->657-->624.    2/12/2024 case was discussed with Dr. Curtis and Dr. Hutson     02/12/2024. Mr. Lee was transferred to [facility] and admitted to [facility] on 02/12/2024 Cardiology was consulted for management of STEMI, and possible cardiac catheterization>  Persistent inferior ST elevations/lateral and anterior ST depressions remains chest pain-free    2/14/2024 [facility] cardiac catheterization showed moderate disease of SVG to OM, subtotal occlusion of SVG to RCA, significantly degenerated graft --> medical therapy decided upon     2/15/2024 1 unit PRBC  2/15/2024 Vasopressin weaned off, Levophed gtt continued  2/15/2024 Plavix, ASA, Lasix 40 mg IV TID, Topro XL l 25 mg QD held by Dr Curtis    2/16/2024 1 unit PRBC Bun/Cr 71/2.0, K+ 3.2, Na 149, albumin 3.3, WBC 31, Hgb 8.2>7.4    2/16/2024 Started on Proamatine 10 mg TID  2/16/2024 Levophed weaned off  2/16/2024 D5W @ 60/hr (per nephrology)    2/17/2024 Na 146, K+ 3.2, Bun/Cr 52/1.5, magnesium 2.0, Hgb 8.6, WBC 25.4, Plt 178, T Den 1.5, Albumin 3.0       SUBJECTIVE: Somewhat confused.   Denies chest pain or shortness of breath  OBJECTIVE: No apparent distress on high flow oxygen nasal cannula    ROS:  Consist: Denies fevers, chills or night sweats  Heart: Denies chest pain, palpitations, lightheadedness, dizziness or syncope  Lungs: Denies SOB, cough, wheezing, orthopnea or PND  GI: Denies abdominal pain, vomiting or diarrhea    PHYSICAL EXAM:   BP (!) 135/56   Pulse 78   Temp 98.2 °F (36.8 °C) (Oral)   Resp 24   Ht 1.727 m (5' 8\")   Wt 91.3 kg (201 lb 4.5 oz)   SpO2 100%   BMI 30.60 kg/m²    B/P Range last 24 hours: Systolic (24hrs), Av , Min:122 , Max:135    Diastolic (24hrs), Av, Min:56, Max:59    CONST: Well developed, ill appearing  male who appears of  stated age. Awake, alert and cooperative. No apparent distress  HEENT:   Head- Normocephalic, atraumatic   Eyes- Conjunctivae pink, anicteric  Throat- Oral mucosa pink and moist  Neck-  No stridor, trachea midline, no jugular venous distention. No carotid bruit  CHEST: Chest symmetrical and non-tender to palpation. No accessory muscle use or intercostal retractions  RESPIRATORY:  Lung sounds -clear anterior lung fields.  Diminished in bases  CARDIOVASCULAR:     Heart Ausculation- Regular rate and rhythm, no murmur. No s3, s4 or rub   PV: No lower extremity edema. No varicosities. Pedal pulses palpable, no clubbing or cyanosis. R femoral TLC and L femoral A-line  ABDOMEN: Soft, non-tender to light palpation. Bowel sounds present. No palpable masses; no abdominal bruit  MS: Good muscle strength and tone. No atrophy or abnormal movements.   : Reese catheter draining yellow urine  SKIN: Warm and dry no statis dermatitis or ulcers   NEURO / PSYCH: Somewhat confused.  Follows simple command.  No focal deficit. Flat affect      Intake/Output Summary (Last 24 hours) at 2024 1347  Last data filed at 2024 1300  Gross per 24 hour   Intake 2325.84 ml   Output 2915 ml   Net -589.16 ml       Weight:   Wt Readings from

## 2024-02-17 NOTE — PLAN OF CARE
Problem: Safety - Adult  Goal: Free from fall injury  Outcome: Progressing     Problem: Discharge Planning  Goal: Discharge to home or other facility with appropriate resources  Outcome: Progressing     Problem: Neurosensory - Adult  Goal: Achieves stable or improved neurological status  Outcome: Progressing  Goal: Absence of seizures  Outcome: Progressing  Goal: Remains free of injury related to seizures activity  Outcome: Progressing  Goal: Achieves maximal functionality and self care  Outcome: Progressing     Problem: Respiratory - Adult  Goal: Achieves optimal ventilation and oxygenation  Outcome: Progressing     Problem: Cardiovascular - Adult  Goal: Maintains optimal cardiac output and hemodynamic stability  Outcome: Progressing  Goal: Absence of cardiac dysrhythmias or at baseline  Outcome: Progressing     Problem: Skin/Tissue Integrity - Adult  Goal: Skin integrity remains intact  Outcome: Progressing  Goal: Incisions, wounds, or drain sites healing without S/S of infection  Outcome: Progressing  Goal: Oral mucous membranes remain intact  Outcome: Progressing

## 2024-02-17 NOTE — PROGRESS NOTES
Department of Internal Medicine  Nephrology Attending Progress Note      Events reviewed.       SUBJECTIVE: We are following Mr. Lee for MIGDALIA.  Reports no new complaints.     PHYSICAL EXAM:      Vitals:    VITALS:  BP (!) 135/56   Pulse 76   Temp 97.6 °F (36.4 °C) (Axillary)   Resp 21   Ht 1.727 m (5' 8\")   Wt 91.3 kg (201 lb 4.5 oz)   SpO2 96%   BMI 30.60 kg/m²   24HR INTAKE/OUTPUT:    Intake/Output Summary (Last 24 hours) at 2/17/2024 0940  Last data filed at 2/17/2024 0900  Gross per 24 hour   Intake 1729.67 ml   Output 3015 ml   Net -1285.33 ml         Constitutional: Patient is alert   HEENT: Pupils equal reactive, mucous membranes are dry  Respiratory: Lungs are coarse  Cardiovascular/Edema: Heart sounds are tachycardic  Gastrointestinal: Abdomen is soft  Neurologic: Patient lethargic, nonfocal  Skin: No lesions  Other: + edema    Scheduled Meds:   midodrine  10 mg Oral TID WC    polyethylene glycol  17 g Oral Daily    hydrocortisone sodium succinate PF  100 mg IntraVENous Q8H    heparin (porcine)  5,000 Units SubCUTAneous 3 times per day    [Held by provider] furosemide  40 mg IntraVENous TID    insulin lispro  0-4 Units SubCUTAneous Q4H    [Held by provider] aspirin  81 mg Oral Daily    sodium chloride flush  5-40 mL IntraVENous 2 times per day    pantoprazole (PROTONIX) 40 mg in sodium chloride (PF) 0.9 % 10 mL injection  40 mg IntraVENous Q12H    ipratropium 0.5 mg-albuterol 2.5 mg  1 Dose Inhalation Q4H WA RT    vitamin D  2,000 Units Oral Daily    ascorbic acid  500 mg Oral Daily    budesonide  0.5 mg Nebulization BID RT    arformoterol tartrate  15 mcg Nebulization BID RT    timolol  1 drop Both Eyes Daily    [Held by provider] metoprolol succinate  25 mg Oral Daily    [Held by provider] clopidogrel  75 mg Oral Daily     Continuous Infusions:   dextrose 60 mL/hr at 02/17/24 0812    sodium chloride      sodium chloride      dextrose      norepinephrine Stopped (02/16/24 1217)    sodium chloride  resolved:    Lactic acidosis, 2/2 septic shock  Hemodynamic shock, septic and hypovolemic, resolved, off vasopressors  Pneumonia, on ceftriaxone    IMPRESSION/RECOMMENDATIONS:      MIGDALIA stage I,  ischemic ATN due to profound hypotension due to hemodynamic shock and possibly contrast associated MIGDALIA.  Renal function relatively stable or improve, creatinine down to 1.5 mg/dL with excellent diuresis.    Hypernatremia, with hypervolemia (elevated proBNP, chest x-ray with pulmonary edema).  Urine sodium 103, urine osmolality 419.  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

## 2024-02-18 LAB
ALBUMIN SERPL-MCNC: 2.9 G/DL (ref 3.5–5.2)
ALP SERPL-CCNC: 56 U/L (ref 40–129)
ALT SERPL-CCNC: 20 U/L (ref 0–40)
ANION GAP SERPL CALCULATED.3IONS-SCNC: 11 MMOL/L (ref 7–16)
AST SERPL-CCNC: 21 U/L (ref 0–39)
BASOPHILS # BLD: 0.02 K/UL (ref 0–0.2)
BASOPHILS NFR BLD: 0 % (ref 0–2)
BILIRUB SERPL-MCNC: 1.3 MG/DL (ref 0–1.2)
BUN SERPL-MCNC: 39 MG/DL (ref 6–23)
CA-I BLD-SCNC: 1.09 MMOL/L (ref 1.15–1.33)
CALCIUM SERPL-MCNC: 7.6 MG/DL (ref 8.6–10.2)
CHLORIDE SERPL-SCNC: 108 MMOL/L (ref 98–107)
CO2 SERPL-SCNC: 24 MMOL/L (ref 22–29)
CREAT SERPL-MCNC: 1.3 MG/DL (ref 0.7–1.2)
EKG ATRIAL RATE: 57 BPM
EKG Q-T INTERVAL: 398 MS
EKG QRS DURATION: 112 MS
EKG QTC CALCULATION (BAZETT): 513 MS
EKG R AXIS: 33 DEGREES
EKG T AXIS: 57 DEGREES
EKG VENTRICULAR RATE: 100 BPM
EOSINOPHIL # BLD: 0.02 K/UL (ref 0.05–0.5)
EOSINOPHILS RELATIVE PERCENT: 0 % (ref 0–6)
ERYTHROCYTE [DISTWIDTH] IN BLOOD BY AUTOMATED COUNT: 17.8 % (ref 11.5–15)
GFR SERPL CREATININE-BSD FRML MDRD: 54 ML/MIN/1.73M2
GLUCOSE BLD-MCNC: 142 MG/DL (ref 74–99)
GLUCOSE BLD-MCNC: 146 MG/DL (ref 74–99)
GLUCOSE BLD-MCNC: 156 MG/DL (ref 74–99)
GLUCOSE BLD-MCNC: 163 MG/DL (ref 74–99)
GLUCOSE SERPL-MCNC: 140 MG/DL (ref 74–99)
HCT VFR BLD AUTO: 23.8 % (ref 37–54)
HCT VFR BLD AUTO: 24.5 % (ref 37–54)
HCT VFR BLD AUTO: 24.6 % (ref 37–54)
HGB BLD-MCNC: 7.5 G/DL (ref 12.5–16.5)
HGB BLD-MCNC: 7.7 G/DL (ref 12.5–16.5)
HGB BLD-MCNC: 7.8 G/DL (ref 12.5–16.5)
IMM GRANULOCYTES # BLD AUTO: 0.34 K/UL (ref 0–0.58)
IMM GRANULOCYTES NFR BLD: 2 % (ref 0–5)
LYMPHOCYTES NFR BLD: 0.79 K/UL (ref 1.5–4)
LYMPHOCYTES RELATIVE PERCENT: 4 % (ref 20–42)
MAGNESIUM SERPL-MCNC: 2.1 MG/DL (ref 1.6–2.6)
MCH RBC QN AUTO: 30.2 PG (ref 26–35)
MCHC RBC AUTO-ENTMCNC: 31.5 G/DL (ref 32–34.5)
MCV RBC AUTO: 96 FL (ref 80–99.9)
MONOCYTES NFR BLD: 0.78 K/UL (ref 0.1–0.95)
MONOCYTES NFR BLD: 4 % (ref 2–12)
NEUTROPHILS NFR BLD: 90 % (ref 43–80)
NEUTS SEG NFR BLD: 17.82 K/UL (ref 1.8–7.3)
PHOSPHATE SERPL-MCNC: 2.5 MG/DL (ref 2.5–4.5)
PLATELET # BLD AUTO: 161 K/UL (ref 130–450)
PMV BLD AUTO: 11.8 FL (ref 7–12)
POTASSIUM SERPL-SCNC: 3.2 MMOL/L (ref 3.5–5)
PROT SERPL-MCNC: 5.1 G/DL (ref 6.4–8.3)
RBC # BLD AUTO: 2.48 M/UL (ref 3.8–5.8)
SODIUM SERPL-SCNC: 143 MMOL/L (ref 132–146)
WBC OTHER # BLD: 19.8 K/UL (ref 4.5–11.5)

## 2024-02-18 PROCEDURE — 94660 CPAP INITIATION&MGMT: CPT

## 2024-02-18 PROCEDURE — 6370000000 HC RX 637 (ALT 250 FOR IP): Performed by: STUDENT IN AN ORGANIZED HEALTH CARE EDUCATION/TRAINING PROGRAM

## 2024-02-18 PROCEDURE — 6370000000 HC RX 637 (ALT 250 FOR IP): Performed by: FAMILY MEDICINE

## 2024-02-18 PROCEDURE — 99291 CRITICAL CARE FIRST HOUR: CPT | Performed by: STUDENT IN AN ORGANIZED HEALTH CARE EDUCATION/TRAINING PROGRAM

## 2024-02-18 PROCEDURE — 2700000000 HC OXYGEN THERAPY PER DAY

## 2024-02-18 PROCEDURE — 2580000003 HC RX 258: Performed by: INTERNAL MEDICINE

## 2024-02-18 PROCEDURE — 6370000000 HC RX 637 (ALT 250 FOR IP)

## 2024-02-18 PROCEDURE — 84100 ASSAY OF PHOSPHORUS: CPT

## 2024-02-18 PROCEDURE — 85018 HEMOGLOBIN: CPT

## 2024-02-18 PROCEDURE — 6360000002 HC RX W HCPCS: Performed by: STUDENT IN AN ORGANIZED HEALTH CARE EDUCATION/TRAINING PROGRAM

## 2024-02-18 PROCEDURE — 2580000003 HC RX 258: Performed by: FAMILY MEDICINE

## 2024-02-18 PROCEDURE — 2000000000 HC ICU R&B

## 2024-02-18 PROCEDURE — 37799 UNLISTED PX VASCULAR SURGERY: CPT

## 2024-02-18 PROCEDURE — 85025 COMPLETE CBC W/AUTO DIFF WBC: CPT

## 2024-02-18 PROCEDURE — 6360000002 HC RX W HCPCS: Performed by: FAMILY MEDICINE

## 2024-02-18 PROCEDURE — C9113 INJ PANTOPRAZOLE SODIUM, VIA: HCPCS | Performed by: FAMILY MEDICINE

## 2024-02-18 PROCEDURE — 82962 GLUCOSE BLOOD TEST: CPT

## 2024-02-18 PROCEDURE — 82330 ASSAY OF CALCIUM: CPT

## 2024-02-18 PROCEDURE — 94640 AIRWAY INHALATION TREATMENT: CPT

## 2024-02-18 PROCEDURE — A4216 STERILE WATER/SALINE, 10 ML: HCPCS | Performed by: FAMILY MEDICINE

## 2024-02-18 PROCEDURE — 99232 SBSQ HOSP IP/OBS MODERATE 35: CPT | Performed by: INTERNAL MEDICINE

## 2024-02-18 PROCEDURE — 6360000002 HC RX W HCPCS

## 2024-02-18 PROCEDURE — 6370000000 HC RX 637 (ALT 250 FOR IP): Performed by: INTERNAL MEDICINE

## 2024-02-18 PROCEDURE — 36415 COLL VENOUS BLD VENIPUNCTURE: CPT

## 2024-02-18 PROCEDURE — 85014 HEMATOCRIT: CPT

## 2024-02-18 PROCEDURE — 83735 ASSAY OF MAGNESIUM: CPT

## 2024-02-18 PROCEDURE — 80053 COMPREHEN METABOLIC PANEL: CPT

## 2024-02-18 RX ORDER — POTASSIUM CHLORIDE 20 MEQ/1
40 TABLET, EXTENDED RELEASE ORAL ONCE
Status: DISCONTINUED | OUTPATIENT
Start: 2024-02-18 | End: 2024-02-18

## 2024-02-18 RX ORDER — CALCIUM GLUCONATE 10 MG/ML
1000 INJECTION, SOLUTION INTRAVENOUS ONCE
Status: COMPLETED | OUTPATIENT
Start: 2024-02-18 | End: 2024-02-18

## 2024-02-18 RX ORDER — HYDROXYZINE HYDROCHLORIDE 10 MG/1
25 TABLET, FILM COATED ORAL EVERY 6 HOURS PRN
Status: DISCONTINUED | OUTPATIENT
Start: 2024-02-18 | End: 2024-02-20

## 2024-02-18 RX ORDER — CALCIUM GLUCONATE 10 MG/ML
1000 INJECTION, SOLUTION INTRAVENOUS ONCE
Status: DISCONTINUED | OUTPATIENT
Start: 2024-02-18 | End: 2024-02-21

## 2024-02-18 RX ORDER — DIVALPROEX SODIUM 125 MG/1
125 CAPSULE, COATED PELLETS ORAL EVERY 8 HOURS SCHEDULED
Status: DISCONTINUED | OUTPATIENT
Start: 2024-02-18 | End: 2024-03-01 | Stop reason: HOSPADM

## 2024-02-18 RX ADMIN — Medication 2000 UNITS: at 08:56

## 2024-02-18 RX ADMIN — HEPARIN SODIUM 5000 UNITS: 10000 INJECTION INTRAVENOUS; SUBCUTANEOUS at 06:16

## 2024-02-18 RX ADMIN — DIVALPROEX SODIUM 125 MG: 125 CAPSULE, COATED PELLETS ORAL at 20:57

## 2024-02-18 RX ADMIN — Medication 500 MG: at 20:57

## 2024-02-18 RX ADMIN — HYDROCORTISONE SODIUM SUCCINATE 100 MG: 100 INJECTION, POWDER, FOR SOLUTION INTRAMUSCULAR; INTRAVENOUS at 02:35

## 2024-02-18 RX ADMIN — ARFORMOTEROL TARTRATE 15 MCG: 15 SOLUTION RESPIRATORY (INHALATION) at 21:13

## 2024-02-18 RX ADMIN — ROSUVASTATIN CALCIUM 5 MG: 5 TABLET, COATED ORAL at 21:30

## 2024-02-18 RX ADMIN — HYDROCORTISONE SODIUM SUCCINATE 100 MG: 100 INJECTION, POWDER, FOR SOLUTION INTRAMUSCULAR; INTRAVENOUS at 17:40

## 2024-02-18 RX ADMIN — ARFORMOTEROL TARTRATE 15 MCG: 15 SOLUTION RESPIRATORY (INHALATION) at 08:43

## 2024-02-18 RX ADMIN — PANTOPRAZOLE SODIUM 40 MG: 40 INJECTION, POWDER, FOR SOLUTION INTRAVENOUS at 08:56

## 2024-02-18 RX ADMIN — IPRATROPIUM BROMIDE AND ALBUTEROL SULFATE 1 DOSE: 2.5; .5 SOLUTION RESPIRATORY (INHALATION) at 08:43

## 2024-02-18 RX ADMIN — BUDESONIDE INHALATION 500 MCG: 0.5 SUSPENSION RESPIRATORY (INHALATION) at 21:13

## 2024-02-18 RX ADMIN — TIMOLOL MALEATE 1 DROP: 5 SOLUTION OPHTHALMIC at 08:56

## 2024-02-18 RX ADMIN — METOPROLOL SUCCINATE 25 MG: 25 TABLET, EXTENDED RELEASE ORAL at 08:56

## 2024-02-18 RX ADMIN — ISOSORBIDE MONONITRATE 30 MG: 30 TABLET, EXTENDED RELEASE ORAL at 10:03

## 2024-02-18 RX ADMIN — BUDESONIDE INHALATION 500 MCG: 0.5 SUSPENSION RESPIRATORY (INHALATION) at 08:43

## 2024-02-18 RX ADMIN — ACETAMINOPHEN 1000 MG: 500 TABLET ORAL at 12:04

## 2024-02-18 RX ADMIN — HYDROXYZINE HYDROCHLORIDE 25 MG: 10 TABLET, FILM COATED ORAL at 09:01

## 2024-02-18 RX ADMIN — DEXTROSE MONOHYDRATE: 50 INJECTION, SOLUTION INTRAVENOUS at 19:13

## 2024-02-18 RX ADMIN — Medication 10 ML: at 21:30

## 2024-02-18 RX ADMIN — DIVALPROEX SODIUM 125 MG: 125 CAPSULE, COATED PELLETS ORAL at 14:52

## 2024-02-18 RX ADMIN — POLYETHYLENE GLYCOL 3350 17 G: 17 POWDER, FOR SOLUTION ORAL at 08:14

## 2024-02-18 RX ADMIN — CALCIUM GLUCONATE 1000 MG: 10 INJECTION, SOLUTION INTRAVENOUS at 08:43

## 2024-02-18 RX ADMIN — IPRATROPIUM BROMIDE AND ALBUTEROL SULFATE 1 DOSE: 2.5; .5 SOLUTION RESPIRATORY (INHALATION) at 21:13

## 2024-02-18 RX ADMIN — HEPARIN SODIUM 5000 UNITS: 10000 INJECTION INTRAVENOUS; SUBCUTANEOUS at 20:57

## 2024-02-18 RX ADMIN — Medication 10 ML: at 09:09

## 2024-02-18 RX ADMIN — Medication 500 MG: at 12:06

## 2024-02-18 RX ADMIN — HEPARIN SODIUM 5000 UNITS: 10000 INJECTION INTRAVENOUS; SUBCUTANEOUS at 13:39

## 2024-02-18 RX ADMIN — IPRATROPIUM BROMIDE AND ALBUTEROL SULFATE 1 DOSE: 2.5; .5 SOLUTION RESPIRATORY (INHALATION) at 13:47

## 2024-02-18 RX ADMIN — IPRATROPIUM BROMIDE AND ALBUTEROL SULFATE 1 DOSE: 2.5; .5 SOLUTION RESPIRATORY (INHALATION) at 16:35

## 2024-02-18 RX ADMIN — POTASSIUM BICARBONATE 40 MEQ: 782 TABLET, EFFERVESCENT ORAL at 09:01

## 2024-02-18 RX ADMIN — Medication 500 MG: at 08:56

## 2024-02-18 RX ADMIN — HYDROCORTISONE SODIUM SUCCINATE 100 MG: 100 INJECTION, POWDER, FOR SOLUTION INTRAMUSCULAR; INTRAVENOUS at 10:03

## 2024-02-18 RX ADMIN — ACETAMINOPHEN 1000 MG: 500 TABLET ORAL at 02:35

## 2024-02-18 RX ADMIN — PANTOPRAZOLE SODIUM 40 MG: 40 INJECTION, POWDER, FOR SOLUTION INTRAVENOUS at 20:57

## 2024-02-18 ASSESSMENT — PAIN SCALES - GENERAL
PAINLEVEL_OUTOF10: 0
PAINLEVEL_OUTOF10: 3
PAINLEVEL_OUTOF10: 0

## 2024-02-18 ASSESSMENT — PAIN DESCRIPTION - DESCRIPTORS: DESCRIPTORS: ACHING;DISCOMFORT

## 2024-02-18 ASSESSMENT — PAIN DESCRIPTION - LOCATION: LOCATION: GENERALIZED

## 2024-02-18 ASSESSMENT — PAIN DESCRIPTION - PAIN TYPE: TYPE: ACUTE PAIN

## 2024-02-18 NOTE — PLAN OF CARE
Problem: Safety - Adult  Goal: Free from fall injury  2/18/2024 0809 by Staci Renee RN  Outcome: Progressing  2/18/2024 0311 by Ginger Franklin RN  Outcome: Progressing     Problem: Discharge Planning  Goal: Discharge to home or other facility with appropriate resources  Outcome: Progressing     Problem: Neurosensory - Adult  Goal: Achieves stable or improved neurological status  Outcome: Progressing  Goal: Absence of seizures  Outcome: Progressing  Goal: Remains free of injury related to seizures activity  Outcome: Progressing  Goal: Achieves maximal functionality and self care  Outcome: Progressing     Problem: Respiratory - Adult  Goal: Achieves optimal ventilation and oxygenation  Outcome: Progressing     Problem: Cardiovascular - Adult  Goal: Maintains optimal cardiac output and hemodynamic stability  2/18/2024 0809 by Staci Renee RN  Outcome: Progressing  2/18/2024 0311 by Ginger Franklin RN  Outcome: Progressing  Goal: Absence of cardiac dysrhythmias or at baseline  2/18/2024 0809 by Staci Renee RN  Outcome: Progressing  2/18/2024 0311 by Ginger Franklin RN  Outcome: Progressing     Problem: Skin/Tissue Integrity - Adult  Goal: Skin integrity remains intact  2/18/2024 0809 by Staci Renee RN  Outcome: Progressing  2/18/2024 0311 by Ginger Franklin RN  Outcome: Progressing  Goal: Incisions, wounds, or drain sites healing without S/S of infection  Outcome: Progressing  Goal: Oral mucous membranes remain intact  Outcome: Progressing     Problem: Musculoskeletal - Adult  Goal: Return mobility to safest level of function  Outcome: Progressing  Goal: Maintain proper alignment of affected body part  Outcome: Progressing  Goal: Return ADL status to a safe level of function  Outcome: Progressing     Problem: Gastrointestinal - Adult  Goal: Minimal or absence of nausea and vomiting  Outcome: Progressing  Goal: Maintains or returns to baseline bowel function  Outcome: Progressing  Goal: Maintains  adequate nutritional intake  Outcome: Progressing  Goal: Establish and maintain optimal ostomy function  Outcome: Progressing     Problem: Genitourinary - Adult  Goal: Absence of urinary retention  Outcome: Progressing  Goal: Urinary catheter remains patent  Outcome: Progressing     Problem: Infection - Adult  Goal: Absence of infection at discharge  Outcome: Progressing  Goal: Absence of infection during hospitalization  Outcome: Progressing  Goal: Absence of fever/infection during anticipated neutropenic period  Outcome: Progressing     Problem: Metabolic/Fluid and Electrolytes - Adult  Goal: Electrolytes maintained within normal limits  Outcome: Progressing  Goal: Hemodynamic stability and optimal renal function maintained  Outcome: Progressing  Goal: Glucose maintained within prescribed range  Outcome: Progressing     Problem: Hematologic - Adult  Goal: Maintains hematologic stability  Outcome: Progressing     Problem: ABCDS Injury Assessment  Goal: Absence of physical injury  Outcome: Progressing     Problem: Skin/Tissue Integrity  Goal: Absence of new skin breakdown  Description: 1.  Monitor for areas of redness and/or skin breakdown  2.  Assess vascular access sites hourly  3.  Every 4-6 hours minimum:  Change oxygen saturation probe site  4.  Every 4-6 hours:  If on nasal continuous positive airway pressure, respiratory therapy assess nares and determine need for appliance change or resting period.  Outcome: Progressing     Problem: Chronic Conditions and Co-morbidities  Goal: Patient's chronic conditions and co-morbidity symptoms are monitored and maintained or improved  Outcome: Progressing     Problem: Pain  Goal: Verbalizes/displays adequate comfort level or baseline comfort level  Outcome: Progressing

## 2024-02-18 NOTE — PROGRESS NOTES
Department of Internal Medicine  Nephrology Attending Progress Note      Events reviewed.       SUBJECTIVE: We are following Mr. Lee for MIGDALIA.  Reports no new complaints.     PHYSICAL EXAM:      Vitals:    VITALS:  /69   Pulse 78   Temp 98.2 °F (36.8 °C) (Oral)   Resp 24   Ht 1.727 m (5' 8\")   Wt 91.4 kg (201 lb 8 oz)   SpO2 97%   BMI 30.64 kg/m²   24HR INTAKE/OUTPUT:    Intake/Output Summary (Last 24 hours) at 2/18/2024 1014  Last data filed at 2/18/2024 1000  Gross per 24 hour   Intake 1709.1 ml   Output 2025 ml   Net -315.9 ml         Constitutional: Patient is alert   HEENT: Pupils equal reactive, mucous membranes are dry  Respiratory: Lungs are coarse  Cardiovascular/Edema: Heart sounds are tachycardic  Gastrointestinal: Abdomen is soft  Neurologic: Patient lethargic, nonfocal  Skin: No lesions  Other: + edema    Scheduled Meds:   potassium & sodium phosphates  2 packet Oral 4x Daily    calcium gluconate  1,000 mg IntraVENous Once    isosorbide mononitrate  30 mg Oral Daily    rosuvastatin  5 mg Oral Nightly    polyethylene glycol  17 g Oral Daily    hydrocortisone sodium succinate PF  100 mg IntraVENous Q8H    heparin (porcine)  5,000 Units SubCUTAneous 3 times per day    insulin lispro  0-4 Units SubCUTAneous Q4H    [Held by provider] aspirin  81 mg Oral Daily    sodium chloride flush  5-40 mL IntraVENous 2 times per day    pantoprazole (PROTONIX) 40 mg in sodium chloride (PF) 0.9 % 10 mL injection  40 mg IntraVENous Q12H    ipratropium 0.5 mg-albuterol 2.5 mg  1 Dose Inhalation Q4H WA RT    vitamin D  2,000 Units Oral Daily    ascorbic acid  500 mg Oral Daily    budesonide  0.5 mg Nebulization BID RT    arformoterol tartrate  15 mcg Nebulization BID RT    timolol  1 drop Both Eyes Daily    metoprolol succinate  25 mg Oral Daily    [Held by provider] clopidogrel  75 mg Oral Daily     Continuous Infusions:   dextrose 60 mL/hr at 02/18/24 0633    sodium chloride      sodium chloride       dextrose      norepinephrine Stopped (02/16/24 1217)    sodium chloride       PRN Meds:.hydrOXYzine HCl, sodium chloride, sodium chloride, glucose, dextrose bolus **OR** dextrose bolus, glucagon (rDNA), dextrose, acetaminophen **OR** acetaminophen, ondansetron **OR** ondansetron, sodium chloride flush, sodium chloride, hydrALAZINE      DATA:    CBC:   Lab Results   Component Value Date/Time    WBC 19.8 02/18/2024 04:10 AM    RBC 2.48 02/18/2024 04:10 AM    HGB 7.5 02/18/2024 04:10 AM    HCT 23.8 02/18/2024 04:10 AM    MCV 96.0 02/18/2024 04:10 AM    MCH 30.2 02/18/2024 04:10 AM    MCHC 31.5 02/18/2024 04:10 AM    RDW 17.8 02/18/2024 04:10 AM     02/18/2024 04:10 AM    MPV 11.8 02/18/2024 04:10 AM     CMP:    Lab Results   Component Value Date/Time     02/18/2024 04:10 AM    K 3.2 02/18/2024 04:10 AM     02/18/2024 04:10 AM    CO2 24 02/18/2024 04:10 AM    BUN 39 02/18/2024 04:10 AM    CREATININE 1.3 02/18/2024 04:10 AM    GFRAA >60 06/18/2020 09:49 AM    LABGLOM 54 02/18/2024 04:10 AM    GLUCOSE 140 02/18/2024 04:10 AM    PROT 5.1 02/18/2024 04:10 AM    LABALBU 2.9 02/18/2024 04:10 AM    CALCIUM 7.6 02/18/2024 04:10 AM    BILITOT 1.3 02/18/2024 04:10 AM    ALKPHOS 56 02/18/2024 04:10 AM    AST 21 02/18/2024 04:10 AM    ALT 20 02/18/2024 04:10 AM     Magnesium:    Lab Results   Component Value Date/Time    MG 2.1 02/18/2024 04:10 AM     Phosphorus:    Lab Results   Component Value Date/Time    PHOS 2.5 02/18/2024 04:10 AM     Radiology Review:      CT ABDOMEN PELVIS W IV CONTRAST Additional Contrast? None 2/7/24    IMPRESSION:  No evidence for pulmonary artery embolism to the lobar level.  Evaluation  beyond this is nondiagnostic due to respiratory motion artifact.  Would  advise close clinical follow-up or could consider repeat examination.     Interstitial/ground-glass opacities in the lower left lung, of questionable  significance but might reflect infectious/inflammatory process.

## 2024-02-18 NOTE — PROGRESS NOTES
Hospitalist Progress Note      PCP: Gilson Ramon III, DO    Date of Admission: 2/12/2024        Hospital Course:  83 y.o. male presented with STEMI, , HE DOES NOT KNOW WHY HE IS HERE OR WHAT HAPPENED.    HE WAS INITIALLY ADMITTED TO [facility], , HIS TT WAS ELEVATED 1338 .      2/16  STILL ON LEVOPHED.   TRYING TO WEAN AIRVO         Subjective:    ASLEEP          Medications:  Reviewed    Infusion Medications    dextrose 60 mL/hr at 02/18/24 0633    sodium chloride      sodium chloride      dextrose      norepinephrine Stopped (02/16/24 1217)    sodium chloride       Scheduled Medications    potassium & sodium phosphates  2 packet Oral 4x Daily    calcium gluconate  1,000 mg IntraVENous Once    isosorbide mononitrate  30 mg Oral Daily    rosuvastatin  5 mg Oral Nightly    polyethylene glycol  17 g Oral Daily    hydrocortisone sodium succinate PF  100 mg IntraVENous Q8H    heparin (porcine)  5,000 Units SubCUTAneous 3 times per day    insulin lispro  0-4 Units SubCUTAneous Q4H    [Held by provider] aspirin  81 mg Oral Daily    sodium chloride flush  5-40 mL IntraVENous 2 times per day    pantoprazole (PROTONIX) 40 mg in sodium chloride (PF) 0.9 % 10 mL injection  40 mg IntraVENous Q12H    ipratropium 0.5 mg-albuterol 2.5 mg  1 Dose Inhalation Q4H WA RT    vitamin D  2,000 Units Oral Daily    ascorbic acid  500 mg Oral Daily    budesonide  0.5 mg Nebulization BID RT    arformoterol tartrate  15 mcg Nebulization BID RT    timolol  1 drop Both Eyes Daily    metoprolol succinate  25 mg Oral Daily    [Held by provider] clopidogrel  75 mg Oral Daily     PRN Meds: hydrOXYzine HCl, sodium chloride, sodium chloride, glucose, dextrose bolus **OR** dextrose bolus, glucagon (rDNA), dextrose, acetaminophen **OR** acetaminophen, ondansetron **OR** ondansetron, sodium chloride flush, sodium chloride, hydrALAZINE      Intake/Output Summary (Last 24 hours) at 2/18/2024 1055  Last data filed at 2/18/2024 1000  Gross per 24

## 2024-02-18 NOTE — PROGRESS NOTES
arteries.  Known CAD with history of CABG LIMA-LAD, V-OM, V-RCA 2009 ( with bioprosthetic AVR )  Aortic valve disease status post AVR with bioprosthesis at the time of CABG in 2009  3.8 cm infrarenal abdominal aortic aneurysm  History of postoperative paroxysmal AF in 2009  Episodes of bradycardia/junctional rhythm/AIVR: Resolved, BB resumed 02/13/2024  Septic shock, off pressors.  COVID-19?  Rapid positive, viral panel negative.  Strep pneumonia positive.  Leukocytosis. Lactic acidosis, no repeat level since 2/7/2024. Elevated CRP, no repeat level since 2/11/2024. Elevated procalcitonin, no repeat since 2/7/2024.  Is not on antibiotics currently  Hypotension, resolved.  History of hypertension  Acute hypoxic respiratory failure high flow nasal cannula  MIGDALIA creatinine 2.5-2.4 baseline 1.2-1.5.  Creatinine 1.3 today  Anemia requiring transfusions this admission  Hypernatremia, resolved  Persistent hypokalemia, being supplemented  Mild hyperbilirubinemia, bilirubin trending down ( 1.3 today )  Hypoalbuminemia  H/o intolerance to simvastatin        PLAN:  Supplement potassium, done  Monitor BMP/renal function  Increase Toprol-XL.  Monitor heart rate  Monitor blood pressure.  Consider increasing Imdur   Monitor H&H, consider transfusing to keep hemoglobin =/> 8.0 (in the context of coronary artery disease/acute coronary syndrome)  Consider resuming aspirin and/or Plavix or both, will discuss with surgery  Continue Crestor  Rest as per the primary service and other consultants  Will continue to follow      I spent 35 minutes completing this encounter. Total time included the following:  Independently interviewing the patient (HPI, ROS, PMH, PSH, FMH, SH, allergies and medications).  Independently performing a medical appropriate examination  Ordering medications, tests and/or procedures  Formulating the assessment/plan and reviewing the rationale for the above recommendations  Reviewing available records, results of all  previously ordered testing/procedures and current problem list  Counseling/educating the patient  Coordinating care with other healthcare professionals  Documenting clinical information in the patient's electronic health records     Electronically signed by Arnoldo Brown MD on 2/18/2024 at 11:37 AM

## 2024-02-18 NOTE — PLAN OF CARE
Problem: Safety - Adult  Goal: Free from fall injury  Outcome: Progressing     Problem: Cardiovascular - Adult  Goal: Maintains optimal cardiac output and hemodynamic stability  Outcome: Progressing  Goal: Absence of cardiac dysrhythmias or at baseline  Outcome: Progressing     Problem: Skin/Tissue Integrity - Adult  Goal: Skin integrity remains intact  Outcome: Progressing

## 2024-02-19 ENCOUNTER — APPOINTMENT (OUTPATIENT)
Dept: GENERAL RADIOLOGY | Age: 84
End: 2024-02-19
Attending: INTERNAL MEDICINE
Payer: MEDICARE

## 2024-02-19 LAB
AADO2: 304.1 MMHG
ALBUMIN SERPL-MCNC: 3.2 G/DL (ref 3.5–5.2)
ALP SERPL-CCNC: 64 U/L (ref 40–129)
ALT SERPL-CCNC: 20 U/L (ref 0–40)
ANION GAP SERPL CALCULATED.3IONS-SCNC: 12 MMOL/L (ref 7–16)
AST SERPL-CCNC: 22 U/L (ref 0–39)
B.E.: -0.1 MMOL/L (ref -3–3)
BASOPHILS # BLD: 0.02 K/UL (ref 0–0.2)
BASOPHILS NFR BLD: 0 % (ref 0–2)
BILIRUB SERPL-MCNC: 1.5 MG/DL (ref 0–1.2)
BUN SERPL-MCNC: 32 MG/DL (ref 6–23)
CA-I BLD-SCNC: 1.07 MMOL/L (ref 1.15–1.33)
CALCIUM SERPL-MCNC: 7.5 MG/DL (ref 8.6–10.2)
CHLORIDE SERPL-SCNC: 109 MMOL/L (ref 98–107)
CO2 SERPL-SCNC: 23 MMOL/L (ref 22–29)
COHB: 0.8 % (ref 0–1.5)
CREAT SERPL-MCNC: 1.1 MG/DL (ref 0.7–1.2)
CRITICAL: ABNORMAL
DATE ANALYZED: ABNORMAL
DATE OF COLLECTION: ABNORMAL
EOSINOPHIL # BLD: 0.02 K/UL (ref 0.05–0.5)
EOSINOPHILS RELATIVE PERCENT: 0 % (ref 0–6)
ERYTHROCYTE [DISTWIDTH] IN BLOOD BY AUTOMATED COUNT: 18.3 % (ref 11.5–15)
FIO2: 60 %
GFR SERPL CREATININE-BSD FRML MDRD: >60 ML/MIN/1.73M2
GLUCOSE BLD-MCNC: 121 MG/DL (ref 74–99)
GLUCOSE BLD-MCNC: 129 MG/DL (ref 74–99)
GLUCOSE BLD-MCNC: 131 MG/DL (ref 74–99)
GLUCOSE BLD-MCNC: 132 MG/DL (ref 74–99)
GLUCOSE BLD-MCNC: 135 MG/DL (ref 74–99)
GLUCOSE BLD-MCNC: 147 MG/DL (ref 74–99)
GLUCOSE BLD-MCNC: 99 MG/DL (ref 74–99)
GLUCOSE SERPL-MCNC: 126 MG/DL (ref 74–99)
HCO3: 22.3 MMOL/L (ref 22–26)
HCT VFR BLD AUTO: 23.5 % (ref 37–54)
HCT VFR BLD AUTO: 28.6 % (ref 37–54)
HGB BLD-MCNC: 7.6 G/DL (ref 12.5–16.5)
HGB BLD-MCNC: 9 G/DL (ref 12.5–16.5)
HHB: 4.2 % (ref 0–5)
IMM GRANULOCYTES # BLD AUTO: 0.33 K/UL (ref 0–0.58)
IMM GRANULOCYTES NFR BLD: 2 % (ref 0–5)
LAB: ABNORMAL
LYMPHOCYTES NFR BLD: 0.66 K/UL (ref 1.5–4)
LYMPHOCYTES RELATIVE PERCENT: 4 % (ref 20–42)
Lab: 520
MAGNESIUM SERPL-MCNC: 2 MG/DL (ref 1.6–2.6)
MCH RBC QN AUTO: 31 PG (ref 26–35)
MCHC RBC AUTO-ENTMCNC: 32.3 G/DL (ref 32–34.5)
MCV RBC AUTO: 95.9 FL (ref 80–99.9)
METHB: 0.5 % (ref 0–1.5)
MODE: ABNORMAL
MONOCYTES NFR BLD: 0.74 K/UL (ref 0.1–0.95)
MONOCYTES NFR BLD: 4 % (ref 2–12)
NEUTROPHILS NFR BLD: 90 % (ref 43–80)
NEUTS SEG NFR BLD: 15.81 K/UL (ref 1.8–7.3)
O2 SATURATION: 95.7 % (ref 92–98.5)
O2HB: 94.5 % (ref 94–97)
OPERATOR ID: ABNORMAL
PATIENT TEMP: 37 C
PCO2: 27.9 MMHG (ref 35–45)
PFO2: 1.3 MMHG/%
PH BLOOD GAS: 7.52 (ref 7.35–7.45)
PHOSPHATE SERPL-MCNC: 3 MG/DL (ref 2.5–4.5)
PLATELET # BLD AUTO: 175 K/UL (ref 130–450)
PMV BLD AUTO: 11.5 FL (ref 7–12)
PO2: 78 MMHG (ref 75–100)
POTASSIUM SERPL-SCNC: 3.5 MMOL/L (ref 3.5–5)
PROT SERPL-MCNC: 5.6 G/DL (ref 6.4–8.3)
RBC # BLD AUTO: 2.45 M/UL (ref 3.8–5.8)
RI(T): 3.9
SODIUM SERPL-SCNC: 144 MMOL/L (ref 132–146)
SOURCE, BLOOD GAS: ABNORMAL
THB: 8.7 G/DL (ref 11.5–16.5)
TIME ANALYZED: 529
WBC OTHER # BLD: 17.6 K/UL (ref 4.5–11.5)

## 2024-02-19 PROCEDURE — P9016 RBC LEUKOCYTES REDUCED: HCPCS

## 2024-02-19 PROCEDURE — 86900 BLOOD TYPING SEROLOGIC ABO: CPT

## 2024-02-19 PROCEDURE — 85014 HEMATOCRIT: CPT

## 2024-02-19 PROCEDURE — 2580000003 HC RX 258

## 2024-02-19 PROCEDURE — 2580000003 HC RX 258: Performed by: FAMILY MEDICINE

## 2024-02-19 PROCEDURE — 6370000000 HC RX 637 (ALT 250 FOR IP): Performed by: INTERNAL MEDICINE

## 2024-02-19 PROCEDURE — 6360000002 HC RX W HCPCS

## 2024-02-19 PROCEDURE — 97530 THERAPEUTIC ACTIVITIES: CPT

## 2024-02-19 PROCEDURE — 83735 ASSAY OF MAGNESIUM: CPT

## 2024-02-19 PROCEDURE — 6370000000 HC RX 637 (ALT 250 FOR IP): Performed by: STUDENT IN AN ORGANIZED HEALTH CARE EDUCATION/TRAINING PROGRAM

## 2024-02-19 PROCEDURE — 97166 OT EVAL MOD COMPLEX 45 MIN: CPT

## 2024-02-19 PROCEDURE — 82330 ASSAY OF CALCIUM: CPT

## 2024-02-19 PROCEDURE — 99291 CRITICAL CARE FIRST HOUR: CPT | Performed by: SURGERY

## 2024-02-19 PROCEDURE — 86901 BLOOD TYPING SEROLOGIC RH(D): CPT

## 2024-02-19 PROCEDURE — 6360000002 HC RX W HCPCS: Performed by: STUDENT IN AN ORGANIZED HEALTH CARE EDUCATION/TRAINING PROGRAM

## 2024-02-19 PROCEDURE — 36430 TRANSFUSION BLD/BLD COMPNT: CPT

## 2024-02-19 PROCEDURE — 99232 SBSQ HOSP IP/OBS MODERATE 35: CPT | Performed by: INTERNAL MEDICINE

## 2024-02-19 PROCEDURE — 2580000003 HC RX 258: Performed by: INTERNAL MEDICINE

## 2024-02-19 PROCEDURE — 82805 BLOOD GASES W/O2 SATURATION: CPT

## 2024-02-19 PROCEDURE — 6360000002 HC RX W HCPCS: Performed by: FAMILY MEDICINE

## 2024-02-19 PROCEDURE — C9113 INJ PANTOPRAZOLE SODIUM, VIA: HCPCS | Performed by: FAMILY MEDICINE

## 2024-02-19 PROCEDURE — 2700000000 HC OXYGEN THERAPY PER DAY

## 2024-02-19 PROCEDURE — 86923 COMPATIBILITY TEST ELECTRIC: CPT

## 2024-02-19 PROCEDURE — 94660 CPAP INITIATION&MGMT: CPT

## 2024-02-19 PROCEDURE — 84100 ASSAY OF PHOSPHORUS: CPT

## 2024-02-19 PROCEDURE — 94640 AIRWAY INHALATION TREATMENT: CPT

## 2024-02-19 PROCEDURE — 85025 COMPLETE CBC W/AUTO DIFF WBC: CPT

## 2024-02-19 PROCEDURE — 82962 GLUCOSE BLOOD TEST: CPT

## 2024-02-19 PROCEDURE — 80053 COMPREHEN METABOLIC PANEL: CPT

## 2024-02-19 PROCEDURE — 6370000000 HC RX 637 (ALT 250 FOR IP): Performed by: FAMILY MEDICINE

## 2024-02-19 PROCEDURE — 37799 UNLISTED PX VASCULAR SURGERY: CPT

## 2024-02-19 PROCEDURE — A4216 STERILE WATER/SALINE, 10 ML: HCPCS | Performed by: FAMILY MEDICINE

## 2024-02-19 PROCEDURE — 86850 RBC ANTIBODY SCREEN: CPT

## 2024-02-19 PROCEDURE — 74018 RADEX ABDOMEN 1 VIEW: CPT

## 2024-02-19 PROCEDURE — 2000000000 HC ICU R&B

## 2024-02-19 PROCEDURE — 97162 PT EVAL MOD COMPLEX 30 MIN: CPT

## 2024-02-19 PROCEDURE — 85018 HEMOGLOBIN: CPT

## 2024-02-19 RX ORDER — PROCHLORPERAZINE EDISYLATE 5 MG/ML
10 INJECTION INTRAMUSCULAR; INTRAVENOUS EVERY 6 HOURS PRN
Status: DISCONTINUED | OUTPATIENT
Start: 2024-02-19 | End: 2024-03-01 | Stop reason: HOSPADM

## 2024-02-19 RX ORDER — PANTOPRAZOLE SODIUM 40 MG/1
40 TABLET, DELAYED RELEASE ORAL DAILY
Status: DISCONTINUED | OUTPATIENT
Start: 2024-02-20 | End: 2024-03-01 | Stop reason: HOSPADM

## 2024-02-19 RX ORDER — SODIUM CHLORIDE 9 MG/ML
INJECTION, SOLUTION INTRAVENOUS PRN
Status: DISCONTINUED | OUTPATIENT
Start: 2024-02-19 | End: 2024-03-01 | Stop reason: HOSPADM

## 2024-02-19 RX ORDER — WATER 10 ML/10ML
INJECTION INTRAMUSCULAR; INTRAVENOUS; SUBCUTANEOUS
Status: COMPLETED
Start: 2024-02-19 | End: 2024-02-19

## 2024-02-19 RX ORDER — FUROSEMIDE 10 MG/ML
40 INJECTION INTRAMUSCULAR; INTRAVENOUS ONCE
Status: COMPLETED | OUTPATIENT
Start: 2024-02-19 | End: 2024-02-19

## 2024-02-19 RX ORDER — FUROSEMIDE 10 MG/ML
40 INJECTION INTRAMUSCULAR; INTRAVENOUS ONCE
Status: DISCONTINUED | OUTPATIENT
Start: 2024-02-19 | End: 2024-02-19

## 2024-02-19 RX ORDER — POTASSIUM CHLORIDE 20 MEQ/1
40 TABLET, EXTENDED RELEASE ORAL ONCE
Status: COMPLETED | OUTPATIENT
Start: 2024-02-19 | End: 2024-02-19

## 2024-02-19 RX ADMIN — Medication 10 ML: at 20:36

## 2024-02-19 RX ADMIN — ONDANSETRON 4 MG: 2 INJECTION INTRAMUSCULAR; INTRAVENOUS at 11:32

## 2024-02-19 RX ADMIN — ARFORMOTEROL TARTRATE 15 MCG: 15 SOLUTION RESPIRATORY (INHALATION) at 20:33

## 2024-02-19 RX ADMIN — ROSUVASTATIN CALCIUM 5 MG: 5 TABLET, COATED ORAL at 20:36

## 2024-02-19 RX ADMIN — TIMOLOL MALEATE 1 DROP: 5 SOLUTION OPHTHALMIC at 09:02

## 2024-02-19 RX ADMIN — Medication 10 ML: at 09:02

## 2024-02-19 RX ADMIN — FUROSEMIDE 40 MG: 10 INJECTION, SOLUTION INTRAMUSCULAR; INTRAVENOUS at 18:48

## 2024-02-19 RX ADMIN — HYDROCORTISONE SODIUM SUCCINATE 100 MG: 100 INJECTION, POWDER, FOR SOLUTION INTRAMUSCULAR; INTRAVENOUS at 10:09

## 2024-02-19 RX ADMIN — DIVALPROEX SODIUM 125 MG: 125 CAPSULE, COATED PELLETS ORAL at 21:59

## 2024-02-19 RX ADMIN — Medication 2000 UNITS: at 09:02

## 2024-02-19 RX ADMIN — HEPARIN SODIUM 5000 UNITS: 10000 INJECTION INTRAVENOUS; SUBCUTANEOUS at 14:24

## 2024-02-19 RX ADMIN — CALCIUM GLUCONATE 2000 MG: 98 INJECTION, SOLUTION INTRAVENOUS at 14:27

## 2024-02-19 RX ADMIN — ASPIRIN 81 MG: 81 TABLET, CHEWABLE ORAL at 09:01

## 2024-02-19 RX ADMIN — ISOSORBIDE MONONITRATE 30 MG: 30 TABLET, EXTENDED RELEASE ORAL at 09:01

## 2024-02-19 RX ADMIN — Medication 500 MG: at 09:01

## 2024-02-19 RX ADMIN — DEXTROSE MONOHYDRATE: 50 INJECTION, SOLUTION INTRAVENOUS at 11:57

## 2024-02-19 RX ADMIN — ARFORMOTEROL TARTRATE 15 MCG: 15 SOLUTION RESPIRATORY (INHALATION) at 07:53

## 2024-02-19 RX ADMIN — DIVALPROEX SODIUM 125 MG: 125 CAPSULE, COATED PELLETS ORAL at 05:59

## 2024-02-19 RX ADMIN — BUDESONIDE INHALATION 500 MCG: 0.5 SUSPENSION RESPIRATORY (INHALATION) at 07:53

## 2024-02-19 RX ADMIN — HEPARIN SODIUM 5000 UNITS: 10000 INJECTION INTRAVENOUS; SUBCUTANEOUS at 22:01

## 2024-02-19 RX ADMIN — DIVALPROEX SODIUM 125 MG: 125 CAPSULE, COATED PELLETS ORAL at 14:25

## 2024-02-19 RX ADMIN — POTASSIUM CHLORIDE 40 MEQ: 1500 TABLET, EXTENDED RELEASE ORAL at 10:09

## 2024-02-19 RX ADMIN — IPRATROPIUM BROMIDE AND ALBUTEROL SULFATE 1 DOSE: 2.5; .5 SOLUTION RESPIRATORY (INHALATION) at 07:54

## 2024-02-19 RX ADMIN — IPRATROPIUM BROMIDE AND ALBUTEROL SULFATE 1 DOSE: 2.5; .5 SOLUTION RESPIRATORY (INHALATION) at 20:33

## 2024-02-19 RX ADMIN — POLYETHYLENE GLYCOL 3350 17 G: 17 POWDER, FOR SOLUTION ORAL at 09:01

## 2024-02-19 RX ADMIN — PANTOPRAZOLE SODIUM 40 MG: 40 INJECTION, POWDER, FOR SOLUTION INTRAVENOUS at 09:01

## 2024-02-19 RX ADMIN — BUDESONIDE INHALATION 500 MCG: 0.5 SUSPENSION RESPIRATORY (INHALATION) at 20:33

## 2024-02-19 RX ADMIN — HEPARIN SODIUM 5000 UNITS: 10000 INJECTION INTRAVENOUS; SUBCUTANEOUS at 05:59

## 2024-02-19 RX ADMIN — METOPROLOL SUCCINATE 25 MG: 25 TABLET, EXTENDED RELEASE ORAL at 09:01

## 2024-02-19 RX ADMIN — IPRATROPIUM BROMIDE AND ALBUTEROL SULFATE 1 DOSE: 2.5; .5 SOLUTION RESPIRATORY (INHALATION) at 11:23

## 2024-02-19 RX ADMIN — HYDROCORTISONE SODIUM SUCCINATE 100 MG: 100 INJECTION, POWDER, FOR SOLUTION INTRAMUSCULAR; INTRAVENOUS at 02:21

## 2024-02-19 RX ADMIN — WATER 10 ML: 1 INJECTION INTRAMUSCULAR; INTRAVENOUS; SUBCUTANEOUS at 23:49

## 2024-02-19 RX ADMIN — IPRATROPIUM BROMIDE AND ALBUTEROL SULFATE 1 DOSE: 2.5; .5 SOLUTION RESPIRATORY (INHALATION) at 16:17

## 2024-02-19 RX ADMIN — ONDANSETRON 4 MG: 2 INJECTION INTRAMUSCULAR; INTRAVENOUS at 23:42

## 2024-02-19 RX ADMIN — HYDROCORTISONE SODIUM SUCCINATE 100 MG: 100 INJECTION, POWDER, FOR SOLUTION INTRAMUSCULAR; INTRAVENOUS at 23:49

## 2024-02-19 ASSESSMENT — PAIN SCALES - GENERAL: PAINLEVEL_OUTOF10: 0

## 2024-02-19 NOTE — PROGRESS NOTES
Physical Therapy  Physical Therapy Initial Assessment     Name: Shiv Lee  : 1940  MRN: [id number]      Date of Service: 2024    Evaluating PT:  Rancho Ramos, PT, DPT [id number]    Room #:  3822/3822-A  Diagnosis:  STEMI (ST elevation myocardial infarction) (HCC) [I21.3]  PMHx/PSHx:    Past Medical History:   Diagnosis Date    Abnormal EKG     Aortic stenosis     Atrial fibrillation (HCC)     Postop    Coronary artery disease     HTN (hypertension)     Left atrial dilatation     Mild    Mitral regurgitation     Trace    SBE (subacute bacterial endocarditis) prophylaxis candidate      Procedure/Surgery:   cardiac cath  Precautions:  Falls, Airvo, sitter, cognition, Zuni  Equipment Needs:  TBD    SUBJECTIVE:    Pt lives with wife in a raised ranch home with 13 step(s) to enter and 1 rail(s).      Pt ambulated without device and was independent PTA.  2L O2 at home.    OBJECTIVE:   Initial Evaluation  Date: 24 Treatment Short Term/ Long Term   Goals   AM-PAC 6 Clicks 10/24     Was pt agreeable to Eval/treatment? yes     Does pt have pain? No c/o pain     Bed Mobility  Rolling: NT  Supine to sit: ModA with HOB elevated  Sit to supine: NT  Scooting: MaxA  Mod Independent   Transfers Sit to stand: MaxA  Stand to sit: ModA  Stand pivot: ModA with WW  Mod Independent with AAD   Ambulation   A few steps to chair with ModA with WW  >400 feet with Mod Independent with AAD   Stair negotiation: ascended and descended NT  >10 steps with 1 rail Mod Independent   ROM BUE:  Defer to OT note  BLE:  WFL     Strength BUE:  Defer to OT note  BLE:  4-/5  Increase by 1/3 MMT grade   Balance Sitting EOB:  Ramakrishna  Dynamic Standing:  ModA with WW  Sitting EOB:  Independent  Dynamic Standing:  Mod Independent with AAD     Pt is A & O x 4  CAM-ICU: NT  RASS: +1  Sensation:  no reported paresthesias  Edema:  none    Vitals:  Heart Rate at rest 91 bpm Heart Rate post session 81 bpm   SpO2 at rest 97% SpO2 post session 97%  function, completion of standardized testing/informal observation of tasks, assessment of data and education on plan of care and goals.    CPT codes:  [] Low Complexity PT evaluation 38781  [x] Moderate Complexity PT evaluation 59838  [] High Complexity PT evaluation 01630  [] PT Re-evaluation 43321  [] Gait training 17493 - minutes  [] Manual therapy 28252 - minutes  [x] Therapeutic activities 57094 9 minutes  [] Therapeutic exercises 20058 - minutes  [] Neuromuscular reeducation 45885 - minutes     Rancho Ramos, PT, DPT  [id number]

## 2024-02-19 NOTE — PROGRESS NOTES
Surgical Intensive Care Unit   Daily Progress Note     Patient's name:  Shiv Lee  Age/Gender: 83 y.o. male  Date of Admission: 2/12/2024  6:49 PM  Length of Stay: 7    Reason for ICU:     HPI:   2/7: Patient presented to [facility] for dyspnea, history of COPD, prior CABG, concern for STEMI.  At that time there was also concern for possible cholecystitis.  HIDA was done and the gallbladder was visualized.  Speech was consulted during that time as well and recommendations for diet size since the solids was made.  Underwent TTE EF 60%    Hospital Course:   2/12: patient was transferred to [facility]  2/13: Nephrology and cardiology consulted, on 15 L high flow  2/14: Underwent cardiac catheterization  2/15: Patient was on vaso and levo, vaso was removed on Airvo  2/16: Mild agitation overnight, requiring a sitter otherwise no acute events.  2/17: Patient off levophed, remains on airvo. Calm and cooperative.   2/18: Intermittently agitated.  Agitation not relieved with hydroxyzine.  Patient remains on Airvo  2/19: Doing well this morning. Off of levophed still. Still on arivo, but is able to be weaned. Speech reval ordered. 1 unit of blood ordered.     Problem List:   Patient Active Problem List   Diagnosis    Aortic stenosis    Coronary artery disease    Atrial fibrillation (HCC)    Abnormal EKG    Left atrial dilatation    Mitral regurgitation    SBE (subacute bacterial endocarditis) prophylaxis candidate    HTN (hypertension)    Septic shock (HCC)    NSTEMI (non-ST elevated myocardial infarction) ([facility])    Pneumonia due to organism    COVID-19    Acute hypoxic respiratory failure (HCC)    History of aortic valve replacement with bioprosthetic valve    Palliative care encounter    Goals of care, counseling/discussion    STEMI (ST elevation myocardial infarction) ([facility])    Status post coronary artery bypass graft    Acute kidney injury superimposed on chronic kidney disease (HCC)    Chronic  carvalho to gravity, clear yellow urine  ABDOMEN: soft,  nondistended, nontympanic, no masses, no organomegaly  MUSCULOSKELETAL: moves all extremities purposefully, 5/5 strength   SKIN/EXTREMITIES: no rashes/ecchymosis, no edema/clubbing, warm/dry, good capillary refill       ASSESSMENT / PLAN:   Neuro: Acute pain, right-sided flank, intermittent agitation  Patient is prescribed Tylenol as needed  No narcotics or benzodiazepines  Depakote sprinkles  CV: STEMI, shock requiring pressor support  Now off levophed  Continue midodrine TID  Cardiology following, subtotal occlusion of RCA reported  Continue steroids, taper start tomorrow   Pulm: Acute hypoxic respiratory failure  Continue to wean Airvo as able  Maintain O2 sats greater than 92%  Encourage incentive spirometry every 2 hours  Daily chest x-ray  GI: NPO  Will continue to monitor LFTs  Ordered Speech reeval today   GlycoLax and senna for bowel regimen  Renal: MIGDALIA, resolving   baseline creatinine 1.1  Nephrology following  Continue maintenance fluids  Hypokalemia and hypocalcemia - replace electrolytes  Continue to monitor strict I's and O's  LR 50 while NPO  ID: No acute issues   Endocrine: Mild hyperglycemia  On steroids, will monitor for worsening hypoglycemia, has not needed sliding scale correction at this time  MSK: No acute issues  PT as able - 10/24 [facility]   Heme: Anemia  Resume aspirin per cardiologist recommendation.    Restart Plavix this week      Dispo: CVICU    Electronically signed by Leonard Martinez DO on 2/19/2024 at 3:25 PM

## 2024-02-19 NOTE — PROGRESS NOTES
Comprehensive Nutrition Assessment    Type and Reason for Visit:  Initial, RD Nutrition Re-Screen/LOS    Nutrition Recommendations/Plan:   Pt NPO x 5 days, if oral diet unable to be initiated in 2 days, recommend EN support to meet nutritional needs- if initiated, please consult for TF recs.  Note, pt was evaluated by SLP at [facility]- started on soft/bite size solids 2/15  Will monitor     Malnutrition Assessment:  Malnutrition Status:  At risk for malnutrition (Comment) (02/19/24 0469)    Context:  Acute Illness     Findings of the 6 clinical characteristics of malnutrition:  Energy Intake:  50% or less of estimated energy requirements for 5 or more days  Weight Loss:  Unable to assess (d/t lack of wt hx per EMR)     Body Fat Loss:  Unable to assess     Muscle Mass Loss:  Unable to assess    Fluid Accumulation:  No significant fluid accumulation     Strength:  Not Performed    Nutrition Assessment:    pt adm at [facility] d/t SOB/STEMI w/ COVID- transf to [facility] d/t NSTEMI/resp failure now s/p heart cath 2/14 w/ hypotension; PMhx of CAD s/p CABG, HTN,AFIB; MIGDALIA noted; pt currently NPO- pt was okay for dysphagia diet per SLP at [facility]; pt NPO x 4 days- if oral diet unable to be initiated, recommend EN support to meet nutritional needs; if EN support initiated, please consult for TF recs; will monitor.    Nutrition Related Findings:    A&Ox3 ; -I/O; poor dentition; active BS; no edema; bili slightly elevated (1.5); K/phos WNL Wound Type: None       Current Nutrition Intake & Therapies:    Average Meal Intake: NPO  Average Supplements Intake: NPO  Diet NPO Exceptions are: Sips of Water with Meds, Sips of Clear Liquids, Ice Chips    Anthropometric Measures:  Height: 172.7 cm (5' 7.99\")  Ideal Body Weight (IBW): 154 lbs (70 kg)       Current Body Weight: 85.5 kg (188 lb 7.9 oz) (2/12-BS), 122.4 % IBW. Weight Source: Bed Scale  Current BMI (kg/m2): 28.7  Usual Body Weight:  (UTO d/t lack of wt hx per EMR)     Weight Adjustment For:

## 2024-02-19 NOTE — PROGRESS NOTES
Surgical Intensive Care Unit   Daily Progress Note     Patient's name:  Shiv Lee  Age/Gender: 83 y.o. male  Date of Admission: 2/12/2024  6:49 PM  Length of Stay: 6    Reason for ICU:     HPI:   2/7: Patient presented to [facility] for dyspnea, history of COPD, prior CABG, concern for STEMI.  At that time there was also concern for possible cholecystitis.  HIDA was done and the gallbladder was visualized.  Speech was consulted during that time as well and recommendations for diet size since the solids was made.  Underwent TTE EF 60%    Hospital Course:   2/12: patient was transferred to [facility]  2/13: Nephrology and cardiology consulted, on 15 L high flow  2/14: Underwent cardiac catheterization  2/15: Patient was on vaso and levo, vaso was removed on Airvo  2/16: Mild agitation overnight, requiring a sitter otherwise no acute events.  2/17: Patient off levophed, remains on airvo. Calm and cooperative.   2/18: Intermittently agitated.  Agitation not relieved with hydroxyzine.  Patient remains on Airvo    Problem List:   Patient Active Problem List   Diagnosis    Aortic stenosis    Coronary artery disease    Atrial fibrillation (HCC)    Abnormal EKG    Left atrial dilatation    Mitral regurgitation    SBE (subacute bacterial endocarditis) prophylaxis candidate    HTN (hypertension)    Septic shock (HCC)    NSTEMI (non-ST elevated myocardial infarction) ([facility])    Pneumonia due to organism    COVID-19    Acute hypoxic respiratory failure (HCC)    History of aortic valve replacement with bioprosthetic valve    Palliative care encounter    Goals of care, counseling/discussion    STEMI (ST elevation myocardial infarction) ([facility])    Status post coronary artery bypass graft    Acute kidney injury superimposed on chronic kidney disease (HCC)    Chronic anemia       Surgical/Interventional Procedures:   Cardiac catheterization    Vent Settings: Additional Respiratory Assessments  Pulse:

## 2024-02-19 NOTE — PLAN OF CARE
Problem: Safety - Adult  Goal: Free from fall injury  Outcome: Progressing  Flowsheets (Taken 2/19/2024 1113)  Free From Fall Injury:   Instruct family/caregiver on patient safety   Based on caregiver fall risk screen, instruct family/caregiver to ask for assistance with transferring infant if caregiver noted to have fall risk factors     Problem: Discharge Planning  Goal: Discharge to home or other facility with appropriate resources  Outcome: Progressing  Flowsheets (Taken 2/19/2024 1113)  Discharge to home or other facility with appropriate resources:   Identify barriers to discharge with patient and caregiver   Arrange for interpreters to assist at discharge as needed   Refer to discharge planning if patient needs post-hospital services based on physician order or complex needs related to functional status, cognitive ability or social support system   Identify discharge learning needs (meds, wound care, etc)   Arrange for needed discharge resources and transportation as appropriate     Problem: Neurosensory - Adult  Goal: Achieves stable or improved neurological status  Outcome: Progressing  Flowsheets (Taken 2/19/2024 1113)  Achieves stable or improved neurological status:   Assess for and report changes in neurological status   Initiate measures to prevent increased intracranial pressure   Maintain blood pressure and fluid volume within ordered parameters to optimize cerebral perfusion and minimize risk of hemorrhage   Monitor temperature, glucose, and sodium. Initiate appropriate interventions as ordered     Problem: Cardiovascular - Adult  Goal: Maintains optimal cardiac output and hemodynamic stability  Outcome: Progressing  Flowsheets (Taken 2/19/2024 1113)  Maintains optimal cardiac output and hemodynamic stability:   Administer vasoactive medications as ordered   Administer fluid and/or volume expanders as ordered   Assess for signs of decreased cardiac output   Monitor urine output and notify Licensed  Verbalizes/displays adequate comfort level or baseline comfort level  Outcome: Progressing  Flowsheets (Taken 2/19/2024 1113)  Verbalizes/displays adequate comfort level or baseline comfort level:   Assess pain using appropriate pain scale   Consider cultural and social influences on pain and pain management   Administer analgesics based on type and severity of pain and evaluate response   Implement non-pharmacological measures as appropriate and evaluate response   Encourage patient to monitor pain and request assistance

## 2024-02-19 NOTE — PROGRESS NOTES
Hospitalist Progress Note      PCP: Gilson Ramon III, DO    Date of Admission: 2/12/2024        Hospital Course:   83 y.o. male presented with STEMI, , HE DOES NOT KNOW WHY HE IS HERE OR WHAT HAPPENED.    HE WAS INITIALLY ADMITTED TO [facility], , HIS TT WAS ELEVATED 1338 .      2/16  STILL ON LEVOPHED.   TRYING TO WEAN AIRVO       2/19  LASIX IS ON HOLD       Subjective:  NAUSEATED           Medications:  Reviewed    Infusion Medications    sodium chloride      dextrose 60 mL/hr at 02/19/24 1157    sodium chloride      sodium chloride      dextrose      sodium chloride       Scheduled Medications    calcium gluconate  2,000 mg IntraVENous Once    furosemide  40 mg IntraVENous Once    [START ON 2/20/2024] pantoprazole  40 mg Oral Daily    [START ON 2/20/2024] hydrocortisone sodium succinate PF  100 mg IntraVENous Q12H    And    [START ON 2/21/2024] hydrocortisone sodium succinate PF  50 mg IntraVENous Q12H    And    [START ON 2/22/2024] hydrocortisone sodium succinate PF  50 mg IntraVENous Once    And    [START ON 2/23/2024] hydrocortisone sodium succinate PF  25 mg IntraVENous Once    calcium gluconate  1,000 mg IntraVENous Once    divalproex  125 mg Oral 3 times per day    isosorbide mononitrate  30 mg Oral Daily    rosuvastatin  5 mg Oral Nightly    polyethylene glycol  17 g Oral Daily    heparin (porcine)  5,000 Units SubCUTAneous 3 times per day    insulin lispro  0-4 Units SubCUTAneous Q4H    aspirin  81 mg Oral Daily    sodium chloride flush  5-40 mL IntraVENous 2 times per day    ipratropium 0.5 mg-albuterol 2.5 mg  1 Dose Inhalation Q4H WA RT    vitamin D  2,000 Units Oral Daily    budesonide  0.5 mg Nebulization BID RT    arformoterol tartrate  15 mcg Nebulization BID RT    timolol  1 drop Both Eyes Daily    metoprolol succinate  25 mg Oral Daily    [Held by provider] clopidogrel  75 mg Oral Daily     PRN Meds: sodium chloride, hydrOXYzine HCl, sodium chloride, sodium chloride, glucose, dextrose

## 2024-02-19 NOTE — PROGRESS NOTES
OCCUPATIONAL THERAPY INITIAL EVALUATION    [facility]  [address], [place]       Date:2024                                                               Patient Name: Shiv Lee  MRN: [id number]  : 1940  Room: [address] [place] [address]    Evaluating OT: Desiree Rowley, OTR/L [id number]    Referring Provider:     Giovanny Starr DO      Specific Provider Orders/Date: OT eval and treat (24)        Diagnosis: STEMI (ST elevation myocardial infarction) (HCC) [I21.3]         Surgery/Procedures:  cardiac cath            Pertinent Medical History:    Past Medical History:   Diagnosis Date    Abnormal EKG     Aortic stenosis     Atrial fibrillation (HCC)     Postop    Coronary artery disease     HTN (hypertension)     Left atrial dilatation     Mild    Mitral regurgitation     Trace    SBE (subacute bacterial endocarditis) prophylaxis candidate           *Precautions:  Fall Risk, alarms, sitter, Airvo, cognition, [place], impulsive    Assessment of current deficits   [x] Functional mobility  [x]ADLs  [x] Strength               [x]Cognition   [x] Functional transfers   [x] IADLs         [x] Safety Awareness   [x]Endurance   [x] Fine Coordination        [x] ROM     [] Vision/perception   []Sensation    [x]Gross Motor Coordination [x] Balance   [] Delirium                  [x]Motor Control     [] Communication    OT PLAN OF CARE   OT POC based on physician orders, patient diagnosis and results of clinical assessment.       Frequency/Duration: 1-3 days/wk for 1-2 weeks PRN    Specific OT Treatment to include:   ADL retraining/adapted techniques and AE recommendations to increase functional independence within precautions                    Energy conservation techniques to improve tolerance for selfcare routine   Functional transfer/mobility training/DME recommendations for increased independence, safety and fall prevention         Patient/family

## 2024-02-19 NOTE — PROGRESS NOTES
PROGRESS NOTE     CARDIOLOGY    Chief complaint: Seen today for follow up, management & recommendations for coronary artery disease    He denies chest pain or shortness of breath today.  He was sitting in the chair at the side of the bed.  He was comfortable and in no distress.    Wt Readings from Last 3 Encounters:   02/18/24 91.4 kg (201 lb 8 oz)   02/12/24 85.5 kg (188 lb 6.4 oz)   07/27/22 88.3 kg (194 lb 9.6 oz)     Temp Readings from Last 3 Encounters:   02/19/24 97.6 °F (36.4 °C) (Temporal)   02/12/24 98.1 °F (36.7 °C) (Axillary)   11/18/20 98.2 °F (36.8 °C) (Infrared)     BP Readings from Last 3 Encounters:   02/19/24 126/61   02/12/24 125/78   07/27/22 (!) 140/85     Pulse Readings from Last 3 Encounters:   02/19/24 78   02/12/24 100   07/27/22 65         Intake/Output Summary (Last 24 hours) at 2/19/2024 1147  Last data filed at 2/19/2024 1100  Gross per 24 hour   Intake 862.92 ml   Output 1570 ml   Net -707.08 ml       Recent Labs     02/17/24  0333 02/17/24  1543 02/18/24  0410 02/18/24  1530 02/19/24  0414   WBC 25.4*  --  19.8*  --  17.6*   HGB 8.6*   < > 7.5* 7.8* 7.6*   HCT 26.3*   < > 23.8* 24.5* 23.5*   MCV 93.6  --  96.0  --  95.9     --  161  --  175    < > = values in this interval not displayed.     Recent Labs     02/17/24  0333 02/18/24  0410 02/19/24  0414   * 143 144   K 3.2* 3.2* 3.5   * 108* 109*   CO2 25 24 23   PHOS 3.0 2.5 3.0   BUN 52* 39* 32*   CREATININE 1.5* 1.3* 1.1   MG 2.0 2.1 2.0     No results for input(s): \"PROTIME\", \"INR\" in the last 72 hours.  No results for input(s): \"CKTOTAL\", \"CKMB\", \"CKMBINDEX\", \"TROPONINI\" in the last 72 hours.  No results for input(s): \"BNP\" in the last 72 hours.  No results for input(s): \"CHOL\", \"HDL\", \"TRIG\" in the last 72 hours.    Invalid input(s): \"CHOLHDLR\", \"LDLCALCU\"  No results for input(s): \"TROPHS\" in the last 72 hours.      hydrOXYzine HCl (ATARAX) tablet 25 mg, Q6H PRN  calcium gluconate 1000 mg in sodium chloride

## 2024-02-19 NOTE — CARE COORDINATION
2/19 Care Coordination: Pt remains in [facility].NSTEMI,STEMI, Cath 2/14.Remains on Airvo,55%, 55 liters. Patient is independent from home in a raised ranch style home, several steps up to main floor. [facility]-also has a nebulizer. He follows Dr. Zaman outpatient. He does own a cane-does not use per wife. PCP is Dr. Gilson Ramon III, preferred pharmacy is [facility]. No history of HHC or SNF. Discharge plan has been to home once medically stable. No family here at this time. Pt a little confused/restless. HAs a Sitter in the room. Will need to readdress discharge plan.  CM/SW will continue to follow for discharge planning.   Danilo VAUGHN,RN--BC  [phone]

## 2024-02-19 NOTE — PROGRESS NOTES
Department of Internal Medicine  Nephrology Attending Progress Note      Events reviewed.       SUBJECTIVE: We are following Mr. Lee for MIGDALIA.  Reports no new complaints. Up in chair    PHYSICAL EXAM:      Vitals:    VITALS:  /61   Pulse 78   Temp 97.6 °F (36.4 °C) (Temporal)   Resp 22   Ht 1.727 m (5' 8\")   Wt 91.4 kg (201 lb 8 oz)   SpO2 100%   BMI 30.64 kg/m²   24HR INTAKE/OUTPUT:    Intake/Output Summary (Last 24 hours) at 2/19/2024 1306  Last data filed at 2/19/2024 1100  Gross per 24 hour   Intake 802.92 ml   Output 1420 ml   Net -617.08 ml         Constitutional: Patient is alert   HEENT: Pupils equal reactive, mucous membranes are dry  Respiratory: Lungs are coarse  Cardiovascular/Edema: Heart sounds are tachycardic  Gastrointestinal: Abdomen is soft  Neurologic: Patient lethargic, nonfocal  Skin: No lesions  Other: + edema    Scheduled Meds:   calcium gluconate  1,000 mg IntraVENous Once    divalproex  125 mg Oral 3 times per day    isosorbide mononitrate  30 mg Oral Daily    rosuvastatin  5 mg Oral Nightly    polyethylene glycol  17 g Oral Daily    hydrocortisone sodium succinate PF  100 mg IntraVENous Q8H    heparin (porcine)  5,000 Units SubCUTAneous 3 times per day    insulin lispro  0-4 Units SubCUTAneous Q4H    aspirin  81 mg Oral Daily    sodium chloride flush  5-40 mL IntraVENous 2 times per day    pantoprazole (PROTONIX) 40 mg in sodium chloride (PF) 0.9 % 10 mL injection  40 mg IntraVENous Q12H    ipratropium 0.5 mg-albuterol 2.5 mg  1 Dose Inhalation Q4H WA RT    vitamin D  2,000 Units Oral Daily    ascorbic acid  500 mg Oral Daily    budesonide  0.5 mg Nebulization BID RT    arformoterol tartrate  15 mcg Nebulization BID RT    timolol  1 drop Both Eyes Daily    metoprolol succinate  25 mg Oral Daily    [Held by provider] clopidogrel  75 mg Oral Daily     Continuous Infusions:   dextrose 60 mL/hr at 02/19/24 1157    sodium chloride      sodium chloride      dextrose      sodium  troponin levels.    Problems resolved:    Lactic acidosis, 2/2 septic shock  Hemodynamic shock, septic and hypovolemic, resolved, off vasopressors  Pneumonia, on ceftriaxone    IMPRESSION/RECOMMENDATIONS:      MIGDALIA stage I,  ischemic ATN due to profound hypotension due to hemodynamic shock and possibly contrast associated MIGDALIA.  Renal function relatively stable or improve, creatinine down to 1.3 mg/dL with excellent diuresis.    Hypernatremia, with hypervolemia (elevated proBNP, chest x-ray with pulmonary edema).  Urine sodium 103, urine osmolality 419.  Sodium levels improved to 144.  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

## 2024-02-20 LAB
ABO/RH: NORMAL
ALBUMIN SERPL-MCNC: 3.2 G/DL (ref 3.5–5.2)
ALP SERPL-CCNC: 66 U/L (ref 40–129)
ALT SERPL-CCNC: 20 U/L (ref 0–40)
ANION GAP SERPL CALCULATED.3IONS-SCNC: 10 MMOL/L (ref 7–16)
ANTIBODY SCREEN: NEGATIVE
ARM BAND NUMBER: NORMAL
AST SERPL-CCNC: 21 U/L (ref 0–39)
BASOPHILS # BLD: 0.02 K/UL (ref 0–0.2)
BASOPHILS NFR BLD: 0 % (ref 0–2)
BILIRUB SERPL-MCNC: 1.5 MG/DL (ref 0–1.2)
BLOOD BANK BLOOD PRODUCT EXPIRATION DATE: NORMAL
BLOOD BANK DISPENSE STATUS: NORMAL
BLOOD BANK ISBT PRODUCT BLOOD TYPE: 5100
BLOOD BANK PRODUCT CODE: NORMAL
BLOOD BANK SAMPLE EXPIRATION: NORMAL
BLOOD BANK UNIT TYPE AND RH: NORMAL
BPU ID: NORMAL
BUN SERPL-MCNC: 34 MG/DL (ref 6–23)
CA-I BLD-SCNC: 1.08 MMOL/L (ref 1.15–1.33)
CALCIUM SERPL-MCNC: 7.8 MG/DL (ref 8.6–10.2)
CHLORIDE SERPL-SCNC: 104 MMOL/L (ref 98–107)
CO2 SERPL-SCNC: 26 MMOL/L (ref 22–29)
COMPONENT: NORMAL
CREAT SERPL-MCNC: 1.2 MG/DL (ref 0.7–1.2)
CROSSMATCH RESULT: NORMAL
EKG ATRIAL RATE: 101 BPM
EKG ATRIAL RATE: 103 BPM
EKG P-R INTERVAL: 232 MS
EKG Q-T INTERVAL: 384 MS
EKG Q-T INTERVAL: 430 MS
EKG QRS DURATION: 106 MS
EKG QRS DURATION: 110 MS
EKG QTC CALCULATION (BAZETT): 477 MS
EKG QTC CALCULATION (BAZETT): 503 MS
EKG R AXIS: 38 DEGREES
EKG R AXIS: 44 DEGREES
EKG T AXIS: 138 DEGREES
EKG T AXIS: 99 DEGREES
EKG VENTRICULAR RATE: 103 BPM
EKG VENTRICULAR RATE: 74 BPM
EOSINOPHIL # BLD: 0.03 K/UL (ref 0.05–0.5)
EOSINOPHILS RELATIVE PERCENT: 0 % (ref 0–6)
ERYTHROCYTE [DISTWIDTH] IN BLOOD BY AUTOMATED COUNT: 18.8 % (ref 11.5–15)
GFR SERPL CREATININE-BSD FRML MDRD: >60 ML/MIN/1.73M2
GLUCOSE BLD-MCNC: 117 MG/DL (ref 74–99)
GLUCOSE BLD-MCNC: 122 MG/DL (ref 74–99)
GLUCOSE BLD-MCNC: 134 MG/DL (ref 74–99)
GLUCOSE BLD-MCNC: 151 MG/DL (ref 74–99)
GLUCOSE BLD-MCNC: 161 MG/DL (ref 74–99)
GLUCOSE SERPL-MCNC: 118 MG/DL (ref 74–99)
HCT VFR BLD AUTO: 27.1 % (ref 37–54)
HGB BLD-MCNC: 8.6 G/DL (ref 12.5–16.5)
IMM GRANULOCYTES # BLD AUTO: 0.28 K/UL (ref 0–0.58)
IMM GRANULOCYTES NFR BLD: 2 % (ref 0–5)
LYMPHOCYTES NFR BLD: 0.52 K/UL (ref 1.5–4)
LYMPHOCYTES RELATIVE PERCENT: 3 % (ref 20–42)
MAGNESIUM SERPL-MCNC: 2 MG/DL (ref 1.6–2.6)
MCH RBC QN AUTO: 30.3 PG (ref 26–35)
MCHC RBC AUTO-ENTMCNC: 31.7 G/DL (ref 32–34.5)
MCV RBC AUTO: 95.4 FL (ref 80–99.9)
MONOCYTES NFR BLD: 0.65 K/UL (ref 0.1–0.95)
MONOCYTES NFR BLD: 4 % (ref 2–12)
NEUTROPHILS NFR BLD: 90 % (ref 43–80)
NEUTS SEG NFR BLD: 13.84 K/UL (ref 1.8–7.3)
PHOSPHATE SERPL-MCNC: 4.1 MG/DL (ref 2.5–4.5)
PLATELET # BLD AUTO: 175 K/UL (ref 130–450)
PMV BLD AUTO: 10.6 FL (ref 7–12)
POTASSIUM SERPL-SCNC: 3.6 MMOL/L (ref 3.5–5)
PROT SERPL-MCNC: 5.7 G/DL (ref 6.4–8.3)
RBC # BLD AUTO: 2.84 M/UL (ref 3.8–5.8)
RBC # BLD: ABNORMAL 10*6/UL
SODIUM SERPL-SCNC: 140 MMOL/L (ref 132–146)
TRANSFUSION STATUS: NORMAL
UNIT DIVISION: 0
UNIT ISSUE DATE/TIME: NORMAL
WBC OTHER # BLD: 15.3 K/UL (ref 4.5–11.5)

## 2024-02-20 PROCEDURE — 6370000000 HC RX 637 (ALT 250 FOR IP): Performed by: INTERNAL MEDICINE

## 2024-02-20 PROCEDURE — 2700000000 HC OXYGEN THERAPY PER DAY

## 2024-02-20 PROCEDURE — 6370000000 HC RX 637 (ALT 250 FOR IP): Performed by: STUDENT IN AN ORGANIZED HEALTH CARE EDUCATION/TRAINING PROGRAM

## 2024-02-20 PROCEDURE — 2580000003 HC RX 258: Performed by: INTERNAL MEDICINE

## 2024-02-20 PROCEDURE — 93010 ELECTROCARDIOGRAM REPORT: CPT | Performed by: INTERNAL MEDICINE

## 2024-02-20 PROCEDURE — 82330 ASSAY OF CALCIUM: CPT

## 2024-02-20 PROCEDURE — 6360000002 HC RX W HCPCS: Performed by: STUDENT IN AN ORGANIZED HEALTH CARE EDUCATION/TRAINING PROGRAM

## 2024-02-20 PROCEDURE — 83735 ASSAY OF MAGNESIUM: CPT

## 2024-02-20 PROCEDURE — 85025 COMPLETE CBC W/AUTO DIFF WBC: CPT

## 2024-02-20 PROCEDURE — 94660 CPAP INITIATION&MGMT: CPT

## 2024-02-20 PROCEDURE — 2580000003 HC RX 258

## 2024-02-20 PROCEDURE — 6370000000 HC RX 637 (ALT 250 FOR IP)

## 2024-02-20 PROCEDURE — 6370000000 HC RX 637 (ALT 250 FOR IP): Performed by: FAMILY MEDICINE

## 2024-02-20 PROCEDURE — 92526 ORAL FUNCTION THERAPY: CPT

## 2024-02-20 PROCEDURE — 94640 AIRWAY INHALATION TREATMENT: CPT

## 2024-02-20 PROCEDURE — 2000000000 HC ICU R&B

## 2024-02-20 PROCEDURE — 80053 COMPREHEN METABOLIC PANEL: CPT

## 2024-02-20 PROCEDURE — 99232 SBSQ HOSP IP/OBS MODERATE 35: CPT | Performed by: INTERNAL MEDICINE

## 2024-02-20 PROCEDURE — 6360000002 HC RX W HCPCS

## 2024-02-20 PROCEDURE — 99291 CRITICAL CARE FIRST HOUR: CPT | Performed by: SURGERY

## 2024-02-20 PROCEDURE — 84100 ASSAY OF PHOSPHORUS: CPT

## 2024-02-20 PROCEDURE — 92610 EVALUATE SWALLOWING FUNCTION: CPT

## 2024-02-20 PROCEDURE — 2580000003 HC RX 258: Performed by: FAMILY MEDICINE

## 2024-02-20 PROCEDURE — 6360000002 HC RX W HCPCS: Performed by: FAMILY MEDICINE

## 2024-02-20 PROCEDURE — 82962 GLUCOSE BLOOD TEST: CPT

## 2024-02-20 RX ORDER — SENNOSIDES A AND B 8.6 MG/1
1 TABLET, FILM COATED ORAL NIGHTLY
Status: DISCONTINUED | OUTPATIENT
Start: 2024-02-20 | End: 2024-03-01 | Stop reason: HOSPADM

## 2024-02-20 RX ORDER — POTASSIUM CHLORIDE 20 MEQ/1
40 TABLET, EXTENDED RELEASE ORAL ONCE
Status: COMPLETED | OUTPATIENT
Start: 2024-02-20 | End: 2024-02-20

## 2024-02-20 RX ORDER — BISACODYL 10 MG
10 SUPPOSITORY, RECTAL RECTAL DAILY
Status: DISCONTINUED | OUTPATIENT
Start: 2024-02-20 | End: 2024-03-01 | Stop reason: HOSPADM

## 2024-02-20 RX ADMIN — HEPARIN SODIUM 5000 UNITS: 10000 INJECTION INTRAVENOUS; SUBCUTANEOUS at 21:04

## 2024-02-20 RX ADMIN — ASPIRIN 81 MG: 81 TABLET, CHEWABLE ORAL at 09:09

## 2024-02-20 RX ADMIN — ARFORMOTEROL TARTRATE 15 MCG: 15 SOLUTION RESPIRATORY (INHALATION) at 09:41

## 2024-02-20 RX ADMIN — CLOPIDOGREL BISULFATE 75 MG: 75 TABLET ORAL at 09:09

## 2024-02-20 RX ADMIN — ISOSORBIDE MONONITRATE 30 MG: 30 TABLET, EXTENDED RELEASE ORAL at 09:09

## 2024-02-20 RX ADMIN — ARFORMOTEROL TARTRATE 15 MCG: 15 SOLUTION RESPIRATORY (INHALATION) at 21:06

## 2024-02-20 RX ADMIN — SENNOSIDES 8.6 MG: 8.6 TABLET, FILM COATED ORAL at 21:04

## 2024-02-20 RX ADMIN — DIVALPROEX SODIUM 125 MG: 125 CAPSULE, COATED PELLETS ORAL at 14:30

## 2024-02-20 RX ADMIN — BUDESONIDE INHALATION 500 MCG: 0.5 SUSPENSION RESPIRATORY (INHALATION) at 21:06

## 2024-02-20 RX ADMIN — Medication 10 ML: at 09:10

## 2024-02-20 RX ADMIN — CALCIUM GLUCONATE 2000 MG: 98 INJECTION, SOLUTION INTRAVENOUS at 08:18

## 2024-02-20 RX ADMIN — HEPARIN SODIUM 5000 UNITS: 10000 INJECTION INTRAVENOUS; SUBCUTANEOUS at 14:30

## 2024-02-20 RX ADMIN — IPRATROPIUM BROMIDE AND ALBUTEROL SULFATE 1 DOSE: 2.5; .5 SOLUTION RESPIRATORY (INHALATION) at 21:06

## 2024-02-20 RX ADMIN — METOPROLOL SUCCINATE 25 MG: 25 TABLET, EXTENDED RELEASE ORAL at 09:09

## 2024-02-20 RX ADMIN — POTASSIUM CHLORIDE 40 MEQ: 1500 TABLET, EXTENDED RELEASE ORAL at 09:09

## 2024-02-20 RX ADMIN — HEPARIN SODIUM 5000 UNITS: 10000 INJECTION INTRAVENOUS; SUBCUTANEOUS at 05:42

## 2024-02-20 RX ADMIN — BISACODYL 10 MG: 10 SUPPOSITORY RECTAL at 09:09

## 2024-02-20 RX ADMIN — Medication 2000 UNITS: at 09:10

## 2024-02-20 RX ADMIN — DEXTROSE MONOHYDRATE: 50 INJECTION, SOLUTION INTRAVENOUS at 04:30

## 2024-02-20 RX ADMIN — BUDESONIDE INHALATION 500 MCG: 0.5 SUSPENSION RESPIRATORY (INHALATION) at 09:41

## 2024-02-20 RX ADMIN — ROSUVASTATIN CALCIUM 5 MG: 5 TABLET, COATED ORAL at 21:03

## 2024-02-20 RX ADMIN — IPRATROPIUM BROMIDE AND ALBUTEROL SULFATE 1 DOSE: 2.5; .5 SOLUTION RESPIRATORY (INHALATION) at 17:26

## 2024-02-20 RX ADMIN — TIMOLOL MALEATE 1 DROP: 5 SOLUTION OPHTHALMIC at 09:11

## 2024-02-20 RX ADMIN — DIVALPROEX SODIUM 125 MG: 125 CAPSULE, COATED PELLETS ORAL at 05:41

## 2024-02-20 RX ADMIN — DIVALPROEX SODIUM 125 MG: 125 CAPSULE, COATED PELLETS ORAL at 21:04

## 2024-02-20 RX ADMIN — PANTOPRAZOLE SODIUM 40 MG: 40 TABLET, DELAYED RELEASE ORAL at 09:09

## 2024-02-20 RX ADMIN — IPRATROPIUM BROMIDE AND ALBUTEROL SULFATE 1 DOSE: 2.5; .5 SOLUTION RESPIRATORY (INHALATION) at 09:41

## 2024-02-20 RX ADMIN — HYDROCORTISONE SODIUM SUCCINATE 100 MG: 100 INJECTION, POWDER, FOR SOLUTION INTRAMUSCULAR; INTRAVENOUS at 13:08

## 2024-02-20 ASSESSMENT — PAIN SCALES - GENERAL
PAINLEVEL_OUTOF10: 0

## 2024-02-20 NOTE — PROGRESS NOTES
Department of Internal Medicine  Nephrology Attending Progress Note      Events reviewed.       SUBJECTIVE: We are following Mr. Lee for MIGDALIA.  Reports no new complaints.    PHYSICAL EXAM:      Vitals:    VITALS:  /72   Pulse 75   Temp 98.5 °F (36.9 °C) (Temporal)   Resp 21   Ht 1.727 m (5' 7.99\")   Wt 88.6 kg (195 lb 5.2 oz)   SpO2 94%   BMI 29.71 kg/m²   24HR INTAKE/OUTPUT:    Intake/Output Summary (Last 24 hours) at 2/20/2024 1034  Last data filed at 2/20/2024 0640  Gross per 24 hour   Intake 2701.23 ml   Output 3075 ml   Net -373.77 ml         Constitutional: Patient is alert   HEENT: Pupils equal reactive, mucous membranes are dry  Respiratory: Lungs are coarse  Cardiovascular/Edema: Heart sounds are tachycardic  Gastrointestinal: Abdomen is soft  Neurologic: Patient lethargic, nonfocal  Skin: No lesions  Other: + edema    Scheduled Meds:   senna  1 tablet Oral Nightly    bisacodyl  10 mg Rectal Daily    pantoprazole  40 mg Oral Daily    hydrocortisone sodium succinate PF  100 mg IntraVENous Q12H    And    [START ON 2/21/2024] hydrocortisone sodium succinate PF  50 mg IntraVENous Q12H    And    [START ON 2/22/2024] hydrocortisone sodium succinate PF  50 mg IntraVENous Once    And    [START ON 2/23/2024] hydrocortisone sodium succinate PF  25 mg IntraVENous Once    calcium gluconate  1,000 mg IntraVENous Once    divalproex  125 mg Oral 3 times per day    isosorbide mononitrate  30 mg Oral Daily    rosuvastatin  5 mg Oral Nightly    polyethylene glycol  17 g Oral Daily    heparin (porcine)  5,000 Units SubCUTAneous 3 times per day    insulin lispro  0-4 Units SubCUTAneous Q4H    aspirin  81 mg Oral Daily    sodium chloride flush  5-40 mL IntraVENous 2 times per day    ipratropium 0.5 mg-albuterol 2.5 mg  1 Dose Inhalation Q4H WA RT    vitamin D  2,000 Units Oral Daily    budesonide  0.5 mg Nebulization BID RT    arformoterol tartrate  15 mcg Nebulization BID RT    timolol  1 drop Both Eyes Daily  consider repeat examination.     Interstitial/ground-glass opacities in the lower left lung, of questionable  significance but might reflect infectious/inflammatory process.     Gallbladder wall thickening versus pericholecystic fluid. Correlate for  cholecystitis.  Could obtain ultrasound or HIDA for further evaluation.     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 Recommend follow-up every 2 years.     Bladder wall thickening with perivesicular stranding.  Findings could reflect  cystitis.  Correlate with urinalysis.  Also note that there is bilateral  perinephric stranding which is a nonspecific finding.     Trace free fluid in the pelvis.     Small hiatal hernia.     Severe atherosclerotic calcification which causes narrowing of the major  abdominal aortic branch vessels.     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    XR CHEST PORTABLE  2/12/24      FINDINGS:  There is mild improvement in aeration with persistent bilateral mid to lower  lung zone hazy airspace opacities.  Blunting of the bilateral costophrenic  angles left, greater than right again seen suggestive of pleural effusions.  Heart is enlarged, but stable.     IMPRESSION:  Slight improvement in lung aeration.  Otherwise, no significant change with  pulmonary edema and bilateral pleural effusions noted.              BRIEF SUMMARY OF INITIAL CONSULT:    Briefly Mr. Lee he is a 83-year-old man with history of HTN, CAD status post CABG, aortic stenosis status post AVR with bioprosthetic valve, hyperlipidemia, PAF, asbestos exposure, who was recently admitted on December 2023 with pneumonia, who was admitted on February 7, 2024 after he was brought to the ER by EMS due to shortness of breath.  In the ER he was found to be hypotensive and septic shock as well as NSTEMI and COVID-19 positive.  On admission his creatinine level was 1.2 mg/dL but since then has rapidly increased up to 2.3

## 2024-02-20 NOTE — PLAN OF CARE
Problem: Nutrition Deficit:  Goal: Optimize nutritional status  Outcome: Not Progressing  Flowsheets  Taken 2/20/2024 0000 by Va Vivas, RN  Nutrient intake appropriate for improving, restoring, or maintaining nutritional needs: Assess nutritional status and recommend course of action    Problem: Safety - Adult  Goal: Free from fall injury  2/20/2024 0018 by Va Vivas, RN  Outcome: Progressing  Flowsheets (Taken 2/20/2024 0000)  Free From Fall Injury: Instruct family/caregiver on patient safety     Problem: Discharge Planning  Goal: Discharge to home or other facility with appropriate resources  2/20/2024 0018 by Va Vivas, RN  Outcome: Progressing  Flowsheets (Taken 2/20/2024 0000)  Discharge to home or other facility with appropriate resources:   Identify barriers to discharge with patient and caregiver   Identify discharge learning needs (meds, wound care, etc)     Problem: Cardiovascular - Adult  Goal: Maintains optimal cardiac output and hemodynamic stability  2/20/2024 0018 by Va Vivas, RN  Outcome: Progressing  Flowsheets (Taken 2/20/2024 0000)  Maintains optimal cardiac output and hemodynamic stability:   Monitor blood pressure and heart rate   Assess for signs of decreased cardiac output   Administer fluid and/or volume expanders as ordered

## 2024-02-20 NOTE — PROGRESS NOTES
SPEECH/LANGUAGE PATHOLOGY  CLINICAL ASSESSMENT OF SWALLOWING FUNCTION   and PLAN OF CARE    PATIENT NAME:  Shiv Lee  (male)     MRN:  [id number]    :  1940  (83 y.o.)  STATUS:  Inpatient: Room 3822/3822-A    TODAY'S DATE:  24    SLP eval and treat  Start:  24 09,   End:  24,   ONE TIME,   Standing Count:  1 Occurrences,   R       Leonard Martinez DO  REASON FOR REFERRAL: to further assess oropharyngeal function   EVALUATING THERAPIST: Sade Woodward, SLP                 RESULTS:    DYSPHAGIA DIAGNOSIS:   Clinical indicators of mild  oral phase dysphagia       DIET RECOMMENDATIONS:  Minced and moist consistency solids (IDDSI level 5) with  thin liquids (IDDSI level 0)     MEDICATION ADMINISTRATION, and Administer medication crushed, as able, with pudding/applesauce     FEEDING RECOMMENDATIONS:     Assistance level:  Supervision is needed during all oral intake      Compensatory strategies recommended: Thorough oral care to prevent colonization of oral bacteria , Upright in bed/ chair as tolerated, Small bites/sips, and Alternate solids and liquids, and SLOW RATE      Discussed recommendations with nursing and/or faxed report to referring provider: Yes    SPEECH THERAPY  PLAN OF CARE   The dysphagia POC is established based on physician order, dysphagia diagnosis and results of clinical assessment     Skilled SLP intervention for dysphagia management on acute care up to 5 x per week until goals met, pt plateaus in function and/or discharged from hospital    Conditions Requiring Skilled Therapeutic Intervention for dysphagia:    Patient is performing below functional baseline d/t  current acute condition, respiratory compromise, multiple medications, and/or increased dependency upon caregivers.    Specific dysphagia interventions to include:     compensatory swallowing strategies to improve airway protection and swallow function.  ongoing mealtime assessment to

## 2024-02-20 NOTE — PROGRESS NOTES
Department of Internal Medicine  Nephrology Attending Progress Note      Events reviewed.       SUBJECTIVE: We are following Mr. Lee for MIGDALIA.  Reports no new complaints. RESTING    PHYSICAL EXAM:      Vitals:    VITALS:  /72   Pulse 75   Temp 98.5 °F (36.9 °C) (Temporal)   Resp 21   Ht 1.727 m (5' 7.99\")   Wt 88.6 kg (195 lb 5.2 oz)   SpO2 94%   BMI 29.71 kg/m²   24HR INTAKE/OUTPUT:    Intake/Output Summary (Last 24 hours) at 2/20/2024 1036  Last data filed at 2/20/2024 0640  Gross per 24 hour   Intake 2701.23 ml   Output 3075 ml   Net -373.77 ml         Constitutional: Patient is alert   HEENT: Pupils equal reactive, mucous membranes are dry  Respiratory: Lungs are coarse  Cardiovascular/Edema: Heart sounds are tachycardic  Gastrointestinal: Abdomen is soft  Neurologic: Patient lethargic, nonfocal  Skin: No lesions  Other: + edema    Scheduled Meds:   senna  1 tablet Oral Nightly    bisacodyl  10 mg Rectal Daily    pantoprazole  40 mg Oral Daily    hydrocortisone sodium succinate PF  100 mg IntraVENous Q12H    And    [START ON 2/21/2024] hydrocortisone sodium succinate PF  50 mg IntraVENous Q12H    And    [START ON 2/22/2024] hydrocortisone sodium succinate PF  50 mg IntraVENous Once    And    [START ON 2/23/2024] hydrocortisone sodium succinate PF  25 mg IntraVENous Once    calcium gluconate  1,000 mg IntraVENous Once    divalproex  125 mg Oral 3 times per day    isosorbide mononitrate  30 mg Oral Daily    rosuvastatin  5 mg Oral Nightly    polyethylene glycol  17 g Oral Daily    heparin (porcine)  5,000 Units SubCUTAneous 3 times per day    insulin lispro  0-4 Units SubCUTAneous Q4H    aspirin  81 mg Oral Daily    sodium chloride flush  5-40 mL IntraVENous 2 times per day    ipratropium 0.5 mg-albuterol 2.5 mg  1 Dose Inhalation Q4H WA RT    vitamin D  2,000 Units Oral Daily    budesonide  0.5 mg Nebulization BID RT    arformoterol tartrate  15 mcg Nebulization BID RT    timolol  1 drop Both Eyes  Daily    metoprolol succinate  25 mg Oral Daily    clopidogrel  75 mg Oral Daily     Continuous Infusions:   sodium chloride      dextrose 60 mL/hr at 02/20/24 0430    sodium chloride      sodium chloride      dextrose      sodium chloride       PRN Meds:.sodium chloride, prochlorperazine, sodium chloride, sodium chloride, glucose, dextrose bolus **OR** dextrose bolus, glucagon (rDNA), dextrose, acetaminophen **OR** acetaminophen, ondansetron **OR** ondansetron, sodium chloride flush, sodium chloride, hydrALAZINE      DATA:    CBC:   Lab Results   Component Value Date/Time    WBC 15.3 02/20/2024 03:52 AM    RBC 2.84 02/20/2024 03:52 AM    HGB 8.6 02/20/2024 03:52 AM    HCT 27.1 02/20/2024 03:52 AM    MCV 95.4 02/20/2024 03:52 AM    MCH 30.3 02/20/2024 03:52 AM    MCHC 31.7 02/20/2024 03:52 AM    RDW 18.8 02/20/2024 03:52 AM     02/20/2024 03:52 AM    MPV 10.6 02/20/2024 03:52 AM     CMP:    Lab Results   Component Value Date/Time     02/20/2024 03:52 AM    K 3.6 02/20/2024 03:52 AM     02/20/2024 03:52 AM    CO2 26 02/20/2024 03:52 AM    BUN 34 02/20/2024 03:52 AM    CREATININE 1.2 02/20/2024 03:52 AM    GFRAA >60 06/18/2020 09:49 AM    LABGLOM >60 02/20/2024 03:52 AM    GLUCOSE 118 02/20/2024 03:52 AM    PROT 5.7 02/20/2024 03:52 AM    LABALBU 3.2 02/20/2024 03:52 AM    CALCIUM 7.8 02/20/2024 03:52 AM    BILITOT 1.5 02/20/2024 03:52 AM    ALKPHOS 66 02/20/2024 03:52 AM    AST 21 02/20/2024 03:52 AM    ALT 20 02/20/2024 03:52 AM     Magnesium:    Lab Results   Component Value Date/Time    MG 2.0 02/20/2024 03:52 AM     Phosphorus:    Lab Results   Component Value Date/Time    PHOS 4.1 02/20/2024 03:52 AM     Radiology Review:      CT ABDOMEN PELVIS W IV CONTRAST Additional Contrast? None 2/7/24    IMPRESSION:  No evidence for pulmonary artery embolism to the lobar level.  Evaluation  beyond this is nondiagnostic due to respiratory motion artifact.  Would  advise close clinical follow-up or could

## 2024-02-20 NOTE — PROGRESS NOTES
hydrALAZINE      Intake/Output Summary (Last 24 hours) at 2/20/2024 1539  Last data filed at 2/20/2024 1508  Gross per 24 hour   Intake 3356.07 ml   Output 2845 ml   Net 511.07 ml       Exam:    /62   Pulse 80   Temp 98 °F (36.7 °C) (Oral)   Resp 22   Ht 1.727 m (5' 7.99\")   Wt 88.6 kg (195 lb 5.2 oz)   SpO2 92%   BMI 29.71 kg/m²       General appearance:  No apparent distress,  HEENT:  Normal cephalic, .  Neck: Supple, with full range of motion. No jugular venous distention. Trachea midline.  Respiratory:  Normal respiratory effort. DIMINISHED  BILATERALLY   Cardiovascular:  RRR  Abdomen: Soft, non-tender, non-distended with normal bowel sounds.  Musculoskeletal:  No clubbing, cyanosis or edema bilaterally.    Skin: smooth and dry    NEURO: CN 2-12 INTACT             Labs:   Recent Labs     02/18/24 0410 02/18/24  1530 02/19/24 0414 02/19/24  1937 02/20/24  0352   WBC 19.8*  --  17.6*  --  15.3*   HGB 7.5*   < > 7.6* 9.0* 8.6*   HCT 23.8*   < > 23.5* 28.6* 27.1*     --  175  --  175    < > = values in this interval not displayed.     Recent Labs     02/18/24 0410 02/19/24 0414 02/20/24  0352    144 140   K 3.2* 3.5 3.6   * 109* 104   CO2 24 23 26   BUN 39* 32* 34*   CREATININE 1.3* 1.1 1.2   CALCIUM 7.6* 7.5* 7.8*   PHOS 2.5 3.0 4.1     Recent Labs     02/18/24 0410 02/19/24  0414 02/20/24  0352   AST 21 22 21   ALT 20 20 20   BILITOT 1.3* 1.5* 1.5*   ALKPHOS 56 64 66     No results for input(s): \"INR\" in the last 72 hours.  No results for input(s): \"CKTOTAL\", \"TROPONINI\" in the last 72 hours.  Recent Labs     02/18/24  0410 02/19/24  0414 02/20/24  0352   AST 21 22 21   ALT 20 20 20   BILITOT 1.3* 1.5* 1.5*   ALKPHOS 56 64 66     No results for input(s): \"LACTA\" in the last 72 hours.  No results found for: \"LABURIC\", \"URICACID\"  No results found for: \"AMMONIA\"    Assessment:    Active Hospital Problems    Diagnosis Date Noted    Status post coronary artery bypass graft [Z95.1]  02/13/2024    Acute kidney injury superimposed on chronic kidney disease (HCC) [N17.9, N18.9] 02/13/2024    Chronic anemia [D64.9] 02/13/2024    STEMI (ST elevation myocardial infarction) (HCC) [I21.3] 02/12/2024    Acute hypoxic respiratory failure (HCC) [J96.01] 02/07/2024   PAF  ACUTE HYPOXIC RESP FAILURE   MIGDALIA  LACTIC ACIDOSIS  AAA 3.8  HTN  S/P COVID     Plan:  NEPHROLOGY  CARD   HEPARIN           DVT Prophylaxis: HEPARIN  Diet: ADULT DIET; Dysphagia - Minced and Moist; Low Fat/Low Chol/High Fiber/2 gm Na  Code Status: Full Code    PT/OT Eval Status: ORDERED    Dispo [facility]UL[facility] -       Electronically signed by Giovanny Starr DO on 2/20/2024 at 3:39 PM FACOI

## 2024-02-20 NOTE — PROGRESS NOTES
Surgical Intensive Care Unit   Daily Progress Note     Patient's name:  Shiv Lee  Age/Gender: 83 y.o. male  Date of Admission: 2/12/2024  6:49 PM  Length of Stay: 8    Reason for ICU:     HPI:   2/7: Patient presented to [facility] for dyspnea, history of COPD, prior CABG, concern for STEMI.  At that time there was also concern for possible cholecystitis.  HIDA was done and the gallbladder was visualized.  Speech was consulted during that time as well and recommendations for diet size since the solids was made.  Underwent TTE EF 60%    Hospital Course:   2/12: patient was transferred to [facility]  2/13: Nephrology and cardiology consulted, on 15 L high flow  2/14: Underwent cardiac catheterization  2/15: Patient was on vaso and levo, vaso was removed on Airvo  2/16: Mild agitation overnight, requiring a sitter otherwise no acute events.  2/17: Patient off levophed, remains on airvo. Calm and cooperative.   2/18: Intermittently agitated.  Agitation not relieved with hydroxyzine.  Patient remains on Airvo  2/19: Doing well this morning. Off of levophed still. Still on arivo, but is able to be weaned. Speech reval ordered. 1 unit of blood ordered.   2/20: Ok for minced and moist diet. Off of airvo. Restarted plavix today.     Problem List:   Patient Active Problem List   Diagnosis    Aortic stenosis    Coronary artery disease    Atrial fibrillation (HCC)    Abnormal EKG    Left atrial dilatation    Mitral regurgitation    SBE (subacute bacterial endocarditis) prophylaxis candidate    HTN (hypertension)    Septic shock (HCC)    NSTEMI (non-ST elevated myocardial infarction) ([facility])    Pneumonia due to organism    COVID-19    Acute hypoxic respiratory failure (HCC)    History of aortic valve replacement with bioprosthetic valve    Palliative care encounter    Goals of care, counseling/discussion    STEMI (ST elevation myocardial infarction) ([facility])    Status post coronary artery bypass graft     Acute kidney injury superimposed on chronic kidney disease (HCC)    Chronic anemia       Surgical/Interventional Procedures:   Cardiac catheterization    Vent Settings: Additional Respiratory Assessments  Pulse: 76  Respirations: 22  SpO2: 95 %  Humidification Source: Heated wire  Humidification Temp: 34  ABG:   Recent Labs     24  0520   PH 7.520*   PCO2 27.9*   PO2 78.0   HCO3 22.3   BE -0.1   O2SAT 95.7         I/O:  I/O last 3 completed shifts:  In: 2701.2 [P.O.:80; I.V.:2173.6; Blood:350; IV Piggyback:97.6]  Out: 4055 [Urine:4055]  No intake/output data recorded.  External Urinary Catheter-Output (mL): 1000 mL  [REMOVED] Urinary Catheter 24-Output (mL): 175 mL  [REMOVED] Urinary Catheter 02/15/24-Output (mL): 70 mL     Stool (measured) : 0 mL    Lines:   Right IJ CVC, left peripheral IV    Tubes:   Reese catheter    Drains:   None    Drips:   sodium chloride      dextrose 60 mL/hr at 24 0430    sodium chloride      sodium chloride      dextrose      sodium chloride         Physical Exam:   /67   Pulse 76   Temp 98 °F (36.7 °C) (Oral)   Resp 22   Ht 1.727 m (5' 7.99\")   Wt 88.6 kg (195 lb 5.2 oz)   SpO2 95%   BMI 29.71 kg/m²     Average, Min, and Max for last 24 hours Vitals:  Temp:  Temp  Av.2 °F (36.8 °C)  Min: 97.6 °F (36.4 °C)  Max: 98.9 °F (37.2 °C)  RR: Resp  Av.7  Min: 16  Max: 28  HR: Pulse  Av.4  Min: 62  Max: 110  BP:  Systolic (24hrs), Av , Min:89 , Max:154   ; Diastolic (24hrs), Av, Min:59, Max:83    SpO2: SpO2  Av %  Min: 91 %  Max: 100 %        GCS:    4 - Opens eyes on own   6 - Follows simple motor commands  5 - Alert and oriented    Pupil size:  Left 3 mm    Right 3 mm    Pupil reaction: Yes    Wiggles fingers: Left Yes Right Yes    Hand grasp:   Left normal       Right normal      CONSTITUTIONAL: Comfortable appearing, in no acute distress  NEUROLOGIC: PERRL, alert and oriented time place and self.  CARDIOVASCULAR: Sternotomy scar. RR.

## 2024-02-20 NOTE — PROGRESS NOTES
PROGRESS NOTE     CARDIOLOGY    Chief complaint: Seen today for follow up, management & recommendations for coronary artery disease    He denies chest pain or shortness of breath today.  He was sitting in the chair at the side of the bed again today.  He was comfortable and in no distress.    Wt Readings from Last 3 Encounters:   02/20/24 88.6 kg (195 lb 5.2 oz)   02/12/24 85.5 kg (188 lb 6.4 oz)   07/27/22 88.3 kg (194 lb 9.6 oz)     Temp Readings from Last 3 Encounters:   02/20/24 98 °F (36.7 °C) (Oral)   02/12/24 98.1 °F (36.7 °C) (Axillary)   11/18/20 98.2 °F (36.8 °C) (Infrared)     BP Readings from Last 3 Encounters:   02/20/24 100/62   02/12/24 125/78   07/27/22 (!) 140/85     Pulse Readings from Last 3 Encounters:   02/20/24 80   02/12/24 100   07/27/22 65         Intake/Output Summary (Last 24 hours) at 2/20/2024 1542  Last data filed at 2/20/2024 1508  Gross per 24 hour   Intake 3356.07 ml   Output 2845 ml   Net 511.07 ml       Recent Labs     02/18/24  0410 02/18/24  1530 02/19/24  0414 02/19/24  1937 02/20/24  0352   WBC 19.8*  --  17.6*  --  15.3*   HGB 7.5*   < > 7.6* 9.0* 8.6*   HCT 23.8*   < > 23.5* 28.6* 27.1*   MCV 96.0  --  95.9  --  95.4     --  175  --  175    < > = values in this interval not displayed.     Recent Labs     02/18/24  0410 02/19/24  0414 02/20/24  0352    144 140   K 3.2* 3.5 3.6   * 109* 104   CO2 24 23 26   PHOS 2.5 3.0 4.1   BUN 39* 32* 34*   CREATININE 1.3* 1.1 1.2   MG 2.1 2.0 2.0     No results for input(s): \"PROTIME\", \"INR\" in the last 72 hours.  No results for input(s): \"CKTOTAL\", \"CKMB\", \"CKMBINDEX\", \"TROPONINI\" in the last 72 hours.  No results for input(s): \"BNP\" in the last 72 hours.  No results for input(s): \"CHOL\", \"HDL\", \"TRIG\" in the last 72 hours.    Invalid input(s): \"CHOLHDLR\", \"LDLCALCU\"  No results for input(s): \"TROPHS\" in the last 72 hours.      senna (SENOKOT) tablet 8.6 mg, Nightly  bisacodyl (DULCOLAX) suppository 10 mg, Daily  0.9

## 2024-02-20 NOTE — PLAN OF CARE
Problem: Safety - Adult  Goal: Free from fall injury  2/20/2024 1133 by Hyun Cabrales RN  Outcome: Progressing     Problem: Discharge Planning  Goal: Discharge to home or other facility with appropriate resources  2/20/2024 0802 by Hyun Cabrales RN  Outcome: Progressing  Flowsheets (Taken 2/20/2024 0000 by Va Vivas, RN)  Discharge to home or other facility with appropriate resources:   Identify barriers to discharge with patient and caregiver   Identify discharge learning needs (meds, wound care, etc)     Problem: Respiratory - Adult  Goal: Achieves optimal ventilation and oxygenation  2/20/2024 1133 by Hyun Cabrales RN  Outcome: Progressing  Flowsheets (Taken 2/20/2024 0802)  Achieves optimal ventilation and oxygenation:   Assess for changes in respiratory status   Assess for changes in mentation and behavior   Position to facilitate oxygenation and minimize respiratory effort   Encourage broncho-pulmonary hygiene including cough, deep breathe, incentive spirometry   Initiate smoking cessation protocol as indicated   Oxygen supplementation based on oxygen saturation or arterial blood gases   Assess the need for suctioning and aspirate as needed   Assess and instruct to report shortness of breath or any respiratory difficulty   Respiratory therapy support as indicated     Problem: Cardiovascular - Adult  Goal: Maintains optimal cardiac output and hemodynamic stability  2/20/2024 1133 by Hyun Cabrales RN  Outcome: Progressing  Flowsheets (Taken 2/20/2024 0000 by Va Vivas, RN)  Maintains optimal cardiac output and hemodynamic stability:   Monitor blood pressure and heart rate   Assess for signs of decreased cardiac output   Administer fluid and/or volume expanders as ordered     Problem: Skin/Tissue Integrity - Adult  Goal: Skin integrity remains intact  Outcome: Progressing  Flowsheets (Taken 2/19/2024 1113)  Skin Integrity Remains Intact:   Monitor for areas of redness and/or

## 2024-02-20 NOTE — CARE COORDINATION
2/20 Care Coordination: Pt remains in [facility].Was a Transfer from [place] on 2/7. history of COPD, prior CABG, concern for STEMI. 2/14 had CC. Today, pt O2  6 liters High Flow NC. Pt remains somewhat confused. Cont with sitter in room. CM spoke with pt's wife. Plan for now is still home. Will cont to monitor progress. Did touch base on SULMA's. She wants to see how he progresses.   CM/SW will continue to follow for discharge planning.   Danilo GARNICAN,RN--BC  [phone]

## 2024-02-21 LAB
ALBUMIN SERPL-MCNC: 3.2 G/DL (ref 3.5–5.2)
ALP SERPL-CCNC: 74 U/L (ref 40–129)
ALT SERPL-CCNC: 23 U/L (ref 0–40)
ANION GAP SERPL CALCULATED.3IONS-SCNC: 11 MMOL/L (ref 7–16)
AST SERPL-CCNC: 25 U/L (ref 0–39)
BASOPHILS # BLD: 0.01 K/UL (ref 0–0.2)
BASOPHILS NFR BLD: 0 % (ref 0–2)
BILIRUB SERPL-MCNC: 1.5 MG/DL (ref 0–1.2)
BUN SERPL-MCNC: 35 MG/DL (ref 6–23)
CA-I BLD-SCNC: 1.09 MMOL/L (ref 1.15–1.33)
CALCIUM SERPL-MCNC: 8 MG/DL (ref 8.6–10.2)
CHLORIDE SERPL-SCNC: 105 MMOL/L (ref 98–107)
CO2 SERPL-SCNC: 25 MMOL/L (ref 22–29)
CREAT SERPL-MCNC: 1.1 MG/DL (ref 0.7–1.2)
EOSINOPHIL # BLD: 0.03 K/UL (ref 0.05–0.5)
EOSINOPHILS RELATIVE PERCENT: 0 % (ref 0–6)
ERYTHROCYTE [DISTWIDTH] IN BLOOD BY AUTOMATED COUNT: 18.6 % (ref 11.5–15)
GFR SERPL CREATININE-BSD FRML MDRD: >60 ML/MIN/1.73M2
GLUCOSE BLD-MCNC: 104 MG/DL (ref 74–99)
GLUCOSE BLD-MCNC: 106 MG/DL (ref 74–99)
GLUCOSE BLD-MCNC: 107 MG/DL (ref 74–99)
GLUCOSE SERPL-MCNC: 102 MG/DL (ref 74–99)
HCT VFR BLD AUTO: 29.1 % (ref 37–54)
HGB BLD-MCNC: 9.2 G/DL (ref 12.5–16.5)
IMM GRANULOCYTES # BLD AUTO: 0.21 K/UL (ref 0–0.58)
IMM GRANULOCYTES NFR BLD: 2 % (ref 0–5)
LYMPHOCYTES NFR BLD: 0.53 K/UL (ref 1.5–4)
LYMPHOCYTES RELATIVE PERCENT: 4 % (ref 20–42)
MAGNESIUM SERPL-MCNC: 1.9 MG/DL (ref 1.6–2.6)
MCH RBC QN AUTO: 30 PG (ref 26–35)
MCHC RBC AUTO-ENTMCNC: 31.6 G/DL (ref 32–34.5)
MCV RBC AUTO: 94.8 FL (ref 80–99.9)
MONOCYTES NFR BLD: 0.72 K/UL (ref 0.1–0.95)
MONOCYTES NFR BLD: 6 % (ref 2–12)
NEUTROPHILS NFR BLD: 89 % (ref 43–80)
NEUTS SEG NFR BLD: 11.52 K/UL (ref 1.8–7.3)
PHOSPHATE SERPL-MCNC: 2.9 MG/DL (ref 2.5–4.5)
PLATELET # BLD AUTO: 200 K/UL (ref 130–450)
PMV BLD AUTO: 10.9 FL (ref 7–12)
POTASSIUM SERPL-SCNC: 4.1 MMOL/L (ref 3.5–5)
PROT SERPL-MCNC: 5.7 G/DL (ref 6.4–8.3)
RBC # BLD AUTO: 3.07 M/UL (ref 3.8–5.8)
RBC # BLD: ABNORMAL 10*6/UL
SODIUM SERPL-SCNC: 141 MMOL/L (ref 132–146)
WBC OTHER # BLD: 13 K/UL (ref 4.5–11.5)

## 2024-02-21 PROCEDURE — 80053 COMPREHEN METABOLIC PANEL: CPT

## 2024-02-21 PROCEDURE — 6370000000 HC RX 637 (ALT 250 FOR IP): Performed by: FAMILY MEDICINE

## 2024-02-21 PROCEDURE — 94640 AIRWAY INHALATION TREATMENT: CPT

## 2024-02-21 PROCEDURE — 6370000000 HC RX 637 (ALT 250 FOR IP): Performed by: STUDENT IN AN ORGANIZED HEALTH CARE EDUCATION/TRAINING PROGRAM

## 2024-02-21 PROCEDURE — 2700000000 HC OXYGEN THERAPY PER DAY

## 2024-02-21 PROCEDURE — 82330 ASSAY OF CALCIUM: CPT

## 2024-02-21 PROCEDURE — 6360000002 HC RX W HCPCS: Performed by: STUDENT IN AN ORGANIZED HEALTH CARE EDUCATION/TRAINING PROGRAM

## 2024-02-21 PROCEDURE — 6370000000 HC RX 637 (ALT 250 FOR IP)

## 2024-02-21 PROCEDURE — 6360000002 HC RX W HCPCS: Performed by: FAMILY MEDICINE

## 2024-02-21 PROCEDURE — 94660 CPAP INITIATION&MGMT: CPT

## 2024-02-21 PROCEDURE — 97530 THERAPEUTIC ACTIVITIES: CPT

## 2024-02-21 PROCEDURE — 2140000000 HC CCU INTERMEDIATE R&B

## 2024-02-21 PROCEDURE — 92526 ORAL FUNCTION THERAPY: CPT

## 2024-02-21 PROCEDURE — 97535 SELF CARE MNGMENT TRAINING: CPT

## 2024-02-21 PROCEDURE — 99233 SBSQ HOSP IP/OBS HIGH 50: CPT | Performed by: SURGERY

## 2024-02-21 PROCEDURE — 6360000002 HC RX W HCPCS

## 2024-02-21 PROCEDURE — 99232 SBSQ HOSP IP/OBS MODERATE 35: CPT | Performed by: INTERNAL MEDICINE

## 2024-02-21 PROCEDURE — 85025 COMPLETE CBC W/AUTO DIFF WBC: CPT

## 2024-02-21 PROCEDURE — 2580000003 HC RX 258

## 2024-02-21 PROCEDURE — 6370000000 HC RX 637 (ALT 250 FOR IP): Performed by: INTERNAL MEDICINE

## 2024-02-21 PROCEDURE — 2580000003 HC RX 258: Performed by: FAMILY MEDICINE

## 2024-02-21 PROCEDURE — 84100 ASSAY OF PHOSPHORUS: CPT

## 2024-02-21 PROCEDURE — 82962 GLUCOSE BLOOD TEST: CPT

## 2024-02-21 PROCEDURE — 83735 ASSAY OF MAGNESIUM: CPT

## 2024-02-21 RX ORDER — VITAMIN B COMPLEX
1000 TABLET ORAL DAILY
Status: DISCONTINUED | OUTPATIENT
Start: 2024-02-22 | End: 2024-03-01 | Stop reason: HOSPADM

## 2024-02-21 RX ADMIN — Medication 10 ML: at 21:55

## 2024-02-21 RX ADMIN — DIVALPROEX SODIUM 125 MG: 125 CAPSULE, COATED PELLETS ORAL at 14:57

## 2024-02-21 RX ADMIN — BISACODYL 10 MG: 10 SUPPOSITORY RECTAL at 08:36

## 2024-02-21 RX ADMIN — PANTOPRAZOLE SODIUM 40 MG: 40 TABLET, DELAYED RELEASE ORAL at 08:36

## 2024-02-21 RX ADMIN — POLYETHYLENE GLYCOL 3350 17 G: 17 POWDER, FOR SOLUTION ORAL at 08:35

## 2024-02-21 RX ADMIN — HYDROCORTISONE SODIUM SUCCINATE 50 MG: 100 INJECTION, POWDER, FOR SOLUTION INTRAMUSCULAR; INTRAVENOUS at 11:58

## 2024-02-21 RX ADMIN — TIMOLOL MALEATE 1 DROP: 5 SOLUTION OPHTHALMIC at 08:38

## 2024-02-21 RX ADMIN — ARFORMOTEROL TARTRATE 15 MCG: 15 SOLUTION RESPIRATORY (INHALATION) at 20:27

## 2024-02-21 RX ADMIN — BUDESONIDE INHALATION 500 MCG: 0.5 SUSPENSION RESPIRATORY (INHALATION) at 20:27

## 2024-02-21 RX ADMIN — ISOSORBIDE MONONITRATE 30 MG: 30 TABLET, EXTENDED RELEASE ORAL at 08:36

## 2024-02-21 RX ADMIN — DIVALPROEX SODIUM 125 MG: 125 CAPSULE, COATED PELLETS ORAL at 21:44

## 2024-02-21 RX ADMIN — CLOPIDOGREL BISULFATE 75 MG: 75 TABLET ORAL at 09:36

## 2024-02-21 RX ADMIN — IPRATROPIUM BROMIDE AND ALBUTEROL SULFATE 1 DOSE: 2.5; .5 SOLUTION RESPIRATORY (INHALATION) at 10:16

## 2024-02-21 RX ADMIN — METOPROLOL SUCCINATE 25 MG: 25 TABLET, EXTENDED RELEASE ORAL at 08:36

## 2024-02-21 RX ADMIN — CALCIUM GLUCONATE 2000 MG: 98 INJECTION, SOLUTION INTRAVENOUS at 12:04

## 2024-02-21 RX ADMIN — DIVALPROEX SODIUM 125 MG: 125 CAPSULE, COATED PELLETS ORAL at 06:39

## 2024-02-21 RX ADMIN — IPRATROPIUM BROMIDE AND ALBUTEROL SULFATE 1 DOSE: 2.5; .5 SOLUTION RESPIRATORY (INHALATION) at 17:08

## 2024-02-21 RX ADMIN — ARFORMOTEROL TARTRATE 15 MCG: 15 SOLUTION RESPIRATORY (INHALATION) at 10:16

## 2024-02-21 RX ADMIN — ASPIRIN 81 MG: 81 TABLET, CHEWABLE ORAL at 08:36

## 2024-02-21 RX ADMIN — Medication 10 ML: at 08:38

## 2024-02-21 RX ADMIN — HEPARIN SODIUM 5000 UNITS: 10000 INJECTION INTRAVENOUS; SUBCUTANEOUS at 06:39

## 2024-02-21 RX ADMIN — IPRATROPIUM BROMIDE AND ALBUTEROL SULFATE 1 DOSE: 2.5; .5 SOLUTION RESPIRATORY (INHALATION) at 20:27

## 2024-02-21 RX ADMIN — HEPARIN SODIUM 5000 UNITS: 10000 INJECTION INTRAVENOUS; SUBCUTANEOUS at 21:44

## 2024-02-21 RX ADMIN — IPRATROPIUM BROMIDE AND ALBUTEROL SULFATE 1 DOSE: 2.5; .5 SOLUTION RESPIRATORY (INHALATION) at 12:35

## 2024-02-21 RX ADMIN — SENNOSIDES 8.6 MG: 8.6 TABLET, FILM COATED ORAL at 21:44

## 2024-02-21 RX ADMIN — HYDROCORTISONE SODIUM SUCCINATE 50 MG: 100 INJECTION, POWDER, FOR SOLUTION INTRAMUSCULAR; INTRAVENOUS at 01:23

## 2024-02-21 RX ADMIN — HEPARIN SODIUM 5000 UNITS: 10000 INJECTION INTRAVENOUS; SUBCUTANEOUS at 14:56

## 2024-02-21 RX ADMIN — BUDESONIDE INHALATION 500 MCG: 0.5 SUSPENSION RESPIRATORY (INHALATION) at 10:16

## 2024-02-21 RX ADMIN — ROSUVASTATIN CALCIUM 5 MG: 5 TABLET, COATED ORAL at 21:55

## 2024-02-21 ASSESSMENT — PAIN SCALES - GENERAL
PAINLEVEL_OUTOF10: 0

## 2024-02-21 NOTE — PROGRESS NOTES
Department of Internal Medicine  Nephrology Attending Progress Note      Events reviewed.       SUBJECTIVE: We are following Mr. Lee for MIGDALIA.  Reports no new complaints.     PHYSICAL EXAM:      Vitals:    VITALS:  /75   Pulse 80   Temp 98.3 °F (36.8 °C) (Temporal)   Resp 27   Ht 1.727 m (5' 7.99\")   Wt 88.6 kg (195 lb 5.2 oz)   SpO2 96%   BMI 29.71 kg/m²   24HR INTAKE/OUTPUT:    Intake/Output Summary (Last 24 hours) at 2/21/2024 0936  Last data filed at 2/21/2024 0400  Gross per 24 hour   Intake 954.84 ml   Output 1750 ml   Net -795.16 ml         Constitutional: Patient is alert   HEENT: Pupils equal reactive, mucous membranes are dry  Respiratory: Lungs are coarse  Cardiovascular/Edema: Heart sounds are tachycardic  Gastrointestinal: Abdomen is soft  Neurologic: Patient lethargic, nonfocal  Skin: No lesions  Other: + edema    Scheduled Meds:   senna  1 tablet Oral Nightly    bisacodyl  10 mg Rectal Daily    pantoprazole  40 mg Oral Daily    hydrocortisone sodium succinate PF  50 mg IntraVENous Q12H    And    [START ON 2/22/2024] hydrocortisone sodium succinate PF  50 mg IntraVENous Once    And    [START ON 2/23/2024] hydrocortisone sodium succinate PF  25 mg IntraVENous Once    calcium gluconate  1,000 mg IntraVENous Once    divalproex  125 mg Oral 3 times per day    isosorbide mononitrate  30 mg Oral Daily    rosuvastatin  5 mg Oral Nightly    polyethylene glycol  17 g Oral Daily    heparin (porcine)  5,000 Units SubCUTAneous 3 times per day    insulin lispro  0-4 Units SubCUTAneous Q4H    aspirin  81 mg Oral Daily    sodium chloride flush  5-40 mL IntraVENous 2 times per day    ipratropium 0.5 mg-albuterol 2.5 mg  1 Dose Inhalation Q4H WA RT    budesonide  0.5 mg Nebulization BID RT    arformoterol tartrate  15 mcg Nebulization BID RT    timolol  1 drop Both Eyes Daily    metoprolol succinate  25 mg Oral Daily    clopidogrel  75 mg Oral Daily     Continuous Infusions:   sodium chloride       might reflect infectious/inflammatory process.     Gallbladder wall thickening versus pericholecystic fluid. Correlate for  cholecystitis.  Could obtain ultrasound or HIDA for further evaluation.     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 Recommend follow-up every 2 years.     Bladder wall thickening with perivesicular stranding.  Findings could reflect  cystitis.  Correlate with urinalysis.  Also note that there is bilateral  perinephric stranding which is a nonspecific finding.     Trace free fluid in the pelvis.     Small hiatal hernia.     Severe atherosclerotic calcification which causes narrowing of the major  abdominal aortic branch vessels.     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    XR CHEST PORTABLE  2/12/24      FINDINGS:  There is mild improvement in aeration with persistent bilateral mid to lower  lung zone hazy airspace opacities.  Blunting of the bilateral costophrenic  angles left, greater than right again seen suggestive of pleural effusions.  Heart is enlarged, but stable.     IMPRESSION:  Slight improvement in lung aeration.  Otherwise, no significant change with  pulmonary edema and bilateral pleural effusions noted.              BRIEF SUMMARY OF INITIAL CONSULT:    Briefly Mr. Lee he is a 83-year-old man with history of HTN, CAD status post CABG, aortic stenosis status post AVR with bioprosthetic valve, hyperlipidemia, PAF, asbestos exposure, who was recently admitted on December 2023 with pneumonia, who was admitted on February 7, 2024 after he was brought to the ER by EMS due to shortness of breath.  In the ER he was found to be hypotensive and septic shock as well as NSTEMI and COVID-19 positive.  On admission his creatinine level was 1.2 mg/dL but since then has rapidly increased up to 2.3 mg/dL, reason for this consultation.  His medications prior to admission included losartan, HCTZ.  Addendum: The patient was

## 2024-02-21 NOTE — PROGRESS NOTES
Attending Physician Statement:  I have examined the patient, reviewed the record, and discussed the case with the surgical team.  I agree with the assessment and plan with the following additions, corrections, and changes.     I provided critical care to a patient with ACS and acute respiratory failure  requiring frequent and emergent imaging, lab studies, intensive monitoring, data review, and adjusting the clinical plan as well as urgent coordination with multiple specialists.         2/20--vomited overnight, oxygen weaned this am, wants to eat, transfused 1 rbc yesterday  2/21--tolerated diet. Up in chair. Worked with PTOT this am.      A:  NSTEMI   acute respiratory failure - 6L NC  COPD  Multifactorial anemia Hb 9.2  MIGDALIA - Cr 1.1        P:  Bedside sitter  Continue depakote - consider stopping   Continue ASA/Plavix  Statin  Continue imdur  supplemental oxygen to keep SpO2 > 94%   Bronchodilators, inhaled steroids  Wean IV steroids-to stop on 2/23  SQH for DVT risk  Continue PTOT  FULL CODE  Transfer to intermediate

## 2024-02-21 NOTE — PROGRESS NOTES
4 Eyes Skin Assessment     NAME:  Shiv Lee  YOB: 1940  MEDICAL RECORD NUMBER:  [id number]    The patient is being assessed for  Transfer to New Unit    I agree that at least one RN has performed a thorough Head to Toe Skin Assessment on the patient. ALL assessment sites listed below have been assessed.      Areas assessed by both nurses:    Head, Face, Ears, Shoulders, Back, Chest, Arms, Elbows, Hands, Sacrum. Buttock, Coccyx, Ischium, Legs. Feet and Heels, and Under Medical Devices         Does the Patient have a Wound? No noted wound(s)       Bulmaro Prevention initiated by RN: No  Wound Care Orders initiated by RN: No    Pressure Injury (Stage 3,4, Unstageable, DTI, NWPT, and Complex wounds) if present, place Wound referral order by RN under : No    New Ostomies, if present place, Ostomy referral order under : No     Nurse 1 eSignature: Electronically signed by Shiela Gonzalez RN on 2/21/24 at 5:19 PM EST    **SHARE this note so that the co-signing nurse can place an eSignature**    Nurse 2 eSignature: Electronically signed by Thelma Victoria RN on 2/21/24 at 6:16 PM EST

## 2024-02-21 NOTE — PROGRESS NOTES
Hospitalist Progress Note      PCP: Gilson Ramon III,     Date of Admission: 2/12/2024        Hospital Course:  83 y.o. male presented with STEMI, , HE DOES NOT KNOW WHY HE IS HERE OR WHAT HAPPENED.    HE WAS INITIALLY ADMITTED TO [place], , HIS TT WAS ELEVATED 1338 .      2/16  STILL ON LEVOPHED.   TRYING TO WEAN AIRVO        2/19  LASIX IS ON HOLD    2/21   TRANSFERRED FROM [facility].  PLAVIX AND ASPIRIN TODAY    Subjective:  MORE ALERT, WIFE AT BEDSIDE           Medications:  Reviewed    Infusion Medications    sodium chloride      sodium chloride      sodium chloride      dextrose      sodium chloride       Scheduled Medications    senna  1 tablet Oral Nightly    bisacodyl  10 mg Rectal Daily    pantoprazole  40 mg Oral Daily    [START ON 2/22/2024] hydrocortisone sodium succinate PF  50 mg IntraVENous Once    And    [START ON 2/23/2024] hydrocortisone sodium succinate PF  25 mg IntraVENous Once    divalproex  125 mg Oral 3 times per day    isosorbide mononitrate  30 mg Oral Daily    rosuvastatin  5 mg Oral Nightly    polyethylene glycol  17 g Oral Daily    heparin (porcine)  5,000 Units SubCUTAneous 3 times per day    aspirin  81 mg Oral Daily    sodium chloride flush  5-40 mL IntraVENous 2 times per day    ipratropium 0.5 mg-albuterol 2.5 mg  1 Dose Inhalation Q4H WA RT    budesonide  0.5 mg Nebulization BID RT    arformoterol tartrate  15 mcg Nebulization BID RT    timolol  1 drop Both Eyes Daily    metoprolol succinate  25 mg Oral Daily    clopidogrel  75 mg Oral Daily     PRN Meds: sodium chloride, prochlorperazine, sodium chloride, sodium chloride, glucose, dextrose bolus **OR** dextrose bolus, glucagon (rDNA), dextrose, acetaminophen **OR** acetaminophen, ondansetron **OR** ondansetron, sodium chloride flush, sodium chloride, hydrALAZINE      Intake/Output Summary (Last 24 hours) at 2/21/2024 1443  Last data filed at 2/21/2024 1343  Gross per 24 hour   Intake 1004.84 ml   Output 2050 ml   Net

## 2024-02-21 NOTE — PLAN OF CARE
equipment as needed   Obtain physical therapy/occupational therapy consults as needed   Apply continuous passive motion per provider or physical therapy orders to increase flexion toward goal   Ensure adequate protection for wounds/incisions during mobilization   Instruct patient/family in ordered activity level  Goal: Maintain proper alignment of affected body part  Outcome: Progressing  Goal: Return ADL status to a safe level of function  Outcome: Progressing     Problem: Gastrointestinal - Adult  Goal: Minimal or absence of nausea and vomiting  2/20/2024 2216 by Adarsh Melo RN  Outcome: Progressing  2/20/2024 1133 by Hyun Cabrales RN  Outcome: Progressing  Flowsheets (Taken 2/19/2024 1113)  Minimal or absence of nausea and vomiting:   Administer IV fluids as ordered to ensure adequate hydration   Nasogastric tube to low intermittent suction as ordered   Nutrition consult to assist patient with adequate nutrition and appropriate food choices   Provide nonpharmacologic comfort measures as appropriate   Maintain NPO status until nausea and vomiting are resolved   Administer ordered antiemetic medications as needed   Advance diet as tolerated, if ordered  Goal: Maintains or returns to baseline bowel function  Outcome: Progressing  Goal: Maintains adequate nutritional intake  Outcome: Progressing  Goal: Establish and maintain optimal ostomy function  Outcome: Progressing     Problem: Genitourinary - Adult  Goal: Absence of urinary retention  2/20/2024 2216 by Adarsh Melo RN  Outcome: Progressing  2/20/2024 1133 by Hyun Cabrales RN  Outcome: Progressing  Goal: Urinary catheter remains patent  Outcome: Progressing     Problem: Infection - Adult  Goal: Absence of infection at discharge  Outcome: Progressing  Goal: Absence of infection during hospitalization  Outcome: Progressing  Goal: Absence of fever/infection during anticipated neutropenic period  Outcome: Progressing     Problem: Metabolic/Fluid  Chronic Conditions and Co-morbidities  Goal: Patient's chronic conditions and co-morbidity symptoms are monitored and maintained or improved  Outcome: Progressing     Problem: Pain  Goal: Verbalizes/displays adequate comfort level or baseline comfort level  2/20/2024 2216 by Adarsh Melo RN  Outcome: Progressing  2/20/2024 1133 by Hyun Cabrales RN  Outcome: Progressing  Flowsheets (Taken 2/19/2024 1113)  Verbalizes/displays adequate comfort level or baseline comfort level:   Assess pain using appropriate pain scale   Consider cultural and social influences on pain and pain management   Administer analgesics based on type and severity of pain and evaluate response   Implement non-pharmacological measures as appropriate and evaluate response   Encourage patient to monitor pain and request assistance     Problem: Nutrition Deficit:  Goal: Optimize nutritional status  Outcome: Progressing

## 2024-02-21 NOTE — PROGRESS NOTES
Patient's wife Scott was called and notified of patient's transfer destination and new room number

## 2024-02-21 NOTE — PROGRESS NOTES
SPEECH LANGUAGE PATHOLOGY  DAILY PROGRESS NOTE        PATIENT NAME:  Shiv Lee      ROOM:  6501/6501-B :  1940         TODAY'S DATE:  2024       Patient seen for dysphagia      Current Diet Order: ADULT DIET; Dysphagia - Minced and Moist; Low Fat/Low Chol/High Fiber/2 gm Na  Results and recommendations of swallowing evaluation reviewed.  Pt monitored during mealtime.  Implementation of recommended compensatory strategies were completed with moderate cues  Appetite was fair        Will continue

## 2024-02-21 NOTE — PROGRESS NOTES
"    Kailash Cooper is a 80 y.o. male.     80-year-old white male with history of lung cancer, chronic back pain who goes to pain management, hypertension, hyperlipidemia, CKD 3 and right arm tremor who comes in today after being seen last week.  He states he has been having shortness of air when laying down at night.  He states he will wake up and has a hard time breathing for a few minutes and then he goes back to sleep.  Patient just had a recent follow-up CT which showed a new nodule in the base of the right lung but it was too small to biopsy they are going to do a follow-up CT in 3 months.  Patient did not really get to talk to pulmonologist I advised him to make an appointment to talk about the increased shortness of air.  His oxygen level on room air today was 95%.  His lungs have some fine rales but otherwise clear and he had a recent chest x-ray that was normal.  I am going to check a BNP on him just to make sure there is no fluid overload and he is going to call make an appointment with his pulmonologist  Blood pressure 130/68 heart rate 62 denies any chest pain, dyspnea, tachycardia or dizziness    BNP  Call pulmonologist for an appointment  Keep appointment follow-up CAT scan in 3 months       The following portions of the patient's history were reviewed and updated as appropriate: allergies, current medications, past family history, past medical history, past social history, past surgical history and problem list.    Vitals:    06/23/21 1354   BP: 130/68   BP Location: Left arm   Patient Position: Sitting   Cuff Size: Adult   Pulse: 63   Temp: 97.3 °F (36.3 °C)   TempSrc: Temporal   SpO2: 95%   Weight: 78.7 kg (173 lb 9.6 oz)   Height: 165.1 cm (65\")     Body mass index is 28.89 kg/m².    Past Medical History:   Diagnosis Date   • Arthritis    • Cancer (CMS/HCC)     bone cancer upper lobe rt lung 9/2017 [facility]   • Diabetes (CMS/HCC)    • Enlarged prostate    • Former smoker    • Hiatal hernia    • Hip " pain     don   • Hip pain, bilateral    • Hyperlipidemia    • Hypertension    • Kidney disease        stage 3    • Leg pain     don   • Low back pain    • Neck pain    • Stroke (CMS/HCC)      Past Surgical History:   Procedure Laterality Date   • CATARACT EXTRACTION  2006 [facility]   • COLONOSCOPY  2011   • LUNG CANCER SURGERY      upper lobe rt lung cancer 9/2017  at [facility]     Family History   Problem Relation Age of Onset   • Stroke Mother    • Cancer Father         Prostate   • Heart failure Brother    • Heart disease Other      Immunization History   Administered Date(s) Administered   • Fluad Quad 65+ 11/18/2019   • Flublock Quad =>18yrs 10/07/2020   • Influenza, Unspecified 09/20/2017       Admission on 06/12/2021, Discharged on 06/14/2021   Component Date Value Ref Range Status   • Glucose 06/12/2021 97  65 - 99 mg/dL Final   • BUN 06/12/2021 16  8 - 23 mg/dL Final   • Creatinine 06/12/2021 1.62* 0.76 - 1.27 mg/dL Final   • Sodium 06/12/2021 143  136 - 145 mmol/L Final   • Potassium 06/12/2021 3.4* 3.5 - 5.2 mmol/L Final   • Chloride 06/12/2021 106  98 - 107 mmol/L Final   • CO2 06/12/2021 25.0  22.0 - 29.0 mmol/L Final   • Calcium 06/12/2021 8.7  8.6 - 10.5 mg/dL Final   • Total Protein 06/12/2021 6.2  6.0 - 8.5 g/dL Final   • Albumin 06/12/2021 4.10  3.50 - 5.20 g/dL Final   • ALT (SGPT) 06/12/2021 16  1 - 41 U/L Final   • AST (SGOT) 06/12/2021 19  1 - 40 U/L Final   • Alkaline Phosphatase 06/12/2021 101  39 - 117 U/L Final   • Total Bilirubin 06/12/2021 0.5  0.0 - 1.2 mg/dL Final   • eGFR Non  Amer 06/12/2021 41* >60 mL/min/1.73 Final   • Globulin 06/12/2021 2.1  gm/dL Final   • A/G Ratio 06/12/2021 2.0  g/dL Final   • BUN/Creatinine Ratio 06/12/2021 9.9  7.0 - 25.0 Final   • Anion Gap 06/12/2021 12.0  5.0 - 15.0 mmol/L Final   • Protime 06/12/2021 9.8  9.6 - 11.7 Seconds Final   • INR 06/12/2021 <0.93* 0.93 - 1.10 Final   • PTT 06/12/2021 23.5* 24.0 - 31.0 seconds Final   • TSH 06/12/2021 1.090   0.270 - 4.200 uIU/mL Final   • Ammonia 06/12/2021 16  16 - 60 umol/L Final   • Blood Culture 06/12/2021 No growth at 5 days   Final   • Blood Culture 06/12/2021 No growth at 5 days   Final   • QT Interval 06/12/2021 436  ms Final   • Troponin T 06/12/2021 0.014  0.000 - 0.030 ng/mL Final   • WBC 06/12/2021 8.50  3.40 - 10.80 10*3/mm3 Final   • RBC 06/12/2021 3.98* 4.14 - 5.80 10*6/mm3 Final   • Hemoglobin 06/12/2021 12.0* 13.0 - 17.7 g/dL Final   • Hematocrit 06/12/2021 35.6* 37.5 - 51.0 % Final   • MCV 06/12/2021 89.5  79.0 - 97.0 fL Final   • MCH 06/12/2021 30.2  26.6 - 33.0 pg Final   • MCHC 06/12/2021 33.8  31.5 - 35.7 g/dL Final   • RDW 06/12/2021 13.9  12.3 - 15.4 % Final   • RDW-SD 06/12/2021 44.2  37.0 - 54.0 fl Final   • MPV 06/12/2021 7.6  6.0 - 12.0 fL Final   • Platelets 06/12/2021 203  140 - 450 10*3/mm3 Final   • Neutrophil % 06/12/2021 76.2* 42.7 - 76.0 % Final   • Lymphocyte % 06/12/2021 13.8* 19.6 - 45.3 % Final   • Monocyte % 06/12/2021 7.1  5.0 - 12.0 % Final   • Eosinophil % 06/12/2021 2.4  0.3 - 6.2 % Final   • Basophil % 06/12/2021 0.5  0.0 - 1.5 % Final   • Neutrophils, Absolute 06/12/2021 6.50  1.70 - 7.00 10*3/mm3 Final   • Lymphocytes, Absolute 06/12/2021 1.20  0.70 - 3.10 10*3/mm3 Final   • Monocytes, Absolute 06/12/2021 0.60  0.10 - 0.90 10*3/mm3 Final   • Eosinophils, Absolute 06/12/2021 0.20  0.00 - 0.40 10*3/mm3 Final   • Basophils, Absolute 06/12/2021 0.00  0.00 - 0.20 10*3/mm3 Final   • nRBC 06/12/2021 0.0  0.0 - 0.2 /100 WBC Final   • Glucose 06/12/2021 95  70 - 105 mg/dL Final    Serial Number: [id number]  [id number]   • Color, UA 06/12/2021 Yellow  Yellow, Straw Final   • Appearance, UA 06/12/2021 Clear  Clear Final   • pH, UA 06/12/2021 5.5  5.0 - 8.0 Final   • Specific Gravity, UA 06/12/2021 1.012  1.005 - 1.030 Final   • Glucose, UA 06/12/2021 Negative  Negative Final   • Ketones, UA 06/12/2021 Negative  Negative Final   • Bilirubin, UA 06/12/2021 Negative  Negative  or itching.   : denies hematuria, dysuria   GI: denies vomiting, diarrhea   Psych: denies mood changed, anxiety, depression.         Physical exam:    Constitutional: A&O x3, communicates well, no acute distress.  Eyes: extraocular muscles intact, PERRL.  Normal lids & conjunctiva.  No icterus.   ENT: clear, no bleeding.  No external masses.  Lips normal formation.  Neck: supple, full ROM, no JVD, no bruits, no lymphadenopathy.  No masses.  trachea midline.  Heart: Distant to auscultation.  Regular rate & rhythm, normal S1 & S2, no abnormal murmurs.  No heave.  Lungs: Poor air movement.  Diffuse bilateral rales.  No accessory muscles.  Abd: Obese soft, non-tender.  Normal bowel sounds.   Neuro: Full ROM X 4, EOMI, no tremors.  EXT: No bilateral lower extremity edema on exam  Skin: warm, dry, intact.  Good turgor.  Psych: A&O x 3, normal behavior, not anxious.      Assessment/Recommendations  Acute coronary syndrome with inferoposterior NSTEMI followed by aborted STEMI.  Cardiac catheterization 2/14/2024: 99% stenosis of the mid SVG to the RCA: I reviewed the images.  Medical therapy pursued.  No chest pain or shortness of breath today.  If he has recurring ischemic symptoms and comorbidities resolved he may need to consider PCI of the SVG to the RCA.  Okay with surgery to add Plavix onto the aspirin.  Will add this today.  Coronary artery disease.  Bioprosthetic AVR.  Infrarenal abdominal aortic aneurysm  Postop atrial fibrillation 2009  Bradycardia.  Resolved.  Septic shock.  Off pressors.  Lactic acidosis.  Hypertension  Anemia.  Status posttransfusion.  Hemoglobin 9.2 today.  See above for DAPT.  Statin intolerance.    Note: This report was completed using computerized voice recognition software. Every effort has been made to ensure accuracy, however; and invert and computerized transcription errors may be present.   Final   • Blood, UA 06/12/2021 Negative  Negative Final   • Protein, UA 06/12/2021 30 mg/dL (1+)* Negative Final   • Leuk Esterase, UA 06/12/2021 Negative  Negative Final   • Nitrite, UA 06/12/2021 Negative  Negative Final   • Urobilinogen, UA 06/12/2021 0.2 E.U./dL  0.2 - 1.0 E.U./dL Final   • RBC, UA 06/12/2021 None Seen  None Seen /HPF Final   • WBC, UA 06/12/2021 None Seen  None Seen /HPF Final   • Bacteria, UA 06/12/2021 None Seen  None Seen /HPF Final   • Squamous Epithelial Cells, UA 06/12/2021 None Seen  None Seen, 0-2 /HPF Final   • Hyaline Casts, UA 06/12/2021 0-2  None Seen /LPF Final   • Methodology 06/12/2021 Automated Microscopy   Final   • Troponin T 06/13/2021 0.017  0.000 - 0.030 ng/mL Final   • Glucose 06/13/2021 105* 65 - 99 mg/dL Final   • BUN 06/13/2021 16  8 - 23 mg/dL Final   • Creatinine 06/13/2021 1.54* 0.76 - 1.27 mg/dL Final   • Sodium 06/13/2021 142  136 - 145 mmol/L Final   • Potassium 06/13/2021 3.1* 3.5 - 5.2 mmol/L Final   • Chloride 06/13/2021 107  98 - 107 mmol/L Final   • CO2 06/13/2021 23.0  22.0 - 29.0 mmol/L Final   • Calcium 06/13/2021 9.1  8.6 - 10.5 mg/dL Final   • eGFR Non African Amer 06/13/2021 44* >60 mL/min/1.73 Final   • BUN/Creatinine Ratio 06/13/2021 10.4  7.0 - 25.0 Final   • Anion Gap 06/13/2021 12.0  5.0 - 15.0 mmol/L Final   • WBC 06/13/2021 8.70  3.40 - 10.80 10*3/mm3 Final   • RBC 06/13/2021 4.05* 4.14 - 5.80 10*6/mm3 Final   • Hemoglobin 06/13/2021 12.4* 13.0 - 17.7 g/dL Final   • Hematocrit 06/13/2021 36.5* 37.5 - 51.0 % Final   • MCV 06/13/2021 90.1  79.0 - 97.0 fL Final   • MCH 06/13/2021 30.5  26.6 - 33.0 pg Final   • MCHC 06/13/2021 33.9  31.5 - 35.7 g/dL Final   • RDW 06/13/2021 14.0  12.3 - 15.4 % Final   • RDW-SD 06/13/2021 43.3  37.0 - 54.0 fl Final   • MPV 06/13/2021 7.6  6.0 - 12.0 fL Final   • Platelets 06/13/2021 182  140 - 450 10*3/mm3 Final   • Neutrophil % 06/13/2021 75.8  42.7 - 76.0 % Final   • Lymphocyte % 06/13/2021 13.6* 19.6 - 45.3 % Final    • Monocyte % 06/13/2021 7.1  5.0 - 12.0 % Final   • Eosinophil % 06/13/2021 2.4  0.3 - 6.2 % Final   • Basophil % 06/13/2021 1.1  0.0 - 1.5 % Final   • Neutrophils, Absolute 06/13/2021 6.60  1.70 - 7.00 10*3/mm3 Final   • Lymphocytes, Absolute 06/13/2021 1.20  0.70 - 3.10 10*3/mm3 Final   • Monocytes, Absolute 06/13/2021 0.60  0.10 - 0.90 10*3/mm3 Final   • Eosinophils, Absolute 06/13/2021 0.20  0.00 - 0.40 10*3/mm3 Final   • Basophils, Absolute 06/13/2021 0.10  0.00 - 0.20 10*3/mm3 Final   • nRBC 06/13/2021 0.0  0.0 - 0.2 /100 WBC Final   • Potassium 06/13/2021 3.7  3.5 - 5.2 mmol/L Final   • Glucose 06/14/2021 105* 65 - 99 mg/dL Final   • BUN 06/14/2021 14  8 - 23 mg/dL Final   • Creatinine 06/14/2021 1.52* 0.76 - 1.27 mg/dL Final   • Sodium 06/14/2021 144  136 - 145 mmol/L Final   • Potassium 06/14/2021 4.2  3.5 - 5.2 mmol/L Final   • Chloride 06/14/2021 108* 98 - 107 mmol/L Final   • CO2 06/14/2021 27.0  22.0 - 29.0 mmol/L Final   • Calcium 06/14/2021 9.9  8.6 - 10.5 mg/dL Final   • eGFR Non African Amer 06/14/2021 44* >60 mL/min/1.73 Final   • BUN/Creatinine Ratio 06/14/2021 9.2  7.0 - 25.0 Final   • Anion Gap 06/14/2021 9.0  5.0 - 15.0 mmol/L Final   • WBC 06/14/2021 6.70  3.40 - 10.80 10*3/mm3 Final   • RBC 06/14/2021 4.12* 4.14 - 5.80 10*6/mm3 Final   • Hemoglobin 06/14/2021 12.6* 13.0 - 17.7 g/dL Final   • Hematocrit 06/14/2021 37.5  37.5 - 51.0 % Final   • MCV 06/14/2021 91.2  79.0 - 97.0 fL Final   • MCH 06/14/2021 30.5  26.6 - 33.0 pg Final   • MCHC 06/14/2021 33.5  31.5 - 35.7 g/dL Final   • RDW 06/14/2021 14.2  12.3 - 15.4 % Final   • RDW-SD 06/14/2021 45.1  37.0 - 54.0 fl Final   • MPV 06/14/2021 7.9  6.0 - 12.0 fL Final   • Platelets 06/14/2021 187  140 - 450 10*3/mm3 Final   • Neutrophil % 06/14/2021 71.4  42.7 - 76.0 % Final   • Lymphocyte % 06/14/2021 16.4* 19.6 - 45.3 % Final   • Monocyte % 06/14/2021 7.9  5.0 - 12.0 % Final   • Eosinophil % 06/14/2021 3.4  0.3 - 6.2 % Final   • Basophil %  06/14/2021 0.9  0.0 - 1.5 % Final   • Neutrophils, Absolute 06/14/2021 4.80  1.70 - 7.00 10*3/mm3 Final   • Lymphocytes, Absolute 06/14/2021 1.10  0.70 - 3.10 10*3/mm3 Final   • Monocytes, Absolute 06/14/2021 0.50  0.10 - 0.90 10*3/mm3 Final   • Eosinophils, Absolute 06/14/2021 0.20  0.00 - 0.40 10*3/mm3 Final   • Basophils, Absolute 06/14/2021 0.10  0.00 - 0.20 10*3/mm3 Final   • nRBC 06/14/2021 0.1  0.0 - 0.2 /100 WBC Final         Review of Systems   Constitutional: Negative.    HENT: Negative.    Respiratory: Positive for shortness of breath.    Cardiovascular: Negative.    Gastrointestinal: Negative.    Genitourinary: Negative.    Musculoskeletal: Negative.    Skin: Negative.    Neurological: Negative.    Psychiatric/Behavioral: Negative.        Objective   Physical Exam  Constitutional:       Appearance: Normal appearance.   Cardiovascular:      Rate and Rhythm: Normal rate and regular rhythm.      Pulses: Normal pulses.      Heart sounds: Normal heart sounds.   Pulmonary:      Effort: Pulmonary effort is normal.      Breath sounds: Normal breath sounds.   Abdominal:      General: Bowel sounds are normal.   Musculoskeletal:         General: Normal range of motion.   Skin:     General: Skin is warm and dry.   Neurological:      General: No focal deficit present.      Mental Status: He is alert and oriented to person, place, and time.   Psychiatric:         Mood and Affect: Mood normal.         Behavior: Behavior normal.         Procedures    Assessment/Plan   Diagnoses and all orders for this visit:    1. Shortness of breath (Primary)  -     proBNP          Current Outpatient Medications:   •  amLODIPine (NORVASC) 5 MG tablet, Take 2 tablets by mouth Daily., Disp: 90 tablet, Rfl: 1  •  cetirizine (zyrTEC) 10 MG tablet, Take 1 tablet by mouth Daily., Disp: 90 tablet, Rfl: 1  •  Cholecalciferol (VITAMIN D3) 50 MCG (2000 UT) capsule, Take 2,000 Units by mouth Daily., Disp: , Rfl:   •  cyclobenzaprine (FLEXERIL) 10  MG tablet, Take 1 tablet by mouth At Night As Needed for Muscle Spasms., Disp: 30 tablet, Rfl: 2  •  famotidine (PEPCID) 20 MG tablet, Take 20 mg by mouth Daily., Disp: , Rfl:   •  ferrous sulfate 325 (65 FE) MG EC tablet, Take 1 tablet by mouth Daily With Breakfast., Disp: , Rfl:   •  glimepiride (AMARYL) 1 MG tablet, Take 1 tablet by mouth Daily., Disp: 90 tablet, Rfl: 1  •  hydrALAZINE (APRESOLINE) 50 MG tablet, Take 1 tablet by mouth 3 (Three) Times a Day., Disp: 90 tablet, Rfl: 1  •  HYDROcodone-acetaminophen (NORCO)  MG per tablet, Take 1 tablet by mouth 5 (Five) Times a Day As Needed for Severe Pain . DNF before 6/4/2021, Disp: 150 tablet, Rfl: 0  •  metoprolol tartrate (LOPRESSOR) 25 MG tablet, Take 1 tablet by mouth Daily., Disp: 90 tablet, Rfl: 1  •  nitroglycerin (Nitrostat) 0.3 MG SL tablet, Place 1 tablet under the tongue Every 5 (Five) Minutes As Needed for Chest Pain. Take no more than 3 doses in 15 minutes., Disp: 50 tablet, Rfl: 12  •  pravastatin (PRAVACHOL) 40 MG tablet, Take 1 tablet by mouth Daily., Disp: 90 tablet, Rfl: 1  •  ramipril (ALTACE) 5 MG capsule, Take 1 capsule by mouth Every Evening., Disp: 90 capsule, Rfl: 1  •  tamsulosin (FLOMAX) 0.4 MG capsule 24 hr capsule, Take 1 capsule by mouth Daily. NEED TO BE SEEN IN OFFICE FOR FUTURE REFILLS. (Patient taking differently: Take 1 capsule by mouth Daily.), Disp: 90 capsule, Rfl: 0  •  torsemide (DEMADEX) 5 MG tablet, Take 1 tablet by mouth Daily., Disp: 90 tablet, Rfl: 1  •  vitamin B-12 (CYANOCOBALAMIN) 1000 MCG tablet, Take 1 tablet by mouth Daily., Disp: , Rfl:

## 2024-02-21 NOTE — PROGRESS NOTES
Patient was transferred from [facility] to [facility] in stable condition, connected to telemetry monitor, on 6L NC. He was transferred with all personal belongings.

## 2024-02-21 NOTE — PLAN OF CARE
Patient's chart updated to reflect:      .    - HF care plan, HF education points and HF discharge instructions.  -Orders: 2 gram sodium diet, daily weights, I/O.  -PCP and cardiology follow up appointments to be scheduled within 7 days of hospital discharge.  -CHF education session will be provided to the patient prior to hospital discharge.    Flory Plummer RN   Heart Failure Navigator

## 2024-02-21 NOTE — DISCHARGE INSTRUCTIONS
Infection.\"     If you do not have an account, please click on the \"Sign Up Now\" link.  Current as of: February 10, 2021               Content Version: 12.9  © 2006-2021 [employer].   Care instructions adapted under license by [email or web address]. If you have questions about a medical condition or this instruction, always ask your healthcare professional. [employer] disclaims any warranty or liability for your use of this information.                      HEART FAILURE  / CONGESTIVE HEART FAILURE  DISCHARGE INSTRUCTIONS:  GUIDELINES TO FOLLOW AT HOME    Self- Managed Care:     MEDICATIONS:  Take your medication as directed. If you are experiencing any side effects, inform your doctor, Do not stop taking any of your medications without letting your doctor know.   Check with your doctor before taking any over-the-counter medications / herbal / or dietary supplements. They may interfere with your other medications.  Do not take ibuprofen (Advil or Motrin) and naproxen (Aleve) without talking to your doctor first. They could make your heart failure worse.         WEIGHT MONITORING:   Weigh yourself everyday (with the same scale) around the same time of the day and write it down. (you can chart them on a calendar or keep track of them on paper.   Notify your doctor of a weight gain of 3 pounds or more in 1 day   OR a total of 5 pounds or more in 1 week    Take your weight record to your doctor visits  Also, the same goes if you loose more than 3# in one day, let your heart doctor know.         DIET:   Cardiac heart healthy diet- Low saturated / low trans fat, no added salt, caffeine restricted, Low sodium diet-   No more than 2,000mg (2 grams) of salt / sodium per day (which equals to a little less than  a teaspoon of salt)  If your doctor wants you on a fluid restriction...it is usually recommended a fluid limit of 2,000cc -  Fluid restriction- 2,000 ml (milliliters) = 64 ounces = you can have 8                                   Cardiac Rehabilitation  Hours: M/W/F 7am-6pm                                                                                [address] [facility] [place] [address]  [facility]                                                          [phone]  Cardiac Rehabilitation                                                                                   [phone]  [address] [place] [address]                                                                                        [facility] [place]  [phone]                                                                                          Cardiac Rehabilitation  [phone]                                                                                          [address]. [address]                                                                                                                        [place] [address]                                                                                                                        [phone]                                                                                                                        [phone]

## 2024-02-21 NOTE — PROGRESS NOTES
OCCUPATIONAL THERAPY TREATMENT NOTE   [facility]  [address], [place]       Date:2024                                                               Patient Name: Shiv Lee  MRN: [id number]  : 1940  Room: [address] [place] [address]    Evaluating OT: Desiree Rowley, OTR/L [id number]     Referring Provider:     Giovanny Starr DO       Specific Provider Orders/Date: OT eval and treat (24)        Diagnosis: STEMI (ST elevation myocardial infarction) (HCC) [I21.3]          Surgery/Procedures:  cardiac cath            Pertinent Medical History:    Past Medical History        Past Medical History:   Diagnosis Date    Abnormal EKG      Aortic stenosis      Atrial fibrillation (HCC)       Postop    Coronary artery disease      HTN (hypertension)      Left atrial dilatation       Mild    Mitral regurgitation       Trace    SBE (subacute bacterial endocarditis) prophylaxis candidate               *Precautions:  Fall Risk, alarms, sitter,, cognition, Ruby, impulsive, O2     Assessment of current deficits   [x] Functional mobility          [x]ADLs           [x] Strength                  [x]Cognition   [x] Functional transfers        [x] IADLs         [x] Safety Awareness   [x]Endurance   [x] Fine Coordination           [x] ROM           [] Vision/perception    []Sensation     [x]Gross Motor Coordination [x] Balance    [] Delirium                  [x]Motor Control     [] Communication     OT PLAN OF CARE   OT POC based on physician orders, patient diagnosis and results of clinical assessment.        Frequency/Duration: 1-3 days/wk for 1-2 weeks PRN    Specific OT Treatment to include:   ADL retraining/adapted techniques and AE recommendations to increase functional independence within precautions                    Energy conservation techniques to improve tolerance for selfcare routine   Functional transfer/mobility training/DME recommendations

## 2024-02-21 NOTE — PROGRESS NOTES
training - pt given verbal and tactile cues to facilitate proper sequencing and safety during supine>sit as well as provided with physical assistance.  Sitting EOB for >3 minutes for upright tolerance, postural awareness and BLE ROM  Transfer training - pt was given verbal and tactile cues to facilitate proper hand placement, technique and safety during sit to stand, stand to sit and stand pivot transfers as well as provided with physical assistance.  Gait training- pt was given verbal and tactile cues to facilitate safety and balance during ambulation as well as provided with physical assistance.    PLAN:    Patient is making good progress towards established goals.  Will continue with current POC.      Time in  0923  Time out  0946    Total Treatment Time  23 minutes     CPT codes:  [] Gait training 77735 - minutes  [] Manual therapy 13734 - minutes  [x] Therapeutic activities 42649 23 minutes  [] Therapeutic exercises 57556 - minutes  [] Neuromuscular reeducation 16409 - minutes    Rancho Ramos, PT, DPT  [id number]

## 2024-02-22 LAB
ALBUMIN SERPL-MCNC: 3.4 G/DL (ref 3.5–5.2)
ALP SERPL-CCNC: 74 U/L (ref 40–129)
ALT SERPL-CCNC: 22 U/L (ref 0–40)
ANION GAP SERPL CALCULATED.3IONS-SCNC: 13 MMOL/L (ref 7–16)
AST SERPL-CCNC: 20 U/L (ref 0–39)
BASOPHILS # BLD: 0.02 K/UL (ref 0–0.2)
BASOPHILS NFR BLD: 0 % (ref 0–2)
BILIRUB SERPL-MCNC: 1.7 MG/DL (ref 0–1.2)
BNP SERPL-MCNC: 2028 PG/ML (ref 0–450)
BUN SERPL-MCNC: 28 MG/DL (ref 6–23)
CALCIUM SERPL-MCNC: 8.3 MG/DL (ref 8.6–10.2)
CHLORIDE SERPL-SCNC: 106 MMOL/L (ref 98–107)
CO2 SERPL-SCNC: 24 MMOL/L (ref 22–29)
CREAT SERPL-MCNC: 0.9 MG/DL (ref 0.7–1.2)
EOSINOPHIL # BLD: 0.02 K/UL (ref 0.05–0.5)
EOSINOPHILS RELATIVE PERCENT: 0 % (ref 0–6)
ERYTHROCYTE [DISTWIDTH] IN BLOOD BY AUTOMATED COUNT: 19 % (ref 11.5–15)
GFR SERPL CREATININE-BSD FRML MDRD: >60 ML/MIN/1.73M2
GLUCOSE SERPL-MCNC: 122 MG/DL (ref 74–99)
HCT VFR BLD AUTO: 31.3 % (ref 37–54)
HGB BLD-MCNC: 9.9 G/DL (ref 12.5–16.5)
IMM GRANULOCYTES # BLD AUTO: 0.15 K/UL (ref 0–0.58)
IMM GRANULOCYTES NFR BLD: 1 % (ref 0–5)
LYMPHOCYTES NFR BLD: 0.32 K/UL (ref 1.5–4)
LYMPHOCYTES RELATIVE PERCENT: 3 % (ref 20–42)
MCH RBC QN AUTO: 30.3 PG (ref 26–35)
MCHC RBC AUTO-ENTMCNC: 31.6 G/DL (ref 32–34.5)
MCV RBC AUTO: 95.7 FL (ref 80–99.9)
MONOCYTES NFR BLD: 0.38 K/UL (ref 0.1–0.95)
MONOCYTES NFR BLD: 3 % (ref 2–12)
NEUTROPHILS NFR BLD: 93 % (ref 43–80)
NEUTS SEG NFR BLD: 12.03 K/UL (ref 1.8–7.3)
PLATELET # BLD AUTO: 210 K/UL (ref 130–450)
PMV BLD AUTO: 11.3 FL (ref 7–12)
POTASSIUM SERPL-SCNC: 3.7 MMOL/L (ref 3.5–5)
PROT SERPL-MCNC: 6 G/DL (ref 6.4–8.3)
RBC # BLD AUTO: 3.27 M/UL (ref 3.8–5.8)
RBC # BLD: ABNORMAL 10*6/UL
SODIUM SERPL-SCNC: 143 MMOL/L (ref 132–146)
WBC OTHER # BLD: 12.9 K/UL (ref 4.5–11.5)

## 2024-02-22 PROCEDURE — 6370000000 HC RX 637 (ALT 250 FOR IP): Performed by: INTERNAL MEDICINE

## 2024-02-22 PROCEDURE — 2140000000 HC CCU INTERMEDIATE R&B

## 2024-02-22 PROCEDURE — 83880 ASSAY OF NATRIURETIC PEPTIDE: CPT

## 2024-02-22 PROCEDURE — 80053 COMPREHEN METABOLIC PANEL: CPT

## 2024-02-22 PROCEDURE — 6370000000 HC RX 637 (ALT 250 FOR IP)

## 2024-02-22 PROCEDURE — 6370000000 HC RX 637 (ALT 250 FOR IP): Performed by: FAMILY MEDICINE

## 2024-02-22 PROCEDURE — 85025 COMPLETE CBC W/AUTO DIFF WBC: CPT

## 2024-02-22 PROCEDURE — 2700000000 HC OXYGEN THERAPY PER DAY

## 2024-02-22 PROCEDURE — 97530 THERAPEUTIC ACTIVITIES: CPT

## 2024-02-22 PROCEDURE — 6370000000 HC RX 637 (ALT 250 FOR IP): Performed by: STUDENT IN AN ORGANIZED HEALTH CARE EDUCATION/TRAINING PROGRAM

## 2024-02-22 PROCEDURE — 99233 SBSQ HOSP IP/OBS HIGH 50: CPT | Performed by: INTERNAL MEDICINE

## 2024-02-22 PROCEDURE — 94640 AIRWAY INHALATION TREATMENT: CPT

## 2024-02-22 PROCEDURE — 6360000002 HC RX W HCPCS: Performed by: STUDENT IN AN ORGANIZED HEALTH CARE EDUCATION/TRAINING PROGRAM

## 2024-02-22 PROCEDURE — 6360000002 HC RX W HCPCS

## 2024-02-22 PROCEDURE — 36415 COLL VENOUS BLD VENIPUNCTURE: CPT

## 2024-02-22 PROCEDURE — 94660 CPAP INITIATION&MGMT: CPT

## 2024-02-22 PROCEDURE — 6360000002 HC RX W HCPCS: Performed by: INTERNAL MEDICINE

## 2024-02-22 PROCEDURE — 6360000002 HC RX W HCPCS: Performed by: FAMILY MEDICINE

## 2024-02-22 PROCEDURE — 2580000003 HC RX 258: Performed by: FAMILY MEDICINE

## 2024-02-22 RX ORDER — POTASSIUM CHLORIDE 20 MEQ/1
40 TABLET, EXTENDED RELEASE ORAL ONCE
Status: COMPLETED | OUTPATIENT
Start: 2024-02-22 | End: 2024-02-22

## 2024-02-22 RX ORDER — MAGNESIUM SULFATE IN WATER 40 MG/ML
2000 INJECTION, SOLUTION INTRAVENOUS ONCE
Status: COMPLETED | OUTPATIENT
Start: 2024-02-22 | End: 2024-02-22

## 2024-02-22 RX ORDER — POTASSIUM CHLORIDE 20 MEQ/1
20 TABLET, EXTENDED RELEASE ORAL ONCE
Status: COMPLETED | OUTPATIENT
Start: 2024-02-22 | End: 2024-02-22

## 2024-02-22 RX ADMIN — DIVALPROEX SODIUM 125 MG: 125 CAPSULE, COATED PELLETS ORAL at 15:10

## 2024-02-22 RX ADMIN — ASPIRIN 81 MG: 81 TABLET, CHEWABLE ORAL at 10:18

## 2024-02-22 RX ADMIN — POLYETHYLENE GLYCOL 3350 17 G: 17 POWDER, FOR SOLUTION ORAL at 10:24

## 2024-02-22 RX ADMIN — POTASSIUM CHLORIDE 40 MEQ: 1500 TABLET, EXTENDED RELEASE ORAL at 17:36

## 2024-02-22 RX ADMIN — IPRATROPIUM BROMIDE AND ALBUTEROL SULFATE 1 DOSE: 2.5; .5 SOLUTION RESPIRATORY (INHALATION) at 11:01

## 2024-02-22 RX ADMIN — ROSUVASTATIN CALCIUM 5 MG: 5 TABLET, COATED ORAL at 22:10

## 2024-02-22 RX ADMIN — Medication 10 ML: at 22:29

## 2024-02-22 RX ADMIN — HEPARIN SODIUM 5000 UNITS: 10000 INJECTION INTRAVENOUS; SUBCUTANEOUS at 15:11

## 2024-02-22 RX ADMIN — POTASSIUM CHLORIDE 20 MEQ: 1500 TABLET, EXTENDED RELEASE ORAL at 12:11

## 2024-02-22 RX ADMIN — HEPARIN SODIUM 5000 UNITS: 10000 INJECTION INTRAVENOUS; SUBCUTANEOUS at 05:46

## 2024-02-22 RX ADMIN — ISOSORBIDE MONONITRATE 30 MG: 30 TABLET, EXTENDED RELEASE ORAL at 10:18

## 2024-02-22 RX ADMIN — BUDESONIDE INHALATION 500 MCG: 0.5 SUSPENSION RESPIRATORY (INHALATION) at 07:18

## 2024-02-22 RX ADMIN — IPRATROPIUM BROMIDE AND ALBUTEROL SULFATE 1 DOSE: 2.5; .5 SOLUTION RESPIRATORY (INHALATION) at 07:18

## 2024-02-22 RX ADMIN — ARFORMOTEROL TARTRATE 15 MCG: 15 SOLUTION RESPIRATORY (INHALATION) at 07:18

## 2024-02-22 RX ADMIN — PANTOPRAZOLE SODIUM 40 MG: 40 TABLET, DELAYED RELEASE ORAL at 10:18

## 2024-02-22 RX ADMIN — HEPARIN SODIUM 5000 UNITS: 10000 INJECTION INTRAVENOUS; SUBCUTANEOUS at 22:11

## 2024-02-22 RX ADMIN — IPRATROPIUM BROMIDE AND ALBUTEROL SULFATE 1 DOSE: 2.5; .5 SOLUTION RESPIRATORY (INHALATION) at 19:26

## 2024-02-22 RX ADMIN — CLOPIDOGREL BISULFATE 75 MG: 75 TABLET ORAL at 10:18

## 2024-02-22 RX ADMIN — Medication 10 ML: at 10:17

## 2024-02-22 RX ADMIN — SENNOSIDES 8.6 MG: 8.6 TABLET, FILM COATED ORAL at 22:11

## 2024-02-22 RX ADMIN — DIVALPROEX SODIUM 125 MG: 125 CAPSULE, COATED PELLETS ORAL at 05:46

## 2024-02-22 RX ADMIN — HYDROCORTISONE SODIUM SUCCINATE 25 MG: 100 INJECTION, POWDER, FOR SOLUTION INTRAMUSCULAR; INTRAVENOUS at 22:11

## 2024-02-22 RX ADMIN — Medication 1000 UNITS: at 10:18

## 2024-02-22 RX ADMIN — IPRATROPIUM BROMIDE AND ALBUTEROL SULFATE 1 DOSE: 2.5; .5 SOLUTION RESPIRATORY (INHALATION) at 15:50

## 2024-02-22 RX ADMIN — METOPROLOL SUCCINATE 25 MG: 25 TABLET, EXTENDED RELEASE ORAL at 10:18

## 2024-02-22 RX ADMIN — BUDESONIDE INHALATION 500 MCG: 0.5 SUSPENSION RESPIRATORY (INHALATION) at 19:26

## 2024-02-22 RX ADMIN — TIMOLOL MALEATE 1 DROP: 5 SOLUTION OPHTHALMIC at 12:11

## 2024-02-22 RX ADMIN — ARFORMOTEROL TARTRATE 15 MCG: 15 SOLUTION RESPIRATORY (INHALATION) at 19:26

## 2024-02-22 RX ADMIN — MAGNESIUM SULFATE HEPTAHYDRATE 2000 MG: 40 INJECTION, SOLUTION INTRAVENOUS at 12:17

## 2024-02-22 RX ADMIN — DIVALPROEX SODIUM 125 MG: 125 CAPSULE, COATED PELLETS ORAL at 22:10

## 2024-02-22 RX ADMIN — HYDROCORTISONE SODIUM SUCCINATE 50 MG: 100 INJECTION, POWDER, FOR SOLUTION INTRAMUSCULAR; INTRAVENOUS at 00:27

## 2024-02-22 ASSESSMENT — PAIN SCALES - WONG BAKER
WONGBAKER_NUMERICALRESPONSE: 0
WONGBAKER_NUMERICALRESPONSE: 0

## 2024-02-22 ASSESSMENT — PAIN SCALES - GENERAL
PAINLEVEL_OUTOF10: 0
PAINLEVEL_OUTOF10: 0

## 2024-02-22 NOTE — CARE COORDINATION
2/22/24  Patient is a LOS day 10 transfer from [facility].   Spoke with patient and his wife regarding transition of care.  Patient admitted for STEMI.  Patient is being followed by cardiology as well as nephrology and speech. Patient had a cardiac cath on 2/14. Cardiology to consider PCI of the SVG to the RCA if ischemic symptoms continue. Patient is on 5 liters of 02 with attempts to wean. Patient is currently on a minced moist dysphagia diet.  Patient noted to have AM-PAC scores of 11/24 for PT as well as OT.  Reviewed with patient and wife the strong recommendation for a skilled rehab stay.  Patient and wife adamantly refusing.  Wife states her  is not going \"to a COVID infested facility to get worse\".  Patient is receptive to home care and would like a referral called to [facility].  If [facility] does not have availability family has no preference of provider. Call to [facility] with referral given and pending.  Discharge goal is home when medically stable.  SVEN/Yuniel to follow.    Electronically signed by NENO Ramos on 2/22/2024 at 3:05 PM

## 2024-02-22 NOTE — CONSULTS
Spoke with patient's wife, Scott, over the phone, and discussed that their physician has ordered a referral to our outpatient Phase II Cardiac Rehabilitation program. Reviewed the benefits of cardiac rehabilitation based on their diagnosis and personal risk factors. Patient demonstrates moderate interest in Cardiac Rehabilitation at this time.  Cardiac Rehabilitation brochure provided to patient/family. The Cardiac Rehabilitation Program has been provided the patient's referral information and pertinent patient details and history. The patient may call [facility] at [phone] for additional information or questions. Contact information for [facility] and other choices close to the patient's residence have been provided in the discharge instructions so that the patient may call and schedule an appointment when cleared by their physician. Thank you for the referral.

## 2024-02-22 NOTE — PROGRESS NOTES
Hospitalist Progress Note      PCP: Gilson Ramon III,     Date of Admission: 2/12/2024        Hospital Course:   83 y.o. male presented with STEMI, , HE DOES NOT KNOW WHY HE IS HERE OR WHAT HAPPENED.    HE WAS INITIALLY ADMITTED TO [place], , HIS TT WAS ELEVATED 1338 .      2/16  STILL ON LEVOPHED.   TRYING TO WEAN AIRVO        2/19  LASIX IS ON HOLD     2/21   TRANSFERRED FROM [facility].  PLAVIX AND ASPIRIN TODAY   2/22  WIFE WANTS TO TAKE HIM HOME.  SHE SAYS SHE HAS ENOUGH SUPPORT         Subjective: WANTS TO GO HOME           Medications:  Reviewed    Infusion Medications    sodium chloride      sodium chloride      sodium chloride      dextrose      sodium chloride       Scheduled Medications    Vitamin D  1,000 Units Oral Daily    senna  1 tablet Oral Nightly    bisacodyl  10 mg Rectal Daily    pantoprazole  40 mg Oral Daily    [START ON 2/23/2024] hydrocortisone sodium succinate PF  25 mg IntraVENous Once    divalproex  125 mg Oral 3 times per day    isosorbide mononitrate  30 mg Oral Daily    rosuvastatin  5 mg Oral Nightly    polyethylene glycol  17 g Oral Daily    heparin (porcine)  5,000 Units SubCUTAneous 3 times per day    aspirin  81 mg Oral Daily    sodium chloride flush  5-40 mL IntraVENous 2 times per day    ipratropium 0.5 mg-albuterol 2.5 mg  1 Dose Inhalation Q4H WA RT    budesonide  0.5 mg Nebulization BID RT    arformoterol tartrate  15 mcg Nebulization BID RT    timolol  1 drop Both Eyes Daily    metoprolol succinate  25 mg Oral Daily    clopidogrel  75 mg Oral Daily     PRN Meds: sodium chloride, prochlorperazine, sodium chloride, sodium chloride, glucose, dextrose bolus **OR** dextrose bolus, glucagon (rDNA), dextrose, acetaminophen **OR** acetaminophen, ondansetron **OR** ondansetron, sodium chloride flush, sodium chloride, hydrALAZINE      Intake/Output Summary (Last 24 hours) at 2/22/2024 1524  Last data filed at 2/22/2024 1342  Gross per 24 hour   Intake 360 ml   Output 1225 ml  ([facility]) [J96.01] 02/07/2024   PAF  ACUTE HYPOXIC RESP FAILURE   MIGDALIA  LACTIC ACIDOSIS  AAA 3.8  HTN  S/P COVID     Plan:  NEPHROLOGY  CARD   HEPARIN   PLAVIX ADDED TO ASPIRIN TODAY        DVT Prophylaxis: HEPARIN  Diet: ADULT DIET; Dysphagia - Minced and Moist; Low Fat/Low Chol/High Fiber/2 gm Na  Code Status: Full Code     PT/OT Eval Status: ORDERED     Dispo [facility] -     Electronically signed by Giovanny Starr DO on 2/22/2024 at 3:24 PM FACOI

## 2024-02-22 NOTE — PROGRESS NOTES
PROGRESS NOTE     CARDIOLOGY    Chief complaint: Seen today for follow up, management & recommendations for coronary artery disease    He denies chest pain or shortness of breath today.   He was comfortable and in no distress.  He was walking back from the bathroom and then sitting in a chair at the side of the bed.    Wt Readings from Last 3 Encounters:   02/22/24 84.1 kg (185 lb 6.5 oz)   02/12/24 85.5 kg (188 lb 6.4 oz)   07/27/22 88.3 kg (194 lb 9.6 oz)     Temp Readings from Last 3 Encounters:   02/22/24 98.1 °F (36.7 °C) (Temporal)   02/12/24 98.1 °F (36.7 °C) (Axillary)   11/18/20 98.2 °F (36.8 °C) (Infrared)     BP Readings from Last 3 Encounters:   02/22/24 138/75   02/12/24 125/78   07/27/22 (!) 140/85     Pulse Readings from Last 3 Encounters:   02/22/24 91   02/12/24 100   07/27/22 65         Intake/Output Summary (Last 24 hours) at 2/22/2024 1639  Last data filed at 2/22/2024 1342  Gross per 24 hour   Intake 360 ml   Output 1225 ml   Net -865 ml       Recent Labs     02/20/24  0352 02/21/24  0328 02/22/24  0436   WBC 15.3* 13.0* 12.9*   HGB 8.6* 9.2* 9.9*   HCT 27.1* 29.1* 31.3*   MCV 95.4 94.8 95.7    200 210     Recent Labs     02/20/24  0352 02/21/24  0328 02/22/24  0436    141 143   K 3.6 4.1 3.7    105 106   CO2 26 25 24   PHOS 4.1 2.9  --    BUN 34* 35* 28*   CREATININE 1.2 1.1 0.9   MG 2.0 1.9  --      No results for input(s): \"PROTIME\", \"INR\" in the last 72 hours.  No results for input(s): \"CKTOTAL\", \"CKMB\", \"CKMBINDEX\", \"TROPONINI\" in the last 72 hours.  No results for input(s): \"BNP\" in the last 72 hours.  No results for input(s): \"CHOL\", \"HDL\", \"TRIG\" in the last 72 hours.    Invalid input(s): \"CHOLHDLR\", \"LDLCALCU\"  No results for input(s): \"TROPHS\" in the last 72 hours.      Vitamin D (CHOLECALCIFEROL) tablet 1,000 Units, Daily  senna (SENOKOT) tablet 8.6 mg, Nightly  bisacodyl (DULCOLAX) suppository 10 mg, Daily  0.9 % sodium chloride infusion, PRN  pantoprazole  (PROTONIX) tablet 40 mg, Daily  [START ON 2/23/2024] hydrocortisone sodium succinate PF (SOLU-CORTEF) injection 25 mg, Once  prochlorperazine (COMPAZINE) injection 10 mg, Q6H PRN  divalproex (DEPAKOTE SPRINKLE) DR capsule 125 mg, 3 times per day  isosorbide mononitrate (IMDUR) extended release tablet 30 mg, Daily  rosuvastatin (CRESTOR) tablet 5 mg, Nightly  0.9 % sodium chloride infusion, PRN  polyethylene glycol (GLYCOLAX) packet 17 g, Daily  heparin (porcine) injection 5,000 Units, 3 times per day  0.9 % sodium chloride infusion, PRN  glucose chewable tablet 16 g, PRN  dextrose bolus 10% 125 mL, PRN   Or  dextrose bolus 10% 250 mL, PRN  glucagon injection 1 mg, PRN  dextrose 10 % infusion, Continuous PRN  acetaminophen (TYLENOL) tablet 1,000 mg, Q8H PRN   Or  acetaminophen (TYLENOL) suppository 650 mg, Q6H PRN  ondansetron (ZOFRAN-ODT) disintegrating tablet 4 mg, Q8H PRN   Or  ondansetron (ZOFRAN) injection 4 mg, Q6H PRN  aspirin chewable tablet 81 mg, Daily  sodium chloride flush 0.9 % injection 5-40 mL, 2 times per day  sodium chloride flush 0.9 % injection 5-40 mL, PRN  0.9 % sodium chloride infusion, PRN  ipratropium 0.5 mg-albuterol 2.5 mg (DUONEB) nebulizer solution 1 Dose, Q4H WA RT  budesonide (PULMICORT) nebulizer suspension 500 mcg, BID RT  arformoterol tartrate (BROVANA) nebulizer solution 15 mcg, BID RT  timolol (TIMOPTIC) 0.5 % ophthalmic solution 1 drop, Daily  metoprolol succinate (TOPROL XL) extended release tablet 25 mg, Daily  hydrALAZINE (APRESOLINE) injection 10 mg, Q6H PRN  clopidogrel (PLAVIX) tablet 75 mg, Daily        Review of systems:     Heart: as above   Lungs: as above   Eyes: denies changes in vision or discharge.    Ears: denies changes in hearing or pain.   Nose: denies epistaxis or masses   Throat: denies sore throat or trouble swallowing.    Neuro: denies numbness, tingling, tremors.   Skin: denies rashes or itching.   : denies hematuria, dysuria   GI: denies vomiting,

## 2024-02-22 NOTE — PROGRESS NOTES
Physical Therapy  Physical Therapy Treatment    Name: Shiv Lee  : 1940  MRN: [id number]      Date of Service: 2024    Evaluating PT:  Rancho Ramos, PT, DPT [id number]    Room #:  6501/6501-B  Diagnosis:  STEMI (ST elevation myocardial infarction) ([facility]) [I21.3]  PMHx/PSHx:   has a past medical history of Abnormal EKG, Aortic stenosis, Atrial fibrillation (HCC), Coronary artery disease, HTN (hypertension), Left atrial dilatation, Mitral regurgitation, and SBE (subacute bacterial endocarditis) prophylaxis candidate.   has a past surgical history that includes Diagnostic Cardiac Cath Lab Procedure (09); Coronary artery bypass graft (09); Aortic valve surgery (09); eye surgery (); Cardiac procedure (N/A, 2024); and Cardiac procedure (N/A, 2024).  Procedure/Surgery:   cardiac cath  Precautions:  Falls, cognition, TSM, [place], O2  Equipment Needs:  FWW; TBD    SUBJECTIVE:    Pt lives with wife in a raised ranch home with 13 step(s) to enter and 1 rail(s).      Pt ambulated without device and was independent PTA.  2L O2 at home.    OBJECTIVE:   Initial Evaluation  Date: 24 Treatment  2024 Short Term/ Long Term   Goals   AM-PAC 6 Clicks 10/24 15/24    Was pt agreeable to Eval/treatment? yes Yes    Does pt have pain? No c/o pain No c/o pain    Bed Mobility  Rolling: NT  Supine to sit: ModA with HOB elevated  Sit to supine: NT  Scooting: MaxA Rolling: Ramakrishna  Supine to sit: ModA  Sit to supine: NT  Scooting: NT Mod Independent   Transfers Sit to stand: MaxA  Stand to sit: ModA  Stand pivot: ModA with WW Sit to stand: Ramakrishna  Stand to sit: Ramakrishna  Stand pivot: NT Mod Independent with AAD   Ambulation   A few steps to chair with ModA with WW 20 feet with WW Ramakrishna >400 feet with Mod Independent with AAD   Stair negotiation: ascended and descended NT NT >10 steps with 1 rail Mod Independent   ROM BUE:  Defer to OT note  BLE:  WFL     Strength BUE:  Defer to OT note  BLE:  4-/5   minutes  [] Manual therapy 92049 0 minutes  [x] Therapeutic activities 93905 30 minutes  [] Therapeutic exercises 77110 0 minutes  [] Neuromuscular reeducation 09688 0 minutes    Francisco Morris, PT, DPT  [id number]

## 2024-02-22 NOTE — PLAN OF CARE
Problem: Safety - Adult  Goal: Free from fall injury  2/22/2024 1030 by Shiela Gonzalez RN  Outcome: Progressing     Problem: Discharge Planning  Goal: Discharge to home or other facility with appropriate resources  2/22/2024 1030 by Shiela Gonzalez RN  Outcome: Progressing     Problem: Neurosensory - Adult  Goal: Achieves stable or improved neurological status  2/22/2024 1030 by Shiela Gonzalez RN  Outcome: Progressing     Problem: Respiratory - Adult  Goal: Achieves optimal ventilation and oxygenation  2/22/2024 1030 by Shiela Gonzalez RN  Outcome: Progressing     Problem: Cardiovascular - Adult  Goal: Maintains optimal cardiac output and hemodynamic stability  2/22/2024 1030 by Shiela Gonzalez RN  Outcome: Progressing     Problem: Skin/Tissue Integrity - Adult  Goal: Skin integrity remains intact  2/22/2024 1030 by Shiela Gonzalez RN  Outcome: Progressing     Problem: Musculoskeletal - Adult  Goal: Return mobility to safest level of function  2/22/2024 1030 by Shiela Gonzalez RN  Outcome: Progressing     Problem: Gastrointestinal - Adult  Goal: Minimal or absence of nausea and vomiting  2/22/2024 1030 by Shiela Gonzalez RN  Outcome: Progressing     Problem: Genitourinary - Adult  Goal: Absence of urinary retention  2/22/2024 1030 by Shiela Gonzalez RN  Outcome: Progressing     Problem: Infection - Adult  Goal: Absence of infection at discharge  2/22/2024 1030 by Shiela Gonzalez RN  Outcome: Progressing     Problem: Metabolic/Fluid and Electrolytes - Adult  Goal: Electrolytes maintained within normal limits  2/22/2024 1030 by Shiela Gonzalez RN  Outcome: Progressing     Problem: Hematologic - Adult  Goal: Maintains hematologic stability  2/22/2024 1030 by Shiela Gonzalez RN  Outcome: Progressing     Problem: ABCDS Injury Assessment  Goal: Absence of physical injury  2/22/2024 1030 by Shiela Gonzalez RN  Outcome: Progressing

## 2024-02-22 NOTE — PROGRESS NOTES
Department of Internal Medicine  Nephrology Attending Progress Note      Events reviewed.       SUBJECTIVE: We are following Mr. Lee for MIGDALIA.  Reports no new complaints.     PHYSICAL EXAM:      Vitals:    VITALS:  /76   Pulse (!) 101   Temp 97.6 °F (36.4 °C)   Resp 20   Ht 1.727 m (5' 7.99\")   Wt 84.1 kg (185 lb 6.5 oz)   SpO2 95%   BMI 28.20 kg/m²   24HR INTAKE/OUTPUT:    Intake/Output Summary (Last 24 hours) at 2/22/2024 1138  Last data filed at 2/22/2024 1019  Gross per 24 hour   Intake --   Output 1375 ml   Net -1375 ml         Constitutional: Patient is alert   HEENT: Pupils equal reactive, mucous membranes are dry  Respiratory: Lungs are coarse  Cardiovascular/Edema: Heart sounds are tachycardic  Gastrointestinal: Abdomen is soft  Neurologic: Patient lethargic, nonfocal  Skin: No lesions  Other: + edema    Scheduled Meds:   potassium chloride  20 mEq Oral Once    magnesium sulfate  2,000 mg IntraVENous Once    Vitamin D  1,000 Units Oral Daily    senna  1 tablet Oral Nightly    bisacodyl  10 mg Rectal Daily    pantoprazole  40 mg Oral Daily    [START ON 2/23/2024] hydrocortisone sodium succinate PF  25 mg IntraVENous Once    divalproex  125 mg Oral 3 times per day    isosorbide mononitrate  30 mg Oral Daily    rosuvastatin  5 mg Oral Nightly    polyethylene glycol  17 g Oral Daily    heparin (porcine)  5,000 Units SubCUTAneous 3 times per day    aspirin  81 mg Oral Daily    sodium chloride flush  5-40 mL IntraVENous 2 times per day    ipratropium 0.5 mg-albuterol 2.5 mg  1 Dose Inhalation Q4H WA RT    budesonide  0.5 mg Nebulization BID RT    arformoterol tartrate  15 mcg Nebulization BID RT    timolol  1 drop Both Eyes Daily    metoprolol succinate  25 mg Oral Daily    clopidogrel  75 mg Oral Daily     Continuous Infusions:   sodium chloride      sodium chloride      sodium chloride      dextrose      sodium chloride       PRN Meds:.sodium chloride, prochlorperazine, sodium chloride, sodium

## 2024-02-22 NOTE — PROGRESS NOTES
[facility] Quality Flow/Interdisciplinary Rounds Progress Note        Quality Flow Rounds held on February 22, 2024    Disciplines Attending:  Bedside Nurse, , , and Nursing Unit Leadership    Shiv Lee was admitted on 2/12/2024  6:49 PM    Anticipated Discharge Date:  Expected Discharge Date: 02/23/24    Disposition:    Bulmaro Score:  Bulmaro Scale Score: 17    Readmission Risk              Risk of Unplanned Readmission:  22           Discussed patient goal for the day, patient clinical progression, and barriers to discharge.  The following Goal(s) of the Day/Commitment(s) have been identified:  Diagnostics - Report Results and Labs - Report Results      Thelma Victoria RN  February 22, 2024

## 2024-02-22 NOTE — PLAN OF CARE
Problem: Safety - Adult  Goal: Free from fall injury  Outcome: Progressing     Problem: Discharge Planning  Goal: Discharge to home or other facility with appropriate resources  Outcome: Progressing     Problem: Neurosensory - Adult  Goal: Achieves stable or improved neurological status  Outcome: Progressing  Goal: Absence of seizures  Outcome: Progressing  Goal: Remains free of injury related to seizures activity  Outcome: Progressing  Goal: Achieves maximal functionality and self care  Outcome: Progressing     Problem: Respiratory - Adult  Goal: Achieves optimal ventilation and oxygenation  Outcome: Progressing     Problem: Cardiovascular - Adult  Goal: Maintains optimal cardiac output and hemodynamic stability  Outcome: Progressing  Goal: Absence of cardiac dysrhythmias or at baseline  Outcome: Progressing     Problem: Skin/Tissue Integrity - Adult  Goal: Skin integrity remains intact  Outcome: Progressing  Goal: Incisions, wounds, or drain sites healing without S/S of infection  Outcome: Progressing  Goal: Oral mucous membranes remain intact  Outcome: Progressing     Problem: Musculoskeletal - Adult  Goal: Return mobility to safest level of function  Outcome: Progressing  Goal: Maintain proper alignment of affected body part  Outcome: Progressing  Goal: Return ADL status to a safe level of function  Outcome: Progressing     Problem: Gastrointestinal - Adult  Goal: Minimal or absence of nausea and vomiting  Outcome: Progressing  Goal: Maintains or returns to baseline bowel function  Outcome: Progressing  Goal: Maintains adequate nutritional intake  Outcome: Progressing  Goal: Establish and maintain optimal ostomy function  Outcome: Progressing     Problem: Genitourinary - Adult  Goal: Absence of urinary retention  Outcome: Progressing  Goal: Urinary catheter remains patent  Outcome: Progressing     Problem: Infection - Adult  Goal: Absence of infection at discharge  Outcome: Progressing  Goal: Absence of

## 2024-02-23 LAB
ACTIVATED CLOTTING TIME, LOW RANGE: 263 SEC
ACTIVATED CLOTTING TIME, LOW RANGE: 288 SEC
ALBUMIN SERPL-MCNC: 3.3 G/DL (ref 3.5–5.2)
ALP SERPL-CCNC: 72 U/L (ref 40–129)
ALT SERPL-CCNC: 19 U/L (ref 0–40)
ANION GAP SERPL CALCULATED.3IONS-SCNC: 9 MMOL/L (ref 7–16)
AST SERPL-CCNC: 19 U/L (ref 0–39)
BASOPHILS # BLD: 0.01 K/UL (ref 0–0.2)
BASOPHILS NFR BLD: 0 % (ref 0–2)
BILIRUB SERPL-MCNC: 1.7 MG/DL (ref 0–1.2)
BUN SERPL-MCNC: 22 MG/DL (ref 6–23)
CA-I BLD-SCNC: 1.08 MMOL/L (ref 1.15–1.33)
CALCIUM SERPL-MCNC: 8.1 MG/DL (ref 8.6–10.2)
CHLORIDE SERPL-SCNC: 109 MMOL/L (ref 98–107)
CO2 SERPL-SCNC: 24 MMOL/L (ref 22–29)
CREAT SERPL-MCNC: 0.9 MG/DL (ref 0.7–1.2)
ECHO BSA: 2.06 M2
EOSINOPHIL # BLD: 0.01 K/UL (ref 0.05–0.5)
EOSINOPHILS RELATIVE PERCENT: 0 % (ref 0–6)
ERYTHROCYTE [DISTWIDTH] IN BLOOD BY AUTOMATED COUNT: 18.9 % (ref 11.5–15)
FERRITIN SERPL-MCNC: 480 NG/ML
FOLATE SERPL-MCNC: 7.8 NG/ML (ref 4.8–24.2)
GFR SERPL CREATININE-BSD FRML MDRD: >60 ML/MIN/1.73M2
GLUCOSE SERPL-MCNC: 124 MG/DL (ref 74–99)
HCT VFR BLD AUTO: 29.4 % (ref 37–54)
HGB BLD-MCNC: 9.2 G/DL (ref 12.5–16.5)
IMM GRANULOCYTES # BLD AUTO: 0.07 K/UL (ref 0–0.58)
IMM GRANULOCYTES NFR BLD: 1 % (ref 0–5)
IRON SATN MFR SERPL: ABNORMAL % (ref 20–55)
IRON SERPL-MCNC: 8 UG/DL (ref 59–158)
LYMPHOCYTES NFR BLD: 0.41 K/UL (ref 1.5–4)
LYMPHOCYTES RELATIVE PERCENT: 4 % (ref 20–42)
MCH RBC QN AUTO: 30.3 PG (ref 26–35)
MCHC RBC AUTO-ENTMCNC: 31.3 G/DL (ref 32–34.5)
MCV RBC AUTO: 96.7 FL (ref 80–99.9)
MONOCYTES NFR BLD: 0.32 K/UL (ref 0.1–0.95)
MONOCYTES NFR BLD: 3 % (ref 2–12)
NEUTROPHILS NFR BLD: 92 % (ref 43–80)
NEUTS SEG NFR BLD: 9.02 K/UL (ref 1.8–7.3)
PLATELET # BLD AUTO: 212 K/UL (ref 130–450)
PMV BLD AUTO: 10.9 FL (ref 7–12)
POTASSIUM SERPL-SCNC: 4.2 MMOL/L (ref 3.5–5)
PROT SERPL-MCNC: 6 G/DL (ref 6.4–8.3)
RBC # BLD AUTO: 3.04 M/UL (ref 3.8–5.8)
RBC # BLD: ABNORMAL 10*6/UL
SODIUM SERPL-SCNC: 142 MMOL/L (ref 132–146)
TIBC SERPL-MCNC: ABNORMAL UG/DL (ref 250–450)
VIT B12 SERPL-MCNC: 812 PG/ML (ref 211–946)
WBC OTHER # BLD: 9.8 K/UL (ref 4.5–11.5)

## 2024-02-23 PROCEDURE — 6360000002 HC RX W HCPCS: Performed by: INTERNAL MEDICINE

## 2024-02-23 PROCEDURE — 6360000002 HC RX W HCPCS: Performed by: STUDENT IN AN ORGANIZED HEALTH CARE EDUCATION/TRAINING PROGRAM

## 2024-02-23 PROCEDURE — 85025 COMPLETE CBC W/AUTO DIFF WBC: CPT

## 2024-02-23 PROCEDURE — 92526 ORAL FUNCTION THERAPY: CPT

## 2024-02-23 PROCEDURE — 6370000000 HC RX 637 (ALT 250 FOR IP): Performed by: INTERNAL MEDICINE

## 2024-02-23 PROCEDURE — 85347 COAGULATION TIME ACTIVATED: CPT

## 2024-02-23 PROCEDURE — C1874 STENT, COATED/COV W/DEL SYS: HCPCS | Performed by: INTERNAL MEDICINE

## 2024-02-23 PROCEDURE — C1725 CATH, TRANSLUMIN NON-LASER: HCPCS | Performed by: INTERNAL MEDICINE

## 2024-02-23 PROCEDURE — 6370000000 HC RX 637 (ALT 250 FOR IP): Performed by: FAMILY MEDICINE

## 2024-02-23 PROCEDURE — C1769 GUIDE WIRE: HCPCS | Performed by: INTERNAL MEDICINE

## 2024-02-23 PROCEDURE — 2580000003 HC RX 258: Performed by: FAMILY MEDICINE

## 2024-02-23 PROCEDURE — 2700000000 HC OXYGEN THERAPY PER DAY

## 2024-02-23 PROCEDURE — C1894 INTRO/SHEATH, NON-LASER: HCPCS | Performed by: INTERNAL MEDICINE

## 2024-02-23 PROCEDURE — 2580000003 HC RX 258

## 2024-02-23 PROCEDURE — C1887 CATHETER, GUIDING: HCPCS | Performed by: INTERNAL MEDICINE

## 2024-02-23 PROCEDURE — 2709999900 HC NON-CHARGEABLE SUPPLY: Performed by: INTERNAL MEDICINE

## 2024-02-23 PROCEDURE — C9604 PERC D-E COR REVASC T CABG S: HCPCS | Performed by: INTERNAL MEDICINE

## 2024-02-23 PROCEDURE — 2140000000 HC CCU INTERMEDIATE R&B

## 2024-02-23 PROCEDURE — 2580000003 HC RX 258: Performed by: INTERNAL MEDICINE

## 2024-02-23 PROCEDURE — 80053 COMPREHEN METABOLIC PANEL: CPT

## 2024-02-23 PROCEDURE — 83540 ASSAY OF IRON: CPT

## 2024-02-23 PROCEDURE — 027034Z DILATION OF CORONARY ARTERY, ONE ARTERY WITH DRUG-ELUTING INTRALUMINAL DEVICE, PERCUTANEOUS APPROACH: ICD-10-PCS | Performed by: INTERNAL MEDICINE

## 2024-02-23 PROCEDURE — 82330 ASSAY OF CALCIUM: CPT

## 2024-02-23 PROCEDURE — 82746 ASSAY OF FOLIC ACID SERUM: CPT

## 2024-02-23 PROCEDURE — 94640 AIRWAY INHALATION TREATMENT: CPT

## 2024-02-23 PROCEDURE — 6360000002 HC RX W HCPCS

## 2024-02-23 PROCEDURE — 82728 ASSAY OF FERRITIN: CPT

## 2024-02-23 PROCEDURE — 82607 VITAMIN B-12: CPT

## 2024-02-23 PROCEDURE — 2500000003 HC RX 250 WO HCPCS: Performed by: INTERNAL MEDICINE

## 2024-02-23 PROCEDURE — 36415 COLL VENOUS BLD VENIPUNCTURE: CPT

## 2024-02-23 PROCEDURE — 6360000002 HC RX W HCPCS: Performed by: FAMILY MEDICINE

## 2024-02-23 PROCEDURE — 6370000000 HC RX 637 (ALT 250 FOR IP)

## 2024-02-23 PROCEDURE — 6360000004 HC RX CONTRAST MEDICATION: Performed by: INTERNAL MEDICINE

## 2024-02-23 PROCEDURE — 92928 PRQ TCAT PLMT NTRAC ST 1 LES: CPT | Performed by: INTERNAL MEDICINE

## 2024-02-23 PROCEDURE — 6370000000 HC RX 637 (ALT 250 FOR IP): Performed by: STUDENT IN AN ORGANIZED HEALTH CARE EDUCATION/TRAINING PROGRAM

## 2024-02-23 PROCEDURE — 83550 IRON BINDING TEST: CPT

## 2024-02-23 DEVICE — STENT ONYXNG35038UX ONYX 3.50X38RX
Type: IMPLANTABLE DEVICE | Status: FUNCTIONAL
Brand: ONYX FRONTIER™

## 2024-02-23 RX ORDER — MIDAZOLAM HYDROCHLORIDE 1 MG/ML
INJECTION INTRAMUSCULAR; INTRAVENOUS PRN
Status: DISCONTINUED | OUTPATIENT
Start: 2024-02-23 | End: 2024-02-23 | Stop reason: HOSPADM

## 2024-02-23 RX ORDER — FENTANYL CITRATE 50 UG/ML
INJECTION, SOLUTION INTRAMUSCULAR; INTRAVENOUS PRN
Status: DISCONTINUED | OUTPATIENT
Start: 2024-02-23 | End: 2024-02-23 | Stop reason: HOSPADM

## 2024-02-23 RX ORDER — NITROGLYCERIN 20 MG/100ML
INJECTION INTRAVENOUS CONTINUOUS PRN
Status: COMPLETED | OUTPATIENT
Start: 2024-02-23 | End: 2024-02-23

## 2024-02-23 RX ORDER — FUROSEMIDE 10 MG/ML
40 INJECTION INTRAMUSCULAR; INTRAVENOUS ONCE
Status: COMPLETED | OUTPATIENT
Start: 2024-02-23 | End: 2024-02-23

## 2024-02-23 RX ORDER — OXYCODONE HYDROCHLORIDE AND ACETAMINOPHEN 5; 325 MG/1; MG/1
1 TABLET ORAL EVERY 4 HOURS PRN
Status: DISCONTINUED | OUTPATIENT
Start: 2024-02-23 | End: 2024-03-01 | Stop reason: HOSPADM

## 2024-02-23 RX ORDER — SODIUM CHLORIDE 9 MG/ML
INJECTION, SOLUTION INTRAVENOUS CONTINUOUS
Status: ACTIVE | OUTPATIENT
Start: 2024-02-23 | End: 2024-02-23

## 2024-02-23 RX ORDER — VERAPAMIL HYDROCHLORIDE 2.5 MG/ML
INJECTION, SOLUTION INTRAVENOUS PRN
Status: DISCONTINUED | OUTPATIENT
Start: 2024-02-23 | End: 2024-02-23 | Stop reason: HOSPADM

## 2024-02-23 RX ORDER — CALCIUM GLUCONATE 10 MG/ML
1000 INJECTION, SOLUTION INTRAVENOUS ONCE
Status: COMPLETED | OUTPATIENT
Start: 2024-02-23 | End: 2024-02-23

## 2024-02-23 RX ORDER — HEPARIN SODIUM 1000 [USP'U]/ML
INJECTION, SOLUTION INTRAVENOUS; SUBCUTANEOUS PRN
Status: DISCONTINUED | OUTPATIENT
Start: 2024-02-23 | End: 2024-02-23 | Stop reason: HOSPADM

## 2024-02-23 RX ORDER — LEVALBUTEROL INHALATION SOLUTION 0.63 MG/3ML
0.63 SOLUTION RESPIRATORY (INHALATION) ONCE
Status: COMPLETED | OUTPATIENT
Start: 2024-02-23 | End: 2024-02-23

## 2024-02-23 RX ORDER — ACETAMINOPHEN 325 MG/1
650 TABLET ORAL EVERY 4 HOURS PRN
Status: DISCONTINUED | OUTPATIENT
Start: 2024-02-23 | End: 2024-03-01 | Stop reason: HOSPADM

## 2024-02-23 RX ORDER — CLOPIDOGREL BISULFATE 75 MG/1
TABLET ORAL PRN
Status: DISCONTINUED | OUTPATIENT
Start: 2024-02-23 | End: 2024-02-23 | Stop reason: HOSPADM

## 2024-02-23 RX ADMIN — HEPARIN SODIUM 5000 UNITS: 10000 INJECTION INTRAVENOUS; SUBCUTANEOUS at 21:34

## 2024-02-23 RX ADMIN — SODIUM CHLORIDE 125 MG: 9 INJECTION, SOLUTION INTRAVENOUS at 11:45

## 2024-02-23 RX ADMIN — ASPIRIN 81 MG: 81 TABLET, CHEWABLE ORAL at 09:37

## 2024-02-23 RX ADMIN — Medication 10 ML: at 20:47

## 2024-02-23 RX ADMIN — ARFORMOTEROL TARTRATE 15 MCG: 15 SOLUTION RESPIRATORY (INHALATION) at 08:21

## 2024-02-23 RX ADMIN — CLOPIDOGREL BISULFATE 75 MG: 75 TABLET ORAL at 09:37

## 2024-02-23 RX ADMIN — Medication 1000 UNITS: at 09:37

## 2024-02-23 RX ADMIN — CALCIUM GLUCONATE 1000 MG: 10 INJECTION, SOLUTION INTRAVENOUS at 09:47

## 2024-02-23 RX ADMIN — IPRATROPIUM BROMIDE AND ALBUTEROL SULFATE 1 DOSE: 2.5; .5 SOLUTION RESPIRATORY (INHALATION) at 08:22

## 2024-02-23 RX ADMIN — HEPARIN SODIUM 5000 UNITS: 10000 INJECTION INTRAVENOUS; SUBCUTANEOUS at 06:12

## 2024-02-23 RX ADMIN — PANTOPRAZOLE SODIUM 40 MG: 40 TABLET, DELAYED RELEASE ORAL at 09:37

## 2024-02-23 RX ADMIN — FUROSEMIDE 40 MG: 10 INJECTION, SOLUTION INTRAMUSCULAR; INTRAVENOUS at 21:34

## 2024-02-23 RX ADMIN — IPRATROPIUM BROMIDE AND ALBUTEROL SULFATE 1 DOSE: 2.5; .5 SOLUTION RESPIRATORY (INHALATION) at 20:06

## 2024-02-23 RX ADMIN — SENNOSIDES 8.6 MG: 8.6 TABLET, FILM COATED ORAL at 20:47

## 2024-02-23 RX ADMIN — IPRATROPIUM BROMIDE AND ALBUTEROL SULFATE 1 DOSE: 2.5; .5 SOLUTION RESPIRATORY (INHALATION) at 11:58

## 2024-02-23 RX ADMIN — SODIUM CHLORIDE: 9 INJECTION, SOLUTION INTRAVENOUS at 19:14

## 2024-02-23 RX ADMIN — ROSUVASTATIN CALCIUM 5 MG: 5 TABLET, COATED ORAL at 20:47

## 2024-02-23 RX ADMIN — METOPROLOL SUCCINATE 25 MG: 25 TABLET, EXTENDED RELEASE ORAL at 19:11

## 2024-02-23 RX ADMIN — Medication 10 ML: at 09:37

## 2024-02-23 RX ADMIN — DIVALPROEX SODIUM 125 MG: 125 CAPSULE, COATED PELLETS ORAL at 14:06

## 2024-02-23 RX ADMIN — LEVALBUTEROL HYDROCHLORIDE 0.63 MG: 0.63 SOLUTION RESPIRATORY (INHALATION) at 21:35

## 2024-02-23 RX ADMIN — ARFORMOTEROL TARTRATE 15 MCG: 15 SOLUTION RESPIRATORY (INHALATION) at 20:06

## 2024-02-23 RX ADMIN — BUDESONIDE INHALATION 500 MCG: 0.5 SUSPENSION RESPIRATORY (INHALATION) at 20:06

## 2024-02-23 RX ADMIN — DIVALPROEX SODIUM 125 MG: 125 CAPSULE, COATED PELLETS ORAL at 06:12

## 2024-02-23 RX ADMIN — BUDESONIDE INHALATION 500 MCG: 0.5 SUSPENSION RESPIRATORY (INHALATION) at 08:21

## 2024-02-23 ASSESSMENT — PAIN SCALES - GENERAL: PAINLEVEL_OUTOF10: 0

## 2024-02-23 NOTE — PLAN OF CARE
Problem: Safety - Adult  Goal: Free from fall injury  2/23/2024 1044 by Shiela Gonzalez RN  Outcome: Progressing     Problem: Discharge Planning  Goal: Discharge to home or other facility with appropriate resources  2/23/2024 1044 by Shiela Gonzalez RN  Outcome: Progressing     Problem: Neurosensory - Adult  Goal: Achieves stable or improved neurological status  2/23/2024 1044 by Shiela Gonzalez RN  Outcome: Progressing     Problem: Neurosensory - Adult  Goal: Absence of seizures  2/23/2024 1044 by Shiela Gonzalez RN  Outcome: Progressing     Problem: Neurosensory - Adult  Goal: Remains free of injury related to seizures activity  2/23/2024 1044 by Shiela Gonzalez RN  Outcome: Progressing     Problem: Neurosensory - Adult  Goal: Achieves maximal functionality and self care  2/23/2024 1044 by Shiela Gonzalez RN  Outcome: Progressing     Problem: Respiratory - Adult  Goal: Achieves optimal ventilation and oxygenation  2/23/2024 1044 by Shiela Gonzalez RN  Outcome: Progressing     Problem: Cardiovascular - Adult  Goal: Maintains optimal cardiac output and hemodynamic stability  2/23/2024 1044 by Shiela Gonzalez RN  Outcome: Progressing     Problem: Cardiovascular - Adult  Goal: Absence of cardiac dysrhythmias or at baseline  2/23/2024 1044 by Shiela Gonzalez RN  Outcome: Progressing     Problem: Skin/Tissue Integrity - Adult  Goal: Skin integrity remains intact  2/23/2024 1044 by Shiela Gonzalez RN  Outcome: Progressing     Problem: Musculoskeletal - Adult  Goal: Return mobility to safest level of function  2/23/2024 1044 by Shiela Gonzalez RN  Outcome: Progressing     Problem: Musculoskeletal - Adult  Goal: Return ADL status to a safe level of function  2/23/2024 1044 by Shiela Gonzalez RN  Outcome: Progressing     Problem: Gastrointestinal - Adult  Goal: Minimal or absence of nausea and vomiting  2/23/2024 1044 by Shiela Gonzalez RN  Outcome: Progressing     Problem: Genitourinary - Adult  Goal:

## 2024-02-23 NOTE — PROGRESS NOTES
Comprehensive Nutrition Assessment    Type and Reason for Visit:  Reassess    Nutrition Recommendations/Plan:   Continue NPO ; will start ONS w/ diet order to promote oral intake  Will continue to monitor     Malnutrition Assessment:  Malnutrition Status:  At risk for malnutrition (Comment) (02/19/24 1673)    Context:  Acute Illness     Findings of the 6 clinical characteristics of malnutrition:  Energy Intake:  50% or less of estimated energy requirements for 5 or more days  Weight Loss:  Unable to assess (d/t lack of wt hx per EMR)     Body Fat Loss:  Unable to assess     Muscle Mass Loss:  Unable to assess    Fluid Accumulation:  No significant fluid accumulation     Strength:  Not Performed    Nutrition Assessment:    pt adm at [facility] d/t SOB/STEMI w/ COVID- transf to [facility] d/t NSTEMI/resp failure now s/p heart cath 2/14 w/ hypotension; PMhx of CAD s/p CABG, HTN,AFIB; MIGDALIA noted; pt s/p SLP eval 2/20- recommend minced/moist solids & thin liquids; MIGDALIA noted; pt currently NPO anticipating cardiac cath today 2/23- will start ONS w/ diet order and continue to monitor.    Nutrition Related Findings:    A&Ox2 (oriented/disoriented at times); poor dentition; active BS; no edema; -I/O Wound Type: None       Current Nutrition Intake & Therapies:    Average Meal Intake: 26-50% (prior to NPO)  Average Supplements Intake: NPO  Diet NPO Exceptions are: Sips of Water with Meds, Sips of Clear Liquids, Ice Chips    Anthropometric Measures:  Height: 172.7 cm (5' 7.99\")  Ideal Body Weight (IBW): 154 lbs (70 kg)       Current Body Weight: 83.4 kg (183 lb 13.8 oz) (2/23-BS), 119.4 % IBW. Weight Source: Bed Scale  Current BMI (kg/m2): 28  Usual Body Weight:  (UTO d/t lack of wt hx per EMR)     Weight Adjustment For: No Adjustment                 BMI Categories: Overweight (BMI 25.0-29.9)    Estimated Daily Nutrient Needs:  Energy Requirements Based On: Formula  Weight Used for Energy Requirements: Current  Energy (kcal/day):

## 2024-02-23 NOTE — CARE COORDINATION
2/23/24  Transition of care Update.  Patient admitted for STEMI. Patient is being followed by cardiology as well as nephrology and speech. Patient had a cardiac cath on 2/14. Cardiology planning a cardiac cath today.  PT/OT evaluation complete with AM-PAC score of 15/24 for PT and 11/24 for OT.  Spoke with patient and wife about a [facility] stay but both are adamantly refusing.  Family is agreeable to home care and has no preference of provider.  Call placed to [facility] with referral given.  [facility] is able to accept and following.  Patient is on 6 liters of 02 with attempt to wean.  Patient does have 02 at home supplied by [facility] at 2 liters.  Patient may need a new order if unable to wean prior to discharge.  Patient also has a home nebulizer if needed per wife.  Discharge goal is home with wife when medically stable.  SW/BARBARA to follow.    Electronically signed by NENO Ramos on 2/23/2024 at 9:06 AM

## 2024-02-23 NOTE — PROGRESS NOTES
Patient states that he thinks he has a machine like BiPAP however he cannot remember will notify RN who has him and see if his wife which he states he lives with will bring in. Patient said he does not know how much oxygen he wears at home either

## 2024-02-23 NOTE — PROGRESS NOTES
Department of Internal Medicine  Nephrology Attending Progress Note      Events reviewed.       SUBJECTIVE: We are following Mr. Lee for MIGDALIA.  Reports no new complaints.     PHYSICAL EXAM:      Vitals:    VITALS:  BP 90/60   Pulse 94   Temp 99.1 °F (37.3 °C) (Temporal)   Resp 20   Ht 1.727 m (5' 7.99\")   Wt 83.4 kg (183 lb 13.8 oz)   SpO2 94%   BMI 27.96 kg/m²   24HR INTAKE/OUTPUT:    Intake/Output Summary (Last 24 hours) at 2/23/2024 1045  Last data filed at 2/22/2024 2328  Gross per 24 hour   Intake 360 ml   Output 400 ml   Net -40 ml         Constitutional: Patient is alert   HEENT: Pupils equal reactive, mucous membranes are dry  Respiratory: Lungs are coarse  Cardiovascular/Edema: Heart sounds are tachycardic  Gastrointestinal: Abdomen is soft  Neurologic: Patient lethargic, nonfocal  Skin: No lesions  Other: + edema    Scheduled Meds:   calcium gluconate  1,000 mg IntraVENous Once    Vitamin D  1,000 Units Oral Daily    senna  1 tablet Oral Nightly    bisacodyl  10 mg Rectal Daily    pantoprazole  40 mg Oral Daily    divalproex  125 mg Oral 3 times per day    isosorbide mononitrate  30 mg Oral Daily    rosuvastatin  5 mg Oral Nightly    polyethylene glycol  17 g Oral Daily    heparin (porcine)  5,000 Units SubCUTAneous 3 times per day    aspirin  81 mg Oral Daily    sodium chloride flush  5-40 mL IntraVENous 2 times per day    ipratropium 0.5 mg-albuterol 2.5 mg  1 Dose Inhalation Q4H WA RT    budesonide  0.5 mg Nebulization BID RT    arformoterol tartrate  15 mcg Nebulization BID RT    timolol  1 drop Both Eyes Daily    metoprolol succinate  25 mg Oral Daily    clopidogrel  75 mg Oral Daily     Continuous Infusions:   sodium chloride      sodium chloride      sodium chloride      dextrose      sodium chloride       PRN Meds:.sodium chloride, prochlorperazine, sodium chloride, sodium chloride, glucose, dextrose bolus **OR** dextrose bolus, glucagon (rDNA), dextrose, acetaminophen **OR**  acetaminophen, ondansetron **OR** ondansetron, sodium chloride flush, sodium chloride, hydrALAZINE      DATA:    CBC:   Lab Results   Component Value Date/Time    WBC 9.8 02/23/2024 05:45 AM    RBC 3.04 02/23/2024 05:45 AM    HGB 9.2 02/23/2024 05:45 AM    HCT 29.4 02/23/2024 05:45 AM    MCV 96.7 02/23/2024 05:45 AM    MCH 30.3 02/23/2024 05:45 AM    MCHC 31.3 02/23/2024 05:45 AM    RDW 18.9 02/23/2024 05:45 AM     02/23/2024 05:45 AM    MPV 10.9 02/23/2024 05:45 AM     CMP:    Lab Results   Component Value Date/Time     02/23/2024 05:45 AM    K 4.2 02/23/2024 05:45 AM     02/23/2024 05:45 AM    CO2 24 02/23/2024 05:45 AM    BUN 22 02/23/2024 05:45 AM    CREATININE 0.9 02/23/2024 05:45 AM    GFRAA >60 06/18/2020 09:49 AM    LABGLOM >60 02/23/2024 05:45 AM    GLUCOSE 124 02/23/2024 05:45 AM    PROT 6.0 02/23/2024 05:45 AM    LABALBU 3.3 02/23/2024 05:45 AM    CALCIUM 8.1 02/23/2024 05:45 AM    BILITOT 1.7 02/23/2024 05:45 AM    ALKPHOS 72 02/23/2024 05:45 AM    AST 19 02/23/2024 05:45 AM    ALT 19 02/23/2024 05:45 AM     Magnesium:    Lab Results   Component Value Date/Time    MG 1.9 02/21/2024 03:28 AM     Phosphorus:    Lab Results   Component Value Date/Time    PHOS 2.9 02/21/2024 03:28 AM     Radiology Review:      CT ABDOMEN PELVIS W IV CONTRAST Additional Contrast? None 2/7/24    IMPRESSION:  No evidence for pulmonary artery embolism to the lobar level.  Evaluation  beyond this is nondiagnostic due to respiratory motion artifact.  Would  advise close clinical follow-up or could consider repeat examination.     Interstitial/ground-glass opacities in the lower left lung, of questionable  significance but might reflect infectious/inflammatory process.     Gallbladder wall thickening versus pericholecystic fluid. Correlate for  cholecystitis.  Could obtain ultrasound or HIDA for further evaluation.     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 Recommend

## 2024-02-23 NOTE — PROGRESS NOTES
Hospitalist Progress Note      PCP: Gilson Ramon III,     Date of Admission: 2/12/2024        Hospital Course:  83 y.o. male presented with STEMI, , HE DOES NOT KNOW WHY HE IS HERE OR WHAT HAPPENED.    HE WAS INITIALLY ADMITTED TO [place], , HIS TT WAS ELEVATED 1338 .      2/16  STILL ON LEVOPHED.   TRYING TO WEAN AIRVO        2/19  LASIX IS ON HOLD     2/21   TRANSFERRED FROM [facility].  PLAVIX AND ASPIRIN TODAY   2/22  WIFE WANTS TO TAKE HIM HOME.  SHE SAYS SHE HAS ENOUGH SUPPORT     2/23  FOR [facility] TODAY     Subjective: DRY NOSE           Medications:  Reviewed    Infusion Medications    sodium chloride      sodium chloride      sodium chloride      dextrose      sodium chloride       Scheduled Medications    ferric gluconate (FERRLECIT) 125 mg in sodium chloride 0.9 % 100 mL IVPB  125 mg IntraVENous Daily    Vitamin D  1,000 Units Oral Daily    senna  1 tablet Oral Nightly    bisacodyl  10 mg Rectal Daily    pantoprazole  40 mg Oral Daily    divalproex  125 mg Oral 3 times per day    isosorbide mononitrate  30 mg Oral Daily    rosuvastatin  5 mg Oral Nightly    polyethylene glycol  17 g Oral Daily    heparin (porcine)  5,000 Units SubCUTAneous 3 times per day    aspirin  81 mg Oral Daily    sodium chloride flush  5-40 mL IntraVENous 2 times per day    ipratropium 0.5 mg-albuterol 2.5 mg  1 Dose Inhalation Q4H WA RT    budesonide  0.5 mg Nebulization BID RT    arformoterol tartrate  15 mcg Nebulization BID RT    timolol  1 drop Both Eyes Daily    metoprolol succinate  25 mg Oral Daily    clopidogrel  75 mg Oral Daily     PRN Meds: sodium chloride, prochlorperazine, sodium chloride, sodium chloride, glucose, dextrose bolus **OR** dextrose bolus, glucagon (rDNA), dextrose, acetaminophen **OR** acetaminophen, ondansetron **OR** ondansetron, sodium chloride flush, sodium chloride, hydrALAZINE      Intake/Output Summary (Last 24 hours) at 2/23/2024 1507  Last data filed at 2/23/2024 1113  Gross per 24 hour

## 2024-02-23 NOTE — PATIENT CARE CONFERENCE
[facility] Quality Flow/Interdisciplinary Rounds Progress Note        Quality Flow Rounds held on February 23, 2024    Disciplines Attending:  Bedside Nurse, , , and Nursing Unit Leadership    Shiv Lee was admitted on 2/12/2024  6:49 PM    Anticipated Discharge Date:  Expected Discharge Date: 02/23/24    Disposition:    Bulmaro Score:  Bulmaro Scale Score: 16    Readmission Risk              Risk of Unplanned Readmission:  19           Discussed patient goal for the day, patient clinical progression, and barriers to discharge.  The following Goal(s) of the Day/Commitment(s) have been identified:  Labs - Report Results      Mona Valencia RN  February 23, 2024

## 2024-02-24 ENCOUNTER — APPOINTMENT (OUTPATIENT)
Dept: GENERAL RADIOLOGY | Age: 84
End: 2024-02-24
Attending: INTERNAL MEDICINE
Payer: MEDICARE

## 2024-02-24 LAB
ABSOLUTE BANDS: NORMAL K/UL (ref 0–1)
ABSOLUTE PLASMA CELLS: NORMAL K/UL
ALBUMIN SERPL-MCNC: 3.4 G/DL (ref 3.5–5.2)
ALP SERPL-CCNC: 75 U/L (ref 40–129)
ALT SERPL-CCNC: 23 U/L (ref 0–40)
ANION GAP SERPL CALCULATED.3IONS-SCNC: 17 MMOL/L (ref 7–16)
AST SERPL-CCNC: 35 U/L (ref 0–39)
ATYPICAL LYMPHOCYTE ABSOLUTE COUNT: NORMAL K/UL
ATYPICAL LYMPHOCYTES: NORMAL %
BANDS: NORMAL %
BASOPHILS # BLD: 0 K/UL (ref 0–0.2)
BASOPHILS # BLD: 0.01 K/UL (ref 0–0.2)
BASOPHILS # BLD: NORMAL K/UL (ref 0–0.2)
BASOPHILS NFR BLD: 0 % (ref 0–2)
BASOPHILS NFR BLD: 0 % (ref 0–2)
BASOPHILS NFR BLD: NORMAL % (ref 0–2)
BILIRUB SERPL-MCNC: 2.1 MG/DL (ref 0–1.2)
BLASTS ABSOLUTE COUNT: NORMAL K/UL
BLASTS: NORMAL %
BUN SERPL-MCNC: 25 MG/DL (ref 6–23)
CA-I BLD-SCNC: 1.07 MMOL/L (ref 1.15–1.33)
CALCIUM SERPL-MCNC: 8.2 MG/DL (ref 8.6–10.2)
CHLORIDE SERPL-SCNC: 106 MMOL/L (ref 98–107)
CO2 SERPL-SCNC: 23 MMOL/L (ref 22–29)
CREAT SERPL-MCNC: 1.1 MG/DL (ref 0.7–1.2)
EOSINOPHIL # BLD: 0 K/UL (ref 0.05–0.5)
EOSINOPHIL # BLD: 0.31 K/UL (ref 0.05–0.5)
EOSINOPHIL # BLD: NORMAL K/UL (ref 0–0.4)
EOSINOPHILS RELATIVE PERCENT: 0 % (ref 0–6)
EOSINOPHILS RELATIVE PERCENT: 3 % (ref 0–6)
EOSINOPHILS RELATIVE PERCENT: NORMAL % (ref 1–4)
ERYTHROCYTE [DISTWIDTH] IN BLOOD BY AUTOMATED COUNT: 19 % (ref 11.5–15)
ERYTHROCYTE [DISTWIDTH] IN BLOOD BY AUTOMATED COUNT: 19.1 % (ref 11.5–15)
ERYTHROCYTE [DISTWIDTH] IN BLOOD BY AUTOMATED COUNT: NORMAL % (ref 11.8–14.4)
GFR SERPL CREATININE-BSD FRML MDRD: >60 ML/MIN/1.73M2
GLUCOSE SERPL-MCNC: 98 MG/DL (ref 74–99)
HCT VFR BLD AUTO: 32.2 % (ref 37–54)
HCT VFR BLD AUTO: 33.5 % (ref 37–54)
HCT VFR BLD AUTO: NORMAL % (ref 40.7–50.3)
HGB BLD-MCNC: 10.1 G/DL (ref 12.5–16.5)
HGB BLD-MCNC: 10.2 G/DL (ref 12.5–16.5)
HGB BLD-MCNC: NORMAL G/DL (ref 13–17)
IMM GRANULOCYTES # BLD AUTO: 0.08 K/UL (ref 0–0.58)
IMM GRANULOCYTES # BLD AUTO: NORMAL K/UL (ref 0–0.3)
IMM GRANULOCYTES NFR BLD: 1 % (ref 0–5)
IMM GRANULOCYTES NFR BLD: NORMAL %
LACTATE BLDV-SCNC: 1.9 MMOL/L (ref 0.5–1.9)
LYMPHOCYTES NFR BLD: 0.73 K/UL (ref 1.5–4)
LYMPHOCYTES NFR BLD: 0.74 K/UL (ref 1.5–4)
LYMPHOCYTES NFR BLD: NORMAL K/UL (ref 1–4.8)
LYMPHOCYTES RELATIVE PERCENT: 6 % (ref 20–42)
LYMPHOCYTES RELATIVE PERCENT: 6 % (ref 20–42)
LYMPHOCYTES RELATIVE PERCENT: NORMAL % (ref 24–44)
MCH RBC QN AUTO: 30.1 PG (ref 26–35)
MCH RBC QN AUTO: 30.5 PG (ref 26–35)
MCH RBC QN AUTO: NORMAL PG (ref 25.2–33.5)
MCHC RBC AUTO-ENTMCNC: 30.4 G/DL (ref 32–34.5)
MCHC RBC AUTO-ENTMCNC: 31.4 G/DL (ref 32–34.5)
MCHC RBC AUTO-ENTMCNC: NORMAL G/DL (ref 28.4–34.8)
MCV RBC AUTO: 97.3 FL (ref 80–99.9)
MCV RBC AUTO: 98.8 FL (ref 80–99.9)
MCV RBC AUTO: NORMAL FL (ref 82.6–102.9)
METAMYELOCYTES ABSOLUTE COUNT: 0.1 K/UL (ref 0–0.12)
METAMYELOCYTES ABSOLUTE COUNT: NORMAL K/UL
METAMYELOCYTES: 1 % (ref 0–1)
METAMYELOCYTES: NORMAL %
MONOCYTES NFR BLD: 0.1 K/UL (ref 0.1–0.95)
MONOCYTES NFR BLD: 0.52 K/UL (ref 0.1–0.95)
MONOCYTES NFR BLD: 1 % (ref 2–12)
MONOCYTES NFR BLD: 4 % (ref 2–12)
MONOCYTES NFR BLD: NORMAL % (ref 1–7)
MONOCYTES NFR BLD: NORMAL K/UL (ref 0.1–0.8)
MYELOCYTES ABSOLUTE COUNT: NORMAL K/UL
MYELOCYTES: NORMAL %
NEUTROPHILS NFR BLD: 86 % (ref 43–80)
NEUTROPHILS NFR BLD: 92 % (ref 43–80)
NEUTROPHILS NFR BLD: NORMAL % (ref 36–66)
NEUTS SEG NFR BLD: 10.24 K/UL (ref 1.8–7.3)
NEUTS SEG NFR BLD: 11.06 K/UL (ref 1.8–7.3)
NEUTS SEG NFR BLD: NORMAL K/UL (ref 1.8–7.7)
NRBC BLD-RTO: NORMAL PER 100 WBC
NUCLEATED RED BLOOD CELLS: NORMAL PER 100 WBC
PLASMA CELLS: NORMAL %
PLATELET # BLD AUTO: 207 K/UL (ref 130–450)
PLATELET # BLD AUTO: 225 K/UL (ref 130–450)
PLATELET # BLD AUTO: NORMAL K/UL (ref 138–453)
PLATELET ESTIMATE: NORMAL
PLATELET, FLUORESCENCE: NORMAL K/UL (ref 138–453)
PLATELETS.RETICULATED NFR BLD AUTO: NORMAL % (ref 1.1–10.3)
PMV BLD AUTO: 10.6 FL (ref 7–12)
PMV BLD AUTO: 11.2 FL (ref 7–12)
PMV BLD AUTO: NORMAL FL (ref 8.1–13.5)
POTASSIUM SERPL-SCNC: 3.8 MMOL/L (ref 3.5–5)
PROMYELOCYTES ABSOLUTE COUNT: NORMAL K/UL
PROMYELOCYTES: NORMAL %
PROT SERPL-MCNC: 6.6 G/DL (ref 6.4–8.3)
RBC # BLD AUTO: 3.31 M/UL (ref 3.8–5.8)
RBC # BLD AUTO: 3.39 M/UL (ref 3.8–5.8)
RBC # BLD AUTO: NORMAL M/UL (ref 4.21–5.77)
RBC # BLD: ABNORMAL 10*6/UL
RBC # BLD: NORMAL 10*6/UL
SODIUM SERPL-SCNC: 146 MMOL/L (ref 132–146)
WBC # BLD: ABNORMAL 10*3/UL
WBC # BLD: NORMAL 10*3/UL
WBC OTHER # BLD: 11.9 K/UL (ref 4.5–11.5)
WBC OTHER # BLD: 12 K/UL (ref 4.5–11.5)
WBC OTHER # BLD: NORMAL K/UL (ref 3.5–11.3)

## 2024-02-24 PROCEDURE — 82330 ASSAY OF CALCIUM: CPT

## 2024-02-24 PROCEDURE — 6360000002 HC RX W HCPCS: Performed by: INTERNAL MEDICINE

## 2024-02-24 PROCEDURE — 6370000000 HC RX 637 (ALT 250 FOR IP): Performed by: INTERNAL MEDICINE

## 2024-02-24 PROCEDURE — 85025 COMPLETE CBC W/AUTO DIFF WBC: CPT

## 2024-02-24 PROCEDURE — 2580000003 HC RX 258: Performed by: INTERNAL MEDICINE

## 2024-02-24 PROCEDURE — 2700000000 HC OXYGEN THERAPY PER DAY

## 2024-02-24 PROCEDURE — 2580000003 HC RX 258: Performed by: NURSE PRACTITIONER

## 2024-02-24 PROCEDURE — 71046 X-RAY EXAM CHEST 2 VIEWS: CPT

## 2024-02-24 PROCEDURE — 80053 COMPREHEN METABOLIC PANEL: CPT

## 2024-02-24 PROCEDURE — 93005 ELECTROCARDIOGRAM TRACING: CPT | Performed by: INTERNAL MEDICINE

## 2024-02-24 PROCEDURE — 94640 AIRWAY INHALATION TREATMENT: CPT

## 2024-02-24 PROCEDURE — 99232 SBSQ HOSP IP/OBS MODERATE 35: CPT | Performed by: INTERNAL MEDICINE

## 2024-02-24 PROCEDURE — 83605 ASSAY OF LACTIC ACID: CPT

## 2024-02-24 PROCEDURE — 36415 COLL VENOUS BLD VENIPUNCTURE: CPT

## 2024-02-24 PROCEDURE — 2140000000 HC CCU INTERMEDIATE R&B

## 2024-02-24 RX ORDER — CALCIUM GLUCONATE 10 MG/ML
1000 INJECTION, SOLUTION INTRAVENOUS ONCE
Status: COMPLETED | OUTPATIENT
Start: 2024-02-24 | End: 2024-02-24

## 2024-02-24 RX ORDER — 0.9 % SODIUM CHLORIDE 0.9 %
250 INTRAVENOUS SOLUTION INTRAVENOUS ONCE
Status: COMPLETED | OUTPATIENT
Start: 2024-02-24 | End: 2024-02-25

## 2024-02-24 RX ADMIN — SENNOSIDES 8.6 MG: 8.6 TABLET, FILM COATED ORAL at 21:00

## 2024-02-24 RX ADMIN — CALCIUM GLUCONATE 1000 MG: 10 INJECTION, SOLUTION INTRAVENOUS at 12:32

## 2024-02-24 RX ADMIN — Medication 10 ML: at 21:51

## 2024-02-24 RX ADMIN — Medication 10 ML: at 09:13

## 2024-02-24 RX ADMIN — HEPARIN SODIUM 5000 UNITS: 10000 INJECTION INTRAVENOUS; SUBCUTANEOUS at 21:01

## 2024-02-24 RX ADMIN — IPRATROPIUM BROMIDE AND ALBUTEROL SULFATE 1 DOSE: 2.5; .5 SOLUTION RESPIRATORY (INHALATION) at 15:48

## 2024-02-24 RX ADMIN — ASPIRIN 81 MG: 81 TABLET, CHEWABLE ORAL at 09:13

## 2024-02-24 RX ADMIN — PANTOPRAZOLE SODIUM 40 MG: 40 TABLET, DELAYED RELEASE ORAL at 09:13

## 2024-02-24 RX ADMIN — ARFORMOTEROL TARTRATE 15 MCG: 15 SOLUTION RESPIRATORY (INHALATION) at 21:21

## 2024-02-24 RX ADMIN — CLOPIDOGREL BISULFATE 75 MG: 75 TABLET ORAL at 09:12

## 2024-02-24 RX ADMIN — IPRATROPIUM BROMIDE AND ALBUTEROL SULFATE 1 DOSE: 2.5; .5 SOLUTION RESPIRATORY (INHALATION) at 21:22

## 2024-02-24 RX ADMIN — PROCHLORPERAZINE EDISYLATE 10 MG: 5 INJECTION INTRAMUSCULAR; INTRAVENOUS at 04:01

## 2024-02-24 RX ADMIN — DIVALPROEX SODIUM 125 MG: 125 CAPSULE, COATED PELLETS ORAL at 13:29

## 2024-02-24 RX ADMIN — IPRATROPIUM BROMIDE AND ALBUTEROL SULFATE 1 DOSE: 2.5; .5 SOLUTION RESPIRATORY (INHALATION) at 11:32

## 2024-02-24 RX ADMIN — HEPARIN SODIUM 5000 UNITS: 10000 INJECTION INTRAVENOUS; SUBCUTANEOUS at 13:29

## 2024-02-24 RX ADMIN — DIVALPROEX SODIUM 125 MG: 125 CAPSULE, COATED PELLETS ORAL at 21:00

## 2024-02-24 RX ADMIN — IPRATROPIUM BROMIDE AND ALBUTEROL SULFATE 1 DOSE: 2.5; .5 SOLUTION RESPIRATORY (INHALATION) at 08:18

## 2024-02-24 RX ADMIN — METOPROLOL SUCCINATE 25 MG: 25 TABLET, EXTENDED RELEASE ORAL at 09:12

## 2024-02-24 RX ADMIN — SODIUM CHLORIDE 125 MG: 9 INJECTION, SOLUTION INTRAVENOUS at 09:37

## 2024-02-24 RX ADMIN — DIVALPROEX SODIUM 125 MG: 125 CAPSULE, COATED PELLETS ORAL at 06:03

## 2024-02-24 RX ADMIN — BUDESONIDE INHALATION 500 MCG: 0.5 SUSPENSION RESPIRATORY (INHALATION) at 21:22

## 2024-02-24 RX ADMIN — BUDESONIDE INHALATION 500 MCG: 0.5 SUSPENSION RESPIRATORY (INHALATION) at 08:18

## 2024-02-24 RX ADMIN — Medication 1000 UNITS: at 09:12

## 2024-02-24 RX ADMIN — SODIUM CHLORIDE 250 ML: 9 INJECTION, SOLUTION INTRAVENOUS at 23:34

## 2024-02-24 RX ADMIN — DIVALPROEX SODIUM 125 MG: 125 CAPSULE, COATED PELLETS ORAL at 00:10

## 2024-02-24 RX ADMIN — ACETAMINOPHEN 650 MG: 325 TABLET ORAL at 21:00

## 2024-02-24 RX ADMIN — ROSUVASTATIN CALCIUM 5 MG: 5 TABLET, COATED ORAL at 21:00

## 2024-02-24 RX ADMIN — TIMOLOL MALEATE 1 DROP: 5 SOLUTION OPHTHALMIC at 09:13

## 2024-02-24 RX ADMIN — HEPARIN SODIUM 5000 UNITS: 10000 INJECTION INTRAVENOUS; SUBCUTANEOUS at 06:03

## 2024-02-24 RX ADMIN — ARFORMOTEROL TARTRATE 15 MCG: 15 SOLUTION RESPIRATORY (INHALATION) at 08:18

## 2024-02-24 NOTE — CONSULTS
Met with patient and discussed that their physician has ordered a referral to our outpatient Phase II Cardiac Rehabilitation program. Reviewed the benefits of cardiac rehabilitation based on their diagnosis and personal risk factors. Patient demonstrates mild interest in Cardiac Rehabilitation at this time.  Cardiac Rehabilitation brochure provided to patient/family. The Cardiac Rehabilitation Program has been provided the patient's referral information and pertinent patient details and history. The patient may call [facility] at [phone] for additional information or questions. Contact information for [facility] and other choices close to the patient's residence have been provided in the discharge instructions so that the patient may call and schedule an appointment when cleared by their physician. Thank you for the referral.

## 2024-02-24 NOTE — PROGRESS NOTES
Dr. Santana notified regarding patients increase in oxygen demand. Patient was on 5-6L NC prior to heart cath. Patient now requiring 13-15L HFNC to maintain an SpO2 of 88-90%. Respirations 20s-30s and heart rate maintaining 100s-110s. Per Dr. Santana, give patient 40mg IV lasix once and 0.63mg xopenex breathing treatment.

## 2024-02-24 NOTE — PROGRESS NOTES
[facility] Quality Flow/Interdisciplinary Rounds Progress Note        Quality Flow Rounds held on February 24, 2024    Disciplines Attending:  Bedside Nurse, , , and Nursing Unit Leadership    Shiv Lee was admitted on 2/12/2024  6:49 PM    Anticipated Discharge Date:  Expected Discharge Date: 02/23/24    Disposition:    Bulmaro Score:  Bulmaro Scale Score: 17    Readmission Risk              Risk of Unplanned Readmission:  18           Discussed patient goal for the day, patient clinical progression, and barriers to discharge.  The following Goal(s) of the Day/Commitment(s) have been identified:  downgrade/discharge plan      Fidelina Hopson RN  February 24, 2024

## 2024-02-24 NOTE — PROGRESS NOTES
Hospitalist Progress Note      PCP: Gilson Ramon III,     Date of Admission: 2/12/2024        Hospital Course:    83 y.o. male presented with STEMI, , HE DOES NOT KNOW WHY HE IS HERE OR WHAT HAPPENED.    HE WAS INITIALLY ADMITTED TO [place], , HIS TT WAS ELEVATED 1338 .      2/16  STILL ON LEVOPHED.   TRYING TO WEAN AIRVO        2/19  LASIX IS ON HOLD     2/21   TRANSFERRED FROM [facility].  PLAVIX AND ASPIRIN TODAY   2/22  WIFE WANTS TO TAKE HIM HOME.  SHE SAYS SHE HAS ENOUGH SUPPORT      2/23  FOR [facility] TODAY    2/24   LHC NOT DONE DUE TO INCREASED OXYGEN DEMAND ON YESTERDAY        Subjective:  FEELING BETTER            Medications:  Reviewed    Infusion Medications    sodium chloride      sodium chloride      sodium chloride      dextrose      sodium chloride       Scheduled Medications    Vitamin D  1,000 Units Oral Daily    senna  1 tablet Oral Nightly    bisacodyl  10 mg Rectal Daily    pantoprazole  40 mg Oral Daily    divalproex  125 mg Oral 3 times per day    isosorbide mononitrate  30 mg Oral Daily    rosuvastatin  5 mg Oral Nightly    polyethylene glycol  17 g Oral Daily    heparin (porcine)  5,000 Units SubCUTAneous 3 times per day    aspirin  81 mg Oral Daily    sodium chloride flush  5-40 mL IntraVENous 2 times per day    ipratropium 0.5 mg-albuterol 2.5 mg  1 Dose Inhalation Q4H WA RT    budesonide  0.5 mg Nebulization BID RT    arformoterol tartrate  15 mcg Nebulization BID RT    timolol  1 drop Both Eyes Daily    metoprolol succinate  25 mg Oral Daily    clopidogrel  75 mg Oral Daily     PRN Meds: acetaminophen, oxyCODONE-acetaminophen, sodium chloride, prochlorperazine, sodium chloride, sodium chloride, glucose, dextrose bolus **OR** dextrose bolus, glucagon (rDNA), dextrose, [DISCONTINUED] acetaminophen **OR** acetaminophen, ondansetron **OR** ondansetron, sodium chloride flush, sodium chloride, hydrALAZINE      Intake/Output Summary (Last 24 hours) at 2/24/2024 2283  Last data filed at

## 2024-02-24 NOTE — PLAN OF CARE
Problem: Respiratory - Adult  Goal: Achieves optimal ventilation and oxygenation  Outcome: Not Progressing     Problem: Skin/Tissue Integrity - Adult  Goal: Oral mucous membranes remain intact  Outcome: Not Progressing     Problem: Safety - Adult  Goal: Free from fall injury  Outcome: Not Progressing

## 2024-02-24 NOTE — PROGRESS NOTES
Department of Internal Medicine  Nephrology Attending Progress Note      Events reviewed.       SUBJECTIVE: We are following Mr. Lee for MIGDALIA.  Reports no new complaints.     PHYSICAL EXAM:      Vitals:    VITALS:  /61   Pulse 81   Temp 97.8 °F (36.6 °C) (Temporal)   Resp 26   Ht 1.727 m (5' 7.99\")   Wt 82.6 kg (182 lb 1.6 oz)   SpO2 99%   BMI 27.69 kg/m²   24HR INTAKE/OUTPUT:    Intake/Output Summary (Last 24 hours) at 2/24/2024 1203  Last data filed at 2/24/2024 1031  Gross per 24 hour   Intake 420 ml   Output 335 ml   Net 85 ml         Constitutional: Patient is alert   HEENT: Pupils equal reactive, mucous membranes are dry  Respiratory: Lungs are coarse  Cardiovascular/Edema: Heart sounds are tachycardic  Gastrointestinal: Abdomen is soft  Neurologic: Patient lethargic, nonfocal  Skin: No lesions  Other: + edema    Scheduled Meds:   Vitamin D  1,000 Units Oral Daily    senna  1 tablet Oral Nightly    bisacodyl  10 mg Rectal Daily    pantoprazole  40 mg Oral Daily    divalproex  125 mg Oral 3 times per day    isosorbide mononitrate  30 mg Oral Daily    rosuvastatin  5 mg Oral Nightly    polyethylene glycol  17 g Oral Daily    heparin (porcine)  5,000 Units SubCUTAneous 3 times per day    aspirin  81 mg Oral Daily    sodium chloride flush  5-40 mL IntraVENous 2 times per day    ipratropium 0.5 mg-albuterol 2.5 mg  1 Dose Inhalation Q4H WA RT    budesonide  0.5 mg Nebulization BID RT    arformoterol tartrate  15 mcg Nebulization BID RT    timolol  1 drop Both Eyes Daily    metoprolol succinate  25 mg Oral Daily    clopidogrel  75 mg Oral Daily     Continuous Infusions:   sodium chloride      sodium chloride      sodium chloride      dextrose      sodium chloride       PRN Meds:.acetaminophen, oxyCODONE-acetaminophen, sodium chloride, prochlorperazine, sodium chloride, sodium chloride, glucose, dextrose bolus **OR** dextrose bolus, glucagon (rDNA), dextrose, [DISCONTINUED] acetaminophen **OR**

## 2024-02-24 NOTE — PROGRESS NOTES
PROGRESS NOTE     CARDIOLOGY    Chief complaint: Seen today for follow up, management & recommendations for coronary artery disease    He denies chest pain or shortness of breath today.   He was comfortable and in no distress.  He was reclining nearly flat in bed sleeping comfortably.  He awakened easily.    Wt Readings from Last 3 Encounters:   02/24/24 82.6 kg (182 lb 1.6 oz)   02/12/24 85.5 kg (188 lb 6.4 oz)   07/27/22 88.3 kg (194 lb 9.6 oz)     Temp Readings from Last 3 Encounters:   02/24/24 97.8 °F (36.6 °C) (Temporal)   02/12/24 98.1 °F (36.7 °C) (Axillary)   11/18/20 98.2 °F (36.8 °C) (Infrared)     BP Readings from Last 3 Encounters:   02/24/24 114/61   02/12/24 125/78   07/27/22 (!) 140/85     Pulse Readings from Last 3 Encounters:   02/24/24 81   02/12/24 100   07/27/22 65         Intake/Output Summary (Last 24 hours) at 2/24/2024 1442  Last data filed at 2/24/2024 1402  Gross per 24 hour   Intake 600 ml   Output 335 ml   Net 265 ml       Recent Labs     02/23/24  0545 02/24/24  0533 02/24/24  0936   WBC 9.8 Unable to perform testing: Specimen clotted. 12.0*   HGB 9.2* Unable to perform testing: Specimen clotted. 10.2*   HCT 29.4* Unable to perform testing: Specimen clotted. 33.5*   MCV 96.7 Unable to perform testing: Specimen clotted. 98.8    Unable to perform testing: Specimen clotted. 225     Recent Labs     02/22/24  0436 02/23/24  0545 02/24/24  0533    142 146   K 3.7 4.2 3.8    109* 106   CO2 24 24 23   BUN 28* 22 25*   CREATININE 0.9 0.9 1.1     No results for input(s): \"PROTIME\", \"INR\" in the last 72 hours.  No results for input(s): \"CKTOTAL\", \"CKMB\", \"CKMBINDEX\", \"TROPONINI\" in the last 72 hours.  No results for input(s): \"BNP\" in the last 72 hours.  No results for input(s): \"CHOL\", \"HDL\", \"TRIG\" in the last 72 hours.    Invalid input(s): \"CHOLHDLR\", \"LDLCALCU\"  No results for input(s): \"TROPHS\" in the last 72 hours.      acetaminophen (TYLENOL) tablet 650 mg, Q4H

## 2024-02-25 LAB
ALBUMIN SERPL-MCNC: 3.1 G/DL (ref 3.5–5.2)
ALP SERPL-CCNC: 74 U/L (ref 40–129)
ALT SERPL-CCNC: 18 U/L (ref 0–40)
ANION GAP SERPL CALCULATED.3IONS-SCNC: 11 MMOL/L (ref 7–16)
ANION GAP SERPL CALCULATED.3IONS-SCNC: 12 MMOL/L (ref 7–16)
AST SERPL-CCNC: 22 U/L (ref 0–39)
B PARAP IS1001 DNA NPH QL NAA+NON-PROBE: NOT DETECTED
B PERT DNA SPEC QL NAA+PROBE: NOT DETECTED
BASOPHILS # BLD: 0.02 K/UL (ref 0–0.2)
BASOPHILS NFR BLD: 0 % (ref 0–2)
BILIRUB SERPL-MCNC: 1.6 MG/DL (ref 0–1.2)
BUN SERPL-MCNC: 23 MG/DL (ref 6–23)
BUN SERPL-MCNC: 29 MG/DL (ref 6–23)
C PNEUM DNA NPH QL NAA+NON-PROBE: NOT DETECTED
CA-I BLD-SCNC: 1.14 MMOL/L (ref 1.15–1.33)
CALCIUM SERPL-MCNC: 8.1 MG/DL (ref 8.6–10.2)
CALCIUM SERPL-MCNC: 8.3 MG/DL (ref 8.6–10.2)
CHLORIDE SERPL-SCNC: 108 MMOL/L (ref 98–107)
CHLORIDE SERPL-SCNC: 111 MMOL/L (ref 98–107)
CO2 SERPL-SCNC: 24 MMOL/L (ref 22–29)
CO2 SERPL-SCNC: 26 MMOL/L (ref 22–29)
CREAT SERPL-MCNC: 1.1 MG/DL (ref 0.7–1.2)
CREAT SERPL-MCNC: 1.4 MG/DL (ref 0.7–1.2)
EKG ATRIAL RATE: 108 BPM
EKG P AXIS: 26 DEGREES
EKG P-R INTERVAL: 216 MS
EKG Q-T INTERVAL: 382 MS
EKG QRS DURATION: 118 MS
EKG QTC CALCULATION (BAZETT): 511 MS
EKG R AXIS: -8 DEGREES
EKG T AXIS: 118 DEGREES
EKG VENTRICULAR RATE: 108 BPM
EOSINOPHIL # BLD: 0.49 K/UL (ref 0.05–0.5)
EOSINOPHILS RELATIVE PERCENT: 4 % (ref 0–6)
ERYTHROCYTE [DISTWIDTH] IN BLOOD BY AUTOMATED COUNT: 18.7 % (ref 11.5–15)
FLUAV RNA NPH QL NAA+NON-PROBE: NOT DETECTED
FLUBV RNA NPH QL NAA+NON-PROBE: NOT DETECTED
GFR SERPL CREATININE-BSD FRML MDRD: 50 ML/MIN/1.73M2
GFR SERPL CREATININE-BSD FRML MDRD: >60 ML/MIN/1.73M2
GLUCOSE SERPL-MCNC: 188 MG/DL (ref 74–99)
GLUCOSE SERPL-MCNC: 96 MG/DL (ref 74–99)
HADV DNA NPH QL NAA+NON-PROBE: NOT DETECTED
HCOV 229E RNA NPH QL NAA+NON-PROBE: NOT DETECTED
HCOV HKU1 RNA NPH QL NAA+NON-PROBE: NOT DETECTED
HCOV NL63 RNA NPH QL NAA+NON-PROBE: NOT DETECTED
HCOV OC43 RNA NPH QL NAA+NON-PROBE: NOT DETECTED
HCT VFR BLD AUTO: 31 % (ref 37–54)
HGB BLD-MCNC: 9.5 G/DL (ref 12.5–16.5)
HMPV RNA NPH QL NAA+NON-PROBE: NOT DETECTED
HPIV1 RNA NPH QL NAA+NON-PROBE: NOT DETECTED
HPIV2 RNA NPH QL NAA+NON-PROBE: NOT DETECTED
HPIV3 RNA NPH QL NAA+NON-PROBE: NOT DETECTED
HPIV4 RNA NPH QL NAA+NON-PROBE: NOT DETECTED
IMM GRANULOCYTES # BLD AUTO: 0.05 K/UL (ref 0–0.58)
IMM GRANULOCYTES NFR BLD: 0 % (ref 0–5)
LYMPHOCYTES NFR BLD: 0.96 K/UL (ref 1.5–4)
LYMPHOCYTES RELATIVE PERCENT: 9 % (ref 20–42)
M PNEUMO DNA NPH QL NAA+NON-PROBE: NOT DETECTED
MAGNESIUM SERPL-MCNC: 2 MG/DL (ref 1.6–2.6)
MCH RBC QN AUTO: 30.3 PG (ref 26–35)
MCHC RBC AUTO-ENTMCNC: 30.6 G/DL (ref 32–34.5)
MCV RBC AUTO: 98.7 FL (ref 80–99.9)
MONOCYTES NFR BLD: 0.47 K/UL (ref 0.1–0.95)
MONOCYTES NFR BLD: 4 % (ref 2–12)
NEUTROPHILS NFR BLD: 82 % (ref 43–80)
NEUTS SEG NFR BLD: 9.19 K/UL (ref 1.8–7.3)
PLATELET # BLD AUTO: 210 K/UL (ref 130–450)
PMV BLD AUTO: 11.2 FL (ref 7–12)
POTASSIUM SERPL-SCNC: 3.1 MMOL/L (ref 3.5–5)
POTASSIUM SERPL-SCNC: 4.7 MMOL/L (ref 3.5–5)
PROT SERPL-MCNC: 6 G/DL (ref 6.4–8.3)
RBC # BLD AUTO: 3.14 M/UL (ref 3.8–5.8)
RSV RNA NPH QL NAA+NON-PROBE: NOT DETECTED
RV+EV RNA NPH QL NAA+NON-PROBE: NOT DETECTED
SARS-COV-2 RNA NPH QL NAA+NON-PROBE: NOT DETECTED
SODIUM SERPL-SCNC: 143 MMOL/L (ref 132–146)
SODIUM SERPL-SCNC: 149 MMOL/L (ref 132–146)
SPECIMEN DESCRIPTION: NORMAL
WBC OTHER # BLD: 11.2 K/UL (ref 4.5–11.5)

## 2024-02-25 PROCEDURE — 6370000000 HC RX 637 (ALT 250 FOR IP): Performed by: INTERNAL MEDICINE

## 2024-02-25 PROCEDURE — 6360000002 HC RX W HCPCS: Performed by: INTERNAL MEDICINE

## 2024-02-25 PROCEDURE — 80048 BASIC METABOLIC PNL TOTAL CA: CPT

## 2024-02-25 PROCEDURE — 6360000002 HC RX W HCPCS: Performed by: CLINICAL NURSE SPECIALIST

## 2024-02-25 PROCEDURE — 2580000003 HC RX 258: Performed by: INTERNAL MEDICINE

## 2024-02-25 PROCEDURE — 94667 MNPJ CHEST WALL 1ST: CPT

## 2024-02-25 PROCEDURE — 94640 AIRWAY INHALATION TREATMENT: CPT

## 2024-02-25 PROCEDURE — 6370000000 HC RX 637 (ALT 250 FOR IP): Performed by: CLINICAL NURSE SPECIALIST

## 2024-02-25 PROCEDURE — 85379 FIBRIN DEGRADATION QUANT: CPT

## 2024-02-25 PROCEDURE — 6370000000 HC RX 637 (ALT 250 FOR IP): Performed by: NURSE PRACTITIONER

## 2024-02-25 PROCEDURE — 86140 C-REACTIVE PROTEIN: CPT

## 2024-02-25 PROCEDURE — 93010 ELECTROCARDIOGRAM REPORT: CPT | Performed by: INTERNAL MEDICINE

## 2024-02-25 PROCEDURE — 2700000000 HC OXYGEN THERAPY PER DAY

## 2024-02-25 PROCEDURE — 83735 ASSAY OF MAGNESIUM: CPT

## 2024-02-25 PROCEDURE — 80053 COMPREHEN METABOLIC PANEL: CPT

## 2024-02-25 PROCEDURE — 99232 SBSQ HOSP IP/OBS MODERATE 35: CPT | Performed by: INTERNAL MEDICINE

## 2024-02-25 PROCEDURE — 85025 COMPLETE CBC W/AUTO DIFF WBC: CPT

## 2024-02-25 PROCEDURE — 2580000003 HC RX 258: Performed by: CLINICAL NURSE SPECIALIST

## 2024-02-25 PROCEDURE — 36415 COLL VENOUS BLD VENIPUNCTURE: CPT

## 2024-02-25 PROCEDURE — 82330 ASSAY OF CALCIUM: CPT

## 2024-02-25 PROCEDURE — 0202U NFCT DS 22 TRGT SARS-COV-2: CPT

## 2024-02-25 PROCEDURE — 2140000000 HC CCU INTERMEDIATE R&B

## 2024-02-25 RX ORDER — GUAIFENESIN 400 MG/1
400 TABLET ORAL EVERY 8 HOURS
Status: DISCONTINUED | OUTPATIENT
Start: 2024-02-25 | End: 2024-03-01 | Stop reason: HOSPADM

## 2024-02-25 RX ORDER — ALBUTEROL SULFATE 2.5 MG/3ML
2.5 SOLUTION RESPIRATORY (INHALATION)
Status: DISCONTINUED | OUTPATIENT
Start: 2024-02-25 | End: 2024-03-01 | Stop reason: HOSPADM

## 2024-02-25 RX ORDER — CALCIUM GLUCONATE 10 MG/ML
1000 INJECTION, SOLUTION INTRAVENOUS ONCE
Status: COMPLETED | OUTPATIENT
Start: 2024-02-25 | End: 2024-02-25

## 2024-02-25 RX ORDER — POTASSIUM CHLORIDE 20 MEQ/1
20 TABLET, EXTENDED RELEASE ORAL ONCE
Status: COMPLETED | OUTPATIENT
Start: 2024-02-25 | End: 2024-02-25

## 2024-02-25 RX ORDER — POTASSIUM CHLORIDE 20 MEQ/1
40 TABLET, EXTENDED RELEASE ORAL ONCE
Status: COMPLETED | OUTPATIENT
Start: 2024-02-25 | End: 2024-02-25

## 2024-02-25 RX ADMIN — CALCIUM GLUCONATE 1000 MG: 10 INJECTION, SOLUTION INTRAVENOUS at 10:20

## 2024-02-25 RX ADMIN — Medication 10 ML: at 22:12

## 2024-02-25 RX ADMIN — POTASSIUM CHLORIDE 40 MEQ: 1500 TABLET, EXTENDED RELEASE ORAL at 09:36

## 2024-02-25 RX ADMIN — ARFORMOTEROL TARTRATE 15 MCG: 15 SOLUTION RESPIRATORY (INHALATION) at 21:21

## 2024-02-25 RX ADMIN — ARFORMOTEROL TARTRATE 15 MCG: 15 SOLUTION RESPIRATORY (INHALATION) at 08:17

## 2024-02-25 RX ADMIN — METHYLPREDNISOLONE SODIUM SUCCINATE 40 MG: 40 INJECTION, POWDER, LYOPHILIZED, FOR SOLUTION INTRAMUSCULAR; INTRAVENOUS at 09:34

## 2024-02-25 RX ADMIN — ALBUTEROL SULFATE 2.5 MG: 2.5 SOLUTION RESPIRATORY (INHALATION) at 12:57

## 2024-02-25 RX ADMIN — GUAIFENESIN 400 MG: 400 TABLET ORAL at 09:36

## 2024-02-25 RX ADMIN — PANTOPRAZOLE SODIUM 40 MG: 40 TABLET, DELAYED RELEASE ORAL at 08:07

## 2024-02-25 RX ADMIN — DIVALPROEX SODIUM 125 MG: 125 CAPSULE, COATED PELLETS ORAL at 06:22

## 2024-02-25 RX ADMIN — ALBUTEROL SULFATE 2.5 MG: 2.5 SOLUTION RESPIRATORY (INHALATION) at 21:20

## 2024-02-25 RX ADMIN — ROSUVASTATIN CALCIUM 5 MG: 5 TABLET, COATED ORAL at 22:12

## 2024-02-25 RX ADMIN — DIVALPROEX SODIUM 125 MG: 125 CAPSULE, COATED PELLETS ORAL at 22:12

## 2024-02-25 RX ADMIN — POTASSIUM CHLORIDE 20 MEQ: 1500 TABLET, EXTENDED RELEASE ORAL at 11:49

## 2024-02-25 RX ADMIN — GUAIFENESIN 400 MG: 400 TABLET ORAL at 17:57

## 2024-02-25 RX ADMIN — OXYCODONE HYDROCHLORIDE AND ACETAMINOPHEN 1 TABLET: 5; 325 TABLET ORAL at 12:15

## 2024-02-25 RX ADMIN — BUDESONIDE INHALATION 500 MCG: 0.5 SUSPENSION RESPIRATORY (INHALATION) at 21:20

## 2024-02-25 RX ADMIN — CLOPIDOGREL BISULFATE 75 MG: 75 TABLET ORAL at 08:06

## 2024-02-25 RX ADMIN — SENNOSIDES 8.6 MG: 8.6 TABLET, FILM COATED ORAL at 22:12

## 2024-02-25 RX ADMIN — ASPIRIN 81 MG: 81 TABLET, CHEWABLE ORAL at 08:07

## 2024-02-25 RX ADMIN — POTASSIUM CHLORIDE: 2 INJECTION, SOLUTION, CONCENTRATE INTRAVENOUS at 11:54

## 2024-02-25 RX ADMIN — IPRATROPIUM BROMIDE AND ALBUTEROL SULFATE 1 DOSE: 2.5; .5 SOLUTION RESPIRATORY (INHALATION) at 08:17

## 2024-02-25 RX ADMIN — METOPROLOL SUCCINATE 25 MG: 25 TABLET, EXTENDED RELEASE ORAL at 08:07

## 2024-02-25 RX ADMIN — HEPARIN SODIUM 5000 UNITS: 10000 INJECTION INTRAVENOUS; SUBCUTANEOUS at 22:12

## 2024-02-25 RX ADMIN — HEPARIN SODIUM 5000 UNITS: 10000 INJECTION INTRAVENOUS; SUBCUTANEOUS at 13:21

## 2024-02-25 RX ADMIN — HEPARIN SODIUM 5000 UNITS: 10000 INJECTION INTRAVENOUS; SUBCUTANEOUS at 06:22

## 2024-02-25 RX ADMIN — TIMOLOL MALEATE 1 DROP: 5 SOLUTION OPHTHALMIC at 08:09

## 2024-02-25 RX ADMIN — ALBUTEROL SULFATE 2.5 MG: 2.5 SOLUTION RESPIRATORY (INHALATION) at 15:55

## 2024-02-25 RX ADMIN — METHYLPREDNISOLONE SODIUM SUCCINATE 40 MG: 40 INJECTION, POWDER, LYOPHILIZED, FOR SOLUTION INTRAMUSCULAR; INTRAVENOUS at 17:57

## 2024-02-25 RX ADMIN — OXYCODONE HYDROCHLORIDE AND ACETAMINOPHEN 1 TABLET: 5; 325 TABLET ORAL at 08:07

## 2024-02-25 RX ADMIN — DIVALPROEX SODIUM 125 MG: 125 CAPSULE, COATED PELLETS ORAL at 13:21

## 2024-02-25 RX ADMIN — Medication 10 ML: at 08:08

## 2024-02-25 RX ADMIN — Medication 1000 UNITS: at 08:07

## 2024-02-25 RX ADMIN — BUDESONIDE INHALATION 500 MCG: 0.5 SUSPENSION RESPIRATORY (INHALATION) at 08:18

## 2024-02-25 ASSESSMENT — PAIN DESCRIPTION - LOCATION: LOCATION: GENERALIZED

## 2024-02-25 ASSESSMENT — PAIN SCALES - WONG BAKER: WONGBAKER_NUMERICALRESPONSE: 0

## 2024-02-25 ASSESSMENT — PAIN SCALES - GENERAL
PAINLEVEL_OUTOF10: 0
PAINLEVEL_OUTOF10: 8

## 2024-02-25 ASSESSMENT — PAIN DESCRIPTION - DESCRIPTORS: DESCRIPTORS: ACHING

## 2024-02-25 NOTE — CONSULTS
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 He had previously been followed by Dr. Lazar last seen by him in 11/2020 for evaluation of dyspnea/chronic cough noted to have drop in lung volumes and referred for chest CT but he was lost to follow up after numerous attempts to reach him.  He re-established with Dr. Zaman on 1/25/24 reporting ongoing non-productive coughing fits, nasal congestion and exertional dyspnea since his pneumonia diagnosis. PFTs done showing spirometry was normal with signs of moderate restrictive lung disease with moderate decrease of diffusion capacity. He was found to be hypoxic to 86% after 1 minute walk test and set up for home oxygen, continued on breztri, and albuterol with flutter valve.     Developed pneumonia around jeet with progressive shortness of breath  His inhalers failed to improve his symptoms.  She grew concerned when he was too weak to stand and called EMS to transport him for evaluation who found him febrile 101F hypoxic at 84% on his normal 2L placing him on NRB for transport. Patient was distressed reporting it felt like he was having a panic attack.  In the ED he became bradycardic to 40s and hypotensive 59/35 treated with atropine and epi push. Norepinephrine was initiated. His work up revealed that he is COVID positive. He was found to have troponin elevation 1338 > 1458 on repeat, BNP 2132 without previous comparison, EKG on presentation showed SR with first degree block, no ACS. Found to have MIGDALIA with metabolic acidosis - BUN/Cr 27/1.5. No definitive PE on CTA Chest. CT Chest did show interstitial ground glass opacities. CT AP showed c/f cholecystitis with gallbladder wall thickening and bladder

## 2024-02-25 NOTE — PROGRESS NOTES
Messaged via perfect serve [place] about BP 78/38 HR 88 SPO2 99% resp 22 on 14L. Awaiting response.Berta Granado RN

## 2024-02-25 NOTE — PATIENT CARE CONFERENCE
[facility] Quality Flow/Interdisciplinary Rounds Progress Note        Quality Flow Rounds held on February 25, 2024    Disciplines Attending:  Bedside Nurse, , , and Nursing Unit Leadership    Shiv Lee was admitted on 2/12/2024  6:49 PM    Anticipated Discharge Date:  Expected Discharge Date: 02/23/24    Disposition:    Bulmaro Score:  Bulmaro Scale Score: 16    Readmission Risk              Risk of Unplanned Readmission:  20           Discussed patient goal for the day, patient clinical progression, and barriers to discharge.  The following Goal(s) of the Day/Commitment(s) have been identified:  discharge planning       Fidelina Hopson RN  February 25, 2024

## 2024-02-25 NOTE — PROGRESS NOTES
Hospitalist Progress Note      PCP: Gilson Ramon III, DO    Date of Admission: 2/12/2024        Hospital Course:  83 y.o. male presented with STEMI, , HE DOES NOT KNOW WHY HE IS HERE OR WHAT HAPPENED.    HE WAS INITIALLY ADMITTED TO [place], , HIS TT WAS ELEVATED 1338 .      2/16  STILL ON LEVOPHED.   TRYING TO WEAN AIRVO        2/19  LASIX IS ON HOLD     2/21   TRANSFERRED FROM [facility].  PLAVIX AND ASPIRIN TODAY   2/22  WIFE WANTS TO TAKE HIM HOME.  SHE SAYS SHE HAS ENOUGH SUPPORT      2/23  FOR [facility] TODAY   LHC NOT DONE DUE TO INCREASED OXYGEN DEMAND ON YESTERDAY     2.24 Successful stenting of the SVG to the RCA yesterday with a 3.5 x 38 mm drug-eluting stent    2/ 25   D DIMER OVER 2000  WILL ORDER CTA  OF CHEST      Subjective:  ASLEEP, WAS GIVEN PAIN MEDS           Medications:  Reviewed    Infusion Medications    potassium chloride 30 mEq in dextrose 5 % 1,000 mL infusion 75 mL/hr at 02/25/24 1154    sodium chloride      sodium chloride      sodium chloride      dextrose      sodium chloride       Scheduled Medications    albuterol  2.5 mg Nebulization 4x Daily RT    guaiFENesin  400 mg Oral q8h    methylPREDNISolone  40 mg IntraVENous Q8H    Vitamin D  1,000 Units Oral Daily    senna  1 tablet Oral Nightly    bisacodyl  10 mg Rectal Daily    pantoprazole  40 mg Oral Daily    divalproex  125 mg Oral 3 times per day    isosorbide mononitrate  30 mg Oral Daily    rosuvastatin  5 mg Oral Nightly    polyethylene glycol  17 g Oral Daily    heparin (porcine)  5,000 Units SubCUTAneous 3 times per day    aspirin  81 mg Oral Daily    sodium chloride flush  5-40 mL IntraVENous 2 times per day    budesonide  0.5 mg Nebulization BID RT    arformoterol tartrate  15 mcg Nebulization BID RT    timolol  1 drop Both Eyes Daily    metoprolol succinate  25 mg Oral Daily    clopidogrel  75 mg Oral Daily     PRN Meds: acetaminophen, oxyCODONE-acetaminophen, sodium chloride, prochlorperazine, sodium chloride, sodium  Hospital Problems    Diagnosis Date Noted    Status post coronary artery bypass graft [Z95.1] 02/13/2024    Acute kidney injury superimposed on chronic kidney disease (HCC) [N17.9, N18.9] 02/13/2024    Chronic anemia [D64.9] 02/13/2024    STEMI (ST elevation myocardial infarction) (HCC) [I21.3] 02/12/2024    Acute hypoxic respiratory failure (HCC) [J96.01] 02/07/2024   PAF  ACUTE HYPOXIC RESP FAILURE   MIGDALIA  LACTIC ACIDOSIS  AAA 3.8  HTN  S/P COVID     Plan:  NEPHROLOGY  CARD   HEPARIN   PLAVIX ADDED TO ASPIRIN TODAY   FOR [facility]   PULM CONSULT        DVT Prophylaxis: HEPARIN  Diet: ADULT DIET; Dysphagia - Minced and Moist; Low Fat/Low Chol/High Fiber/2 gm Na  Code Status: Full Code     PT/OT Eval Status: ORDERED     Dispo [facility] -       Electronically signed by Giovanny Starr DO on 2/25/2024 at 4:05 PM FACOI

## 2024-02-25 NOTE — PROGRESS NOTES
Notified via radha Rowland MD for pt BP 82/56 manual HR 90 pt SR resp 22 spo2 99% on 14 L HFNC and pt complaints of lethargy. Asked if he would like to add any new orders. Awaiting response.Berta Granado RN

## 2024-02-25 NOTE — PROGRESS NOTES
nutrition status for possible third spacing.    Note: This report was completed using computerized voice recognition software. Every effort has been made to ensure accuracy, however; and invert and computerized transcription errors may be present.

## 2024-02-25 NOTE — PLAN OF CARE
Problem: Metabolic/Fluid and Electrolytes - Adult  Goal: Electrolytes maintained within normal limits  Outcome: Progressing  Goal: Hemodynamic stability and optimal renal function maintained  Outcome: Progressing  Goal: Glucose maintained within prescribed range  Outcome: Progressing     Problem: Hematologic - Adult  Goal: Maintains hematologic stability  Outcome: Progressing     Problem: ABCDS Injury Assessment  Goal: Absence of physical injury  Outcome: Progressing     Problem: Skin/Tissue Integrity  Goal: Absence of new skin breakdown  Description: 1.  Monitor for areas of redness and/or skin breakdown  2.  Assess vascular access sites hourly  3.  Every 4-6 hours minimum:  Change oxygen saturation probe site  4.  Every 4-6 hours:  If on nasal continuous positive airway pressure, respiratory therapy assess nares and determine need for appliance change or resting period.  Outcome: Progressing     Problem: Chronic Conditions and Co-morbidities  Goal: Patient's chronic conditions and co-morbidity symptoms are monitored and maintained or improved  Outcome: Progressing     Problem: Pain  Goal: Verbalizes/displays adequate comfort level or baseline comfort level  Outcome: Progressing     Problem: Nutrition Deficit:  Goal: Optimize nutritional status  Outcome: Progressing     Problem: Safety - Adult  Goal: Free from fall injury  Outcome: Not Progressing     Problem: Skin/Tissue Integrity - Adult  Goal: Oral mucous membranes remain intact  Outcome: Not Progressing

## 2024-02-25 NOTE — PROGRESS NOTES
Department of Internal Medicine  Nephrology Attending Progress Note      Events reviewed.       SUBJECTIVE: We are following Mr. Lee for MIGDALIA.  Reports no new complaints.     PHYSICAL EXAM:      Vitals:    VITALS:  /73   Pulse 90   Temp 98 °F (36.7 °C) (Temporal)   Resp 22   Ht 1.727 m (5' 7.99\")   Wt 80.4 kg (177 lb 4 oz)   SpO2 99%   BMI 26.96 kg/m²   24HR INTAKE/OUTPUT:    Intake/Output Summary (Last 24 hours) at 2/25/2024 1108  Last data filed at 2/25/2024 0641  Gross per 24 hour   Intake 780 ml   Output 300 ml   Net 480 ml         Constitutional: Patient is alert   HEENT: Pupils equal reactive, mucous membranes are dry  Respiratory: Lungs are coarse  Cardiovascular/Edema: Heart sounds are tachycardic  Gastrointestinal: Abdomen is soft  Neurologic: Patient lethargic, nonfocal  Skin: No lesions  Other: + edema    Scheduled Meds:   potassium chloride  20 mEq Oral Once    albuterol  2.5 mg Nebulization 4x Daily RT    guaiFENesin  400 mg Oral q8h    methylPREDNISolone  40 mg IntraVENous Q8H    calcium gluconate  1,000 mg IntraVENous Once    Vitamin D  1,000 Units Oral Daily    senna  1 tablet Oral Nightly    bisacodyl  10 mg Rectal Daily    pantoprazole  40 mg Oral Daily    divalproex  125 mg Oral 3 times per day    isosorbide mononitrate  30 mg Oral Daily    rosuvastatin  5 mg Oral Nightly    polyethylene glycol  17 g Oral Daily    heparin (porcine)  5,000 Units SubCUTAneous 3 times per day    aspirin  81 mg Oral Daily    sodium chloride flush  5-40 mL IntraVENous 2 times per day    budesonide  0.5 mg Nebulization BID RT    arformoterol tartrate  15 mcg Nebulization BID RT    timolol  1 drop Both Eyes Daily    metoprolol succinate  25 mg Oral Daily    clopidogrel  75 mg Oral Daily     Continuous Infusions:   potassium chloride 30 mEq in dextrose 5 % 1,000 mL infusion      sodium chloride      sodium chloride      sodium chloride      dextrose      sodium chloride       PRN Meds:.acetaminophen,  oxyCODONE-acetaminophen, sodium chloride, prochlorperazine, sodium chloride, sodium chloride, glucose, dextrose bolus **OR** dextrose bolus, glucagon (rDNA), dextrose, [DISCONTINUED] acetaminophen **OR** acetaminophen, ondansetron **OR** ondansetron, sodium chloride flush, sodium chloride, hydrALAZINE      DATA:    CBC:   Lab Results   Component Value Date/Time    WBC 11.2 02/25/2024 05:39 AM    RBC 3.14 02/25/2024 05:39 AM    HGB 9.5 02/25/2024 05:39 AM    HCT 31.0 02/25/2024 05:39 AM    MCV 98.7 02/25/2024 05:39 AM    MCH 30.3 02/25/2024 05:39 AM    MCHC 30.6 02/25/2024 05:39 AM    RDW 18.7 02/25/2024 05:39 AM     02/25/2024 05:39 AM    MPV 11.2 02/25/2024 05:39 AM     CMP:    Lab Results   Component Value Date/Time     02/25/2024 05:39 AM    K 3.1 02/25/2024 05:39 AM     02/25/2024 05:39 AM    CO2 26 02/25/2024 05:39 AM    BUN 23 02/25/2024 05:39 AM    CREATININE 1.1 02/25/2024 05:39 AM    GFRAA >60 06/18/2020 09:49 AM    LABGLOM >60 02/25/2024 05:39 AM    GLUCOSE 96 02/25/2024 05:39 AM    PROT 6.0 02/25/2024 05:39 AM    LABALBU 3.1 02/25/2024 05:39 AM    CALCIUM 8.1 02/25/2024 05:39 AM    BILITOT 1.6 02/25/2024 05:39 AM    ALKPHOS 74 02/25/2024 05:39 AM    AST 22 02/25/2024 05:39 AM    ALT 18 02/25/2024 05:39 AM     Magnesium:    Lab Results   Component Value Date/Time    MG 2.0 02/25/2024 05:39 AM     Phosphorus:    Lab Results   Component Value Date/Time    PHOS 2.9 02/21/2024 03:28 AM     Radiology Review:      CT ABDOMEN PELVIS W IV CONTRAST Additional Contrast? None 2/7/24    IMPRESSION:  No evidence for pulmonary artery embolism to the lobar level.  Evaluation  beyond this is nondiagnostic due to respiratory motion artifact.  Would  advise close clinical follow-up or could consider repeat examination.     Interstitial/ground-glass opacities in the lower left lung, of questionable  significance but might reflect infectious/inflammatory process.     Gallbladder wall thickening versus

## 2024-02-26 ENCOUNTER — APPOINTMENT (OUTPATIENT)
Dept: ULTRASOUND IMAGING | Age: 84
End: 2024-02-26
Attending: INTERNAL MEDICINE
Payer: MEDICARE

## 2024-02-26 ENCOUNTER — APPOINTMENT (OUTPATIENT)
Dept: CT IMAGING | Age: 84
End: 2024-02-26
Attending: INTERNAL MEDICINE
Payer: MEDICARE

## 2024-02-26 PROBLEM — K68.3 NONTRAUMATIC RETROPERITONEAL HEMATOMA: Status: ACTIVE | Noted: 2024-02-26

## 2024-02-26 PROBLEM — I71.43 INFRARENAL ABDOMINAL AORTIC ANEURYSM (AAA) WITHOUT RUPTURE (HCC): Status: ACTIVE | Noted: 2024-02-26

## 2024-02-26 LAB
ABO + RH BLD: NORMAL
ALBUMIN SERPL-MCNC: 3 G/DL (ref 3.5–5.2)
ALP SERPL-CCNC: 76 U/L (ref 40–129)
ALT SERPL-CCNC: 17 U/L (ref 0–40)
ANION GAP SERPL CALCULATED.3IONS-SCNC: 10 MMOL/L (ref 7–16)
ARM BAND NUMBER: NORMAL
AST SERPL-CCNC: 17 U/L (ref 0–39)
BASOPHILS # BLD: 0 K/UL (ref 0–0.2)
BASOPHILS NFR BLD: 0 % (ref 0–2)
BILIRUB SERPL-MCNC: 1.1 MG/DL (ref 0–1.2)
BLOOD BANK SAMPLE EXPIRATION: NORMAL
BLOOD GROUP ANTIBODIES SERPL: NEGATIVE
BUN SERPL-MCNC: 32 MG/DL (ref 6–23)
CA-I BLD-SCNC: 1.14 MMOL/L (ref 1.15–1.33)
CALCIUM SERPL-MCNC: 8.1 MG/DL (ref 8.6–10.2)
CHLORIDE SERPL-SCNC: 108 MMOL/L (ref 98–107)
CO2 SERPL-SCNC: 23 MMOL/L (ref 22–29)
CREAT SERPL-MCNC: 1.8 MG/DL (ref 0.7–1.2)
EOSINOPHIL # BLD: 0 K/UL (ref 0.05–0.5)
EOSINOPHILS RELATIVE PERCENT: 0 % (ref 0–6)
ERYTHROCYTE [DISTWIDTH] IN BLOOD BY AUTOMATED COUNT: 18.2 % (ref 11.5–15)
GFR SERPL CREATININE-BSD FRML MDRD: 37 ML/MIN/1.73M2
GLUCOSE SERPL-MCNC: 139 MG/DL (ref 74–99)
HCT VFR BLD AUTO: 30.1 % (ref 37–54)
HGB BLD-MCNC: 9.3 G/DL (ref 12.5–16.5)
INR PPP: 1.2
LYMPHOCYTES NFR BLD: 0.24 K/UL (ref 1.5–4)
LYMPHOCYTES RELATIVE PERCENT: 2 % (ref 20–42)
MCH RBC QN AUTO: 30.7 PG (ref 26–35)
MCHC RBC AUTO-ENTMCNC: 30.9 G/DL (ref 32–34.5)
MCV RBC AUTO: 99.3 FL (ref 80–99.9)
MONOCYTES NFR BLD: 0.59 K/UL (ref 0.1–0.95)
MONOCYTES NFR BLD: 4 % (ref 2–12)
NEUTROPHILS NFR BLD: 94 % (ref 43–80)
NEUTS SEG NFR BLD: 12.77 K/UL (ref 1.8–7.3)
PARTIAL THROMBOPLASTIN TIME: 27 SEC (ref 24.5–35.1)
PLATELET # BLD AUTO: 210 K/UL (ref 130–450)
PMV BLD AUTO: 10.9 FL (ref 7–12)
POTASSIUM SERPL-SCNC: 4.6 MMOL/L (ref 3.5–5)
PREALB SERPL-MCNC: 14 MG/DL (ref 20–40)
PROT SERPL-MCNC: 6.1 G/DL (ref 6.4–8.3)
PROTHROMBIN TIME: 13.5 SEC (ref 9.3–12.4)
RBC # BLD AUTO: 3.03 M/UL (ref 3.8–5.8)
RBC # BLD: ABNORMAL 10*6/UL
SODIUM SERPL-SCNC: 141 MMOL/L (ref 132–146)
WBC OTHER # BLD: 13.6 K/UL (ref 4.5–11.5)

## 2024-02-26 PROCEDURE — 6370000000 HC RX 637 (ALT 250 FOR IP): Performed by: NURSE PRACTITIONER

## 2024-02-26 PROCEDURE — 2580000003 HC RX 258: Performed by: CLINICAL NURSE SPECIALIST

## 2024-02-26 PROCEDURE — 51798 US URINE CAPACITY MEASURE: CPT

## 2024-02-26 PROCEDURE — 97530 THERAPEUTIC ACTIVITIES: CPT

## 2024-02-26 PROCEDURE — 86850 RBC ANTIBODY SCREEN: CPT

## 2024-02-26 PROCEDURE — 2580000003 HC RX 258: Performed by: INTERNAL MEDICINE

## 2024-02-26 PROCEDURE — 6360000004 HC RX CONTRAST MEDICATION: Performed by: RADIOLOGY

## 2024-02-26 PROCEDURE — 6370000000 HC RX 637 (ALT 250 FOR IP): Performed by: CLINICAL NURSE SPECIALIST

## 2024-02-26 PROCEDURE — 6370000000 HC RX 637 (ALT 250 FOR IP): Performed by: INTERNAL MEDICINE

## 2024-02-26 PROCEDURE — 6360000002 HC RX W HCPCS: Performed by: CLINICAL NURSE SPECIALIST

## 2024-02-26 PROCEDURE — 2140000000 HC CCU INTERMEDIATE R&B

## 2024-02-26 PROCEDURE — 84134 ASSAY OF PREALBUMIN: CPT

## 2024-02-26 PROCEDURE — 86900 BLOOD TYPING SEROLOGIC ABO: CPT

## 2024-02-26 PROCEDURE — 85025 COMPLETE CBC W/AUTO DIFF WBC: CPT

## 2024-02-26 PROCEDURE — 97535 SELF CARE MNGMENT TRAINING: CPT

## 2024-02-26 PROCEDURE — 99223 1ST HOSP IP/OBS HIGH 75: CPT | Performed by: SURGERY

## 2024-02-26 PROCEDURE — 82330 ASSAY OF CALCIUM: CPT

## 2024-02-26 PROCEDURE — 71275 CT ANGIOGRAPHY CHEST: CPT

## 2024-02-26 PROCEDURE — 36415 COLL VENOUS BLD VENIPUNCTURE: CPT

## 2024-02-26 PROCEDURE — 94667 MNPJ CHEST WALL 1ST: CPT

## 2024-02-26 PROCEDURE — 80053 COMPREHEN METABOLIC PANEL: CPT

## 2024-02-26 PROCEDURE — 6360000002 HC RX W HCPCS: Performed by: INTERNAL MEDICINE

## 2024-02-26 PROCEDURE — 86901 BLOOD TYPING SEROLOGIC RH(D): CPT

## 2024-02-26 PROCEDURE — 85730 THROMBOPLASTIN TIME PARTIAL: CPT

## 2024-02-26 PROCEDURE — 74176 CT ABD & PELVIS W/O CONTRAST: CPT

## 2024-02-26 PROCEDURE — 93970 EXTREMITY STUDY: CPT

## 2024-02-26 PROCEDURE — 51701 INSERT BLADDER CATHETER: CPT

## 2024-02-26 PROCEDURE — 87086 URINE CULTURE/COLONY COUNT: CPT

## 2024-02-26 PROCEDURE — 2700000000 HC OXYGEN THERAPY PER DAY

## 2024-02-26 PROCEDURE — 85610 PROTHROMBIN TIME: CPT

## 2024-02-26 PROCEDURE — 94640 AIRWAY INHALATION TREATMENT: CPT

## 2024-02-26 RX ADMIN — GUAIFENESIN 400 MG: 400 TABLET ORAL at 08:12

## 2024-02-26 RX ADMIN — IOPAMIDOL 75 ML: 755 INJECTION, SOLUTION INTRAVENOUS at 09:50

## 2024-02-26 RX ADMIN — ROSUVASTATIN CALCIUM 5 MG: 5 TABLET, COATED ORAL at 19:55

## 2024-02-26 RX ADMIN — ARFORMOTEROL TARTRATE 15 MCG: 15 SOLUTION RESPIRATORY (INHALATION) at 20:41

## 2024-02-26 RX ADMIN — Medication 1000 UNITS: at 08:12

## 2024-02-26 RX ADMIN — Medication 10 ML: at 19:55

## 2024-02-26 RX ADMIN — METHYLPREDNISOLONE SODIUM SUCCINATE 40 MG: 40 INJECTION, POWDER, LYOPHILIZED, FOR SOLUTION INTRAMUSCULAR; INTRAVENOUS at 18:01

## 2024-02-26 RX ADMIN — ARFORMOTEROL TARTRATE 15 MCG: 15 SOLUTION RESPIRATORY (INHALATION) at 08:23

## 2024-02-26 RX ADMIN — ISOSORBIDE MONONITRATE 30 MG: 30 TABLET, EXTENDED RELEASE ORAL at 08:12

## 2024-02-26 RX ADMIN — DIVALPROEX SODIUM 125 MG: 125 CAPSULE, COATED PELLETS ORAL at 13:42

## 2024-02-26 RX ADMIN — GUAIFENESIN 400 MG: 400 TABLET ORAL at 02:54

## 2024-02-26 RX ADMIN — GUAIFENESIN 400 MG: 400 TABLET ORAL at 18:01

## 2024-02-26 RX ADMIN — ASPIRIN 81 MG: 81 TABLET, CHEWABLE ORAL at 08:12

## 2024-02-26 RX ADMIN — METHYLPREDNISOLONE SODIUM SUCCINATE 40 MG: 40 INJECTION, POWDER, LYOPHILIZED, FOR SOLUTION INTRAMUSCULAR; INTRAVENOUS at 08:12

## 2024-02-26 RX ADMIN — ALBUTEROL SULFATE 2.5 MG: 2.5 SOLUTION RESPIRATORY (INHALATION) at 08:23

## 2024-02-26 RX ADMIN — BUDESONIDE INHALATION 500 MCG: 0.5 SUSPENSION RESPIRATORY (INHALATION) at 08:23

## 2024-02-26 RX ADMIN — METOPROLOL SUCCINATE 25 MG: 25 TABLET, EXTENDED RELEASE ORAL at 08:12

## 2024-02-26 RX ADMIN — ALBUTEROL SULFATE 2.5 MG: 2.5 SOLUTION RESPIRATORY (INHALATION) at 20:41

## 2024-02-26 RX ADMIN — DIVALPROEX SODIUM 125 MG: 125 CAPSULE, COATED PELLETS ORAL at 22:15

## 2024-02-26 RX ADMIN — HEPARIN SODIUM 5000 UNITS: 10000 INJECTION INTRAVENOUS; SUBCUTANEOUS at 06:33

## 2024-02-26 RX ADMIN — METHYLPREDNISOLONE SODIUM SUCCINATE 40 MG: 40 INJECTION, POWDER, LYOPHILIZED, FOR SOLUTION INTRAMUSCULAR; INTRAVENOUS at 02:54

## 2024-02-26 RX ADMIN — SENNOSIDES 8.6 MG: 8.6 TABLET, FILM COATED ORAL at 19:55

## 2024-02-26 RX ADMIN — BUDESONIDE INHALATION 500 MCG: 0.5 SUSPENSION RESPIRATORY (INHALATION) at 20:41

## 2024-02-26 RX ADMIN — TIMOLOL MALEATE 1 DROP: 5 SOLUTION OPHTHALMIC at 08:14

## 2024-02-26 RX ADMIN — Medication 10 ML: at 08:13

## 2024-02-26 RX ADMIN — CLOPIDOGREL BISULFATE 75 MG: 75 TABLET ORAL at 08:12

## 2024-02-26 RX ADMIN — DIVALPROEX SODIUM 125 MG: 125 CAPSULE, COATED PELLETS ORAL at 06:34

## 2024-02-26 RX ADMIN — PANTOPRAZOLE SODIUM 40 MG: 40 TABLET, DELAYED RELEASE ORAL at 08:12

## 2024-02-26 RX ADMIN — ALBUTEROL SULFATE 2.5 MG: 2.5 SOLUTION RESPIRATORY (INHALATION) at 12:35

## 2024-02-26 NOTE — PROGRESS NOTES
Hospitalist Progress Note      PCP: Gilson Ramon III, DO    Date of Admission: 2/12/2024        Hospital Course:  83 y.o. male presented with STEMI, , HE DOES NOT KNOW WHY HE IS HERE OR WHAT HAPPENED.    HE WAS INITIALLY ADMITTED TO [facility], , HIS TT WAS ELEVATED 1338 .      2/16  STILL ON LEVOPHED.   TRYING TO WEAN AIRVO        2/19  LASIX IS ON HOLD     2/21   TRANSFERRED FROM [facility].  PLAVIX AND ASPIRIN TODAY   2/22  WIFE WANTS TO TAKE HIM HOME.  SHE SAYS SHE HAS ENOUGH SUPPORT      2/23  FOR LHC TODAY   LHC NOT DONE DUE TO INCREASED OXYGEN DEMAND ON YESTERDAY     2.24 Successful stenting of the SVG to the RCA yesterday with a 3.5 x 38 mm drug-eluting stent    2/ 25   D DIMER OVER 2000  WILL ORDER CTA  OF CHEST      Subjective:      Sitting up in a chair on assessment  States his breathing has been improving  CTA chest this a.m.          Medications:  Reviewed    Infusion Medications    sodium chloride      sodium chloride      sodium chloride      dextrose      sodium chloride       Scheduled Medications    albuterol  2.5 mg Nebulization 4x Daily RT    guaiFENesin  400 mg Oral q8h    methylPREDNISolone  40 mg IntraVENous Q8H    Vitamin D  1,000 Units Oral Daily    senna  1 tablet Oral Nightly    bisacodyl  10 mg Rectal Daily    pantoprazole  40 mg Oral Daily    divalproex  125 mg Oral 3 times per day    isosorbide mononitrate  30 mg Oral Daily    rosuvastatin  5 mg Oral Nightly    polyethylene glycol  17 g Oral Daily    [Held by provider] heparin (porcine)  5,000 Units SubCUTAneous 3 times per day    aspirin  81 mg Oral Daily    sodium chloride flush  5-40 mL IntraVENous 2 times per day    budesonide  0.5 mg Nebulization BID RT    arformoterol tartrate  15 mcg Nebulization BID RT    timolol  1 drop Both Eyes Daily    metoprolol succinate  25 mg Oral Daily    clopidogrel  75 mg Oral Daily     PRN Meds: acetaminophen, oxyCODONE-acetaminophen, sodium chloride, prochlorperazine, sodium chloride, sodium  chloride, glucose, dextrose bolus **OR** dextrose bolus, glucagon (rDNA), dextrose, [DISCONTINUED] acetaminophen **OR** acetaminophen, ondansetron **OR** ondansetron, sodium chloride flush, sodium chloride, hydrALAZINE      Intake/Output Summary (Last 24 hours) at 2/26/2024 1542  Last data filed at 2/26/2024 1416  Gross per 24 hour   Intake 540 ml   Output --   Net 540 ml         Exam:    BP 93/64   Pulse 91   Temp 97 °F (36.1 °C) (Temporal)   Resp 20   Ht 1.727 m (5' 7.99\")   Wt 80.4 kg (177 lb 4 oz)   SpO2 94%   BMI 26.96 kg/m²       General appearance:  No apparent distress  HEENT:  Normal cephalic, .  Neck: Supple, with full range of motion.Trachea midline.  Respiratory: Coarse breath sounds bilaterally, 7 L nasal cannula  Cardiovascular:  RRR  Abdomen: Soft, non-tender, non-distended with normal bowel sounds.  Musculoskeletal:  No clubbing, cyanosis or edema bilaterally.    Skin: smooth and dry   Psych: Not oriented to date  Neuro: grossly intact, moves all four extremities.                  Labs:   Recent Labs     02/24/24 2118 02/25/24  0539 02/26/24  0529   WBC 11.9* 11.2 13.6*   HGB 10.1* 9.5* 9.3*   HCT 32.2* 31.0* 30.1*    210 210       Recent Labs     02/25/24  0539 02/25/24  1544 02/26/24  0529   * 143 141   K 3.1* 4.7 4.6   * 108* 108*   CO2 26 24 23   BUN 23 29* 32*   CREATININE 1.1 1.4* 1.8*   CALCIUM 8.1* 8.3* 8.1*       Recent Labs     02/24/24  0533 02/25/24  0539 02/26/24  0529   AST 35 22 17   ALT 23 18 17   BILITOT 2.1* 1.6* 1.1   ALKPHOS 75 74 76       Recent Labs     02/26/24  1418   INR 1.2     No results for input(s): \"CKTOTAL\", \"TROPONINI\" in the last 72 hours.  Recent Labs     02/24/24  0533 02/25/24  0539 02/26/24  0529   AST 35 22 17   ALT 23 18 17   BILITOT 2.1* 1.6* 1.1   ALKPHOS 75 74 76       No results for input(s): \"LACTA\" in the last 72 hours.  No results found for: \"LABURIC\", \"URICACID\"  No results found for: \"AMMONIA\"    Assessment:    [facility]

## 2024-02-26 NOTE — PROGRESS NOTES
Physical Therapy  Physical Therapy Treatment    Name: Shiv Lee  : 1940  MRN: [id number]      Date of Service: 2024    Evaluating PT:  Rancho Ramos, PT, DPT [id number]    Room #:  6501/6501-B  Diagnosis:  STEMI (ST elevation myocardial infarction) (HCC) [I21.3]  PMHx/PSHx:   has a past medical history of Abnormal EKG, Aortic stenosis, Atrial fibrillation (HCC), Coronary artery disease, HTN (hypertension), Left atrial dilatation, Mitral regurgitation, and SBE (subacute bacterial endocarditis) prophylaxis candidate.   has a past surgical history that includes Diagnostic Cardiac Cath Lab Procedure (09); Coronary artery bypass graft (09); Aortic valve surgery (09); eye surgery (); Cardiac procedure (N/A, 2024); Cardiac procedure (N/A, 2024); and Cardiac procedure (N/A, 2024).  Procedure/Surgery:   cardiac cath;  cardiac cath  Precautions:  Falls, cognition, TSM, [place], O2  Equipment Needs:  FWW; TBD    SUBJECTIVE:    Pt lives with wife in a raised ranch home with 13 step(s) to enter and 1 rail(s).  Pt ambulated without device and was independent PTA.  2L O2 at home.    OBJECTIVE:   Initial Evaluation  Date: 24 Treatment  2024 Short Term/ Long Term   Goals   AM-PAC 6 Clicks 10/24 14/24    Was pt agreeable to Eval/treatment? yes Yes    Does pt have pain? No c/o pain No c/o pain    Bed Mobility  Rolling: NT  Supine to sit: ModA with HOB elevated  Sit to supine: NT  Scooting: MaxA Rolling: NT  Supine to sit: Ramakrishna  Sit to supine: NT  Scooting: NT Mod Independent   Transfers Sit to stand: MaxA  Stand to sit: ModA  Stand pivot: ModA with WW Sit to stand: ModA  Stand to sit: ModA  Stand pivot: NT Mod Independent with AAD   Ambulation   A few steps to chair with ModA with WW 10 feet x2 with WW Ramakrishna >400 feet with Mod Independent with AAD   Stair negotiation: ascended and descended NT NT >10 steps with 1 rail Mod Independent   ROM BUE:  Defer to OT note  BLE:  CORIEL      Strength BUE:  Defer to OT note  BLE:  4-/5  Increase by 1/3 MMT grade   Balance Sitting EOB:  Ramakrishna  Dynamic Standing:  ModA with WW Sitting EOB:  SBA  Dynamic Standing:  Ramakrishna with WW Sitting EOB:  Independent  Dynamic Standing:  Mod Independent with AAD     Sensation:  No reports of numbness/tingling to extremities  Edema:  Unremarkable    Vitals:   HR 87, SpO2 95% (7 L O2) at rest.  SpO2 83% (7 L O2) with activity.  , SpO2 71-94% (8 L O2) with activity.  SpO2 95% (15 L O2) with rest following activity. SpO2 recovered to 90%+ within ~2-3 minutes following activity.  HR 94, SpO2 92% (10 L O2) at end of session.    Nurse notified of SpO2, supplemental O2.    Patient education  Pt educated on safety during functional mobility, use of call light for assistance.    Patient response to education:   Pt expressed understanding Pt demonstrated skill Pt requires further education in this area   Yes Yes Yes     ASSESSMENT:    Comments:  Patient in bed upon arrival; agreeable to PT session with OT collaboration. Sat EOB for extended period of time. Ambulated to/from bathroom, where care was transferred to OT. Exhibited significant shortness of breath. Patient left sitting in chair with alarm activated, call light in reach. Instructed not to get up on own and to use call light for assistance; expressed understanding. Nurse notified.    Treatment:  Patient practiced and was instructed in the following treatment:    Bed mobility - physical assistance provided as needed during activity  Static/dynamic sitting - performed to promote activity tolerance and balance maintenance  Functional transfers - physical assistance provided as needed during activity  Ambulation - physical assistance provided as needed during activity  Skilled monitoring of vitals    PLAN:    Patient is making fair progress towards established goals.  Will continue with current POC.      Time in  1146  Time out  1216    Total Treatment Time  30 minutes

## 2024-02-26 NOTE — PROGRESS NOTES
for increased independence, safety and fall prevention         Patient/family education to increase safety and functional independence within precautions              Environmental modifications for safe mobility and completion of ADLs                           Cognitive retraining ex's to improve problem solving skills & safe participation in ADLs/IADLs  Sensory re-education techniques to improve extremity awareness, maintain skin integrity and improve hand function                             Visual/Perceptual retraining  to improve body awareness and safety during transfers and ADLs  Splinting/positioning needs to maintain joint/skin integrity and prevent contractures  Therapeutic activity to improve functional performance during ADLs/IADLs                                         Therapeutic exercise to improve tolerance and functional strength for ADLs   Balance retraining exercises/tasks for facilitation of postural control with dynamic challenges during ADLs .  Positioning to improve functional independence  Neuromuscular re-education: facilitation of righting/equilibrium reactions, normalization muscle tone/facilitation active functional movement                      Delirium prevention/treatment     Recommended Adaptive Equipment: TBA: LB dressing AD as appropriate, bathroom DME, AD      Home Living: Pt lives with wife  [place] a [place] with 13 step(s) to enter and 1 rail(s); pt uses walk in shower in basement. Pt is a poor historian due to impaired cognition.   Bathroom setup: walk in shower  Equipment owned:home O2 per old chart     Prior Level of Function: Mod I with ADLs;  IND with IADLs.   No AD for ambulation.   Driving: ?  Occupation: pt reports he is active and mows his own lawn     Pain Level: pt c/o 0/10  pain  this session     Cognition: A&O: 3/4 Pt oriented to month/year.   Follows 1 step commands with min redirection (impulsive.)             Memory: fair-             Comprehension fair- (simple  Time Out: 1210             Treatment Charges: Mins Units   Ther Ex  [id number]     Manual Therapy [id number]     Thera Activities 60081 15 1   ADL/Home Mgt 00545 15 1   Neuro Re-ed 81727     Group Therapy      Orthotic manage/training  10342     Non-Billable Time     Total Timed Treatment 30 2     [facility] FERNANDEZ/L [id number]

## 2024-02-26 NOTE — PROGRESS NOTES
Pulmonary Progress Note    Admit Date: 2024                            PCP: Gilson Ramon III, DO  Principal Problem:    STEMI (ST elevation myocardial infarction) ([facility])  Active Problems:    Acute hypoxic respiratory failure (HCC)    Status post coronary artery bypass graft    Acute kidney injury superimposed on chronic kidney disease (HCC)    Chronic anemia  Resolved Problems:    * No resolved hospital problems. *      Subjective:  Resting on 7l hfnc pox 94%  Still sob with exertion  CTA chest pending     Medications:   sodium chloride      sodium chloride      sodium chloride      dextrose      sodium chloride          albuterol  2.5 mg Nebulization 4x Daily RT    guaiFENesin  400 mg Oral q8h    methylPREDNISolone  40 mg IntraVENous Q8H    Vitamin D  1,000 Units Oral Daily    senna  1 tablet Oral Nightly    bisacodyl  10 mg Rectal Daily    pantoprazole  40 mg Oral Daily    divalproex  125 mg Oral 3 times per day    isosorbide mononitrate  30 mg Oral Daily    rosuvastatin  5 mg Oral Nightly    polyethylene glycol  17 g Oral Daily    heparin (porcine)  5,000 Units SubCUTAneous 3 times per day    aspirin  81 mg Oral Daily    sodium chloride flush  5-40 mL IntraVENous 2 times per day    budesonide  0.5 mg Nebulization BID RT    arformoterol tartrate  15 mcg Nebulization BID RT    timolol  1 drop Both Eyes Daily    metoprolol succinate  25 mg Oral Daily    clopidogrel  75 mg Oral Daily       Vitals:  VITALS:  /79   Pulse (!) 101   Temp 96.9 °F (36.1 °C) (Temporal)   Resp 20   Ht 1.727 m (5' 7.99\")   Wt 80.4 kg (177 lb 4 oz)   SpO2 94%   BMI 26.96 kg/m²   24HR INTAKE/OUTPUT:    Intake/Output Summary (Last 24 hours) at 2024 1139  Last data filed at 2024 1045  Gross per 24 hour   Intake 360 ml   Output --   Net 360 ml     CURRENT PULSE OXIMETRY:  SpO2: 94 %  24HR PULSE OXIMETRY RANGE:  SpO2  Av.9 %  Min: 92 %  Max: 99 %  CVP: CVP (Mean): 17 mmHg  VENT SETTINGS:   Vent  Q8H PRN Amadou Santana, DO        Or    ondansetron (ZOFRAN) injection 4 mg  4 mg IntraVENous Q6H PRN Amadou Santana, DO   4 mg at 02/19/24 2342    aspirin chewable tablet 81 mg  81 mg Oral Daily Amadou Santana, DO   81 mg at 02/26/24 0812    sodium chloride flush 0.9 % injection 5-40 mL  5-40 mL IntraVENous 2 times per day Amadou Santana, DO   10 mL at 02/26/24 0813    sodium chloride flush 0.9 % injection 5-40 mL  5-40 mL IntraVENous PRN Amadou Santana, DO        0.9 % sodium chloride infusion   IntraVENous PRN Amadou Santana, DO        budesonide (PULMICORT) nebulizer suspension 500 mcg  0.5 mg Nebulization BID RT Amadou Santana, DO   500 mcg at 02/26/24 0823    arformoterol tartrate (BROVANA) nebulizer solution 15 mcg  15 mcg Nebulization BID RT Amadou Santana, DO   15 mcg at 02/26/24 0823    timolol (TIMOPTIC) 0.5 % ophthalmic solution 1 drop  1 drop Both Eyes Daily Amadou Santana, DO   1 drop at 02/26/24 0814    metoprolol succinate (TOPROL XL) extended release tablet 25 mg  25 mg Oral Daily AshlandGoldie, April, APRN - CNP   25 mg at 02/26/24 0812    hydrALAZINE (APRESOLINE) injection 10 mg  10 mg IntraVENous Q6H PRN Amadou Santana, DO        clopidogrel (PLAVIX) tablet 75 mg  75 mg Oral Daily Amadou Santana, DO   75 mg at 02/26/24 0812      Vascular duplex lower extremity venous bilateral   Final Result   No evidence of DVT in either lower extremity.         CTA CHEST W CONTRAST   Final Result   1.  Small single focus of segmental PULMONARY EMBOLUS in the right upper lobe.      2.  No evidence of right heart strain      3.  Mild-to-moderate region of RETROPERITONEAL HEMORRHAGE posterior to the   right kidney IN THE UPPER ABDOMEN measuring at least 10.5 x 10.5 cm.  The   area was not completely included on the study.  The retroperitoneal   hemorrhage is new compared to 10/07/2024.      4.  Evidence of prior median sternotomy, cardiomegaly.      5.  Asymmetric ground-glass

## 2024-02-26 NOTE — PATIENT CARE CONFERENCE
[facility] Quality Flow/Interdisciplinary Rounds Progress Note        Quality Flow Rounds held on February 26, 2024    Disciplines Attending:  Bedside Nurse, , , and Nursing Unit Leadership    Shiv Lee was admitted on 2/12/2024  6:49 PM    Anticipated Discharge Date:  Expected Discharge Date: 02/23/24    Disposition:    Bulmaro Score:  Bulmaro Scale Score: 16    Readmission Risk              Risk of Unplanned Readmission:  24           Discussed patient goal for the day, patient clinical progression, and barriers to discharge.  The following Goal(s) of the Day/Commitment(s) have been identified:  Report labs/diagnostics       Fidelina Hopson RN  February 26, 2024

## 2024-02-26 NOTE — PROGRESS NOTES
Department of Internal Medicine  Nephrology Attending Progress Note      Events reviewed.       SUBJECTIVE: We are following Mr. Lee for MIGDALIA.  Reports no new complaints.     PHYSICAL EXAM:      Vitals:    VITALS:  /79   Pulse (!) 101   Temp 96.9 °F (36.1 °C) (Temporal)   Resp 20   Ht 1.727 m (5' 7.99\")   Wt 80.4 kg (177 lb 4 oz)   SpO2 94%   BMI 26.96 kg/m²   24HR INTAKE/OUTPUT:    Intake/Output Summary (Last 24 hours) at 2/26/2024 1130  Last data filed at 2/26/2024 1045  Gross per 24 hour   Intake 360 ml   Output --   Net 360 ml         Constitutional: Patient is alert   HEENT: Pupils equal reactive, mucous membranes are dry  Respiratory: Lungs are coarse  Cardiovascular/Edema: Heart sounds are tachycardic  Gastrointestinal: Abdomen is soft  Neurologic: Patient lethargic, nonfocal  Skin: No lesions  Other: + edema    Scheduled Meds:   albuterol  2.5 mg Nebulization 4x Daily RT    guaiFENesin  400 mg Oral q8h    methylPREDNISolone  40 mg IntraVENous Q8H    Vitamin D  1,000 Units Oral Daily    senna  1 tablet Oral Nightly    bisacodyl  10 mg Rectal Daily    pantoprazole  40 mg Oral Daily    divalproex  125 mg Oral 3 times per day    isosorbide mononitrate  30 mg Oral Daily    rosuvastatin  5 mg Oral Nightly    polyethylene glycol  17 g Oral Daily    heparin (porcine)  5,000 Units SubCUTAneous 3 times per day    aspirin  81 mg Oral Daily    sodium chloride flush  5-40 mL IntraVENous 2 times per day    budesonide  0.5 mg Nebulization BID RT    arformoterol tartrate  15 mcg Nebulization BID RT    timolol  1 drop Both Eyes Daily    metoprolol succinate  25 mg Oral Daily    clopidogrel  75 mg Oral Daily     Continuous Infusions:   sodium chloride      sodium chloride      sodium chloride      dextrose      sodium chloride       PRN Meds:.acetaminophen, oxyCODONE-acetaminophen, sodium chloride, prochlorperazine, sodium chloride, sodium chloride, glucose, dextrose bolus **OR** dextrose bolus, glucagon

## 2024-02-26 NOTE — CONSULTS
Chief Complaint: Patient seen for evaluation of retroperitoneal hematoma and small subsegmental chronic looking tiny pulmonary embolus of the right upper lobe      HPI: This patient who has multiple, risk factors was originally admitted, on the seventh of February with ST elevated myocardial infarction, also has past history of aortic valve replacement, coronary artery disease, history of cardiac bypass surgery and postoperative atrial fibrillation, hypertension, hyperlipidemia, was appropriately treated, and was readmitted on 13 February again with the diagnosis of STEMI, seen by cardiology services, pulmonary critical care service for hypoxic respiratory failure, patient also known to chronic kidney disease, the recent creatinine 1.8, with a GFR of 37 but on admission though his renal functions are stable, patient being currently followed by nephrology service    Patient also underwent cardiac catheterization and angioplasty and stenting by Dr. Santana this admission    Patient did undergo CT scan of the chest, revealed a tiny chronic looking subsegmental right upper lobe pulm embolus and incidental notice of right peritoneal hematoma noted that was not present on the CT scan that was done a few days earlier    Also patient was found to have incidental infrarenal abdominal aortic aneurysm of approximately 3.5 cm    When I came to see the patient, patient was resting comfortably on supplemental oxygen, denies any abdominal, flank or back pain    Patient's wife Scott and her son were also in the room with whom I discussed, patient denies any previous history of deep vein thrombosis      Patient denies any focal lateralizing neurological symptoms like loss of speech, vision or loss of function of extremity    Patient can walk a few blocks, and denies any symptoms of rest pain    Allergies   Allergen Reactions    Ativan [Lorazepam]      Causes altered mental status    Haldol [Haloperidol] Other (See Comments)

## 2024-02-26 NOTE — PLAN OF CARE
Problem: Safety - Adult  Goal: Free from fall injury  Outcome: Progressing     Problem: Discharge Planning  Goal: Discharge to home or other facility with appropriate resources  Outcome: Progressing     Problem: Neurosensory - Adult  Goal: Achieves stable or improved neurological status  Outcome: Progressing  Goal: Absence of seizures  Outcome: Progressing  Goal: Remains free of injury related to seizures activity  Outcome: Progressing  Goal: Achieves maximal functionality and self care  Outcome: Progressing     Problem: Respiratory - Adult  Goal: Achieves optimal ventilation and oxygenation  Outcome: Progressing     Problem: Cardiovascular - Adult  Goal: Maintains optimal cardiac output and hemodynamic stability  Outcome: Progressing  Goal: Absence of cardiac dysrhythmias or at baseline  Outcome: Progressing     Problem: Skin/Tissue Integrity - Adult  Goal: Skin integrity remains intact  Outcome: Progressing  Goal: Incisions, wounds, or drain sites healing without S/S of infection  Outcome: Progressing  Goal: Oral mucous membranes remain intact  Outcome: Progressing     Problem: Musculoskeletal - Adult  Goal: Return mobility to safest level of function  Outcome: Progressing  Flowsheets (Taken 2/25/2024 2000)  Return Mobility to Safest Level of Function: Assess patient stability and activity tolerance for standing, transferring and ambulating with or without assistive devices  Goal: Maintain proper alignment of affected body part  Outcome: Progressing  Goal: Return ADL status to a safe level of function  Outcome: Progressing     Problem: Gastrointestinal - Adult  Goal: Minimal or absence of nausea and vomiting  Outcome: Progressing  Goal: Maintains or returns to baseline bowel function  Outcome: Progressing  Goal: Maintains adequate nutritional intake  Outcome: Progressing  Goal: Establish and maintain optimal ostomy function  Outcome: Progressing     Problem: Genitourinary - Adult  Goal: Absence of urinary  retention  Outcome: Progressing  Goal: Urinary catheter remains patent  Outcome: Progressing     Problem: Infection - Adult  Goal: Absence of infection at discharge  Outcome: Progressing  Goal: Absence of infection during hospitalization  Outcome: Progressing  Goal: Absence of fever/infection during anticipated neutropenic period  Outcome: Progressing     Problem: Metabolic/Fluid and Electrolytes - Adult  Goal: Electrolytes maintained within normal limits  Outcome: Progressing  Goal: Hemodynamic stability and optimal renal function maintained  Outcome: Progressing  Goal: Glucose maintained within prescribed range  Outcome: Progressing     Problem: Hematologic - Adult  Goal: Maintains hematologic stability  Outcome: Progressing     Problem: ABCDS Injury Assessment  Goal: Absence of physical injury  Outcome: Progressing     Problem: Skin/Tissue Integrity  Goal: Absence of new skin breakdown  Description: 1.  Monitor for areas of redness and/or skin breakdown  2.  Assess vascular access sites hourly  3.  Every 4-6 hours minimum:  Change oxygen saturation probe site  4.  Every 4-6 hours:  If on nasal continuous positive airway pressure, respiratory therapy assess nares and determine need for appliance change or resting period.  Outcome: Progressing     Problem: Chronic Conditions and Co-morbidities  Goal: Patient's chronic conditions and co-morbidity symptoms are monitored and maintained or improved  Outcome: Progressing     Problem: Pain  Goal: Verbalizes/displays adequate comfort level or baseline comfort level  Outcome: Progressing     Problem: Nutrition Deficit:  Goal: Optimize nutritional status  Outcome: Progressing

## 2024-02-26 NOTE — CARE COORDINATION
2/26/24  Transition of Care update.  Patient is s/p LHC from 2/24 with PCI.  Pulm consulted for increased 02 needs.  Patient was started on IV solu-medrol.  Patients D dimer over 2000 and  planned for a Chest x-ray today.  Patient is on 11 liters of high flow 02.  PT/OT updates requested last evals show AM-PAC score of 15/24 for PT and 11/24 for OT. Spoke with patient and wife about a SULMA stay but both are adamantly refusing. Family is agreeable to home care and has no preference of provider. Call placed to [facility] who has accepted and following. Patient does have 02 at home supplied by [facility] at 2 liters. Patient may need a new order if unable to wean prior to discharge. Patient also has a home nebulizer if needed per wife. Discharge goal is home with wife when medically stable. SVEN/CM to follow.     Electronically signed by NENO Ramos on 2/26/2024 at 9:39 AM

## 2024-02-27 LAB
ALBUMIN SERPL-MCNC: 3.1 G/DL (ref 3.5–5.2)
ALP SERPL-CCNC: 79 U/L (ref 40–129)
ALT SERPL-CCNC: 18 U/L (ref 0–40)
ANION GAP SERPL CALCULATED.3IONS-SCNC: 16 MMOL/L (ref 7–16)
AST SERPL-CCNC: 18 U/L (ref 0–39)
BASOPHILS # BLD: 0.01 K/UL (ref 0–0.2)
BASOPHILS NFR BLD: 0 % (ref 0–2)
BILIRUB SERPL-MCNC: 1 MG/DL (ref 0–1.2)
BUN SERPL-MCNC: 33 MG/DL (ref 6–23)
CALCIUM SERPL-MCNC: 8 MG/DL (ref 8.6–10.2)
CHLORIDE SERPL-SCNC: 107 MMOL/L (ref 98–107)
CO2 SERPL-SCNC: 19 MMOL/L (ref 22–29)
CREAT SERPL-MCNC: 1.3 MG/DL (ref 0.7–1.2)
EKG ATRIAL RATE: 91 BPM
EKG ATRIAL RATE: 91 BPM
EKG P AXIS: 19 DEGREES
EKG P AXIS: 5 DEGREES
EKG P-R INTERVAL: 206 MS
EKG P-R INTERVAL: 226 MS
EKG Q-T INTERVAL: 378 MS
EKG Q-T INTERVAL: 396 MS
EKG QRS DURATION: 108 MS
EKG QRS DURATION: 112 MS
EKG QTC CALCULATION (BAZETT): 464 MS
EKG QTC CALCULATION (BAZETT): 487 MS
EKG R AXIS: -7 DEGREES
EKG R AXIS: -8 DEGREES
EKG T AXIS: 110 DEGREES
EKG T AXIS: 28 DEGREES
EKG VENTRICULAR RATE: 91 BPM
EKG VENTRICULAR RATE: 91 BPM
EOSINOPHIL # BLD: 0 K/UL (ref 0.05–0.5)
EOSINOPHILS RELATIVE PERCENT: 0 % (ref 0–6)
ERYTHROCYTE [DISTWIDTH] IN BLOOD BY AUTOMATED COUNT: 18.6 % (ref 11.5–15)
GFR SERPL CREATININE-BSD FRML MDRD: 57 ML/MIN/1.73M2
GLUCOSE SERPL-MCNC: 157 MG/DL (ref 74–99)
HCT VFR BLD AUTO: 27.1 % (ref 37–54)
HGB BLD-MCNC: 8.1 G/DL (ref 12.5–16.5)
IMM GRANULOCYTES # BLD AUTO: 0.09 K/UL (ref 0–0.58)
IMM GRANULOCYTES NFR BLD: 1 % (ref 0–5)
LYMPHOCYTES NFR BLD: 0.42 K/UL (ref 1.5–4)
LYMPHOCYTES RELATIVE PERCENT: 4 % (ref 20–42)
MCH RBC QN AUTO: 29.9 PG (ref 26–35)
MCHC RBC AUTO-ENTMCNC: 29.9 G/DL (ref 32–34.5)
MCV RBC AUTO: 100 FL (ref 80–99.9)
MONOCYTES NFR BLD: 0.29 K/UL (ref 0.1–0.95)
MONOCYTES NFR BLD: 2 % (ref 2–12)
NEUTROPHILS NFR BLD: 93 % (ref 43–80)
NEUTS SEG NFR BLD: 11.2 K/UL (ref 1.8–7.3)
PLATELET # BLD AUTO: 197 K/UL (ref 130–450)
PMV BLD AUTO: 11.1 FL (ref 7–12)
POTASSIUM SERPL-SCNC: 4.2 MMOL/L (ref 3.5–5)
PROT SERPL-MCNC: 6 G/DL (ref 6.4–8.3)
RBC # BLD AUTO: 2.71 M/UL (ref 3.8–5.8)
RBC # BLD: ABNORMAL 10*6/UL
SODIUM SERPL-SCNC: 142 MMOL/L (ref 132–146)
WBC OTHER # BLD: 12 K/UL (ref 4.5–11.5)

## 2024-02-27 PROCEDURE — 92526 ORAL FUNCTION THERAPY: CPT

## 2024-02-27 PROCEDURE — 6360000002 HC RX W HCPCS: Performed by: CLINICAL NURSE SPECIALIST

## 2024-02-27 PROCEDURE — 6360000002 HC RX W HCPCS: Performed by: INTERNAL MEDICINE

## 2024-02-27 PROCEDURE — 6370000000 HC RX 637 (ALT 250 FOR IP): Performed by: NURSE PRACTITIONER

## 2024-02-27 PROCEDURE — 6370000000 HC RX 637 (ALT 250 FOR IP): Performed by: INTERNAL MEDICINE

## 2024-02-27 PROCEDURE — 2580000003 HC RX 258: Performed by: CLINICAL NURSE SPECIALIST

## 2024-02-27 PROCEDURE — 36415 COLL VENOUS BLD VENIPUNCTURE: CPT

## 2024-02-27 PROCEDURE — 51701 INSERT BLADDER CATHETER: CPT

## 2024-02-27 PROCEDURE — 2580000003 HC RX 258: Performed by: INTERNAL MEDICINE

## 2024-02-27 PROCEDURE — 85025 COMPLETE CBC W/AUTO DIFF WBC: CPT

## 2024-02-27 PROCEDURE — 2140000000 HC CCU INTERMEDIATE R&B

## 2024-02-27 PROCEDURE — 6370000000 HC RX 637 (ALT 250 FOR IP)

## 2024-02-27 PROCEDURE — 80053 COMPREHEN METABOLIC PANEL: CPT

## 2024-02-27 PROCEDURE — 51798 US URINE CAPACITY MEASURE: CPT

## 2024-02-27 PROCEDURE — 94640 AIRWAY INHALATION TREATMENT: CPT

## 2024-02-27 PROCEDURE — 2700000000 HC OXYGEN THERAPY PER DAY

## 2024-02-27 PROCEDURE — 6370000000 HC RX 637 (ALT 250 FOR IP): Performed by: CLINICAL NURSE SPECIALIST

## 2024-02-27 RX ORDER — TAMSULOSIN HYDROCHLORIDE 0.4 MG/1
0.4 CAPSULE ORAL DAILY
Status: DISCONTINUED | OUTPATIENT
Start: 2024-02-27 | End: 2024-03-01 | Stop reason: HOSPADM

## 2024-02-27 RX ADMIN — DIVALPROEX SODIUM 125 MG: 125 CAPSULE, COATED PELLETS ORAL at 05:31

## 2024-02-27 RX ADMIN — Medication 1000 UNITS: at 11:08

## 2024-02-27 RX ADMIN — DIVALPROEX SODIUM 125 MG: 125 CAPSULE, COATED PELLETS ORAL at 22:32

## 2024-02-27 RX ADMIN — Medication 10 ML: at 11:11

## 2024-02-27 RX ADMIN — TAMSULOSIN HYDROCHLORIDE 0.4 MG: 0.4 CAPSULE ORAL at 11:09

## 2024-02-27 RX ADMIN — ALBUTEROL SULFATE 2.5 MG: 2.5 SOLUTION RESPIRATORY (INHALATION) at 09:31

## 2024-02-27 RX ADMIN — TIMOLOL MALEATE 1 DROP: 5 SOLUTION OPHTHALMIC at 14:08

## 2024-02-27 RX ADMIN — GUAIFENESIN 400 MG: 400 TABLET ORAL at 01:57

## 2024-02-27 RX ADMIN — METHYLPREDNISOLONE SODIUM SUCCINATE 40 MG: 40 INJECTION, POWDER, LYOPHILIZED, FOR SOLUTION INTRAMUSCULAR; INTRAVENOUS at 01:58

## 2024-02-27 RX ADMIN — ALBUTEROL SULFATE 2.5 MG: 2.5 SOLUTION RESPIRATORY (INHALATION) at 19:36

## 2024-02-27 RX ADMIN — BUDESONIDE INHALATION 500 MCG: 0.5 SUSPENSION RESPIRATORY (INHALATION) at 19:37

## 2024-02-27 RX ADMIN — ASPIRIN 81 MG: 81 TABLET, CHEWABLE ORAL at 11:09

## 2024-02-27 RX ADMIN — DIVALPROEX SODIUM 125 MG: 125 CAPSULE, COATED PELLETS ORAL at 14:08

## 2024-02-27 RX ADMIN — METHYLPREDNISOLONE SODIUM SUCCINATE 40 MG: 40 INJECTION, POWDER, LYOPHILIZED, FOR SOLUTION INTRAMUSCULAR; INTRAVENOUS at 17:43

## 2024-02-27 RX ADMIN — POLYETHYLENE GLYCOL 3350 17 G: 17 POWDER, FOR SOLUTION ORAL at 11:11

## 2024-02-27 RX ADMIN — ISOSORBIDE MONONITRATE 30 MG: 30 TABLET, EXTENDED RELEASE ORAL at 11:08

## 2024-02-27 RX ADMIN — ROSUVASTATIN CALCIUM 5 MG: 5 TABLET, COATED ORAL at 20:43

## 2024-02-27 RX ADMIN — PANTOPRAZOLE SODIUM 40 MG: 40 TABLET, DELAYED RELEASE ORAL at 11:08

## 2024-02-27 RX ADMIN — SENNOSIDES 8.6 MG: 8.6 TABLET, FILM COATED ORAL at 20:43

## 2024-02-27 RX ADMIN — METOPROLOL SUCCINATE 25 MG: 25 TABLET, EXTENDED RELEASE ORAL at 11:09

## 2024-02-27 RX ADMIN — Medication 10 ML: at 20:44

## 2024-02-27 RX ADMIN — METHYLPREDNISOLONE SODIUM SUCCINATE 40 MG: 40 INJECTION, POWDER, LYOPHILIZED, FOR SOLUTION INTRAMUSCULAR; INTRAVENOUS at 11:09

## 2024-02-27 RX ADMIN — GUAIFENESIN 400 MG: 400 TABLET ORAL at 11:09

## 2024-02-27 RX ADMIN — ALBUTEROL SULFATE 2.5 MG: 2.5 SOLUTION RESPIRATORY (INHALATION) at 16:27

## 2024-02-27 RX ADMIN — GUAIFENESIN 400 MG: 400 TABLET ORAL at 17:43

## 2024-02-27 RX ADMIN — BUDESONIDE INHALATION 500 MCG: 0.5 SUSPENSION RESPIRATORY (INHALATION) at 09:31

## 2024-02-27 RX ADMIN — ARFORMOTEROL TARTRATE 15 MCG: 15 SOLUTION RESPIRATORY (INHALATION) at 09:31

## 2024-02-27 RX ADMIN — ARFORMOTEROL TARTRATE 15 MCG: 15 SOLUTION RESPIRATORY (INHALATION) at 19:36

## 2024-02-27 RX ADMIN — CLOPIDOGREL BISULFATE 75 MG: 75 TABLET ORAL at 11:09

## 2024-02-27 ASSESSMENT — PAIN SCALES - GENERAL: PAINLEVEL_OUTOF10: 0

## 2024-02-27 NOTE — PLAN OF CARE
Problem: Safety - Adult  Goal: Free from fall injury  Outcome: Progressing     Problem: Discharge Planning  Goal: Discharge to home or other facility with appropriate resources  Outcome: Progressing     Problem: Neurosensory - Adult  Goal: Achieves stable or improved neurological status  Outcome: Progressing     Problem: Cardiovascular - Adult  Goal: Maintains optimal cardiac output and hemodynamic stability  Outcome: Progressing     Problem: Cardiovascular - Adult  Goal: Absence of cardiac dysrhythmias or at baseline  Outcome: Progressing     Problem: Skin/Tissue Integrity - Adult  Goal: Skin integrity remains intact  Outcome: Progressing     Problem: Gastrointestinal - Adult  Goal: Minimal or absence of nausea and vomiting  Outcome: Progressing     Problem: Metabolic/Fluid and Electrolytes - Adult  Goal: Electrolytes maintained within normal limits  Outcome: Progressing     Problem: ABCDS Injury Assessment  Goal: Absence of physical injury  Outcome: Progressing     Problem: Skin/Tissue Integrity  Goal: Absence of new skin breakdown  Description: 1.  Monitor for areas of redness and/or skin breakdown  2.  Assess vascular access sites hourly  3.  Every 4-6 hours minimum:  Change oxygen saturation probe site  4.  Every 4-6 hours:  If on nasal continuous positive airway pressure, respiratory therapy assess nares and determine need for appliance change or resting period.  Outcome: Progressing     Problem: Chronic Conditions and Co-morbidities  Goal: Patient's chronic conditions and co-morbidity symptoms are monitored and maintained or improved  Outcome: Progressing     Problem: Pain  Goal: Verbalizes/displays adequate comfort level or baseline comfort level  Outcome: Progressing     Problem: Nutrition Deficit:  Goal: Optimize nutritional status  Outcome: Progressing

## 2024-02-27 NOTE — CARE COORDINATION
Transition of care update. S/P left heart cath and PCI from 2/24/24. Per IDR today, O2 is currently at 9 L high flow. His home O2 is 2 L per [facility] [place]. Will nneed an ambulatory pulse ox prior to d/c. Per pulmonology note, pulmonary embolism was seen on CTA 2/26, with retroperitoneal hematoma. Vascular surgery following, after discussion will hold on IVF placement at this time d/t bleeding. IV solumedrol continues Q 8 hours, and will wean once O2 requirements improve. Nephrology is following for MIGDAILA. Per nephrology note, to continue the carvalho cath. Urine culture was also sent.  Per previous note, patient and wife are adamantly refusing SULMA.  Their discharge plan at this time is home with [facility] when medically stable. Met with patient at bedside, but wife was not in room to discuss possible SULMA again. Will attempt to speak with wife .OT from 2/26 is 13, and PT was 14, with ambulation of only 10 ft x 2 with a WW.  CM/SW will follow.  Electronically signed by Tomás Oswald RN on 2/27/2024 at 1:08 PM

## 2024-02-27 NOTE — PROGRESS NOTES
Department of Internal Medicine  Nephrology Attending Progress Note      Events reviewed.       SUBJECTIVE: We are following Mr. Lee for MIGDALIA.  Reports no new complaints.     PHYSICAL EXAM:      Vitals:    VITALS:  BP (!) 94/57   Pulse 86   Temp 97.3 °F (36.3 °C) (Temporal)   Resp 18   Ht 1.727 m (5' 7.99\")   Wt 79.8 kg (175 lb 14.8 oz)   SpO2 92%   BMI 26.76 kg/m²   24HR INTAKE/OUTPUT:    Intake/Output Summary (Last 24 hours) at 2/27/2024 1042  Last data filed at 2/27/2024 0600  Gross per 24 hour   Intake 300 ml   Output 1775 ml   Net -1475 ml         Constitutional: Patient is alert   HEENT: Pupils equal reactive, mucous membranes are dry  Respiratory: Lungs are coarse  Cardiovascular/Edema: Heart sounds are tachycardic  Gastrointestinal: Abdomen is soft  Neurologic: Patient lethargic, nonfocal  Skin: No lesions  Other: + edema    Scheduled Meds:   tamsulosin  0.4 mg Oral Daily    albuterol  2.5 mg Nebulization 4x Daily RT    guaiFENesin  400 mg Oral q8h    methylPREDNISolone  40 mg IntraVENous Q8H    Vitamin D  1,000 Units Oral Daily    senna  1 tablet Oral Nightly    bisacodyl  10 mg Rectal Daily    pantoprazole  40 mg Oral Daily    divalproex  125 mg Oral 3 times per day    isosorbide mononitrate  30 mg Oral Daily    rosuvastatin  5 mg Oral Nightly    polyethylene glycol  17 g Oral Daily    [Held by provider] heparin (porcine)  5,000 Units SubCUTAneous 3 times per day    aspirin  81 mg Oral Daily    sodium chloride flush  5-40 mL IntraVENous 2 times per day    budesonide  0.5 mg Nebulization BID RT    arformoterol tartrate  15 mcg Nebulization BID RT    timolol  1 drop Both Eyes Daily    metoprolol succinate  25 mg Oral Daily    clopidogrel  75 mg Oral Daily     Continuous Infusions:   sodium chloride      sodium chloride      sodium chloride      dextrose      sodium chloride       PRN Meds:.acetaminophen, oxyCODONE-acetaminophen, sodium chloride, prochlorperazine, sodium chloride, sodium chloride,  evaluation.     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 Recommend follow-up every 2 years.     Bladder wall thickening with perivesicular stranding.  Findings could reflect  cystitis.  Correlate with urinalysis.  Also note that there is bilateral  perinephric stranding which is a nonspecific finding.     Trace free fluid in the pelvis.     Small hiatal hernia.     Severe atherosclerotic calcification which causes narrowing of the major  abdominal aortic branch vessels.     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    XR CHEST PORTABLE  2/12/24      FINDINGS:  There is mild improvement in aeration with persistent bilateral mid to lower  lung zone hazy airspace opacities.  Blunting of the bilateral costophrenic  angles left, greater than right again seen suggestive of pleural effusions.  Heart is enlarged, but stable.     IMPRESSION:  Slight improvement in lung aeration.  Otherwise, no significant change with  pulmonary edema and bilateral pleural effusions noted.              BRIEF SUMMARY OF INITIAL CONSULT:    Briefly Mr. Lee he is a 83-year-old man with history of HTN, CAD status post CABG, aortic stenosis status post AVR with bioprosthetic valve, hyperlipidemia, PAF, asbestos exposure, who was recently admitted on December 2023 with pneumonia, who was admitted on February 7, 2024 after he was brought to the ER by EMS due to shortness of breath.  In the ER he was found to be hypotensive and septic shock as well as NSTEMI and COVID-19 positive.  On admission his creatinine level was 1.2 mg/dL but since then has rapidly increased up to 2.3 mg/dL, reason for this consultation.  His medications prior to admission included losartan, HCTZ.  Addendum: The patient was transferred to [facility] on February 12, 2024 for possible catheterization due to STEMI, persistent elevated troponin levels.    Problems resolved:    Lactic acidosis, 2/2

## 2024-02-27 NOTE — PATIENT CARE CONFERENCE
[facility] Quality Flow/Interdisciplinary Rounds Progress Note        Quality Flow Rounds held on February 27, 2024    Disciplines Attending:  Bedside Nurse, , and Nursing Unit Leadership    Shiv Lee was admitted on 2/12/2024  6:49 PM    Anticipated Discharge Date:  Expected Discharge Date: 02/23/24    Disposition:    Bulmaro Score:  Bulmaro Scale Score: 16    Readmission Risk              Risk of Unplanned Readmission:  24           Discussed patient goal for the day, patient clinical progression, and barriers to discharge.  The following Goal(s) of the Day/Commitment(s) have been identified:   attempt to wean oxygen      Mona Valencia RN  February 27, 2024

## 2024-02-27 NOTE — PROGRESS NOTES
Reached out to Raina Lainez NP to make aware that pt is reporting burning with urination and is saying he is having a hard time voiding with a bladder scan of 544cc.   Orders for straight cath and urine culture placed.

## 2024-02-27 NOTE — PROGRESS NOTES
Pulmonary Progress Note    Admit Date: 2/12/2024                            PCP: Gilson Ramon III, DO  Principal Problem:    STEMI (ST elevation myocardial infarction) ([facility])  Active Problems:    Acute hypoxic respiratory failure (HCC)    Status post coronary artery bypass graft    Acute kidney injury superimposed on chronic kidney disease (HCC)    Chronic anemia    Nontraumatic retroperitoneal hematoma    Infrarenal abdominal aortic aneurysm (AAA) without rupture (HCC)  Resolved Problems:    * No resolved hospital problems. *      Subjective:  Resting on 9LHF  pox 95%  Still sob with exertion  Dry cough  Aches all over       Medications:   sodium chloride      sodium chloride      sodium chloride      dextrose      sodium chloride          tamsulosin  0.4 mg Oral Daily    albuterol  2.5 mg Nebulization 4x Daily RT    guaiFENesin  400 mg Oral q8h    methylPREDNISolone  40 mg IntraVENous Q8H    Vitamin D  1,000 Units Oral Daily    senna  1 tablet Oral Nightly    bisacodyl  10 mg Rectal Daily    pantoprazole  40 mg Oral Daily    divalproex  125 mg Oral 3 times per day    isosorbide mononitrate  30 mg Oral Daily    rosuvastatin  5 mg Oral Nightly    polyethylene glycol  17 g Oral Daily    [Held by provider] heparin (porcine)  5,000 Units SubCUTAneous 3 times per day    aspirin  81 mg Oral Daily    sodium chloride flush  5-40 mL IntraVENous 2 times per day    budesonide  0.5 mg Nebulization BID RT    arformoterol tartrate  15 mcg Nebulization BID RT    timolol  1 drop Both Eyes Daily    metoprolol succinate  25 mg Oral Daily    clopidogrel  75 mg Oral Daily       Vitals:  VITALS:  BP (!) 94/57   Pulse 86   Temp 97.3 °F (36.3 °C) (Temporal)   Resp 18   Ht 1.727 m (5' 7.99\")   Wt 79.8 kg (175 lb 14.8 oz)   SpO2 95%   BMI 26.76 kg/m²   24HR INTAKE/OUTPUT:    Intake/Output Summary (Last 24 hours) at 2/27/2024 0901  Last data filed at 2/27/2024 0600  Gross per 24 hour   Intake 300 ml   Output 1775 ml   Net  1408    metoprolol succinate (TOPROL XL) extended release tablet 25 mg  25 mg Oral Daily JeffyGoldie, April, APRN - CNP   25 mg at 02/27/24 1109    hydrALAZINE (APRESOLINE) injection 10 mg  10 mg IntraVENous Q6H PRN Amadou Santana,         clopidogrel (PLAVIX) tablet 75 mg  75 mg Oral Daily Amadou Santana, DO   75 mg at 02/27/24 1109      - CTA appears to show artifact and scarring in place in RUL  - supportive care  - no role for IVC Filter placement   - retroperitoneal hematoma noted   - O2, IS    Duc Amaya MD  2/27/2024  5:12 PM

## 2024-02-27 NOTE — PROGRESS NOTES
Hospitalist Progress Note      PCP: Gilson Ramon III, DO    Date of Admission: 2/12/2024        Hospital Course:  83 y.o. male presented with STEMI, , HE DOES NOT KNOW WHY HE IS HERE OR WHAT HAPPENED.    HE WAS INITIALLY ADMITTED TO [facility], , HIS TT WAS ELEVATED 1338 .      2/16  STILL ON LEVOPHED.   TRYING TO WEAN AIRVO        2/19  LASIX IS ON HOLD     2/21   TRANSFERRED FROM [facility].  PLAVIX AND ASPIRIN TODAY   2/22  WIFE WANTS TO TAKE HIM HOME.  SHE SAYS SHE HAS ENOUGH SUPPORT      2/23  FOR LHC TODAY   LHC NOT DONE DUE TO INCREASED OXYGEN DEMAND ON YESTERDAY     2.24 Successful stenting of the SVG to the RCA yesterday with a 3.5 x 38 mm drug-eluting stent    2/ 25   D DIMER OVER 2000  WILL ORDER CTA  OF CHEST      Subjective:      Laying comfortably in bed in no acute distress  No family members at bedside  Denies any chest pain shortness of breath or any other acute complaints          Medications:  Reviewed    Infusion Medications    sodium chloride      sodium chloride      sodium chloride      dextrose      sodium chloride       Scheduled Medications    tamsulosin  0.4 mg Oral Daily    albuterol  2.5 mg Nebulization 4x Daily RT    guaiFENesin  400 mg Oral q8h    methylPREDNISolone  40 mg IntraVENous Q8H    Vitamin D  1,000 Units Oral Daily    senna  1 tablet Oral Nightly    bisacodyl  10 mg Rectal Daily    pantoprazole  40 mg Oral Daily    divalproex  125 mg Oral 3 times per day    isosorbide mononitrate  30 mg Oral Daily    rosuvastatin  5 mg Oral Nightly    polyethylene glycol  17 g Oral Daily    [Held by provider] heparin (porcine)  5,000 Units SubCUTAneous 3 times per day    aspirin  81 mg Oral Daily    sodium chloride flush  5-40 mL IntraVENous 2 times per day    budesonide  0.5 mg Nebulization BID RT    arformoterol tartrate  15 mcg Nebulization BID RT    timolol  1 drop Both Eyes Daily    metoprolol succinate  25 mg Oral Daily    clopidogrel  75 mg Oral Daily     PRN Meds: acetaminophen,  oxyCODONE-acetaminophen, sodium chloride, prochlorperazine, sodium chloride, sodium chloride, glucose, dextrose bolus **OR** dextrose bolus, glucagon (rDNA), dextrose, [DISCONTINUED] acetaminophen **OR** acetaminophen, ondansetron **OR** ondansetron, sodium chloride flush, sodium chloride, hydrALAZINE      Intake/Output Summary (Last 24 hours) at 2/27/2024 1226  Last data filed at 2/27/2024 1100  Gross per 24 hour   Intake 180 ml   Output 2125 ml   Net -1945 ml         Exam:    /69   Pulse 95   Temp 97.3 °F (36.3 °C) (Temporal)   Resp 18   Ht 1.727 m (5' 7.99\")   Wt 79.8 kg (175 lb 14.8 oz)   SpO2 95%   BMI 26.76 kg/m²       General appearance:  No apparent distress  HEENT:  Normal cephalic, .  Neck: Supple, with full range of motion.Trachea midline.  Respiratory: Coarse breath sounds bilaterally, 7 L nasal cannula  Cardiovascular:  RRR  Abdomen: Soft, non-tender, non-distended with normal bowel sounds.  Musculoskeletal:  No clubbing, cyanosis or edema bilaterally.    Skin: smooth and dry   Psych: Not oriented to date  Neuro: grossly intact, moves all four extremities.                  Labs:   Recent Labs     02/25/24  0539 02/26/24  0529 02/27/24  0549   WBC 11.2 13.6* 12.0*   HGB 9.5* 9.3* 8.1*   HCT 31.0* 30.1* 27.1*    210 197       Recent Labs     02/25/24  1544 02/26/24  0529 02/27/24  0549    141 142   K 4.7 4.6 4.2   * 108* 107   CO2 24 23 19*   BUN 29* 32* 33*   CREATININE 1.4* 1.8* 1.3*   CALCIUM 8.3* 8.1* 8.0*       Recent Labs     02/25/24  0539 02/26/24  0529 02/27/24  0549   AST 22 17 18   ALT 18 17 18   BILITOT 1.6* 1.1 1.0   ALKPHOS 74 76 79       Recent Labs     02/26/24  1418   INR 1.2       No results for input(s): \"CKTOTAL\", \"TROPONINI\" in the last 72 hours.  Recent Labs     02/25/24  0539 02/26/24  0529 02/27/24  0549   AST 22 17 18   ALT 18 17 18   BILITOT 1.6* 1.1 1.0   ALKPHOS 74 76 79       No results for input(s): \"LACTA\" in the last 72 hours.  No results

## 2024-02-27 NOTE — PROGRESS NOTES
RN left voicemail with Dr. Brock's group regarding bladder scan of 757cc.   Orders placed for straight cath, urology consult, and Flomax daily.

## 2024-02-27 NOTE — PLAN OF CARE
Problem: Chronic Conditions and Co-morbidities  Goal: Patient's chronic conditions and co-morbidity symptoms are monitored and maintained or improved  Outcome: Progressing     Problem: Pain  Goal: Verbalizes/displays adequate comfort level or baseline comfort level  Outcome: Progressing     Problem: Skin/Tissue Integrity  Goal: Absence of new skin breakdown  Outcome: Progressing     Problem: ABCDS Injury Assessment  Goal: Absence of physical injury  Outcome: Progressing     Problem: Safety - Adult  Goal: Free from fall injury  Outcome: Progressing     Problem: Discharge Planning  Goal: Discharge to home or other facility with appropriate resources  Outcome: Progressing

## 2024-02-27 NOTE — CONSULTS
CONSULT NOTE      INPATIENT CONSULTATION RECORD FOR  2/27/2024      REASON FOR CONSULTATION: Acute situational urinary retention/BPH (Possible H/O TURP)/UTI      HISTORY OF PRESENT ILLNESS:      The patient is a 83 y.o. male patient who was admitted earlier this month following myocardial infarction.  He is a very poor historian and is confused.  He denies any pain or discomfort at this time.  Bladder scan PVRs have been elevated around 700 cc at times.  He had a Reese placed initially during his early admission.  This has been removed.  He has been straight catheterized several times.  He denies any hematuria, dysuria, stone disease.  He believes he may have had a TURP in the past.  He does not recall when and where.  CT scan yesterday shows a right retroperitoneal hemorrhage, 3.6 cm abdominal aortic aneurysm, a large hiatal hernia, diverticulosis, fat-containing left anterior abdominal wall hernia, and prostatomegaly with a TURP defect.  Urine culture on 2/7/2024 grew E. coli.  Repeat urine culture is pending.  He is taking Flomax without side effects apparently      Past Medical History:   Diagnosis Date    Abnormal EKG     Aortic stenosis     Atrial fibrillation (HCC)     Postop    Coronary artery disease     HTN (hypertension)     Infrarenal abdominal aortic aneurysm (AAA) without rupture (HCC) 02/26/2024    3.5 x 3.6 cm CT scan February 2024    Left atrial dilatation     Mild    Mitral regurgitation     Trace    Nontraumatic retroperitoneal hematoma 02/26/2024    SBE (subacute bacterial endocarditis) prophylaxis candidate          Past Surgical History:   Procedure Laterality Date    AORTIC VALVE SURGERY  1/27/09    Dr. Villa    CARDIAC PROCEDURE N/A 2/14/2024    Left heart cath / coronary angiography performed by Dipika Curtis MD at [facility] CARDIAC CATH LAB    CARDIAC PROCEDURE N/A 2/14/2024    Percutaneous coronary intervention performed by Dipika Curtis MD at [facility] CARDIAC CATH LAB    CARDIAC PROCEDURE

## 2024-02-28 LAB
ALBUMIN SERPL-MCNC: 3 G/DL (ref 3.5–5.2)
ALP SERPL-CCNC: 72 U/L (ref 40–129)
ALT SERPL-CCNC: 20 U/L (ref 0–40)
ANION GAP SERPL CALCULATED.3IONS-SCNC: 10 MMOL/L (ref 7–16)
AST SERPL-CCNC: 19 U/L (ref 0–39)
BASOPHILS # BLD: 0.01 K/UL (ref 0–0.2)
BASOPHILS NFR BLD: 0 % (ref 0–2)
BILIRUB SERPL-MCNC: 1 MG/DL (ref 0–1.2)
BUN SERPL-MCNC: 27 MG/DL (ref 6–23)
CALCIUM SERPL-MCNC: 8 MG/DL (ref 8.6–10.2)
CHLORIDE SERPL-SCNC: 106 MMOL/L (ref 98–107)
CO2 SERPL-SCNC: 24 MMOL/L (ref 22–29)
CREAT SERPL-MCNC: 0.9 MG/DL (ref 0.7–1.2)
EOSINOPHIL # BLD: 0 K/UL (ref 0.05–0.5)
EOSINOPHILS RELATIVE PERCENT: 0 % (ref 0–6)
ERYTHROCYTE [DISTWIDTH] IN BLOOD BY AUTOMATED COUNT: 18.3 % (ref 11.5–15)
GFR SERPL CREATININE-BSD FRML MDRD: >60 ML/MIN/1.73M2
GLUCOSE SERPL-MCNC: 111 MG/DL (ref 74–99)
HCT VFR BLD AUTO: 27.1 % (ref 37–54)
HGB BLD-MCNC: 8.2 G/DL (ref 12.5–16.5)
IMM GRANULOCYTES # BLD AUTO: 0.07 K/UL (ref 0–0.58)
IMM GRANULOCYTES NFR BLD: 1 % (ref 0–5)
LYMPHOCYTES NFR BLD: 0.44 K/UL (ref 1.5–4)
LYMPHOCYTES RELATIVE PERCENT: 5 % (ref 20–42)
MCH RBC QN AUTO: 29.9 PG (ref 26–35)
MCHC RBC AUTO-ENTMCNC: 30.3 G/DL (ref 32–34.5)
MCV RBC AUTO: 98.9 FL (ref 80–99.9)
MICROORGANISM SPEC CULT: NO GROWTH
MONOCYTES NFR BLD: 0.36 K/UL (ref 0.1–0.95)
MONOCYTES NFR BLD: 4 % (ref 2–12)
NEUTROPHILS NFR BLD: 91 % (ref 43–80)
NEUTS SEG NFR BLD: 8.54 K/UL (ref 1.8–7.3)
PLATELET # BLD AUTO: 185 K/UL (ref 130–450)
PMV BLD AUTO: 10.9 FL (ref 7–12)
POTASSIUM SERPL-SCNC: 4.4 MMOL/L (ref 3.5–5)
PROT SERPL-MCNC: 5.9 G/DL (ref 6.4–8.3)
RBC # BLD AUTO: 2.74 M/UL (ref 3.8–5.8)
SODIUM SERPL-SCNC: 140 MMOL/L (ref 132–146)
SPECIMEN DESCRIPTION: NORMAL
WBC OTHER # BLD: 9.4 K/UL (ref 4.5–11.5)

## 2024-02-28 PROCEDURE — 6370000000 HC RX 637 (ALT 250 FOR IP): Performed by: NURSE PRACTITIONER

## 2024-02-28 PROCEDURE — 6370000000 HC RX 637 (ALT 250 FOR IP): Performed by: INTERNAL MEDICINE

## 2024-02-28 PROCEDURE — 6360000002 HC RX W HCPCS: Performed by: CLINICAL NURSE SPECIALIST

## 2024-02-28 PROCEDURE — 94640 AIRWAY INHALATION TREATMENT: CPT

## 2024-02-28 PROCEDURE — 97530 THERAPEUTIC ACTIVITIES: CPT

## 2024-02-28 PROCEDURE — 6370000000 HC RX 637 (ALT 250 FOR IP)

## 2024-02-28 PROCEDURE — 2580000003 HC RX 258: Performed by: INTERNAL MEDICINE

## 2024-02-28 PROCEDURE — 85025 COMPLETE CBC W/AUTO DIFF WBC: CPT

## 2024-02-28 PROCEDURE — 93005 ELECTROCARDIOGRAM TRACING: CPT | Performed by: INTERNAL MEDICINE

## 2024-02-28 PROCEDURE — 80053 COMPREHEN METABOLIC PANEL: CPT

## 2024-02-28 PROCEDURE — 92526 ORAL FUNCTION THERAPY: CPT

## 2024-02-28 PROCEDURE — 6360000002 HC RX W HCPCS: Performed by: INTERNAL MEDICINE

## 2024-02-28 PROCEDURE — 36415 COLL VENOUS BLD VENIPUNCTURE: CPT

## 2024-02-28 PROCEDURE — 97535 SELF CARE MNGMENT TRAINING: CPT

## 2024-02-28 PROCEDURE — 2700000000 HC OXYGEN THERAPY PER DAY

## 2024-02-28 PROCEDURE — 2140000000 HC CCU INTERMEDIATE R&B

## 2024-02-28 PROCEDURE — 6370000000 HC RX 637 (ALT 250 FOR IP): Performed by: CLINICAL NURSE SPECIALIST

## 2024-02-28 PROCEDURE — 2580000003 HC RX 258: Performed by: CLINICAL NURSE SPECIALIST

## 2024-02-28 RX ADMIN — Medication 1000 UNITS: at 08:56

## 2024-02-28 RX ADMIN — DIVALPROEX SODIUM 125 MG: 125 CAPSULE, COATED PELLETS ORAL at 06:10

## 2024-02-28 RX ADMIN — TIMOLOL MALEATE 1 DROP: 5 SOLUTION OPHTHALMIC at 08:57

## 2024-02-28 RX ADMIN — BUDESONIDE INHALATION 500 MCG: 0.5 SUSPENSION RESPIRATORY (INHALATION) at 20:21

## 2024-02-28 RX ADMIN — WATER 40 MG: 1 INJECTION INTRAMUSCULAR; INTRAVENOUS; SUBCUTANEOUS at 21:03

## 2024-02-28 RX ADMIN — Medication 10 ML: at 21:04

## 2024-02-28 RX ADMIN — DIVALPROEX SODIUM 125 MG: 125 CAPSULE, COATED PELLETS ORAL at 21:03

## 2024-02-28 RX ADMIN — GUAIFENESIN 400 MG: 400 TABLET ORAL at 08:56

## 2024-02-28 RX ADMIN — ROSUVASTATIN CALCIUM 5 MG: 5 TABLET, COATED ORAL at 21:11

## 2024-02-28 RX ADMIN — METHYLPREDNISOLONE SODIUM SUCCINATE 40 MG: 40 INJECTION, POWDER, LYOPHILIZED, FOR SOLUTION INTRAMUSCULAR; INTRAVENOUS at 01:25

## 2024-02-28 RX ADMIN — DIVALPROEX SODIUM 125 MG: 125 CAPSULE, COATED PELLETS ORAL at 14:33

## 2024-02-28 RX ADMIN — PANTOPRAZOLE SODIUM 40 MG: 40 TABLET, DELAYED RELEASE ORAL at 08:56

## 2024-02-28 RX ADMIN — ASPIRIN 81 MG: 81 TABLET, CHEWABLE ORAL at 08:56

## 2024-02-28 RX ADMIN — ALBUTEROL SULFATE 2.5 MG: 2.5 SOLUTION RESPIRATORY (INHALATION) at 16:32

## 2024-02-28 RX ADMIN — METOPROLOL SUCCINATE 25 MG: 25 TABLET, EXTENDED RELEASE ORAL at 08:56

## 2024-02-28 RX ADMIN — METHYLPREDNISOLONE SODIUM SUCCINATE 40 MG: 40 INJECTION, POWDER, LYOPHILIZED, FOR SOLUTION INTRAMUSCULAR; INTRAVENOUS at 08:56

## 2024-02-28 RX ADMIN — Medication 10 ML: at 08:56

## 2024-02-28 RX ADMIN — GUAIFENESIN 400 MG: 400 TABLET ORAL at 01:25

## 2024-02-28 RX ADMIN — ISOSORBIDE MONONITRATE 30 MG: 30 TABLET, EXTENDED RELEASE ORAL at 08:56

## 2024-02-28 RX ADMIN — ALBUTEROL SULFATE 2.5 MG: 2.5 SOLUTION RESPIRATORY (INHALATION) at 08:04

## 2024-02-28 RX ADMIN — BUDESONIDE INHALATION 500 MCG: 0.5 SUSPENSION RESPIRATORY (INHALATION) at 08:05

## 2024-02-28 RX ADMIN — ALBUTEROL SULFATE 2.5 MG: 2.5 SOLUTION RESPIRATORY (INHALATION) at 12:03

## 2024-02-28 RX ADMIN — ARFORMOTEROL TARTRATE 15 MCG: 15 SOLUTION RESPIRATORY (INHALATION) at 20:21

## 2024-02-28 RX ADMIN — ALBUTEROL SULFATE 2.5 MG: 2.5 SOLUTION RESPIRATORY (INHALATION) at 20:21

## 2024-02-28 RX ADMIN — CLOPIDOGREL BISULFATE 75 MG: 75 TABLET ORAL at 08:56

## 2024-02-28 RX ADMIN — ARFORMOTEROL TARTRATE 15 MCG: 15 SOLUTION RESPIRATORY (INHALATION) at 08:05

## 2024-02-28 RX ADMIN — POLYETHYLENE GLYCOL 3350 17 G: 17 POWDER, FOR SOLUTION ORAL at 08:56

## 2024-02-28 RX ADMIN — TAMSULOSIN HYDROCHLORIDE 0.4 MG: 0.4 CAPSULE ORAL at 08:56

## 2024-02-28 RX ADMIN — GUAIFENESIN 400 MG: 400 TABLET ORAL at 18:30

## 2024-02-28 ASSESSMENT — PAIN SCALES - GENERAL: PAINLEVEL_OUTOF10: 0

## 2024-02-28 NOTE — PROGRESS NOTES
Department of Internal Medicine  Nephrology Attending Progress Note      Events reviewed.       SUBJECTIVE: We are following Mr. Lee for MIGDALIA.  Reports no new complaints.     PHYSICAL EXAM:      Vitals:    VITALS:  /76   Pulse 81   Temp 98.2 °F (36.8 °C) (Temporal)   Resp 21   Ht 1.727 m (5' 7.99\")   Wt 81.4 kg (179 lb 7.3 oz)   SpO2 98%   BMI 27.29 kg/m²   24HR INTAKE/OUTPUT:    Intake/Output Summary (Last 24 hours) at 2/28/2024 0916  Last data filed at 2/28/2024 0551  Gross per 24 hour   Intake 120 ml   Output 1625 ml   Net -1505 ml         Constitutional: Patient is alert   HEENT: Pupils equal reactive, mucous membranes are dry  Respiratory: Lungs are coarse  Cardiovascular/Edema: Heart sounds are tachycardic  Gastrointestinal: Abdomen is soft  Neurologic: Patient lethargic, nonfocal  Skin: No lesions  Other: + edema    Scheduled Meds:   tamsulosin  0.4 mg Oral Daily    albuterol  2.5 mg Nebulization 4x Daily RT    guaiFENesin  400 mg Oral q8h    methylPREDNISolone  40 mg IntraVENous Q8H    Vitamin D  1,000 Units Oral Daily    senna  1 tablet Oral Nightly    bisacodyl  10 mg Rectal Daily    pantoprazole  40 mg Oral Daily    divalproex  125 mg Oral 3 times per day    isosorbide mononitrate  30 mg Oral Daily    rosuvastatin  5 mg Oral Nightly    polyethylene glycol  17 g Oral Daily    [Held by provider] heparin (porcine)  5,000 Units SubCUTAneous 3 times per day    aspirin  81 mg Oral Daily    sodium chloride flush  5-40 mL IntraVENous 2 times per day    budesonide  0.5 mg Nebulization BID RT    arformoterol tartrate  15 mcg Nebulization BID RT    timolol  1 drop Both Eyes Daily    metoprolol succinate  25 mg Oral Daily    clopidogrel  75 mg Oral Daily     Continuous Infusions:   sodium chloride      sodium chloride      sodium chloride      dextrose      sodium chloride       PRN Meds:.acetaminophen, oxyCODONE-acetaminophen, sodium chloride, prochlorperazine, sodium chloride, sodium chloride,  septic shock  Hemodynamic shock, septic and hypovolemic, resolved, off vasopressors  Pneumonia, on ceftriaxoneAKI stage I,    ischemic ATN due to profound hypotension due to hemodynamic shock and possibly contrast associated MIGDALIA.  Renal function relatively stable or improve, creatinine down to 1.3 mg/dL with excellent diuresis.  Hypernatremia, with hypervolemia (elevated proBNP, chest x-ray with pulmonary edema).  Urine sodium 103, urine osmolality 419.  Sodium levels improved to 144.    Hypokalemia 2/2 poor oral intake  Hemodynamic shock, cardiogenic, on vasopressors    IMPRESSION/RECOMMENDATIONS:      Recurrent MIGDALIA, creatinine increased to 1.8 mg/dL, likely 2/2 obstructive uropathy and hemodynamically mediated due to hypotension.  Resolved, renal function improved, creatinine down to 0.9 mg deciliter    HFpEF 60%,  proBNP, 2132 > 11,795 > 7966 >2028  Hypocalcemia, 2/2 vitamin D deficiency  Vitamin D deficiency, vitamin D 25 level 22.6, on cholecalciferol   Normocytic anemia, multifactorial, iron 8, iron saturation cannot be calculated, ferritin 480, folate 7.8, vitamin B12 812, s/p IV iron  Urinary retention, continue Reese catheter per urology    -----------------------------------------------------------------------  CAD status post CABG, now with NSTEMI, s/p cardiac catheterization   S/p AVR  COVID-19, on dexamethasone  Hyperlipidemia, on rosuvastatin   Infrarenal AAA, 3.8 cm        Plan:    Continue Reese catheter per urology  Continue metoprolol succinate 25 mg daily   Continue cholecalciferol 1000 units daily  Continue to monitor ionized calcium  Okay to discharge from renal point of view  Follow-up with us in 3 to 4 weeks  BMP in 2 weeks        Electronically signed by Rashard Mathew MD on 2/28/2024 at 9:16 AM

## 2024-02-28 NOTE — PROGRESS NOTES
Hospitalist Progress Note      PCP: Gilson Ramon III, DO    Date of Admission: 2/12/2024        Hospital Course:  83 y.o. male presented with STEMI, , HE DOES NOT KNOW WHY HE IS HERE OR WHAT HAPPENED.    HE WAS INITIALLY ADMITTED TO [facility], , HIS TT WAS ELEVATED 1338 .      2/16  STILL ON LEVOPHED.   TRYING TO WEAN AIRVO        2/19  LASIX IS ON HOLD     2/21   TRANSFERRED FROM [facility].  PLAVIX AND ASPIRIN TODAY   2/22  WIFE WANTS TO TAKE HIM HOME.  SHE SAYS SHE HAS ENOUGH SUPPORT      2/23  FOR LHC TODAY   LHC NOT DONE DUE TO INCREASED OXYGEN DEMAND ON YESTERDAY     2.24 Successful stenting of the SVG to the RCA yesterday with a 3.5 x 38 mm drug-eluting stent     2/ 25   D DIMER OVER 2000  WILL ORDER CTA  OF CHEST    FOUND TO HAVE A PE PER RADIOLOGY.   PER VASCULAR, NO NEED FOR IVC         Subjective: SOB    Medications:  Reviewed    Infusion Medications    sodium chloride      sodium chloride      sodium chloride      dextrose      sodium chloride       Scheduled Medications    methylPREDNISolone  40 mg IntraVENous Q12H    tamsulosin  0.4 mg Oral Daily    albuterol  2.5 mg Nebulization 4x Daily RT    guaiFENesin  400 mg Oral q8h    Vitamin D  1,000 Units Oral Daily    senna  1 tablet Oral Nightly    bisacodyl  10 mg Rectal Daily    pantoprazole  40 mg Oral Daily    divalproex  125 mg Oral 3 times per day    isosorbide mononitrate  30 mg Oral Daily    rosuvastatin  5 mg Oral Nightly    polyethylene glycol  17 g Oral Daily    [Held by provider] heparin (porcine)  5,000 Units SubCUTAneous 3 times per day    aspirin  81 mg Oral Daily    sodium chloride flush  5-40 mL IntraVENous 2 times per day    budesonide  0.5 mg Nebulization BID RT    arformoterol tartrate  15 mcg Nebulization BID RT    timolol  1 drop Both Eyes Daily    metoprolol succinate  25 mg Oral Daily    clopidogrel  75 mg Oral Daily     PRN Meds: acetaminophen, oxyCODONE-acetaminophen, sodium chloride, prochlorperazine, sodium chloride, sodium

## 2024-02-28 NOTE — PROGRESS NOTES
[facility] Quality Flow/Interdisciplinary Rounds Progress Note        Quality Flow Rounds held on February 28, 2024    Disciplines Attending:  Bedside Nurse, , , and Nursing Unit Leadership    Shiv Lee was admitted on 2/12/2024  6:49 PM    Anticipated Discharge Date:  Expected Discharge Date: 02/23/24    Disposition:    Bulmaro Score:  Bulmaro Scale Score: 16    Readmission Risk              Risk of Unplanned Readmission:  25           Discussed patient goal for the day, patient clinical progression, and barriers to discharge.  The following Goal(s) of the Day/Commitment(s) have been identified:  Diagnostics - Report Results and Labs - Report Results      Thelma Victoria RN  February 28, 2024

## 2024-02-28 NOTE — PLAN OF CARE
Problem: Safety - Adult  Goal: Free from fall injury  2/27/2024 2242 by Yola Loaiza RN  Outcome: Progressing     Problem: Discharge Planning  Goal: Discharge to home or other facility with appropriate resources  2/27/2024 2242 by Yola Loaiza RN  Outcome: Progressing     Problem: Cardiovascular - Adult  Goal: Maintains optimal cardiac output and hemodynamic stability  2/27/2024 2242 by Yola Loaiza RN  Outcome: Progressing     Problem: Skin/Tissue Integrity - Adult  Goal: Skin integrity remains intact  2/27/2024 2242 by Yola Loaiza RN  Outcome: Progressing     Problem: Gastrointestinal - Adult  Goal: Minimal or absence of nausea and vomiting  2/27/2024 2242 by Yola Loaiza RN  Outcome: Progressing     Problem: Genitourinary - Adult  Goal: Absence of urinary retention  2/27/2024 2242 by Yola Loaiza RN  Outcome: Progressing     Problem: ABCDS Injury Assessment  Goal: Absence of physical injury  2/27/2024 2242 by Yola Loaiza RN  Outcome: Progressing     Problem: Skin/Tissue Integrity  Goal: Absence of new skin breakdown  Description: 1.  Monitor for areas of redness and/or skin breakdown  2.  Assess vascular access sites hourly  3.  Every 4-6 hours minimum:  Change oxygen saturation probe site  4.  Every 4-6 hours:  If on nasal continuous positive airway pressure, respiratory therapy assess nares and determine need for appliance change or resting period.  2/27/2024 2242 by Yola Loaiza RN  Outcome: Progressing     Problem: Chronic Conditions and Co-morbidities  Goal: Patient's chronic conditions and co-morbidity symptoms are monitored and maintained or improved  2/27/2024 2242 by Yola Loaiza RN  Outcome: Progressing     Problem: Pain  Goal: Verbalizes/displays adequate comfort level or baseline comfort level  2/27/2024 2242 by Yola Loaiza RN  Outcome: Progressing

## 2024-02-28 NOTE — PROGRESS NOTES
2/28/2024 1:00 PM  Shiv Lee  [id number]    Subjective:    He is awake and alert  His wife is present  He has never seen a urologist in the past  He states he feels better with the Reese catheter compared to straight cathing    Review of Systems  Constitutional: No fever or chills   Respiratory: Positive for shortness of breath  Cardiovascular: negative for chest pain and dyspnea  Gastrointestinal: negative for abdominal pain, diarrhea, nausea and vomiting   : See above  Derm: negative for rash and skin lesion(s)  Neurological: negative for seizures and tremors  Musculoskeletal: Negative    Psychiatric: Negative   All other reviews are negative      Scheduled Meds:   tamsulosin  0.4 mg Oral Daily    albuterol  2.5 mg Nebulization 4x Daily RT    guaiFENesin  400 mg Oral q8h    methylPREDNISolone  40 mg IntraVENous Q8H    Vitamin D  1,000 Units Oral Daily    senna  1 tablet Oral Nightly    bisacodyl  10 mg Rectal Daily    pantoprazole  40 mg Oral Daily    divalproex  125 mg Oral 3 times per day    isosorbide mononitrate  30 mg Oral Daily    rosuvastatin  5 mg Oral Nightly    polyethylene glycol  17 g Oral Daily    [Held by provider] heparin (porcine)  5,000 Units SubCUTAneous 3 times per day    aspirin  81 mg Oral Daily    sodium chloride flush  5-40 mL IntraVENous 2 times per day    budesonide  0.5 mg Nebulization BID RT    arformoterol tartrate  15 mcg Nebulization BID RT    timolol  1 drop Both Eyes Daily    metoprolol succinate  25 mg Oral Daily    clopidogrel  75 mg Oral Daily       Objective:  Vitals:    02/28/24 1203   BP:    Pulse: 80   Resp: 18   Temp:    SpO2: 97%         Allergies: Ativan [lorazepam], Haldol [haloperidol], and Zocor [simvastatin]    General Appearance: alert and oriented to person, place and time and in no acute distress  Skin: no rash or erythema  Head: normocephalic and atraumatic  Pulmonary/Chest: normal air movement, no respiratory distress  Abdomen: soft, non-tender,  non-distended  Genitourinary: Reese catheter with josué urine  Extremities: no cyanosis, clubbing or edema         Labs:     Recent Labs     02/28/24  0354      K 4.4      CO2 24   BUN 27*   CREATININE 0.9   GLUCOSE 111*   CALCIUM 8.0*       Lab Results   Component Value Date/Time    HGB 8.2 02/28/2024 03:54 AM    HCT 27.1 02/28/2024 03:54 AM       No results found for: \"PSA\"      Assessment/Plan:  Acute situational urinary retention  BPH  UTI    His wife is present she states he is never seen a urologist in the past and denies any previous  surgeries  Urine culture from 226 unremarkable  Continue the Flomax  Continue the Reese catheter  He will be given a voiding trial in the office as an outpatient  He will need a MARITZA and PSA as an outpatient once he is catheter free  No further  interventions are planned at this time  Please call with any further questions or concerns  Thank you for allowing us to participate in his care       Manisha Chawla, APRN - CNP   [facility]  Urology

## 2024-02-28 NOTE — CARE COORDINATION
2/28/24  Transition of care update.  Patient is S/P left heart cath and PCI from 2/24/24. Patient continues on 9 liters of high flow 02 with attempt to wean.  Patient will need an ambulatory pulse ox prior to discharge to assess for any increased 02 needs.  Patient is on baseline 02 with [employer] at 2 liters. Vascular is following with no plan for IVC filter due to finding of small PE and retroperitoneal hemorrhage with inability  to anticoagulate.  Urology continues to follow for urinary retention with UA ordered. Discharge goal is home once 02 needs have decreased.  Wife is adamantly refusing a [facility] stay. Discharge plan is home with wife.  Wife was agreeable with Home Health.  Referral made to [facility] who has accepted and following.  SVEN/BARBARA to follow.    Electronically signed by NENO Ramos on 2/28/2024 at 10:24 AM

## 2024-02-28 NOTE — PROGRESS NOTES
OCCUPATIONAL THERAPY TREATMENT NOTE   [facility]  [address], [place]       Date:2024                                                               Patient Name: Shiv Lee  MRN: [id number]  : 1940  Room: [address] [place] [address]    Evaluating OT: Desiree Rowley, OTR/L 1411     Referring Provider:     Giovanny Starr DO       Specific Provider Orders/Date: OT eval and treat (24)        Diagnosis: STEMI (ST elevation myocardial infarction) (HCC) [I21.3]          Surgery/Procedures:  cardiac cath            Pertinent Medical History:    Past Medical History        Past Medical History:   Diagnosis Date    Abnormal EKG      Aortic stenosis      Atrial fibrillation (HCC)       Postop    Coronary artery disease      HTN (hypertension)      Left atrial dilatation       Mild    Mitral regurgitation       Trace    SBE (subacute bacterial endocarditis) prophylaxis candidate               *Precautions:  Fall Risk, alarms, sitter,, cognition, [place], impulsive, O2     Assessment of current deficits   [x] Functional mobility          [x]ADLs           [x] Strength                  [x]Cognition   [x] Functional transfers        [x] IADLs         [x] Safety Awareness   [x]Endurance   [x] Fine Coordination           [x] ROM           [] Vision/perception    []Sensation     [x]Gross Motor Coordination [x] Balance    [] Delirium                  [x]Motor Control     [] Communication     OT PLAN OF CARE   OT POC based on physician orders, patient diagnosis and results of clinical assessment.        Frequency/Duration: 1-3 days/wk for 1-2 weeks PRN    Specific OT Treatment to include:   ADL retraining/adapted techniques and AE recommendations to increase functional independence within precautions                    Energy conservation techniques to improve tolerance for selfcare routine   Functional transfer/mobility training/DME recommendations

## 2024-02-28 NOTE — PROGRESS NOTES
Physical Therapy  Physical Therapy Treatment    Name: Shiv Lee  : 1940  MRN: [id number]      Date of Service: 2024    Evaluating PT:  Rancho Ramos, PT, DPT [id number]    Room #:  6501/6501-B  Diagnosis:  STEMI (ST elevation myocardial infarction) (HCC) [I21.3]  PMHx/PSHx:   has a past medical history of Abnormal EKG, Aortic stenosis, Atrial fibrillation (HCC), Coronary artery disease, HTN (hypertension), Infrarenal abdominal aortic aneurysm (AAA) without rupture (HCC), Left atrial dilatation, Mitral regurgitation, Nontraumatic retroperitoneal hematoma, and SBE (subacute bacterial endocarditis) prophylaxis candidate.   has a past surgical history that includes Diagnostic Cardiac Cath Lab Procedure (09); Coronary artery bypass graft (09); Aortic valve surgery (09); eye surgery (); Cardiac procedure (N/A, 2024); Cardiac procedure (N/A, 2024); and Cardiac procedure (N/A, 2024).  Procedure/Surgery:   cardiac cath;  cardiac cath  Precautions:  Falls, cognition, , [place], O2, carvalho catheter  Equipment Needs:  FWW; TBD    SUBJECTIVE:    Pt lives with wife in a raised ranch home with 13 step(s) to enter and 1 rail(s).  Pt ambulated without device and was independent PTA.  2L O2 at home.    OBJECTIVE:   Initial Evaluation  Date: 24 Treatment  2024 Short Term/ Long Term   Goals   AM-PAC 6 Clicks 10/24 13/24    Was pt agreeable to Eval/treatment? yes Yes    Does pt have pain? No c/o pain No c/o pain    Bed Mobility  Rolling: NT  Supine to sit: ModA with HOB elevated  Sit to supine: NT  Scooting: MaxA Rolling: NT  Supine to sit: SBA  Sit to supine: NT  Scooting: SBA (seated) Mod Independent   Transfers Sit to stand: MaxA  Stand to sit: ModA  Stand pivot: ModA with WW Sit to stand: ModA  Stand to sit: ModA  Stand pivot: ModA with WW Mod Independent with AAD   Ambulation   A few steps to chair with ModA with WW 15 feet with WW ModA >400 feet with Mod Independent

## 2024-02-28 NOTE — PLAN OF CARE
Problem: Safety - Adult  Goal: Free from fall injury  2/28/2024 0923 by Daron Mcconnell RN  Outcome: Progressing  2/27/2024 2242 by Yola Loaiza RN  Outcome: Progressing     Problem: Discharge Planning  Goal: Discharge to home or other facility with appropriate resources  2/28/2024 0923 by Daron Mcconnell RN  Outcome: Progressing  2/27/2024 2242 by Yola Loaiza RN  Outcome: Progressing     Problem: Neurosensory - Adult  Goal: Achieves stable or improved neurological status  Outcome: Progressing  Goal: Absence of seizures  Outcome: Progressing  Goal: Remains free of injury related to seizures activity  Outcome: Progressing  Goal: Achieves maximal functionality and self care  Outcome: Progressing     Problem: Respiratory - Adult  Goal: Achieves optimal ventilation and oxygenation  Outcome: Progressing     Problem: Cardiovascular - Adult  Goal: Maintains optimal cardiac output and hemodynamic stability  2/28/2024 0923 by Daron Mcconnell RN  Outcome: Progressing  2/27/2024 2242 by Yola Loaiza RN  Outcome: Progressing     Problem: Skin/Tissue Integrity - Adult  Goal: Skin integrity remains intact  2/28/2024 0923 by Daron Mcconnell RN  Outcome: Progressing  2/27/2024 2242 by Yola Loaiza RN  Outcome: Progressing  Goal: Incisions, wounds, or drain sites healing without S/S of infection  Outcome: Progressing     Problem: Gastrointestinal - Adult  Goal: Minimal or absence of nausea and vomiting  2/27/2024 2242 by Yola Loaiza RN  Outcome: Progressing     Problem: Genitourinary - Adult  Goal: Absence of urinary retention  2/27/2024 2242 by Yola Loaiza RN  Outcome: Progressing     Problem: ABCDS Injury Assessment  Goal: Absence of physical injury  2/27/2024 2242 by Yola Loaiza RN  Outcome: Progressing     Problem: Skin/Tissue Integrity  Goal: Absence of new skin breakdown  Description: 1.  Monitor for areas of redness and/or skin breakdown  2.  Assess vascular access sites hourly  3.  Every 4-6 hours minimum:  Change  oxygen saturation probe site  4.  Every 4-6 hours:  If on nasal continuous positive airway pressure, respiratory therapy assess nares and determine need for appliance change or resting period.  2/27/2024 2242 by Yola Loaiza RN  Outcome: Progressing     Problem: Chronic Conditions and Co-morbidities  Goal: Patient's chronic conditions and co-morbidity symptoms are monitored and maintained or improved  2/27/2024 2242 by Yola Loaiza, RN  Outcome: Progressing     Problem: Pain  Goal: Verbalizes/displays adequate comfort level or baseline comfort level  2/27/2024 2242 by Yola Loaiza, RN  Outcome: Progressing

## 2024-02-28 NOTE — PROGRESS NOTES
Pulmonary Progress Note    Admit Date: 2/12/2024                            PCP: Gilson Ramon III, DO  Principal Problem:    STEMI (ST elevation myocardial infarction) ([facility])  Active Problems:    Acute hypoxic respiratory failure (HCC)    Status post coronary artery bypass graft    Acute kidney injury superimposed on chronic kidney disease (HCC)    Chronic anemia    Nontraumatic retroperitoneal hematoma    Infrarenal abdominal aortic aneurysm (AAA) without rupture (HCC)  Resolved Problems:    * No resolved hospital problems. *      Subjective:  Seen up in chair with PT  Was on 6LNC at rest  Increased to 8L while ambulting dropped to 74%  Increased to 15L, took long recovery time to get to 90%  Coughing increased  Patient c/o feeling weak   In bed too long       Medications:   sodium chloride      sodium chloride      sodium chloride      dextrose      sodium chloride          tamsulosin  0.4 mg Oral Daily    albuterol  2.5 mg Nebulization 4x Daily RT    guaiFENesin  400 mg Oral q8h    methylPREDNISolone  40 mg IntraVENous Q8H    Vitamin D  1,000 Units Oral Daily    senna  1 tablet Oral Nightly    bisacodyl  10 mg Rectal Daily    pantoprazole  40 mg Oral Daily    divalproex  125 mg Oral 3 times per day    isosorbide mononitrate  30 mg Oral Daily    rosuvastatin  5 mg Oral Nightly    polyethylene glycol  17 g Oral Daily    [Held by provider] heparin (porcine)  5,000 Units SubCUTAneous 3 times per day    aspirin  81 mg Oral Daily    sodium chloride flush  5-40 mL IntraVENous 2 times per day    budesonide  0.5 mg Nebulization BID RT    arformoterol tartrate  15 mcg Nebulization BID RT    timolol  1 drop Both Eyes Daily    metoprolol succinate  25 mg Oral Daily    clopidogrel  75 mg Oral Daily       Vitals:  VITALS:  BP (!) 101/49   Pulse 80   Temp 98 °F (36.7 °C) (Temporal)   Resp 18   Ht 1.727 m (5' 7.99\")   Wt 81.4 kg (179 lb 7.3 oz)   SpO2 97%   BMI 27.29 kg/m²   24HR INTAKE/OUTPUT:    Intake/Output  Summary (Last 24 hours) at 2024 1447  Last data filed at 2024 1008  Gross per 24 hour   Intake 120 ml   Output 1275 ml   Net -1155 ml     CURRENT PULSE OXIMETRY:  SpO2: 97 %  24HR PULSE OXIMETRY RANGE:  SpO2  Av.6 %  Min: 92 %  Max: 100 %  CVP: CVP (Mean): 17 mmHg  VENT SETTINGS:   Vent Information  Equipment Changed: Humidification  Additional Respiratory Assessments  Pulse: 80  Respirations: 18  SpO2: 97 %  Humidification Source: Heated wire  Humidification Temp: 34      EXAM:  General: Alert, in NAD, 9L hfnc   ENT: No discharge. Pharynx clear. membranes moist   Neck: Supple, Trachea midline.  Resp: No accessory muscle use. Non-labored.  Lungs clear with No crackles. No wheezing. No rhonchi. Bibasilar rales   CV: Regular rate. Regular rhythm. No murmur . No edema.   ABD: Non-tender. Non-distended. Soft, round . Normal bowel sounds.   Skin: Warm and dry.   M/S: No cyanosis. No joint deformity. No clubbing.   Neuro: Awake. Follows commands. O x 3, TOM  Psych:  calm and interactive     I/O: I/O last 3 completed shifts:  In: 120 [P.O.:120]  Out: 3400 [Urine:3400]  I/O this shift:  In: 120 [P.O.:120]  Out: -      Results:  CBC:   Recent Labs     24  0524  0549 24  0354   WBC 13.6* 12.0* 9.4   HGB 9.3* 8.1* 8.2*   HCT 30.1* 27.1* 27.1*   MCV 99.3 100.0* 98.9    197 185     BMP:   Recent Labs     24  0529 24  0549 24  0354    142 140   K 4.6 4.2 4.4   * 107 106   CO2 23 19* 24   BUN 32* 33* 27*   CREATININE 1.8* 1.3* 0.9     LFT:   Recent Labs     24  0529 24  0549 24  0354   ALKPHOS 76 79 72   ALT 17 18 20   AST 17 18 19   PROT 6.1* 6.0* 5.9*   BILITOT 1.1 1.0 1.0   LABALBU 3.0* 3.1* 3.0*     PT/INR:   Recent Labs     24  1418   PROTIME 13.5*   INR 1.2     Procalcitonin: No results for input(s): \"PROCAL\" in the last 72 hours.  Cultures:  No results for input(s): \"CULTRESP\" in the last 72 hours.      ABG:   No results for

## 2024-02-29 LAB
ANION GAP SERPL CALCULATED.3IONS-SCNC: 10 MMOL/L (ref 7–16)
BUN SERPL-MCNC: 28 MG/DL (ref 6–23)
CA-I BLD-SCNC: 1.09 MMOL/L (ref 1.15–1.33)
CALCIUM SERPL-MCNC: 7.9 MG/DL (ref 8.6–10.2)
CHLORIDE SERPL-SCNC: 104 MMOL/L (ref 98–107)
CO2 SERPL-SCNC: 24 MMOL/L (ref 22–29)
CREAT SERPL-MCNC: 0.8 MG/DL (ref 0.7–1.2)
EKG ATRIAL RATE: 82 BPM
EKG P AXIS: 38 DEGREES
EKG P-R INTERVAL: 228 MS
EKG Q-T INTERVAL: 384 MS
EKG QRS DURATION: 104 MS
EKG QTC CALCULATION (BAZETT): 448 MS
EKG R AXIS: 2 DEGREES
EKG T AXIS: 76 DEGREES
EKG VENTRICULAR RATE: 82 BPM
ERYTHROCYTE [DISTWIDTH] IN BLOOD BY AUTOMATED COUNT: 18 % (ref 11.5–15)
GFR SERPL CREATININE-BSD FRML MDRD: >60 ML/MIN/1.73M2
GLUCOSE SERPL-MCNC: 159 MG/DL (ref 74–99)
HCT VFR BLD AUTO: 27.9 % (ref 37–54)
HGB BLD-MCNC: 8.6 G/DL (ref 12.5–16.5)
MCH RBC QN AUTO: 30.8 PG (ref 26–35)
MCHC RBC AUTO-ENTMCNC: 30.8 G/DL (ref 32–34.5)
MCV RBC AUTO: 100 FL (ref 80–99.9)
PLATELET # BLD AUTO: 183 K/UL (ref 130–450)
PMV BLD AUTO: 10.8 FL (ref 7–12)
POTASSIUM SERPL-SCNC: 3.8 MMOL/L (ref 3.5–5)
RBC # BLD AUTO: 2.79 M/UL (ref 3.8–5.8)
SODIUM SERPL-SCNC: 138 MMOL/L (ref 132–146)
WBC OTHER # BLD: 9.2 K/UL (ref 4.5–11.5)

## 2024-02-29 PROCEDURE — 6370000000 HC RX 637 (ALT 250 FOR IP): Performed by: INTERNAL MEDICINE

## 2024-02-29 PROCEDURE — 6370000000 HC RX 637 (ALT 250 FOR IP)

## 2024-02-29 PROCEDURE — 92526 ORAL FUNCTION THERAPY: CPT

## 2024-02-29 PROCEDURE — 85027 COMPLETE CBC AUTOMATED: CPT

## 2024-02-29 PROCEDURE — 2580000003 HC RX 258: Performed by: INTERNAL MEDICINE

## 2024-02-29 PROCEDURE — 2580000003 HC RX 258: Performed by: CLINICAL NURSE SPECIALIST

## 2024-02-29 PROCEDURE — 6360000002 HC RX W HCPCS: Performed by: CLINICAL NURSE SPECIALIST

## 2024-02-29 PROCEDURE — 93010 ELECTROCARDIOGRAM REPORT: CPT | Performed by: INTERNAL MEDICINE

## 2024-02-29 PROCEDURE — 2140000000 HC CCU INTERMEDIATE R&B

## 2024-02-29 PROCEDURE — 80048 BASIC METABOLIC PNL TOTAL CA: CPT

## 2024-02-29 PROCEDURE — 2700000000 HC OXYGEN THERAPY PER DAY

## 2024-02-29 PROCEDURE — 94640 AIRWAY INHALATION TREATMENT: CPT

## 2024-02-29 PROCEDURE — 6360000002 HC RX W HCPCS: Performed by: INTERNAL MEDICINE

## 2024-02-29 PROCEDURE — 36415 COLL VENOUS BLD VENIPUNCTURE: CPT

## 2024-02-29 PROCEDURE — 6370000000 HC RX 637 (ALT 250 FOR IP): Performed by: CLINICAL NURSE SPECIALIST

## 2024-02-29 PROCEDURE — 6370000000 HC RX 637 (ALT 250 FOR IP): Performed by: NURSE PRACTITIONER

## 2024-02-29 PROCEDURE — 82330 ASSAY OF CALCIUM: CPT

## 2024-02-29 PROCEDURE — 6360000002 HC RX W HCPCS

## 2024-02-29 RX ORDER — CALCIUM GLUCONATE 10 MG/ML
1000 INJECTION, SOLUTION INTRAVENOUS ONCE
Status: COMPLETED | OUTPATIENT
Start: 2024-02-29 | End: 2024-02-29

## 2024-02-29 RX ADMIN — ALBUTEROL SULFATE 2.5 MG: 2.5 SOLUTION RESPIRATORY (INHALATION) at 07:24

## 2024-02-29 RX ADMIN — METOPROLOL SUCCINATE 25 MG: 25 TABLET, EXTENDED RELEASE ORAL at 09:52

## 2024-02-29 RX ADMIN — ISOSORBIDE MONONITRATE 30 MG: 30 TABLET, EXTENDED RELEASE ORAL at 09:52

## 2024-02-29 RX ADMIN — ALBUTEROL SULFATE 2.5 MG: 2.5 SOLUTION RESPIRATORY (INHALATION) at 21:47

## 2024-02-29 RX ADMIN — WATER 40 MG: 1 INJECTION INTRAMUSCULAR; INTRAVENOUS; SUBCUTANEOUS at 09:52

## 2024-02-29 RX ADMIN — GUAIFENESIN 400 MG: 400 TABLET ORAL at 14:42

## 2024-02-29 RX ADMIN — ARFORMOTEROL TARTRATE 15 MCG: 15 SOLUTION RESPIRATORY (INHALATION) at 07:24

## 2024-02-29 RX ADMIN — Medication 1000 UNITS: at 09:52

## 2024-02-29 RX ADMIN — TIMOLOL MALEATE 1 DROP: 5 SOLUTION OPHTHALMIC at 09:52

## 2024-02-29 RX ADMIN — ROSUVASTATIN CALCIUM 5 MG: 5 TABLET, COATED ORAL at 21:07

## 2024-02-29 RX ADMIN — Medication 10 ML: at 09:52

## 2024-02-29 RX ADMIN — ARFORMOTEROL TARTRATE 15 MCG: 15 SOLUTION RESPIRATORY (INHALATION) at 21:47

## 2024-02-29 RX ADMIN — Medication 10 ML: at 20:59

## 2024-02-29 RX ADMIN — ALBUTEROL SULFATE 2.5 MG: 2.5 SOLUTION RESPIRATORY (INHALATION) at 15:13

## 2024-02-29 RX ADMIN — CLOPIDOGREL BISULFATE 75 MG: 75 TABLET ORAL at 09:52

## 2024-02-29 RX ADMIN — GUAIFENESIN 400 MG: 400 TABLET ORAL at 20:59

## 2024-02-29 RX ADMIN — ASPIRIN 81 MG: 81 TABLET, CHEWABLE ORAL at 09:52

## 2024-02-29 RX ADMIN — WATER 40 MG: 1 INJECTION INTRAMUSCULAR; INTRAVENOUS; SUBCUTANEOUS at 20:59

## 2024-02-29 RX ADMIN — ALBUTEROL SULFATE 2.5 MG: 2.5 SOLUTION RESPIRATORY (INHALATION) at 11:33

## 2024-02-29 RX ADMIN — TAMSULOSIN HYDROCHLORIDE 0.4 MG: 0.4 CAPSULE ORAL at 09:52

## 2024-02-29 RX ADMIN — DIVALPROEX SODIUM 125 MG: 125 CAPSULE, COATED PELLETS ORAL at 06:24

## 2024-02-29 RX ADMIN — DIVALPROEX SODIUM 125 MG: 125 CAPSULE, COATED PELLETS ORAL at 14:42

## 2024-02-29 RX ADMIN — PANTOPRAZOLE SODIUM 40 MG: 40 TABLET, DELAYED RELEASE ORAL at 09:52

## 2024-02-29 RX ADMIN — GUAIFENESIN 400 MG: 400 TABLET ORAL at 06:24

## 2024-02-29 RX ADMIN — DIVALPROEX SODIUM 125 MG: 125 CAPSULE, COATED PELLETS ORAL at 20:59

## 2024-02-29 RX ADMIN — BUDESONIDE INHALATION 500 MCG: 0.5 SUSPENSION RESPIRATORY (INHALATION) at 07:24

## 2024-02-29 RX ADMIN — CALCIUM GLUCONATE 1000 MG: 10 INJECTION, SOLUTION INTRAVENOUS at 12:13

## 2024-02-29 RX ADMIN — BUDESONIDE INHALATION 500 MCG: 0.5 SUSPENSION RESPIRATORY (INHALATION) at 21:47

## 2024-02-29 NOTE — PATIENT CARE CONFERENCE
[facility] Quality Flow/Interdisciplinary Rounds Progress Note        Quality Flow Rounds held on February 29, 2024    Disciplines Attending:  Bedside Nurse, , , and Nursing Unit Leadership    Shiv Lee was admitted on 2/12/2024  6:49 PM    Anticipated Discharge Date:  Expected Discharge Date: 02/23/24    Disposition:    Bulmaro Score:  Bulmaro Scale Score: 17    Readmission Risk              Risk of Unplanned Readmission:  23           Discussed patient goal for the day, patient clinical progression, and barriers to discharge.  The following Goal(s) of the Day/Commitment(s) have been identified:  discharge planning       Fidelina Hopson RN  February 29, 2024

## 2024-02-29 NOTE — PROGRESS NOTES
Pulmonary Progress Note    Admit Date: 2/12/2024                            PCP: Gilson Ramon III, DO  Principal Problem:    STEMI (ST elevation myocardial infarction) (HCC)  Active Problems:    Acute hypoxic respiratory failure (HCC)    Status post coronary artery bypass graft    Acute kidney injury superimposed on chronic kidney disease (HCC)    Chronic anemia    Nontraumatic retroperitoneal hematoma    Infrarenal abdominal aortic aneurysm (AAA) without rupture (HCC)  Resolved Problems:    * No resolved hospital problems. *      Subjective:  Resting on 5lnc pox 94%  Says his breathing isn't bad        Medications:   sodium chloride      sodium chloride      sodium chloride      dextrose      sodium chloride          methylPREDNISolone  40 mg IntraVENous Q12H    tamsulosin  0.4 mg Oral Daily    albuterol  2.5 mg Nebulization 4x Daily RT    guaiFENesin  400 mg Oral q8h    Vitamin D  1,000 Units Oral Daily    senna  1 tablet Oral Nightly    bisacodyl  10 mg Rectal Daily    pantoprazole  40 mg Oral Daily    divalproex  125 mg Oral 3 times per day    isosorbide mononitrate  30 mg Oral Daily    rosuvastatin  5 mg Oral Nightly    polyethylene glycol  17 g Oral Daily    [Held by provider] heparin (porcine)  5,000 Units SubCUTAneous 3 times per day    aspirin  81 mg Oral Daily    sodium chloride flush  5-40 mL IntraVENous 2 times per day    budesonide  0.5 mg Nebulization BID RT    arformoterol tartrate  15 mcg Nebulization BID RT    timolol  1 drop Both Eyes Daily    metoprolol succinate  25 mg Oral Daily    clopidogrel  75 mg Oral Daily       Vitals:  VITALS:  BP (!) 97/59   Pulse 85   Temp (!) 96.3 °F (35.7 °C) (Temporal)   Resp 27   Ht 1.727 m (5' 7.99\")   Wt 80.2 kg (176 lb 12.9 oz)   SpO2 94%   BMI 26.89 kg/m²   24HR INTAKE/OUTPUT:    Intake/Output Summary (Last 24 hours) at 2/29/2024 1323  Last data filed at 2/29/2024 1136  Gross per 24 hour   Intake 180 ml   Output 1750 ml   Net -1570 ml  Nightly Amadou Santana, DO   5 mg at 02/28/24 2111    0.9 % sodium chloride infusion   IntraVENous PRN Amadou Santana, DO        polyethylene glycol (GLYCOLAX) packet 17 g  17 g Oral Daily Amadou Santana, DO   17 g at 02/28/24 0856    [Held by provider] heparin (porcine) injection 5,000 Units  5,000 Units SubCUTAneous 3 times per day Amadou Santana, DO   5,000 Units at 02/26/24 0633    0.9 % sodium chloride infusion   IntraVENous PRN Amadou Santana, DO        glucose chewable tablet 16 g  4 tablet Oral PRN Amadou Santana, DO        dextrose bolus 10% 125 mL  125 mL IntraVENous PRN Amadou Santana, DO        Or    dextrose bolus 10% 250 mL  250 mL IntraVENous PRN Amadou Santana, DO        glucagon injection 1 mg  1 mg SubCUTAneous PRN Amadou Santana, DO        dextrose 10 % infusion   IntraVENous Continuous PRN Amadou Santana DO        acetaminophen (TYLENOL) suppository 650 mg  650 mg Rectal Q6H PRN Amadou Santana, DO        ondansetron (ZOFRAN-ODT) disintegrating tablet 4 mg  4 mg Oral Q8H PRN Amadou Santana DO        Or    ondansetron (ZOFRAN) injection 4 mg  4 mg IntraVENous Q6H PRN Amadou Santana, DO   4 mg at 02/19/24 2342    aspirin chewable tablet 81 mg  81 mg Oral Daily Amadou Santana, DO   81 mg at 02/29/24 0952    sodium chloride flush 0.9 % injection 5-40 mL  5-40 mL IntraVENous 2 times per day Amadou Santana, DO   10 mL at 02/29/24 0952    sodium chloride flush 0.9 % injection 5-40 mL  5-40 mL IntraVENous PRN Amadou Santana, DO        0.9 % sodium chloride infusion   IntraVENous PRN Amadou Santana, DO        budesonide (PULMICORT) nebulizer suspension 500 mcg  0.5 mg Nebulization BID RT Amadou Santana, DO   500 mcg at 02/29/24 0724    arformoterol tartrate (BROVANA) nebulizer solution 15 mcg  15 mcg Nebulization BID RT Amadou Santana, DO   15 mcg at 02/29/24 0724    timolol (TIMOPTIC) 0.5 % ophthalmic solution 1 drop  1 drop Both Eyes Daily

## 2024-02-29 NOTE — PROGRESS NOTES
Department of Internal Medicine  Nephrology Attending Progress Note      Events reviewed.       SUBJECTIVE: We are following Mr. Lee for MIGDALIA.  Reports no new complaints.     PHYSICAL EXAM:      Vitals:    VITALS:  BP (!) 97/59   Pulse 85   Temp (!) 96.3 °F (35.7 °C) (Temporal)   Resp 27   Ht 1.727 m (5' 7.99\")   Wt 80.2 kg (176 lb 12.9 oz)   SpO2 93%   BMI 26.89 kg/m²   24HR INTAKE/OUTPUT:    Intake/Output Summary (Last 24 hours) at 2/29/2024 1135  Last data filed at 2/29/2024 0613  Gross per 24 hour   Intake 180 ml   Output 1750 ml   Net -1570 ml         Constitutional: Patient is alert   HEENT: Pupils equal reactive, mucous membranes are dry  Respiratory: Lungs are coarse  Cardiovascular/Edema: Heart sounds are tachycardic  Gastrointestinal: Abdomen is soft  Neurologic: Patient lethargic, nonfocal  Skin: No lesions  Other: + edema    Scheduled Meds:   calcium gluconate  1,000 mg IntraVENous Once    methylPREDNISolone  40 mg IntraVENous Q12H    tamsulosin  0.4 mg Oral Daily    albuterol  2.5 mg Nebulization 4x Daily RT    guaiFENesin  400 mg Oral q8h    Vitamin D  1,000 Units Oral Daily    senna  1 tablet Oral Nightly    bisacodyl  10 mg Rectal Daily    pantoprazole  40 mg Oral Daily    divalproex  125 mg Oral 3 times per day    isosorbide mononitrate  30 mg Oral Daily    rosuvastatin  5 mg Oral Nightly    polyethylene glycol  17 g Oral Daily    [Held by provider] heparin (porcine)  5,000 Units SubCUTAneous 3 times per day    aspirin  81 mg Oral Daily    sodium chloride flush  5-40 mL IntraVENous 2 times per day    budesonide  0.5 mg Nebulization BID RT    arformoterol tartrate  15 mcg Nebulization BID RT    timolol  1 drop Both Eyes Daily    metoprolol succinate  25 mg Oral Daily    clopidogrel  75 mg Oral Daily     Continuous Infusions:   sodium chloride      sodium chloride      sodium chloride      dextrose      sodium chloride       PRN Meds:.acetaminophen, oxyCODONE-acetaminophen, sodium chloride,  troponin levels.    Problems resolved:    Lactic acidosis, 2/2 septic shock  Hemodynamic shock, septic and hypovolemic, resolved, off vasopressors  Pneumonia, on ceftriaxoneAKI stage I,    ischemic ATN due to profound hypotension due to hemodynamic shock and possibly contrast associated MIGDALIA.  Renal function relatively stable or improve, creatinine down to 1.3 mg/dL with excellent diuresis.  Hypernatremia, with hypervolemia (elevated proBNP, chest x-ray with pulmonary edema).  Urine sodium 103, urine osmolality 419.  Sodium levels improved to 144.    Hypokalemia 2/2 poor oral intake  Hemodynamic shock, cardiogenic, on vasopressors  Recurrent MIGDALIA, creatinine increased to 1.8 mg/dL, likely 2/2 obstructive uropathy and hemodynamically mediated due to hypotension.  Resolved, renal function improved, creatinine down to 0.9 milligrams per deciliter    IMPRESSION/RECOMMENDATIONS:      HFpEF 60%,  proBNP, 2132 > 11,795 > 7966 >2028  Hypocalcemia, 2/2 vitamin D deficiency ionized calcium 1.09, to replace  Vitamin D deficiency, vitamin D 25 level 22.6, on cholecalciferol   Normocytic anemia, multifactorial, iron 8, iron saturation cannot be calculated, ferritin 480, folate 7.8, vitamin B12 812, s/p IV iron  Urinary retention, continue Reese catheter per urology    -----------------------------------------------------------------------  CAD status post CABG, now with NSTEMI, s/p cardiac catheterization   S/p AVR  COVID-19, on dexamethasone  Hyperlipidemia, on rosuvastatin   Infrarenal AAA, 3.8 cm        Plan:    Replace calcium  Continue Reese catheter per urology  Continue metoprolol succinate 25 mg daily   Continue cholecalciferol 1000 units daily  Continue to monitor ionized calcium  Okay to discharge from renal point of view  Follow-up with us in 3 to 4 weeks  BMP in 2 weeks        Electronically signed by ANA Curtis CNP on 2/29/2024 at 11:35 AM

## 2024-02-29 NOTE — PLAN OF CARE
ventilation and oxygenation: Assess for changes in respiratory status     Problem: Cardiovascular - Adult  Goal: Maintains optimal cardiac output and hemodynamic stability  2/29/2024 1106 by Daron Mcconnell RN  Outcome: Progressing  2/29/2024 0407 by Breana Hernánedz RN  Outcome: Progressing  Flowsheets (Taken 2/28/2024 2000)  Maintains optimal cardiac output and hemodynamic stability: Monitor blood pressure and heart rate  Goal: Absence of cardiac dysrhythmias or at baseline  2/29/2024 0407 by Breana Hernández RN  Outcome: Progressing  Flowsheets (Taken 2/28/2024 2000)  Absence of cardiac dysrhythmias or at baseline: Monitor cardiac rate and rhythm     Problem: Skin/Tissue Integrity - Adult  Goal: Skin integrity remains intact  2/29/2024 0407 by Breana Hernández RN  Outcome: Progressing  Flowsheets (Taken 2/28/2024 2000)  Skin Integrity Remains Intact: Monitor for areas of redness and/or skin breakdown  Goal: Incisions, wounds, or drain sites healing without S/S of infection  2/29/2024 0407 by Breana Hernández RN  Outcome: Progressing  Flowsheets (Taken 2/28/2024 2000)  Incisions, Wounds, or Drain Sites Healing Without Sign and Symptoms of Infection: TWICE DAILY: Assess and document skin integrity  Goal: Oral mucous membranes remain intact  2/29/2024 0407 by Breana Hernández RN  Outcome: Progressing  Flowsheets (Taken 2/28/2024 2000)  Oral Mucous Membranes Remain Intact: Assess oral mucosa and hygiene practices     Problem: Musculoskeletal - Adult  Goal: Return mobility to safest level of function  2/29/2024 0407 by Breana Hernández RN  Outcome: Progressing  Flowsheets (Taken 2/28/2024 2000)  Return Mobility to Safest Level of Function: Assess patient stability and activity tolerance for standing, transferring and ambulating with or without assistive devices  Goal: Maintain proper alignment of affected body part  2/29/2024 0407 by Breana Hernández RN  Outcome: Progressing  Flowsheets (Taken 2/28/2024  2000)  Maintain proper alignment of affected body part: Support and protect limb and body alignment per provider's orders  Goal: Return ADL status to a safe level of function  2/29/2024 0407 by Breana Hernández RN  Outcome: Progressing  Flowsheets (Taken 2/28/2024 2000)  Return ADL Status to a Safe Level of Function: Administer medication as ordered     Problem: Gastrointestinal - Adult  Goal: Minimal or absence of nausea and vomiting  2/29/2024 0407 by Breana Hernández RN  Outcome: Progressing  Flowsheets (Taken 2/28/2024 2000)  Minimal or absence of nausea and vomiting: Administer IV fluids as ordered to ensure adequate hydration  Goal: Maintains or returns to baseline bowel function  2/29/2024 0407 by Breana Hernández RN  Outcome: Progressing  Flowsheets (Taken 2/28/2024 2000)  Maintains or returns to baseline bowel function: Assess bowel function  Goal: Maintains adequate nutritional intake  2/29/2024 0407 by Breana Hernández RN  Outcome: Progressing  Flowsheets (Taken 2/28/2024 2000)  Maintains adequate nutritional intake: Monitor percentage of each meal consumed  Goal: Establish and maintain optimal ostomy function  2/29/2024 0407 by Breana Hernández RN  Outcome: Progressing  Flowsheets (Taken 2/28/2024 2000)  Establish and maintain optimal ostomy function: Monitor output from ostomies     Problem: Genitourinary - Adult  Goal: Absence of urinary retention  2/29/2024 0407 by Breana Hernández RN  Outcome: Progressing  Flowsheets (Taken 2/28/2024 2000)  Absence of urinary retention: Assess patient’s ability to void and empty bladder  Goal: Urinary catheter remains patent  2/29/2024 0407 by Breana Hernández RN  Outcome: Progressing  Flowsheets (Taken 2/28/2024 2000)  Urinary catheter remains patent: Assess patency of urinary catheter     Problem: Infection - Adult  Goal: Absence of infection at discharge  2/29/2024 0407 by Breana Hernández RN  Outcome: Progressing  Flowsheets (Taken 2/28/2024

## 2024-02-29 NOTE — PLAN OF CARE
Problem: Safety - Adult  Goal: Free from fall injury  Outcome: Progressing     Problem: Discharge Planning  Goal: Discharge to home or other facility with appropriate resources  Outcome: Progressing  Flowsheets (Taken 2/28/2024 2000)  Discharge to home or other facility with appropriate resources: Identify barriers to discharge with patient and caregiver     Problem: Neurosensory - Adult  Goal: Achieves stable or improved neurological status  Outcome: Progressing  Flowsheets (Taken 2/28/2024 2000)  Achieves stable or improved neurological status: Assess for and report changes in neurological status  Goal: Absence of seizures  Outcome: Progressing  Flowsheets (Taken 2/28/2024 2000)  Absence of seizures: Monitor for seizure activity.  If seizure occurs, document type and location of movements and any associated apnea  Goal: Remains free of injury related to seizures activity  Outcome: Progressing  Flowsheets (Taken 2/28/2024 2000)  Remains free of injury related to seizure activity: Maintain airway, patient safety  and administer oxygen as ordered  Goal: Achieves maximal functionality and self care  Outcome: Progressing  Flowsheets (Taken 2/28/2024 2000)  Achieves maximal functionality and self care: Monitor swallowing and airway patency with patient fatigue and changes in neurological status     Problem: Respiratory - Adult  Goal: Achieves optimal ventilation and oxygenation  Outcome: Progressing  Flowsheets (Taken 2/28/2024 2000)  Achieves optimal ventilation and oxygenation: Assess for changes in respiratory status     Problem: Cardiovascular - Adult  Goal: Maintains optimal cardiac output and hemodynamic stability  Outcome: Progressing  Flowsheets (Taken 2/28/2024 2000)  Maintains optimal cardiac output and hemodynamic stability: Monitor blood pressure and heart rate  Goal: Absence of cardiac dysrhythmias or at baseline  Outcome: Progressing  Flowsheets (Taken 2/28/2024 2000)  Absence of cardiac dysrhythmias or  Progressing  Flowsheets (Taken 2/28/2024 2000)  Maintains adequate nutritional intake: Monitor percentage of each meal consumed  Goal: Establish and maintain optimal ostomy function  Outcome: Progressing  Flowsheets (Taken 2/28/2024 2000)  Establish and maintain optimal ostomy function: Monitor output from ostomies     Problem: Genitourinary - Adult  Goal: Absence of urinary retention  Outcome: Progressing  Flowsheets (Taken 2/28/2024 2000)  Absence of urinary retention: Assess patient’s ability to void and empty bladder  Goal: Urinary catheter remains patent  Outcome: Progressing  Flowsheets (Taken 2/28/2024 2000)  Urinary catheter remains patent: Assess patency of urinary catheter     Problem: Infection - Adult  Goal: Absence of infection at discharge  Outcome: Progressing  Flowsheets (Taken 2/28/2024 2000)  Absence of infection at discharge: Assess and monitor for signs and symptoms of infection  Goal: Absence of infection during hospitalization  Outcome: Progressing  Flowsheets (Taken 2/28/2024 2000)  Absence of infection during hospitalization: Assess and monitor for signs and symptoms of infection  Goal: Absence of fever/infection during anticipated neutropenic period  Outcome: Progressing  Flowsheets (Taken 2/28/2024 2000)  Absence of fever/infection during anticipated neutropenic period: Monitor white blood cell count     Problem: Metabolic/Fluid and Electrolytes - Adult  Goal: Electrolytes maintained within normal limits  Outcome: Progressing  Flowsheets (Taken 2/28/2024 2000)  Electrolytes maintained within normal limits: Monitor labs and assess patient for signs and symptoms of electrolyte imbalances  Goal: Hemodynamic stability and optimal renal function maintained  Outcome: Progressing  Flowsheets (Taken 2/28/2024 2000)  Hemodynamic stability and optimal renal function maintained: Monitor labs and assess for signs and symptoms of volume excess or deficit  Goal: Glucose maintained within prescribed

## 2024-02-29 NOTE — CARE COORDINATION
2/29/24  Transition of care update.  Patient is S/P left heart cath and PCI from 2/24/24. Patient continues on 8 liters of high flow 02 with attempt to wean.  Patient will need an ambulatory pulse ox prior to discharge to assess for any increased 02 needs.  Patient is on baseline 02 with [employer] at 2 liters. Vascular is following with no plan for IVC filter due to finding of small PE and retroperitoneal hemorrhage with inability  to anticoagulate.  Urology continues to follow for urinary retention with UA ordered. PT OT updates complete with Am-PAC of 13/24 for PT and 14/24 for OT.  Call to wife again this am to review discharge planned.  Stressed to wife that a [facility]  stay is being recommended.  Wife continues to adamantly decline any stay in a nursing facility. Discharge goal is home once 02 needs have decreased.   Discharge plan is home with wife.  Wife was agreeable with Home Health.  Referral made to [facility] who has accepted and following.  SVEN/BARBARA to follow.     Electronically signed by NENO Ramos on 2/29/2024 at 9:13 AM

## 2024-02-29 NOTE — PROGRESS NOTES
Hospitalist Progress Note      PCP: Gilson Ramon III,     Date of Admission: 2/12/2024        Hospital Course:  83 y.o. male presented with STEMI, , HE DOES NOT KNOW WHY HE IS HERE OR WHAT HAPPENED.    HE WAS INITIALLY ADMITTED TO [place], , HIS TT WAS ELEVATED 1338 .      2/16  STILL ON LEVOPHED.   TRYING TO WEAN AIRVO        2/19  LASIX IS ON HOLD     2/21   TRANSFERRED FROM [facility].  PLAVIX AND ASPIRIN TODAY   2/22  WIFE WANTS TO TAKE HIM HOME.  SHE SAYS SHE HAS ENOUGH SUPPORT      2/23  FOR LHC TODAY   LHC NOT DONE DUE TO INCREASED OXYGEN DEMAND ON YESTERDAY     2.24 Successful stenting of the SVG to the RCA yesterday with a 3.5 x 38 mm drug-eluting stent     2/ 25   D DIMER OVER 2000  WILL ORDER CTA  OF CHEST    FOUND TO HAVE A PE PER RADIOLOGY.   PER VASCULAR, NO NEED FOR IVC         Subjective: ASLEEP           Medications:  Reviewed    Infusion Medications    sodium chloride      sodium chloride      sodium chloride      dextrose      sodium chloride       Scheduled Medications    methylPREDNISolone  40 mg IntraVENous Q12H    tamsulosin  0.4 mg Oral Daily    albuterol  2.5 mg Nebulization 4x Daily RT    guaiFENesin  400 mg Oral q8h    Vitamin D  1,000 Units Oral Daily    senna  1 tablet Oral Nightly    bisacodyl  10 mg Rectal Daily    pantoprazole  40 mg Oral Daily    divalproex  125 mg Oral 3 times per day    isosorbide mononitrate  30 mg Oral Daily    rosuvastatin  5 mg Oral Nightly    polyethylene glycol  17 g Oral Daily    [Held by provider] heparin (porcine)  5,000 Units SubCUTAneous 3 times per day    aspirin  81 mg Oral Daily    sodium chloride flush  5-40 mL IntraVENous 2 times per day    budesonide  0.5 mg Nebulization BID RT    arformoterol tartrate  15 mcg Nebulization BID RT    timolol  1 drop Both Eyes Daily    metoprolol succinate  25 mg Oral Daily    clopidogrel  75 mg Oral Daily     PRN Meds: acetaminophen, oxyCODONE-acetaminophen, sodium chloride, prochlorperazine, sodium

## 2024-03-01 ENCOUNTER — APPOINTMENT (OUTPATIENT)
Dept: GENERAL RADIOLOGY | Age: 84
End: 2024-03-01
Attending: INTERNAL MEDICINE
Payer: MEDICARE

## 2024-03-01 VITALS
OXYGEN SATURATION: 94 % | BODY MASS INDEX: 27.75 KG/M2 | SYSTOLIC BLOOD PRESSURE: 91 MMHG | TEMPERATURE: 97.3 F | HEIGHT: 67 IN | DIASTOLIC BLOOD PRESSURE: 58 MMHG | HEART RATE: 64 BPM | RESPIRATION RATE: 17 BRPM | WEIGHT: 176.81 LBS

## 2024-03-01 LAB
ANION GAP SERPL CALCULATED.3IONS-SCNC: 12 MMOL/L (ref 7–16)
BUN SERPL-MCNC: 25 MG/DL (ref 6–23)
CA-I BLD-SCNC: 1.16 MMOL/L (ref 1.15–1.33)
CALCIUM SERPL-MCNC: 7.8 MG/DL (ref 8.6–10.2)
CHLORIDE SERPL-SCNC: 103 MMOL/L (ref 98–107)
CO2 SERPL-SCNC: 23 MMOL/L (ref 22–29)
CREAT SERPL-MCNC: 0.8 MG/DL (ref 0.7–1.2)
EKG ATRIAL RATE: 88 BPM
EKG P AXIS: 11 DEGREES
EKG P-R INTERVAL: 182 MS
EKG Q-T INTERVAL: 400 MS
EKG QRS DURATION: 94 MS
EKG QTC CALCULATION (BAZETT): 484 MS
EKG R AXIS: -13 DEGREES
EKG T AXIS: 2 DEGREES
EKG VENTRICULAR RATE: 88 BPM
ERYTHROCYTE [DISTWIDTH] IN BLOOD BY AUTOMATED COUNT: 18.1 % (ref 11.5–15)
GFR SERPL CREATININE-BSD FRML MDRD: >60 ML/MIN/1.73M2
GLUCOSE SERPL-MCNC: 133 MG/DL (ref 74–99)
HCT VFR BLD AUTO: 29.7 % (ref 37–54)
HGB BLD-MCNC: 9 G/DL (ref 12.5–16.5)
MCH RBC QN AUTO: 30.1 PG (ref 26–35)
MCHC RBC AUTO-ENTMCNC: 30.3 G/DL (ref 32–34.5)
MCV RBC AUTO: 99.3 FL (ref 80–99.9)
PLATELET # BLD AUTO: 180 K/UL (ref 130–450)
PMV BLD AUTO: 10.5 FL (ref 7–12)
POTASSIUM SERPL-SCNC: 3.9 MMOL/L (ref 3.5–5)
RBC # BLD AUTO: 2.99 M/UL (ref 3.8–5.8)
SODIUM SERPL-SCNC: 138 MMOL/L (ref 132–146)
WBC OTHER # BLD: 8.5 K/UL (ref 4.5–11.5)

## 2024-03-01 PROCEDURE — 6360000002 HC RX W HCPCS: Performed by: CLINICAL NURSE SPECIALIST

## 2024-03-01 PROCEDURE — 6370000000 HC RX 637 (ALT 250 FOR IP)

## 2024-03-01 PROCEDURE — 6370000000 HC RX 637 (ALT 250 FOR IP): Performed by: INTERNAL MEDICINE

## 2024-03-01 PROCEDURE — 94640 AIRWAY INHALATION TREATMENT: CPT

## 2024-03-01 PROCEDURE — 85027 COMPLETE CBC AUTOMATED: CPT

## 2024-03-01 PROCEDURE — 2580000003 HC RX 258: Performed by: INTERNAL MEDICINE

## 2024-03-01 PROCEDURE — 36415 COLL VENOUS BLD VENIPUNCTURE: CPT

## 2024-03-01 PROCEDURE — 6360000002 HC RX W HCPCS: Performed by: INTERNAL MEDICINE

## 2024-03-01 PROCEDURE — 6370000000 HC RX 637 (ALT 250 FOR IP): Performed by: NURSE PRACTITIONER

## 2024-03-01 PROCEDURE — 71045 X-RAY EXAM CHEST 1 VIEW: CPT

## 2024-03-01 PROCEDURE — 2580000003 HC RX 258: Performed by: CLINICAL NURSE SPECIALIST

## 2024-03-01 PROCEDURE — 2700000000 HC OXYGEN THERAPY PER DAY

## 2024-03-01 PROCEDURE — 92526 ORAL FUNCTION THERAPY: CPT

## 2024-03-01 PROCEDURE — 80048 BASIC METABOLIC PNL TOTAL CA: CPT

## 2024-03-01 PROCEDURE — 82330 ASSAY OF CALCIUM: CPT

## 2024-03-01 PROCEDURE — 6370000000 HC RX 637 (ALT 250 FOR IP): Performed by: CLINICAL NURSE SPECIALIST

## 2024-03-01 PROCEDURE — 94669 MECHANICAL CHEST WALL OSCILL: CPT

## 2024-03-01 RX ORDER — DIVALPROEX SODIUM 125 MG/1
125 CAPSULE, COATED PELLETS ORAL EVERY 8 HOURS SCHEDULED
Qty: 60 CAPSULE | Refills: 3 | Status: SHIPPED | OUTPATIENT
Start: 2024-03-01

## 2024-03-01 RX ORDER — ISOSORBIDE MONONITRATE 30 MG/1
30 TABLET, EXTENDED RELEASE ORAL DAILY
Qty: 30 TABLET | Refills: 3 | Status: ON HOLD | OUTPATIENT
Start: 2024-03-02 | End: 2024-03-08

## 2024-03-01 RX ORDER — TAMSULOSIN HYDROCHLORIDE 0.4 MG/1
0.4 CAPSULE ORAL DAILY
Qty: 30 CAPSULE | Refills: 3 | Status: SHIPPED | OUTPATIENT
Start: 2024-03-02

## 2024-03-01 RX ORDER — GUAIFENESIN 400 MG/1
400 TABLET ORAL EVERY 8 HOURS
Qty: 56 TABLET | Refills: 0 | Status: SHIPPED | OUTPATIENT
Start: 2024-03-01

## 2024-03-01 RX ORDER — VITAMIN B COMPLEX
1000 TABLET ORAL DAILY
Qty: 60 TABLET | Refills: 0 | Status: SHIPPED | OUTPATIENT
Start: 2024-03-02

## 2024-03-01 RX ORDER — PANTOPRAZOLE SODIUM 40 MG/1
40 TABLET, DELAYED RELEASE ORAL DAILY
Qty: 30 TABLET | Refills: 3 | Status: SHIPPED | OUTPATIENT
Start: 2024-03-02

## 2024-03-01 RX ORDER — ROSUVASTATIN CALCIUM 5 MG/1
5 TABLET, COATED ORAL NIGHTLY
Qty: 30 TABLET | Refills: 3 | Status: SHIPPED | OUTPATIENT
Start: 2024-03-01

## 2024-03-01 RX ORDER — CLOPIDOGREL BISULFATE 75 MG/1
75 TABLET ORAL DAILY
Qty: 30 TABLET | Refills: 3 | Status: SHIPPED | OUTPATIENT
Start: 2024-03-02

## 2024-03-01 RX ORDER — PREDNISONE 10 MG/1
TABLET ORAL
Qty: 30 TABLET | Refills: 0 | Status: ON HOLD | OUTPATIENT
Start: 2024-03-01 | End: 2024-03-08 | Stop reason: HOSPADM

## 2024-03-01 RX ADMIN — Medication 10 ML: at 10:21

## 2024-03-01 RX ADMIN — ALBUTEROL SULFATE 2.5 MG: 2.5 SOLUTION RESPIRATORY (INHALATION) at 09:09

## 2024-03-01 RX ADMIN — ARFORMOTEROL TARTRATE 15 MCG: 15 SOLUTION RESPIRATORY (INHALATION) at 09:09

## 2024-03-01 RX ADMIN — TAMSULOSIN HYDROCHLORIDE 0.4 MG: 0.4 CAPSULE ORAL at 10:21

## 2024-03-01 RX ADMIN — ASPIRIN 81 MG: 81 TABLET, CHEWABLE ORAL at 10:21

## 2024-03-01 RX ADMIN — Medication 1000 UNITS: at 10:21

## 2024-03-01 RX ADMIN — ALBUTEROL SULFATE 2.5 MG: 2.5 SOLUTION RESPIRATORY (INHALATION) at 13:33

## 2024-03-01 RX ADMIN — GUAIFENESIN 400 MG: 400 TABLET ORAL at 05:48

## 2024-03-01 RX ADMIN — METOPROLOL SUCCINATE 25 MG: 25 TABLET, EXTENDED RELEASE ORAL at 10:21

## 2024-03-01 RX ADMIN — WATER 40 MG: 1 INJECTION INTRAMUSCULAR; INTRAVENOUS; SUBCUTANEOUS at 10:20

## 2024-03-01 RX ADMIN — TIMOLOL MALEATE 1 DROP: 5 SOLUTION OPHTHALMIC at 10:21

## 2024-03-01 RX ADMIN — CLOPIDOGREL BISULFATE 75 MG: 75 TABLET ORAL at 10:21

## 2024-03-01 RX ADMIN — DIVALPROEX SODIUM 125 MG: 125 CAPSULE, COATED PELLETS ORAL at 05:48

## 2024-03-01 RX ADMIN — DIVALPROEX SODIUM 125 MG: 125 CAPSULE, COATED PELLETS ORAL at 13:49

## 2024-03-01 RX ADMIN — BUDESONIDE INHALATION 500 MCG: 0.5 SUSPENSION RESPIRATORY (INHALATION) at 09:09

## 2024-03-01 RX ADMIN — ISOSORBIDE MONONITRATE 30 MG: 30 TABLET, EXTENDED RELEASE ORAL at 10:21

## 2024-03-01 RX ADMIN — GUAIFENESIN 400 MG: 400 TABLET ORAL at 13:49

## 2024-03-01 RX ADMIN — PANTOPRAZOLE SODIUM 40 MG: 40 TABLET, DELAYED RELEASE ORAL at 10:21

## 2024-03-01 ASSESSMENT — PAIN SCALES - GENERAL: PAINLEVEL_OUTOF10: 0

## 2024-03-01 NOTE — PLAN OF CARE
Problem: Safety - Adult  Goal: Free from fall injury  3/1/2024 1050 by Daron Mcconnell RN  Outcome: Progressing  3/1/2024 0023 by Breana Hernández RN  Outcome: Progressing     Problem: Discharge Planning  Goal: Discharge to home or other facility with appropriate resources  3/1/2024 1050 by Daron Mcconnell RN  Outcome: Progressing  3/1/2024 0023 by Breana Hernández RN  Outcome: Progressing  Flowsheets (Taken 2/29/2024 2000)  Discharge to home or other facility with appropriate resources: Identify barriers to discharge with patient and caregiver     Problem: Neurosensory - Adult  Goal: Achieves stable or improved neurological status  3/1/2024 1050 by Daron Mcconnell RN  Outcome: Progressing  3/1/2024 0023 by Breana Hernández RN  Outcome: Progressing  Flowsheets (Taken 2/29/2024 2000)  Achieves stable or improved neurological status: Assess for and report changes in neurological status  Goal: Absence of seizures  3/1/2024 0023 by Breana Hernández RN  Outcome: Progressing  Flowsheets (Taken 2/29/2024 2000)  Absence of seizures: Monitor for seizure activity.  If seizure occurs, document type and location of movements and any associated apnea  Goal: Remains free of injury related to seizures activity  3/1/2024 0023 by Breana Hernández RN  Outcome: Progressing  Flowsheets (Taken 2/29/2024 2000)  Remains free of injury related to seizure activity: Maintain airway, patient safety  and administer oxygen as ordered  Goal: Achieves maximal functionality and self care  3/1/2024 0023 by Breana Hernández RN  Outcome: Progressing  Flowsheets (Taken 2/29/2024 2000)  Achieves maximal functionality and self care: Monitor swallowing and airway patency with patient fatigue and changes in neurological status     Problem: Respiratory - Adult  Goal: Achieves optimal ventilation and oxygenation  3/1/2024 1050 by Daron Mcconnell RN  Outcome: Progressing  3/1/2024 0023 by Breana Hernández RN  Outcome: Progressing  Flowsheets  monitored and maintained or improved  3/1/2024 0023 by Breana Hernández RN  Outcome: Progressing  Flowsheets (Taken 2/29/2024 2000)  Care Plan - Patient's Chronic Conditions and Co-Morbidity Symptoms are Monitored and Maintained or Improved: Monitor and assess patient's chronic conditions and comorbid symptoms for stability, deterioration, or improvement     Problem: Pain  Goal: Verbalizes/displays adequate comfort level or baseline comfort level  3/1/2024 0023 by Breana Hernández RN  Outcome: Progressing     Problem: Nutrition Deficit:  Goal: Optimize nutritional status  Recent Flowsheet Documentation  Taken 3/1/2024 1030 by Olayinka Coy RD, LD  Nutrient intake appropriate for improving, restoring, or maintaining nutritional needs: Recommend appropriate diets, oral nutritional supplements, and vitamin/mineral supplements  3/1/2024 0023 by Breana Hernández, RN  Outcome: Progressing

## 2024-03-01 NOTE — PROGRESS NOTES
CLINICAL PHARMACY NOTE: MEDS TO BEDS    Total # of Prescriptions Filled: 9   The following medications were delivered to the patient:  Chest congestion 400  Tamsulosin 0.4  Prednisone 10  Rosuvastatin 5  Isosorbide mono er 30  Pantoprazole 40  Clopidogrel 75  Divalproex sodium 125  Vitamin d3 25mcg     Additional Documentation:  Delivered to pt

## 2024-03-01 NOTE — PROGRESS NOTES
Spoke with Angelita from [facility] regarding ETA of O2 and the O2 will arrive within the hour.

## 2024-03-01 NOTE — PROGRESS NOTES
Department of Internal Medicine  Nephrology Attending Progress Note      Events reviewed.       SUBJECTIVE: We are following Mr. Lee for MIGDALIA.  Reports no new complaints.     PHYSICAL EXAM:      Vitals:    VITALS:  BP (!) 90/59   Pulse 92   Temp 97.5 °F (36.4 °C) (Temporal)   Resp 25   Ht 1.702 m (5' 7\")   Wt 80.2 kg (176 lb 12.9 oz)   SpO2 94%   BMI 27.69 kg/m²   24HR INTAKE/OUTPUT:    Intake/Output Summary (Last 24 hours) at 3/1/2024 1301  Last data filed at 3/1/2024 1048  Gross per 24 hour   Intake 740 ml   Output 2050 ml   Net -1310 ml         Constitutional: Patient is alert   HEENT: Pupils equal reactive, mucous membranes are dry  Respiratory: Lungs are coarse  Cardiovascular/Edema: Heart sounds are tachycardic  Gastrointestinal: Abdomen is soft  Neurologic: Patient lethargic, nonfocal  Skin: No lesions  Other: + edema    Scheduled Meds:   methylPREDNISolone  40 mg IntraVENous Q12H    tamsulosin  0.4 mg Oral Daily    albuterol  2.5 mg Nebulization 4x Daily RT    guaiFENesin  400 mg Oral q8h    Vitamin D  1,000 Units Oral Daily    senna  1 tablet Oral Nightly    bisacodyl  10 mg Rectal Daily    pantoprazole  40 mg Oral Daily    divalproex  125 mg Oral 3 times per day    isosorbide mononitrate  30 mg Oral Daily    rosuvastatin  5 mg Oral Nightly    polyethylene glycol  17 g Oral Daily    [Held by provider] heparin (porcine)  5,000 Units SubCUTAneous 3 times per day    aspirin  81 mg Oral Daily    sodium chloride flush  5-40 mL IntraVENous 2 times per day    budesonide  0.5 mg Nebulization BID RT    arformoterol tartrate  15 mcg Nebulization BID RT    timolol  1 drop Both Eyes Daily    metoprolol succinate  25 mg Oral Daily    clopidogrel  75 mg Oral Daily     Continuous Infusions:   sodium chloride      sodium chloride      sodium chloride      dextrose      sodium chloride       PRN Meds:.acetaminophen, oxyCODONE-acetaminophen, sodium chloride, prochlorperazine, sodium chloride, sodium chloride,

## 2024-03-01 NOTE — PLAN OF CARE
Problem: Safety - Adult  Goal: Free from fall injury  3/1/2024 0023 by Breana Hernández RN  Outcome: Progressing  2/29/2024 1106 by Daron Mcconnell RN  Outcome: Progressing     Problem: Discharge Planning  Goal: Discharge to home or other facility with appropriate resources  3/1/2024 0023 by Breana Hernández RN  Outcome: Progressing  Flowsheets (Taken 2/29/2024 2000)  Discharge to home or other facility with appropriate resources: Identify barriers to discharge with patient and caregiver  2/29/2024 1106 by Daron Mcconnell RN  Outcome: Progressing     Problem: Neurosensory - Adult  Goal: Achieves stable or improved neurological status  3/1/2024 0023 by Breana Hernández RN  Outcome: Progressing  Flowsheets (Taken 2/29/2024 2000)  Achieves stable or improved neurological status: Assess for and report changes in neurological status  2/29/2024 1106 by Daron Mcconnell RN  Outcome: Progressing  Goal: Absence of seizures  Outcome: Progressing  Flowsheets (Taken 2/29/2024 2000)  Absence of seizures: Monitor for seizure activity.  If seizure occurs, document type and location of movements and any associated apnea  Goal: Remains free of injury related to seizures activity  Outcome: Progressing  Flowsheets (Taken 2/29/2024 2000)  Remains free of injury related to seizure activity: Maintain airway, patient safety  and administer oxygen as ordered  Goal: Achieves maximal functionality and self care  Outcome: Progressing  Flowsheets (Taken 2/29/2024 2000)  Achieves maximal functionality and self care: Monitor swallowing and airway patency with patient fatigue and changes in neurological status     Problem: Respiratory - Adult  Goal: Achieves optimal ventilation and oxygenation  Outcome: Progressing  Flowsheets (Taken 2/29/2024 2000)  Achieves optimal ventilation and oxygenation: Assess for changes in respiratory status     Problem: Cardiovascular - Adult  Goal: Maintains optimal cardiac output and hemodynamic  stability  3/1/2024 0023 by Breana Hernández, RN  Outcome: Progressing  Flowsheets (Taken 2/29/2024 2000)  Maintains optimal cardiac output and hemodynamic stability: Monitor blood pressure and heart rate  2/29/2024 1106 by Daron Mcconnell, RN  Outcome: Progressing  Goal: Absence of cardiac dysrhythmias or at baseline  Outcome: Progressing  Flowsheets (Taken 2/29/2024 2000)  Absence of cardiac dysrhythmias or at baseline: Monitor cardiac rate and rhythm     Problem: Skin/Tissue Integrity - Adult  Goal: Skin integrity remains intact  Outcome: Progressing  Flowsheets (Taken 2/29/2024 2000)  Skin Integrity Remains Intact: Monitor for areas of redness and/or skin breakdown  Goal: Incisions, wounds, or drain sites healing without S/S of infection  Outcome: Progressing  Flowsheets (Taken 2/29/2024 2000)  Incisions, Wounds, or Drain Sites Healing Without Sign and Symptoms of Infection: TWICE DAILY: Assess and document skin integrity  Goal: Oral mucous membranes remain intact  Outcome: Progressing  Flowsheets (Taken 2/29/2024 2000)  Oral Mucous Membranes Remain Intact: Assess oral mucosa and hygiene practices     Problem: Musculoskeletal - Adult  Goal: Return mobility to safest level of function  Outcome: Progressing  Goal: Maintain proper alignment of affected body part  Outcome: Progressing  Goal: Return ADL status to a safe level of function  Outcome: Progressing     Problem: Gastrointestinal - Adult  Goal: Minimal or absence of nausea and vomiting  Outcome: Progressing  Flowsheets (Taken 2/29/2024 2000)  Minimal or absence of nausea and vomiting: Administer IV fluids as ordered to ensure adequate hydration  Goal: Maintains or returns to baseline bowel function  Outcome: Progressing  Flowsheets (Taken 2/29/2024 2000)  Maintains or returns to baseline bowel function: Assess bowel function  Goal: Maintains adequate nutritional intake  Outcome: Progressing  Flowsheets (Taken 2/29/2024 2000)  Maintains adequate nutritional

## 2024-03-01 NOTE — PROGRESS NOTES
Pulse oximetry assessment  88% at rest on room air (if 88% or less, skip next steps)  Placed patient on 4L NC and sustained 88% and below  Patient unable to ambulate, sustaining 88% while lying in bed on room air % while ambulating on room air  94% at rest on 5L  Patient unable to ambulate, sustaining 88% while lying in bed on room air % while ambulating on ___LPM

## 2024-03-01 NOTE — PROGRESS NOTES
Pulmonary Progress Note    Admit Date: 2/12/2024                            PCP: Gilson Ramon III,   Principal Problem:    STEMI (ST elevation myocardial infarction) (HCC)  Active Problems:    Acute hypoxic respiratory failure (HCC)    Status post coronary artery bypass graft    Acute kidney injury superimposed on chronic kidney disease (HCC)    Chronic anemia    Nontraumatic retroperitoneal hematoma    Infrarenal abdominal aortic aneurysm (AAA) without rupture (HCC)  Resolved Problems:    * No resolved hospital problems. *      Subjective:  Resting on 4lnc pox 94%  Denies any complaints  Wife at bedside, to be discharged today         Medications:   sodium chloride      sodium chloride      sodium chloride      dextrose      sodium chloride          methylPREDNISolone  40 mg IntraVENous Q12H    tamsulosin  0.4 mg Oral Daily    albuterol  2.5 mg Nebulization 4x Daily RT    guaiFENesin  400 mg Oral q8h    Vitamin D  1,000 Units Oral Daily    senna  1 tablet Oral Nightly    bisacodyl  10 mg Rectal Daily    pantoprazole  40 mg Oral Daily    divalproex  125 mg Oral 3 times per day    isosorbide mononitrate  30 mg Oral Daily    rosuvastatin  5 mg Oral Nightly    polyethylene glycol  17 g Oral Daily    [Held by provider] heparin (porcine)  5,000 Units SubCUTAneous 3 times per day    aspirin  81 mg Oral Daily    sodium chloride flush  5-40 mL IntraVENous 2 times per day    budesonide  0.5 mg Nebulization BID RT    arformoterol tartrate  15 mcg Nebulization BID RT    timolol  1 drop Both Eyes Daily    metoprolol succinate  25 mg Oral Daily    clopidogrel  75 mg Oral Daily       Vitals:  VITALS:  BP (!) 90/59   Pulse 92   Temp 97.5 °F (36.4 °C) (Temporal)   Resp 25   Ht 1.702 m (5' 7\")   Wt 80.2 kg (176 lb 12.9 oz)   SpO2 94%   BMI 27.69 kg/m²   24HR INTAKE/OUTPUT:    Intake/Output Summary (Last 24 hours) at 3/1/2024 1204  Last data filed at 3/1/2024 1048  Gross per 24 hour   Intake 740 ml   Output 2050  Staff, NP.  I have extensively reviewed the chart lab work and imaging.  I agree with above.  Modifications and amendment of note made as necessary.    In addition, the following apply:    Current Facility-Administered Medications   Medication Dose Route Frequency Provider Last Rate Last Admin    methylPREDNISolone sodium succ (SOLU-MEDROL) 40 mg in sterile water 1 mL injection  40 mg IntraVENous Q12H Chuckie Delacruz APRN - CNS   40 mg at 03/01/24 1020    tamsulosin (FLOMAX) capsule 0.4 mg  0.4 mg Oral Daily Yuliya Quinn APRN - CNP   0.4 mg at 03/01/24 1021    albuterol (PROVENTIL) (2.5 MG/3ML) 0.083% nebulizer solution 2.5 mg  2.5 mg Nebulization 4x Daily RT Chuckie Delacruz APRN - CNS   2.5 mg at 03/01/24 1333    guaiFENesin tablet 400 mg  400 mg Oral q8h Chuckie Delacruz APRN - CNS   400 mg at 03/01/24 1349    acetaminophen (TYLENOL) tablet 650 mg  650 mg Oral Q4H PRN Amadou Santana, DO   650 mg at 02/24/24 2100    oxyCODONE-acetaminophen (PERCOCET) 5-325 MG per tablet 1 tablet  1 tablet Oral Q4H PRN Amadou Santana, DO   1 tablet at 02/25/24 1215    Vitamin D (CHOLECALCIFEROL) tablet 1,000 Units  1,000 Units Oral Daily Amadou Santana, DO   1,000 Units at 03/01/24 1021    senna (SENOKOT) tablet 8.6 mg  1 tablet Oral Nightly Amadou Santana, DO   8.6 mg at 02/27/24 2043    bisacodyl (DULCOLAX) suppository 10 mg  10 mg Rectal Daily Amadou Santana, DO   10 mg at 02/21/24 0836    0.9 % sodium chloride infusion   IntraVENous PRN Amadou Santana, DO        pantoprazole (PROTONIX) tablet 40 mg  40 mg Oral Daily Amadou Santana, DO   40 mg at 03/01/24 1021    prochlorperazine (COMPAZINE) injection 10 mg  10 mg IntraVENous Q6H PRN SantanaAmadou duncan DO   10 mg at 02/24/24 0401    divalproex (DEPAKOTE SPRINKLE) DR capsule 125 mg  125 mg Oral 3 times per day Amadou Santana DO   125 mg at 03/01/24 1349    isosorbide mononitrate (IMDUR) extended release tablet 30 mg  30 mg Oral Daily Amadou Santana,    30

## 2024-03-01 NOTE — PROGRESS NOTES
Department of Internal Medicine  Nephrology Attending Progress Note      Events reviewed.       SUBJECTIVE: We are following Mr. Lee for MIGDALIA.  Reports no new complaints.     PHYSICAL EXAM:      Vitals:    VITALS:  BP (!) 91/58   Pulse 64   Temp 97.3 °F (36.3 °C) (Temporal)   Resp 17   Ht 1.702 m (5' 7\")   Wt 80.2 kg (176 lb 12.9 oz)   SpO2 94%   BMI 27.69 kg/m²   24HR INTAKE/OUTPUT:    Intake/Output Summary (Last 24 hours) at 3/1/2024 1549  Last data filed at 3/1/2024 1429  Gross per 24 hour   Intake 300 ml   Output 1600 ml   Net -1300 ml         Constitutional: Patient is alert   HEENT: Pupils equal reactive, mucous membranes are dry  Respiratory: Lungs are coarse  Cardiovascular/Edema: Heart sounds are tachycardic  Gastrointestinal: Abdomen is soft  Neurologic: Patient lethargic, nonfocal  Skin: No lesions  Other: + edema    Scheduled Meds:   methylPREDNISolone  40 mg IntraVENous Q12H    tamsulosin  0.4 mg Oral Daily    albuterol  2.5 mg Nebulization 4x Daily RT    guaiFENesin  400 mg Oral q8h    Vitamin D  1,000 Units Oral Daily    senna  1 tablet Oral Nightly    bisacodyl  10 mg Rectal Daily    pantoprazole  40 mg Oral Daily    divalproex  125 mg Oral 3 times per day    isosorbide mononitrate  30 mg Oral Daily    rosuvastatin  5 mg Oral Nightly    polyethylene glycol  17 g Oral Daily    [Held by provider] heparin (porcine)  5,000 Units SubCUTAneous 3 times per day    aspirin  81 mg Oral Daily    sodium chloride flush  5-40 mL IntraVENous 2 times per day    budesonide  0.5 mg Nebulization BID RT    arformoterol tartrate  15 mcg Nebulization BID RT    timolol  1 drop Both Eyes Daily    metoprolol succinate  25 mg Oral Daily    clopidogrel  75 mg Oral Daily     Continuous Infusions:   sodium chloride      sodium chloride      sodium chloride      dextrose      sodium chloride       PRN Meds:.acetaminophen, oxyCODONE-acetaminophen, sodium chloride, prochlorperazine, sodium chloride, sodium chloride,  evaluation.     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 Recommend follow-up every 2 years.     Bladder wall thickening with perivesicular stranding.  Findings could reflect  cystitis.  Correlate with urinalysis.  Also note that there is bilateral  perinephric stranding which is a nonspecific finding.     Trace free fluid in the pelvis.     Small hiatal hernia.     Severe atherosclerotic calcification which causes narrowing of the major  abdominal aortic branch vessels.     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    XR CHEST PORTABLE  2/12/24      FINDINGS:  There is mild improvement in aeration with persistent bilateral mid to lower  lung zone hazy airspace opacities.  Blunting of the bilateral costophrenic  angles left, greater than right again seen suggestive of pleural effusions.  Heart is enlarged, but stable.     IMPRESSION:  Slight improvement in lung aeration.  Otherwise, no significant change with  pulmonary edema and bilateral pleural effusions noted.              BRIEF SUMMARY OF INITIAL CONSULT:    Briefly Mr. Lee he is a 83-year-old man with history of HTN, CAD status post CABG, aortic stenosis status post AVR with bioprosthetic valve, hyperlipidemia, PAF, asbestos exposure, who was recently admitted on December 2023 with pneumonia, who was admitted on February 7, 2024 after he was brought to the ER by EMS due to shortness of breath.  In the ER he was found to be hypotensive and septic shock as well as NSTEMI and COVID-19 positive.  On admission his creatinine level was 1.2 mg/dL but since then has rapidly increased up to 2.3 mg/dL, reason for this consultation.  His medications prior to admission included losartan, HCTZ.  Addendum: The patient was transferred to [facility] on February 12, 2024 for possible catheterization due to STEMI, persistent elevated troponin levels.    Problems resolved:    Lactic acidosis, 2/2

## 2024-03-01 NOTE — PROGRESS NOTES
Hospitalist Progress Note      PCP: Gilson Ramon III, DO    Date of Admission: 2/12/2024        Hospital Course:   83 y.o. male presented with STEMI, , HE DOES NOT KNOW WHY HE IS HERE OR WHAT HAPPENED.    HE WAS INITIALLY ADMITTED TO [place], , HIS TT WAS ELEVATED 1338 .      2/16  STILL ON LEVOPHED.   TRYING TO WEAN AIRVO        2/19  LASIX IS ON HOLD     2/21   TRANSFERRED FROM [facility].  PLAVIX AND ASPIRIN TODAY   2/22  WIFE WANTS TO TAKE HIM HOME.  SHE SAYS SHE HAS ENOUGH SUPPORT      2/23  FOR LHC TODAY   LHC NOT DONE DUE TO INCREASED OXYGEN DEMAND ON YESTERDAY     2.24 Successful stenting of the SVG to the RCA yesterday with a 3.5 x 38 mm drug-eluting stent     2/ 25   D DIMER OVER 2000  WILL ORDER CTA  OF CHEST    FOUND TO HAVE A PE PER RADIOLOGY.   PER VASCULAR, NO NEED FOR IVC               Subjective:  WANTS TO GO HOME            Medications:  Reviewed    Infusion Medications    sodium chloride      sodium chloride      sodium chloride      dextrose      sodium chloride       Scheduled Medications    methylPREDNISolone  40 mg IntraVENous Q12H    tamsulosin  0.4 mg Oral Daily    albuterol  2.5 mg Nebulization 4x Daily RT    guaiFENesin  400 mg Oral q8h    Vitamin D  1,000 Units Oral Daily    senna  1 tablet Oral Nightly    bisacodyl  10 mg Rectal Daily    pantoprazole  40 mg Oral Daily    divalproex  125 mg Oral 3 times per day    isosorbide mononitrate  30 mg Oral Daily    rosuvastatin  5 mg Oral Nightly    polyethylene glycol  17 g Oral Daily    [Held by provider] heparin (porcine)  5,000 Units SubCUTAneous 3 times per day    aspirin  81 mg Oral Daily    sodium chloride flush  5-40 mL IntraVENous 2 times per day    budesonide  0.5 mg Nebulization BID RT    arformoterol tartrate  15 mcg Nebulization BID RT    timolol  1 drop Both Eyes Daily    metoprolol succinate  25 mg Oral Daily    clopidogrel  75 mg Oral Daily     PRN Meds: acetaminophen, oxyCODONE-acetaminophen, sodium chloride,

## 2024-03-01 NOTE — CARE COORDINATION
3/1/24  Transition of Care [place].   Patient is S/P left heart cath and PCI from 2/24/24. Patient continues on  5 liters of 02 with attempt to wean.  A walking pulse ox was requested from nursing today in anticipation of discharge soon.  Patient is on baseline 02 with [employer] at 2 liters. Vascular is following with no plan for IVC filter due to finding of small PE and retroperitoneal hemorrhage with inability  to anticoagulate. Plavix has been started.  PT OT updates complete with AM-PAC of 13/24 for PT and 14/24 for OT. Stressed to wife that a SULMA stay is being recommended. Wife continues to adamantly decline any stay in a nursing facility.  Discharge plan is home with wife.  Wife was agreeable with Home Health.  Referral made to [facility] who has accepted and following. Home care orders requested and placed.  SVEN/BARBARA to follow.      2:45pm Discharge order noted.  Patient will require 5 liters of 02 at discharge.  Nursing obtaining an order from Pulmonology.  Walking pulse ox complete.  Call to Sarah to updated that patient is being discharged today and will need a tank with increased 02 needs for discharge. Sarah to watch for the order in [employer]. Call to [facility] to update on discharge as well.     Electronically signed by NENO Ramos on 3/1/2024 at 10:35 AM

## 2024-03-01 NOTE — PROGRESS NOTES
Comprehensive Nutrition Assessment    Type and Reason for Visit:  Reassess    Nutrition Recommendations/Plan:   Recommend and start Ensure high protein supplement daily and Magic cup supplement daily to help meet nutritional needs.         Malnutrition Assessment:  Malnutrition Status:  At risk for malnutrition (Comment) (03/01/24 1038)    Context:  Acute Illness     Findings of the 6 clinical characteristics of malnutrition:  Energy Intake:  75% or less of estimated energy requirements for 7 or more days  Weight Loss:  Unable to assess (d/t possible fluid shifts)     Body Fat Loss:  Unable to assess     Muscle Mass Loss:  Unable to assess    Fluid Accumulation:  No significant fluid accumulation     Strength:  Not Performed    Nutrition Assessment:    Patients po intake has been a little sporadic, averaging 50-75% of meals served ; noted clinical indicators of mild oral phase dysphagia per speech who recommended minced and moist consistency solids with thin liquids ; origincally adm w/ SOB and STEMI ; noted PNA and acute on chronic hypoxic respiratory failure ; recent COVID-19 ; noted MIGDALIA ; s/p cardiac cath w/ stent on 2/23 ; hx of CAD and CABG ; s/p cardiac cath on 2/14 ; noted hemodynamic shock, septic and hypovolemic, resolved ; will start ONS    Nutrition Related Findings:    -I&Os (-8.5 L), no edema, A&O x 4, missing teeth, active BS, rounded abd, puncture site, scabs ; Wound Type: None       Current Nutrition Intake & Therapies:    Average Meal Intake: 51-75%  Average Supplements Intake: NPO  ADULT DIET; Dysphagia - Minced and Moist; Low Fat/Low Chol/High Fiber/2 gm Na    Anthropometric Measures:  Height: 170.2 cm (5' 7\")  Ideal Body Weight (IBW): 148 lbs (67 kg)    Admission Body Weight: 88 kg (194 lb) (2/13, actual)  Current Body Weight: 79.8 kg (176 lb) (2/29, bedscale), 118.9 % IBW. Weight Source: Bed Scale  Current BMI (kg/m2): 27.6  Usual Body Weight:  (UTO d/t lack of wt hx per EMR)     Weight

## 2024-03-01 NOTE — PROGRESS NOTES
Discharge instructions given to patient and wife at bedside. Educated on the importance of medication compliance as well as follow up appointments. All questions answered appropriately. Ivs removed and heart monitor cleansed and placed back in nurses station.     Patient discharged with all belongings including HOME o2 delivered by Sarah.     Patient wheeled via wheelchair to main lobby by this RN and assisted into vehicle.

## 2024-03-02 NOTE — DISCHARGE SUMMARY
Reason for exam:->sob FINDINGS: Bilateral interstitial and parenchymal fibrosis is not changed.  Some superimposed infiltrates are not excluded.  There is pleural thickening left costophrenic angle.  There has been sternotomy.     Bilateral interstitial and parenchymal fibrosis is not changed. Some superimposed infiltrates are not excluded.     XR CHEST PORTABLE    Result Date: 2/10/2024  EXAMINATION: ONE XRAY VIEW OF THE CHEST 2/10/2024 10:06 am COMPARISON: 02/09/2028 HISTORY: ORDERING SYSTEM PROVIDED HISTORY: Concern for fluid overload TECHNOLOGIST PROVIDED HISTORY: Reason for exam:->Concern for fluid overload FINDINGS: There is interstitial prominence within the lungs concerning for congestion or fluid overload.  This process appears slightly worse.  There is fluid extending into the minor fissure.  The left heart border and diaphragm are silhouetted.  The patient is status post median sternotomy.  The heart does not appear enlarged.  There are no signs of mediastinal widening.     There is increased interstitial prominence within lungs concerning for congestion or fluid overload..     XR CHEST PORTABLE    Result Date: 2/9/2024  EXAMINATION: ONE XRAY VIEW OF THE CHEST 2/9/2024 6:57 am COMPARISON: 2/8/2024 HISTORY: ORDERING SYSTEM PROVIDED HISTORY: chf TECHNOLOGIST PROVIDED HISTORY: Reason for exam:->chf FINDINGS: The heart is mildly enlarged.  Stable bilateral pulmonary opacities more prominently in the left mid and lower lung, which may be due to atelectasis or pneumonia.     Stable bilateral pulmonary opacities more prominently in the left mid and lower lung, which may be due to atelectasis or pneumonia.     XR CHEST PORTABLE    Result Date: 2/8/2024  EXAMINATION: ONE XRAY VIEW OF THE CHEST 2/8/2024 8:26 pm COMPARISON: Portable chest from yesterday. HISTORY: ORDERING SYSTEM PROVIDED HISTORY: eval lung fields TECHNOLOGIST PROVIDED HISTORY: Reason for exam:->eval lung fields FINDINGS: There continues to be  tablet  divalproex 125 MG DR capsule  guaiFENesin 400 MG tablet  isosorbide mononitrate 30 MG extended release tablet  pantoprazole 40 MG tablet  rosuvastatin 5 MG tablet  tamsulosin 0.4 MG capsule  Vitamin D 25 MCG (1000 UT) Tabs tablet       You can get these medications from any pharmacy    Bring a paper prescription for each of these medications  predniSONE 10 MG tablet         Time Spent on discharge is 45 minutes in the review of medications and discharge plan  and EXAM.    +++++++++++++++++++++++++++++++++++++++++++++++++  Giovanny Starr DO  [place], [place]  +++++++++++++++++++++++++++++++++++++++++++++++++  NOTE: This report was transcribed using voice recognition software. Every effort was made to ensure accuracy; however, inadvertent computerized transcription errors may be present.

## 2024-03-05 ENCOUNTER — HOSPITAL ENCOUNTER (INPATIENT)
Age: 84
LOS: 4 days | Discharge: OTHER INSTITUTION | End: 2024-03-09
Attending: EMERGENCY MEDICINE | Admitting: INTERNAL MEDICINE
Payer: MEDICARE

## 2024-03-05 ENCOUNTER — TELEPHONE (OUTPATIENT)
Dept: CARDIOLOGY CLINIC | Age: 84
End: 2024-03-05

## 2024-03-05 ENCOUNTER — APPOINTMENT (OUTPATIENT)
Dept: GENERAL RADIOLOGY | Age: 84
End: 2024-03-05
Payer: MEDICARE

## 2024-03-05 DIAGNOSIS — I50.9 ACUTE ON CHRONIC CONGESTIVE HEART FAILURE, UNSPECIFIED HEART FAILURE TYPE (HCC): Primary | ICD-10-CM

## 2024-03-05 DIAGNOSIS — N39.0 URINARY TRACT INFECTION WITHOUT HEMATURIA, SITE UNSPECIFIED: ICD-10-CM

## 2024-03-05 DIAGNOSIS — R06.02 SHORTNESS OF BREATH: ICD-10-CM

## 2024-03-05 PROBLEM — I50.43 CHF (CONGESTIVE HEART FAILURE), NYHA CLASS I, ACUTE ON CHRONIC, COMBINED (HCC): Status: ACTIVE | Noted: 2024-03-05

## 2024-03-05 PROBLEM — J96.21 ACUTE ON CHRONIC RESPIRATORY FAILURE WITH HYPOXIA (HCC): Status: ACTIVE | Noted: 2024-03-05

## 2024-03-05 PROBLEM — R53.1 GENERALIZED WEAKNESS: Status: ACTIVE | Noted: 2024-03-05

## 2024-03-05 LAB
ALBUMIN SERPL-MCNC: 3.2 G/DL (ref 3.5–5.2)
ALP SERPL-CCNC: 91 U/L (ref 40–129)
ALT SERPL-CCNC: 27 U/L (ref 0–40)
ANION GAP SERPL CALCULATED.3IONS-SCNC: 8 MMOL/L (ref 7–16)
AST SERPL-CCNC: 19 U/L (ref 0–39)
B PARAP IS1001 DNA NPH QL NAA+NON-PROBE: NOT DETECTED
B PERT DNA SPEC QL NAA+PROBE: NOT DETECTED
B.E.: -1.3 MMOL/L (ref -3–3)
BACTERIA URNS QL MICRO: ABNORMAL
BASOPHILS # BLD: 0 K/UL (ref 0–0.2)
BASOPHILS NFR BLD: 0 % (ref 0–2)
BILIRUB DIRECT SERPL-MCNC: <0.2 MG/DL (ref 0–0.3)
BILIRUB INDIRECT SERPL-MCNC: ABNORMAL MG/DL (ref 0–1)
BILIRUB SERPL-MCNC: 0.6 MG/DL (ref 0–1.2)
BILIRUB UR QL STRIP: NEGATIVE
BNP SERPL-MCNC: 1713 PG/ML (ref 0–450)
BUN SERPL-MCNC: 26 MG/DL (ref 6–23)
C PNEUM DNA NPH QL NAA+NON-PROBE: NOT DETECTED
CALCIUM SERPL-MCNC: 7.6 MG/DL (ref 8.6–10.2)
CHLORIDE SERPL-SCNC: 103 MMOL/L (ref 98–107)
CLARITY UR: ABNORMAL
CO2 SERPL-SCNC: 27 MMOL/L (ref 22–29)
COHB: 0.2 % (ref 0–1.5)
COLOR UR: YELLOW
CREAT SERPL-MCNC: 0.9 MG/DL (ref 0.7–1.2)
CRITICAL: ABNORMAL
DATE ANALYZED: ABNORMAL
DATE OF COLLECTION: ABNORMAL
EKG ATRIAL RATE: 91 BPM
EKG P AXIS: 19 DEGREES
EKG P-R INTERVAL: 206 MS
EKG Q-T INTERVAL: 396 MS
EKG QRS DURATION: 112 MS
EKG QTC CALCULATION (BAZETT): 487 MS
EKG R AXIS: -8 DEGREES
EKG T AXIS: 28 DEGREES
EKG VENTRICULAR RATE: 91 BPM
EOSINOPHIL # BLD: 0.08 K/UL (ref 0.05–0.5)
EOSINOPHILS RELATIVE PERCENT: 1 % (ref 0–6)
EPI CELLS #/AREA URNS HPF: ABNORMAL /HPF
ERYTHROCYTE [DISTWIDTH] IN BLOOD BY AUTOMATED COUNT: 18.7 % (ref 11.5–15)
FLUAV RNA NPH QL NAA+NON-PROBE: NOT DETECTED
FLUBV RNA NPH QL NAA+NON-PROBE: NOT DETECTED
GFR SERPL CREATININE-BSD FRML MDRD: >60 ML/MIN/1.73M2
GLUCOSE SERPL-MCNC: 142 MG/DL (ref 74–99)
GLUCOSE UR STRIP-MCNC: NEGATIVE MG/DL
HADV DNA NPH QL NAA+NON-PROBE: NOT DETECTED
HCO3: 22.8 MMOL/L (ref 22–26)
HCOV 229E RNA NPH QL NAA+NON-PROBE: NOT DETECTED
HCOV HKU1 RNA NPH QL NAA+NON-PROBE: NOT DETECTED
HCOV NL63 RNA NPH QL NAA+NON-PROBE: NOT DETECTED
HCOV OC43 RNA NPH QL NAA+NON-PROBE: NOT DETECTED
HCT VFR BLD AUTO: 29.4 % (ref 37–54)
HGB BLD-MCNC: 8.9 G/DL (ref 12.5–16.5)
HGB UR QL STRIP.AUTO: ABNORMAL
HHB: 2.6 % (ref 0–5)
HMPV RNA NPH QL NAA+NON-PROBE: NOT DETECTED
HPIV1 RNA NPH QL NAA+NON-PROBE: NOT DETECTED
HPIV2 RNA NPH QL NAA+NON-PROBE: NOT DETECTED
HPIV3 RNA NPH QL NAA+NON-PROBE: NOT DETECTED
HPIV4 RNA NPH QL NAA+NON-PROBE: NOT DETECTED
KETONES UR STRIP-MCNC: NEGATIVE MG/DL
LAB: ABNORMAL
LACTATE BLDV-SCNC: 1.6 MMOL/L (ref 0.5–1.9)
LEUKOCYTE ESTERASE UR QL STRIP: ABNORMAL
LYMPHOCYTES NFR BLD: 0.25 K/UL (ref 1.5–4)
LYMPHOCYTES RELATIVE PERCENT: 3 % (ref 20–42)
Lab: 1615
M PNEUMO DNA NPH QL NAA+NON-PROBE: NOT DETECTED
MCH RBC QN AUTO: 30.8 PG (ref 26–35)
MCHC RBC AUTO-ENTMCNC: 30.3 G/DL (ref 32–34.5)
MCV RBC AUTO: 101.7 FL (ref 80–99.9)
METHB: 0.3 % (ref 0–1.5)
MODE: ABNORMAL
MONOCYTES NFR BLD: 0.25 K/UL (ref 0.1–0.95)
MONOCYTES NFR BLD: 3 % (ref 2–12)
NEUTROPHILS NFR BLD: 94 % (ref 43–80)
NEUTS SEG NFR BLD: 8.72 K/UL (ref 1.8–7.3)
NITRITE UR QL STRIP: POSITIVE
O2 SATURATION: 97.4 % (ref 92–98.5)
O2HB: 96.9 % (ref 94–97)
OPERATOR ID: ABNORMAL
PATIENT TEMP: 37 C
PCO2: 35.9 MMHG (ref 35–45)
PH BLOOD GAS: 7.42 (ref 7.35–7.45)
PH UR STRIP: 5.5 [PH] (ref 5–9)
PLATELET # BLD AUTO: 210 K/UL (ref 130–450)
PMV BLD AUTO: 10 FL (ref 7–12)
PO2: 98.1 MMHG (ref 75–100)
POTASSIUM SERPL-SCNC: 3.8 MMOL/L (ref 3.5–5)
PROT SERPL-MCNC: 5.7 G/DL (ref 6.4–8.3)
PROT UR STRIP-MCNC: ABNORMAL MG/DL
RBC # BLD AUTO: 2.89 M/UL (ref 3.8–5.8)
RBC # BLD: ABNORMAL 10*6/UL
RBC #/AREA URNS HPF: ABNORMAL /HPF
RSV RNA NPH QL NAA+NON-PROBE: NOT DETECTED
RV+EV RNA NPH QL NAA+NON-PROBE: NOT DETECTED
SARS-COV-2 RNA NPH QL NAA+NON-PROBE: NOT DETECTED
SODIUM SERPL-SCNC: 138 MMOL/L (ref 132–146)
SOURCE, BLOOD GAS: ABNORMAL
SP GR UR STRIP: 1.02 (ref 1–1.03)
SPECIMEN DESCRIPTION: NORMAL
THB: 10.1 G/DL (ref 11.5–16.5)
TIME ANALYZED: 1632
TROPONIN I SERPL HS-MCNC: 56 NG/L (ref 0–11)
TROPONIN I SERPL HS-MCNC: 58 NG/L (ref 0–11)
TROPONIN I SERPL HS-MCNC: 62 NG/L (ref 0–11)
UROBILINOGEN UR STRIP-ACNC: 0.2 EU/DL (ref 0–1)
WBC #/AREA URNS HPF: ABNORMAL /HPF
WBC OTHER # BLD: 9.3 K/UL (ref 4.5–11.5)

## 2024-03-05 PROCEDURE — 80053 COMPREHEN METABOLIC PANEL: CPT

## 2024-03-05 PROCEDURE — 82805 BLOOD GASES W/O2 SATURATION: CPT

## 2024-03-05 PROCEDURE — 93005 ELECTROCARDIOGRAM TRACING: CPT

## 2024-03-05 PROCEDURE — 2580000003 HC RX 258: Performed by: NURSE PRACTITIONER

## 2024-03-05 PROCEDURE — 99285 EMERGENCY DEPT VISIT HI MDM: CPT

## 2024-03-05 PROCEDURE — 83880 ASSAY OF NATRIURETIC PEPTIDE: CPT

## 2024-03-05 PROCEDURE — 81001 URINALYSIS AUTO W/SCOPE: CPT

## 2024-03-05 PROCEDURE — 84484 ASSAY OF TROPONIN QUANT: CPT

## 2024-03-05 PROCEDURE — 82248 BILIRUBIN DIRECT: CPT

## 2024-03-05 PROCEDURE — 85025 COMPLETE CBC W/AUTO DIFF WBC: CPT

## 2024-03-05 PROCEDURE — 87077 CULTURE AEROBIC IDENTIFY: CPT

## 2024-03-05 PROCEDURE — 87086 URINE CULTURE/COLONY COUNT: CPT

## 2024-03-05 PROCEDURE — 0202U NFCT DS 22 TRGT SARS-COV-2: CPT

## 2024-03-05 PROCEDURE — 6360000002 HC RX W HCPCS

## 2024-03-05 PROCEDURE — 87040 BLOOD CULTURE FOR BACTERIA: CPT

## 2024-03-05 PROCEDURE — 6370000000 HC RX 637 (ALT 250 FOR IP): Performed by: NURSE PRACTITIONER

## 2024-03-05 PROCEDURE — 6360000002 HC RX W HCPCS: Performed by: NURSE PRACTITIONER

## 2024-03-05 PROCEDURE — 2060000000 HC ICU INTERMEDIATE R&B

## 2024-03-05 PROCEDURE — 2580000003 HC RX 258

## 2024-03-05 PROCEDURE — 71045 X-RAY EXAM CHEST 1 VIEW: CPT

## 2024-03-05 PROCEDURE — 83605 ASSAY OF LACTIC ACID: CPT

## 2024-03-05 RX ORDER — SODIUM CHLORIDE 9 MG/ML
INJECTION, SOLUTION INTRAVENOUS PRN
Status: DISCONTINUED | OUTPATIENT
Start: 2024-03-05 | End: 2024-03-09 | Stop reason: HOSPADM

## 2024-03-05 RX ORDER — PROCHLORPERAZINE EDISYLATE 5 MG/ML
5 INJECTION INTRAMUSCULAR; INTRAVENOUS EVERY 6 HOURS PRN
Status: DISCONTINUED | OUTPATIENT
Start: 2024-03-05 | End: 2024-03-09 | Stop reason: HOSPADM

## 2024-03-05 RX ORDER — PANTOPRAZOLE SODIUM 40 MG/1
40 TABLET, DELAYED RELEASE ORAL DAILY
Status: DISCONTINUED | OUTPATIENT
Start: 2024-03-06 | End: 2024-03-09 | Stop reason: HOSPADM

## 2024-03-05 RX ORDER — BISACODYL 5 MG/1
5 TABLET, DELAYED RELEASE ORAL DAILY PRN
Status: DISCONTINUED | OUTPATIENT
Start: 2024-03-05 | End: 2024-03-09 | Stop reason: HOSPADM

## 2024-03-05 RX ORDER — ACETAMINOPHEN 325 MG/1
650 TABLET ORAL EVERY 6 HOURS PRN
Status: DISCONTINUED | OUTPATIENT
Start: 2024-03-05 | End: 2024-03-09 | Stop reason: HOSPADM

## 2024-03-05 RX ORDER — FUROSEMIDE 10 MG/ML
40 INJECTION INTRAMUSCULAR; INTRAVENOUS ONCE
Status: COMPLETED | OUTPATIENT
Start: 2024-03-05 | End: 2024-03-05

## 2024-03-05 RX ORDER — IPRATROPIUM BROMIDE AND ALBUTEROL SULFATE 2.5; .5 MG/3ML; MG/3ML
1 SOLUTION RESPIRATORY (INHALATION)
Status: DISCONTINUED | OUTPATIENT
Start: 2024-03-05 | End: 2024-03-05

## 2024-03-05 RX ORDER — DIVALPROEX SODIUM 125 MG/1
125 CAPSULE, COATED PELLETS ORAL EVERY 8 HOURS SCHEDULED
Status: DISCONTINUED | OUTPATIENT
Start: 2024-03-05 | End: 2024-03-09 | Stop reason: HOSPADM

## 2024-03-05 RX ORDER — ISOSORBIDE MONONITRATE 30 MG/1
30 TABLET, EXTENDED RELEASE ORAL DAILY
Status: DISCONTINUED | OUTPATIENT
Start: 2024-03-06 | End: 2024-03-09 | Stop reason: HOSPADM

## 2024-03-05 RX ORDER — ACETAMINOPHEN 650 MG/1
650 SUPPOSITORY RECTAL EVERY 6 HOURS PRN
Status: DISCONTINUED | OUTPATIENT
Start: 2024-03-05 | End: 2024-03-09 | Stop reason: HOSPADM

## 2024-03-05 RX ORDER — FUROSEMIDE 10 MG/ML
40 INJECTION INTRAMUSCULAR; INTRAVENOUS 2 TIMES DAILY
Status: DISCONTINUED | OUTPATIENT
Start: 2024-03-06 | End: 2024-03-09 | Stop reason: HOSPADM

## 2024-03-05 RX ORDER — ROSUVASTATIN CALCIUM 10 MG/1
5 TABLET, COATED ORAL NIGHTLY
Status: DISCONTINUED | OUTPATIENT
Start: 2024-03-05 | End: 2024-03-09 | Stop reason: HOSPADM

## 2024-03-05 RX ORDER — GUAIFENESIN 400 MG/1
400 TABLET ORAL EVERY 8 HOURS
Status: DISCONTINUED | OUTPATIENT
Start: 2024-03-05 | End: 2024-03-09 | Stop reason: HOSPADM

## 2024-03-05 RX ORDER — VITAMIN B COMPLEX
1000 TABLET ORAL DAILY
Status: DISCONTINUED | OUTPATIENT
Start: 2024-03-06 | End: 2024-03-09 | Stop reason: HOSPADM

## 2024-03-05 RX ORDER — TAMSULOSIN HYDROCHLORIDE 0.4 MG/1
0.4 CAPSULE ORAL DAILY
Status: DISCONTINUED | OUTPATIENT
Start: 2024-03-06 | End: 2024-03-09 | Stop reason: HOSPADM

## 2024-03-05 RX ORDER — SODIUM CHLORIDE 0.9 % (FLUSH) 0.9 %
5-40 SYRINGE (ML) INJECTION EVERY 12 HOURS SCHEDULED
Status: DISCONTINUED | OUTPATIENT
Start: 2024-03-05 | End: 2024-03-09 | Stop reason: HOSPADM

## 2024-03-05 RX ORDER — SODIUM CHLORIDE 0.9 % (FLUSH) 0.9 %
5-40 SYRINGE (ML) INJECTION PRN
Status: DISCONTINUED | OUTPATIENT
Start: 2024-03-05 | End: 2024-03-09 | Stop reason: HOSPADM

## 2024-03-05 RX ORDER — IPRATROPIUM BROMIDE AND ALBUTEROL SULFATE 2.5; .5 MG/3ML; MG/3ML
1 SOLUTION RESPIRATORY (INHALATION)
Status: DISCONTINUED | OUTPATIENT
Start: 2024-03-06 | End: 2024-03-09 | Stop reason: HOSPADM

## 2024-03-05 RX ORDER — 0.9 % SODIUM CHLORIDE 0.9 %
1000 INTRAVENOUS SOLUTION INTRAVENOUS ONCE
Status: DISCONTINUED | OUTPATIENT
Start: 2024-03-05 | End: 2024-03-05

## 2024-03-05 RX ORDER — METOPROLOL SUCCINATE 25 MG/1
25 TABLET, EXTENDED RELEASE ORAL DAILY
Status: DISCONTINUED | OUTPATIENT
Start: 2024-03-06 | End: 2024-03-09 | Stop reason: HOSPADM

## 2024-03-05 RX ORDER — ASPIRIN 81 MG/1
81 TABLET ORAL DAILY
Status: DISCONTINUED | OUTPATIENT
Start: 2024-03-06 | End: 2024-03-09 | Stop reason: HOSPADM

## 2024-03-05 RX ORDER — CLOPIDOGREL BISULFATE 75 MG/1
75 TABLET ORAL DAILY
Status: DISCONTINUED | OUTPATIENT
Start: 2024-03-06 | End: 2024-03-09 | Stop reason: HOSPADM

## 2024-03-05 RX ADMIN — SODIUM CHLORIDE 1000 ML: 9 INJECTION, SOLUTION INTRAVENOUS at 18:51

## 2024-03-05 RX ADMIN — CEFTRIAXONE SODIUM 1000 MG: 1 INJECTION, POWDER, FOR SOLUTION INTRAMUSCULAR; INTRAVENOUS at 18:52

## 2024-03-05 RX ADMIN — GUAIFENESIN 400 MG: 400 TABLET ORAL at 23:05

## 2024-03-05 RX ADMIN — METHYLPREDNISOLONE SODIUM SUCCINATE 40 MG: 40 INJECTION, POWDER, LYOPHILIZED, FOR SOLUTION INTRAMUSCULAR; INTRAVENOUS at 23:06

## 2024-03-05 RX ADMIN — FUROSEMIDE 40 MG: 10 INJECTION, SOLUTION INTRAMUSCULAR; INTRAVENOUS at 20:27

## 2024-03-05 RX ADMIN — DIVALPROEX SODIUM 125 MG: 125 CAPSULE, COATED PELLETS ORAL at 23:06

## 2024-03-05 RX ADMIN — ROSUVASTATIN CALCIUM 5 MG: 10 TABLET, FILM COATED ORAL at 23:05

## 2024-03-05 ASSESSMENT — PAIN - FUNCTIONAL ASSESSMENT: PAIN_FUNCTIONAL_ASSESSMENT: NONE - DENIES PAIN

## 2024-03-05 NOTE — TELEPHONE ENCOUNTER
Patient's wife (Scott) notified of Dr. Santana's recommendation. F/U scheduled for 4/16/24 at 1:00 p.m.

## 2024-03-05 NOTE — TELEPHONE ENCOUNTER
Received a call from Home Health stating patient's BP/P is 90/50 (113).  His has LE edema which he did not have in the hospital and he is increasingly more short of breath.  Wife can barely get him up to move around.  Home Health nurse feels patient should be evaluated in the ER.   Advised nurse to recommend ER and that I would let Dr. Santana know.

## 2024-03-05 NOTE — ED PROVIDER NOTES
Department of Emergency Medicine     Written by: Monica King MD  Patient Name: Shiv Lee  Admit Date: 3/5/2024  3:10 PM  MRN: [id number]                   : 1940    HPI  Chief Complaint   Patient presents with    Shortness of Breath     W/exertion and BLE edema. Pt just dc'd from here on . Wears 3LNC and increased to 5LNC by EMS d/t SpO2 at 89%.       Shiv Lee is a 83 y.o. male that presents to the ED with concerns for shortness of breath.  He was discharged from the hospital few days ago.  Chronically wears 3 L and cannula oxygen, was reportedly 88 to 92% on that, was increased to 5 L by EMS.  The patient says that he is feeling extremely weak upon standing.  He is from home.  He is concerned he cannot take care of himself nor can his wife.  No fevers chills diaphoresis.    Review of systems:  Pertinent positives and negatives mentioned in the HPI/MDM.    Physical Exam  Constitutional:       General: He is not in acute distress.     Appearance: Normal appearance.   Eyes:      Extraocular Movements: Extraocular movements intact.      Pupils: Pupils are equal, round, and reactive to light.   Cardiovascular:      Rate and Rhythm: Normal rate and regular rhythm.   Pulmonary:      Effort: Pulmonary effort is normal. No respiratory distress.   Chest:      Chest wall: No mass or tenderness.   Abdominal:      Palpations: Abdomen is soft.      Tenderness: There is no abdominal tenderness.   Musculoskeletal:      Right lower leg: Edema present.      Left lower leg: Edema present.   Skin:     Coloration: Skin is not cyanotic.   Neurological:      Mental Status: He is alert and oriented to person, place, and time. Mental status is at baseline.          Chart review: Patient was admitted for STEMI on 2024    Social determinants: the patient has a PCP to follow up with outpatient    Acute or chronic illness limiting or affecting care: Weakness, risk of fall    ------------------------- PAST  including fractures.      ED Course as of 03/05/24 2313   Tue Mar 05, 2024   1828 EKG Interpretation  Interpreted by emergency department physician, Dr. Palma    Rhythm: normal sinus   Rate: normal  Axis: normal  Ectopy: none  Conduction: normal  ST Segments: nonspecific changes  T Waves: non specific changes  Q Waves: none    Clinical Impression: non-specific EKG   [HH]   1908 Chest x-ray interpreted by me, pulmonary vascular [HH]      ED Course User Index  [HH] Beulah Palma,        EKG obtained during ED visit is noted in the ED course.  This is an 83-year-old male with history of STEMI, recent admission to the hospital for septic shock and acute respiratory failure with hypoxia presents the ED with increased shortness of breath.  Normally on 3 L nasal cannula oxygen since discharge, however increased to 5 L due to hypoxia.  Patient is concerned that he is unable to take care of himself at home as he is very weak.  His initial troponin was 58, and then 56.  BNP is 1700, there is findings concerning for CHF on chest x-ray.  Urinalysis positive for UTI.  Urine culture was added on.  The patient was given Lasix and Rocephin 1 g.  Discussed with admitting team, will admit.    Clinical Impression  1. Acute on chronic congestive heart failure, unspecified heart failure type (HCC)    2. Urinary tract infection without hematuria, site unspecified    3. Shortness of breath         Disposition  Patient's disposition: Admit to telemetry    Monica King MD  Resident PGY-2

## 2024-03-06 PROBLEM — I27.20 PULMONARY HTN (HCC): Status: ACTIVE | Noted: 2024-03-06

## 2024-03-06 PROBLEM — Z95.2 H/O AORTIC VALVE REPLACEMENT: Status: ACTIVE | Noted: 2024-02-07

## 2024-03-06 PROBLEM — I50.9 ACUTE ON CHRONIC CONGESTIVE HEART FAILURE (HCC): Status: ACTIVE | Noted: 2024-03-06

## 2024-03-06 PROBLEM — I36.1 NONRHEUMATIC TRICUSPID VALVE REGURGITATION: Status: ACTIVE | Noted: 2024-03-06

## 2024-03-06 LAB
ANION GAP SERPL CALCULATED.3IONS-SCNC: 10 MMOL/L (ref 7–16)
BASOPHILS # BLD: 0.09 K/UL (ref 0–0.2)
BASOPHILS NFR BLD: 1 % (ref 0–2)
BUN SERPL-MCNC: 25 MG/DL (ref 6–23)
CALCIUM SERPL-MCNC: 8 MG/DL (ref 8.6–10.2)
CHLORIDE SERPL-SCNC: 104 MMOL/L (ref 98–107)
CO2 SERPL-SCNC: 29 MMOL/L (ref 22–29)
CREAT SERPL-MCNC: 0.8 MG/DL (ref 0.7–1.2)
EKG ATRIAL RATE: 87 BPM
EKG P AXIS: 31 DEGREES
EKG P-R INTERVAL: 196 MS
EKG Q-T INTERVAL: 380 MS
EKG QRS DURATION: 86 MS
EKG QTC CALCULATION (BAZETT): 457 MS
EKG R AXIS: -20 DEGREES
EKG VENTRICULAR RATE: 87 BPM
EOSINOPHIL # BLD: 0 K/UL (ref 0.05–0.5)
EOSINOPHILS RELATIVE PERCENT: 0 % (ref 0–6)
ERYTHROCYTE [DISTWIDTH] IN BLOOD BY AUTOMATED COUNT: 18.6 % (ref 11.5–15)
GFR SERPL CREATININE-BSD FRML MDRD: >60 ML/MIN/1.73M2
GLUCOSE SERPL-MCNC: 119 MG/DL (ref 74–99)
HCT VFR BLD AUTO: 33.4 % (ref 37–54)
HGB BLD-MCNC: 10.2 G/DL (ref 12.5–16.5)
LYMPHOCYTES NFR BLD: 0.09 K/UL (ref 1.5–4)
LYMPHOCYTES RELATIVE PERCENT: 1 % (ref 20–42)
MCH RBC QN AUTO: 30.9 PG (ref 26–35)
MCHC RBC AUTO-ENTMCNC: 30.5 G/DL (ref 32–34.5)
MCV RBC AUTO: 101.2 FL (ref 80–99.9)
METAMYELOCYTES ABSOLUTE COUNT: 0.09 K/UL (ref 0–0.12)
METAMYELOCYTES: 1 % (ref 0–1)
MONOCYTES NFR BLD: 0.26 K/UL (ref 0.1–0.95)
MONOCYTES NFR BLD: 3 % (ref 2–12)
NEUTROPHILS NFR BLD: 95 % (ref 43–80)
NEUTS SEG NFR BLD: 9.57 K/UL (ref 1.8–7.3)
PLATELET # BLD AUTO: 221 K/UL (ref 130–450)
PMV BLD AUTO: 10.1 FL (ref 7–12)
POTASSIUM SERPL-SCNC: 4.1 MMOL/L (ref 3.5–5)
RBC # BLD AUTO: 3.3 M/UL (ref 3.8–5.8)
RBC # BLD: ABNORMAL 10*6/UL
SODIUM SERPL-SCNC: 143 MMOL/L (ref 132–146)
WBC OTHER # BLD: 10.1 K/UL (ref 4.5–11.5)

## 2024-03-06 PROCEDURE — 6360000002 HC RX W HCPCS: Performed by: NURSE PRACTITIONER

## 2024-03-06 PROCEDURE — 2700000000 HC OXYGEN THERAPY PER DAY

## 2024-03-06 PROCEDURE — 97161 PT EVAL LOW COMPLEX 20 MIN: CPT

## 2024-03-06 PROCEDURE — 2060000000 HC ICU INTERMEDIATE R&B

## 2024-03-06 PROCEDURE — 97530 THERAPEUTIC ACTIVITIES: CPT

## 2024-03-06 PROCEDURE — 85025 COMPLETE CBC W/AUTO DIFF WBC: CPT

## 2024-03-06 PROCEDURE — 6370000000 HC RX 637 (ALT 250 FOR IP): Performed by: NURSE PRACTITIONER

## 2024-03-06 PROCEDURE — 94640 AIRWAY INHALATION TREATMENT: CPT

## 2024-03-06 PROCEDURE — 80048 BASIC METABOLIC PNL TOTAL CA: CPT

## 2024-03-06 PROCEDURE — 36415 COLL VENOUS BLD VENIPUNCTURE: CPT

## 2024-03-06 PROCEDURE — 99222 1ST HOSP IP/OBS MODERATE 55: CPT | Performed by: INTERNAL MEDICINE

## 2024-03-06 PROCEDURE — 2580000003 HC RX 258: Performed by: NURSE PRACTITIONER

## 2024-03-06 PROCEDURE — APPSS180 APP SPLIT SHARED TIME > 60 MINUTES: Performed by: CLINICAL NURSE SPECIALIST

## 2024-03-06 PROCEDURE — 93010 ELECTROCARDIOGRAM REPORT: CPT | Performed by: INTERNAL MEDICINE

## 2024-03-06 RX ADMIN — TAMSULOSIN HYDROCHLORIDE 0.4 MG: 0.4 CAPSULE ORAL at 12:57

## 2024-03-06 RX ADMIN — DIVALPROEX SODIUM 125 MG: 125 CAPSULE, COATED PELLETS ORAL at 21:15

## 2024-03-06 RX ADMIN — SODIUM CHLORIDE, PRESERVATIVE FREE 10 ML: 5 INJECTION INTRAVENOUS at 13:09

## 2024-03-06 RX ADMIN — METHYLPREDNISOLONE SODIUM SUCCINATE 40 MG: 40 INJECTION, POWDER, LYOPHILIZED, FOR SOLUTION INTRAMUSCULAR; INTRAVENOUS at 21:16

## 2024-03-06 RX ADMIN — FUROSEMIDE 40 MG: 10 INJECTION, SOLUTION INTRAMUSCULAR; INTRAVENOUS at 13:01

## 2024-03-06 RX ADMIN — GUAIFENESIN 400 MG: 400 TABLET ORAL at 21:15

## 2024-03-06 RX ADMIN — SODIUM CHLORIDE, PRESERVATIVE FREE 10 ML: 5 INJECTION INTRAVENOUS at 21:16

## 2024-03-06 RX ADMIN — ACETAMINOPHEN 650 MG: 325 TABLET ORAL at 01:44

## 2024-03-06 RX ADMIN — METOPROLOL SUCCINATE 25 MG: 25 TABLET, EXTENDED RELEASE ORAL at 12:56

## 2024-03-06 RX ADMIN — DIVALPROEX SODIUM 125 MG: 125 CAPSULE, COATED PELLETS ORAL at 12:56

## 2024-03-06 RX ADMIN — IPRATROPIUM BROMIDE AND ALBUTEROL SULFATE 1 DOSE: 2.5; .5 SOLUTION RESPIRATORY (INHALATION) at 06:32

## 2024-03-06 RX ADMIN — ROSUVASTATIN CALCIUM 5 MG: 10 TABLET, FILM COATED ORAL at 21:16

## 2024-03-06 RX ADMIN — METHYLPREDNISOLONE SODIUM SUCCINATE 40 MG: 40 INJECTION, POWDER, LYOPHILIZED, FOR SOLUTION INTRAMUSCULAR; INTRAVENOUS at 12:58

## 2024-03-06 RX ADMIN — PANTOPRAZOLE SODIUM 40 MG: 40 TABLET, DELAYED RELEASE ORAL at 12:57

## 2024-03-06 RX ADMIN — GUAIFENESIN 400 MG: 400 TABLET ORAL at 12:57

## 2024-03-06 RX ADMIN — CLOPIDOGREL BISULFATE 75 MG: 75 TABLET ORAL at 12:57

## 2024-03-06 RX ADMIN — Medication 1000 UNITS: at 12:57

## 2024-03-06 RX ADMIN — IPRATROPIUM BROMIDE AND ALBUTEROL SULFATE 1 DOSE: 2.5; .5 SOLUTION RESPIRATORY (INHALATION) at 20:27

## 2024-03-06 RX ADMIN — IPRATROPIUM BROMIDE AND ALBUTEROL SULFATE 1 DOSE: 2.5; .5 SOLUTION RESPIRATORY (INHALATION) at 13:23

## 2024-03-06 RX ADMIN — WATER 1000 MG: 1 INJECTION INTRAMUSCULAR; INTRAVENOUS; SUBCUTANEOUS at 17:40

## 2024-03-06 RX ADMIN — ISOSORBIDE MONONITRATE 30 MG: 30 TABLET, EXTENDED RELEASE ORAL at 12:56

## 2024-03-06 RX ADMIN — ASPIRIN 81 MG: 81 TABLET, COATED ORAL at 12:57

## 2024-03-06 ASSESSMENT — PAIN SCALES - GENERAL
PAINLEVEL_OUTOF10: 0
PAINLEVEL_OUTOF10: 7

## 2024-03-06 ASSESSMENT — PAIN DESCRIPTION - LOCATION: LOCATION: BACK

## 2024-03-06 NOTE — PROGRESS NOTES
mobility transfers.  Transfers: Pt was cued for hand/foot placement and WW technique during sit <> stand transfers.  Ambulation: Pt was cued for WW technique and sequencing when sidestepping at EOB.  Sitting EOB: Pt sat at EOB for 10+ minutes (sitting balance, endurance). Vitals monitored closely.  Vitals and symptoms were closely monitored throughout session.  Skilled positioning in bed to protect skin/joint integrity.  Education: Pt was educated on pursed lip breathing and triple cough technique.    Pt's/family goals:  1. To return to PLOF.    Prognosis is Good for reaching above PT goals.    Patient and or family understand(s) diagnosis, prognosis, and plan of care.  Yes    PHYSICAL THERAPY PLAN OF CARE:    PT POC is established based on physician order and patient diagnosis     Referring provider/PT Order:    Start   Ordering Provider    03/05/24 2100  PT eval and treat  Start:  03/05/24 2100,   End:  03/05/24 2100,   ONE TIME,   Standing Count:  1 Occurrences,   R         Mica, April, APRN - CNP      Diagnosis:  Shortness of breath [R06.02]  CHF (congestive heart failure), NYHA class I, acute on chronic, combined (HCC) [I50.43]  Urinary tract infection without hematuria, site unspecified [N39.0]  Acute on chronic congestive heart failure, unspecified heart failure type (HCC) [I50.9]  Specific instructions for next treatment:  Progress ambulation     Current Treatment Recommendations:     [x] Strengthening to improve independence with functional mobility   [] ROM to improve independence with functional mobility   [x] Balance Training to improve static/dynamic balance and to reduce fall risk  [x] Endurance Training to improve activity tolerance during functional mobility   [x] Transfer Training to improve safety and independence with all functional transfers   [x] Gait Training to improve gait mechanics, endurance and assess need for appropriate assistive device  [x] Stair Training in preparation for  safe discharge home and/or into the community   [x] Positioning to prevent skin breakdown and contractures  [x] Safety and Education Training   [x] Patient/Caregiver Education   [x] HEP  [] Other     PT long term treatment goals are located in above grid    Frequency of treatments: 2-5x/week x 1-2 weeks.    Time in  1545  Time out  1610    Total Treatment Time  10 minutes     Evaluation Time includes thorough review of current medical information, gathering information on past medical history/social history and prior level of function, completion of standardized testing/informal observation of tasks, assessment of data and education on plan of care and goals.    CPT codes:  [x] Low Complexity PT evaluation 09929  [] Moderate Complexity PT evaluation 90697  [] High Complexity PT evaluation 37061  [] PT Re-evaluation 89832  [] Gait training 36852 0 minutes  [] Manual therapy 35337 0 minutes  [x] Therapeutic activities 44980 10 minutes  [] Therapeutic exercises 11753 0 minutes  [] Neuromuscular reeducation 81150 0 minutes     Pee Morocho, PT, DPT  [id number]

## 2024-03-06 NOTE — PROGRESS NOTES
4 Eyes Skin Assessment     NAME:  Shiv Lee  YOB: 1940  MEDICAL RECORD NUMBER:  [id number]    The patient is being assessed for  Admission    I agree that at least one RN has performed a thorough Head to Toe Skin Assessment on the patient. ALL assessment sites listed below have been assessed.      Areas assessed by both nurses:    Head, Face, Ears, Shoulders, Back, Chest, Arms, Elbows, Hands, Sacrum. Buttock, Coccyx, Ischium, and Legs. Feet and Heels        Does the Patient have a Wound? Yes wound(s) were present on assessment. LDA wound assessment was Initiated and completed by RN       Bulmaro Prevention initiated by RN: Yes  Wound Care Orders initiated by RN: No    Pressure Injury (Stage 3,4, Unstageable, DTI, NWPT, and Complex wounds) if present, place Wound referral order by RN under : No    New Ostomies, if present place, Ostomy referral order under : No     Pt. Has a stage one pressure ulcer on both ears from oxygen tubing. Placed foam ear protectors.   Ecchymotic areas BUE and abdomen.  Ecchymosis on L lateral ankle    Nurse 1 eSignature: Electronically signed by Sita Urias RN on 3/6/24 at 6:47 PM EST    **SHARE this note so that the co-signing nurse can place an eSignature**    Nurse 2 eSignature: Electronically signed by Patricia Cabrera RN on 3/6/24 at 6:49 PM EST

## 2024-03-06 NOTE — CARE COORDINATION
Social Work /Transition of Care:    Pt presents to the ED secondary to shortness of breath, bilateral leg swelling and weakness.  Pt is from home and discharged from this hospital on 3/1, with [facility].  Pt declined SULMA/SNF placement at that time.    Pt is admitted inpatient with CHF.  Pt has consults for cardiology and the heart failure nurse.  SW met with pt and his wife.  Pt was sitting up in bed, alert and oriented x3, on 4L oxygen and reports he wears 3L oxygen at home.  Pt and wife live in a one floor home.  Pt has a cane, walker, wheel chair, and bsc at home.  Pt is active with [facility].  Pt's PCP is Dr Ramon and he uses [facility] on [address] to fill his prescriptions.  Pt and wife report pt would like to go to [facility] upon discharge.  SW made referral to Radhika at [facility].

## 2024-03-06 NOTE — CONSULTS
Inpatient Cardiology Consultation      Reason for Consult:  New HF    Consulting Physician: Dr. Curtis     Requesting Physician:  Dr. Brock     Date of Consultation: 3/6/2024    HISTORY OF PRESENT ILLNESS:   Shiv Lee  is a 83 y.o.  male known to Dr. Santana.      Seen most recently by Dr. Curtis during prolonged admit 2/12/24 - 3/1/24   COVID pneumonia- hypoxic respiratory failure, bradycardia - junctional / AIVR requiring several doses of atropine, epi, bicarb, & hypotension requiring levophed drip. Resolved - BB resumed 2/13/24   EKG 2/12/24  showing Persistent inferior ST elevations and lateral & anterior ST depressions. CP free  Troponins 1338>1458>1287>1270>1036>1019>889>779>652>639 . ProBNP 11,695>>7966 >>2132  2/14/2024 [facility] cardiac catheterization showed moderate disease of SVG to OM, subtotal occlusion of SVG to RCA, significantly degenerated graft   [facility] 2/23/24 Dr. Santana: Percutaneous coronary intervention with stenting of the mid SVG to the RCA with a 3.5 x 38 mm drug-eluting stent   MIGDALIA creatinine 2.5-2.4 baseline 1.2-1.5   Required transfusion of PRBC for anemia x 3 (2/15, 2/16, 2/19) and IV iron  2/16/2024 Started on Proamatine 10 mg TID >>stopped 2/17/24 2/26/2024: R retroperitoneal hematoma    Abnormal CT of the chest revealing various tiny small chronic looking subsegmental abdominal findings consistent with possibly old small pulmonary embolus no evidence of acute pulm embolus and at this time,, do not believe the patient would benefit from placement of inferior vena cava filter - per vascular surgery note 2/26/24  Asymptomatic infrarenal abdominal aortic aneurysm, 3.5 x 3.6 cm  Obstructive uropathy urinary retention- required carvalho catheter  Discharge cardiac medications:  Plavix 75mg QD, Aspirin 81mg, Toprol XL 25mg, Imdur 30mg, Crestor 5mg, O2 5 L NC -discharged home with Carvalho catheter (SULMA was recommended.  Wife adamantly declined staying in nursing facility.  Discharged home  with wife.  With home health.)      PMH: see below    [facility] ED 3/5/24 1424 via EMS for weakness / SOB had to increase home O2 from 3>>5L EMS reported SPO2 89%   Arrival vitals: T 97.4 HR 86 /64>>81/52  SPO2 96% 6 L NC O2   Significant Labs: ProBNP 1713 Trop 58>>56>>62 BUN 25 Cr 0.8 K 4.1 Lactic Acid 1.6 Alb 3.2 LFT WNL WBC 10.1 Hgb 8.9>>10.2 Plt 221    Full respiratory panel negative   Blood and urine cultures pending   UA: + nitrates, RBC, bacteria   ABGs on 6L NC O2:  pH 7.421 pco2 35.9 hco3 22.8   RAD: Pcxr:  congestive heart failure with prominent bilateral pleural effusions.   ED treatment: NS IV bolus 1L, Lasix IV 40mg, Solumedrol, Duoneb, guaifenesin, rocephin, tylenol, home meds    Patient seen and examined.  Recent vitals:/67 HR 82 SPO2 99% 3.5L NC O2  Patient's wife at bedside tells me that after patient was discharged home, he was very weak and was sleeping in a recliner chair -he could only walk a few steps to the bedside commode.  She and her family were unable to take care of him.      He denies shortness of breath, orthopnea, PND, chest discomfort, palpitations.  He notes a dry cough.  Increased bilateral lower extremity swelling- although wife states his legs hung down in the recliner for 4 days.  His Reese catheter had good urinary output.  He complains of a dry nose for which he was picking frequently causing a small amount of nosebleeding.  No black bloody or tarry stools no nausea vomiting diarrhea or abdominal pain.  He had a good appetite and was taking his home medications.  He was wearing his oxygen 3 to 5 L at all times.  On exam, he is mildly volume up, but in no acute distress at rest on nasal cannula oxygen.    Please note: past medical records were reviewed per electronic medical record (EMR) - see detailed reports under Past Medical/ Surgical History.   Past Medical History:     CAD s/p CABG (1/27/09 by Dr. Villa)  MATUTE-LAD, SVG-OM, SVG- RCA  2/23/24 PCI MADDI mid SVG to the

## 2024-03-06 NOTE — PROGRESS NOTES
Called and left a message to Dr. Brock's voicemail regarding BP of 74/48 manual. Pt non symptomatic at this time

## 2024-03-06 NOTE — H&P
Medical History:   Diagnosis Date    Abnormal EKG     Aortic stenosis     Atrial fibrillation (HCC)     Postop    Coronary artery disease     HTN (hypertension)     Infrarenal abdominal aortic aneurysm (AAA) without rupture (HCC) 02/26/2024    3.5 x 3.6 cm CT scan February 2024    Left atrial dilatation     Mild    Mitral regurgitation     Trace    Nontraumatic retroperitoneal hematoma 02/26/2024    SBE (subacute bacterial endocarditis) prophylaxis candidate          Past Surgical History:   Procedure Laterality Date    AORTIC VALVE SURGERY  1/27/09    Dr. Villa    CARDIAC PROCEDURE N/A 2/14/2024    Left heart cath / coronary angiography performed by Dipika Curtis MD at [facility] CARDIAC CATH LAB    CARDIAC PROCEDURE N/A 2/14/2024    Percutaneous coronary intervention performed by Dipika Curtis MD at [facility] CARDIAC CATH LAB    CARDIAC PROCEDURE N/A 2/23/2024    Percutaneous coronary intervention performed by Amadou Santana DO at [facility] CARDIAC CATH LAB    CORONARY ARTERY BYPASS GRAFT  1/27/09    DIAGNOSTIC CARDIAC CATH LAB PROCEDURE  1/26/09    EYE SURGERY  2016       Medications Prior to Admission:    Not in a hospital admission.    Note that the patient's home medications were reviewed and the above list is accurate to the best of my knowledge at the time of the exam.    Allergies:    Ativan [lorazepam], Haldol [haloperidol], and Zocor [simvastatin]    Social History:    reports that he has never smoked. He has never used smokeless tobacco. He reports current alcohol use. He reports that he does not use drugs.    Family History:   family history includes Cancer in his father; Diabetes in his mother; Heart Failure in his mother.      PHYSICAL EXAM:    Vitals:  /63   Pulse 75   Temp 97.4 °F (36.3 °C)   Resp 15   Ht 1.702 m (5' 7\")   Wt 80.2 kg (176 lb 12.9 oz)   SpO2 100%   BMI 27.69 kg/m²       General appearance: NAD, conversant  Eyes: Sclerae anicteric, PERRLA  HEENT: AT/NC, MMM  Neck: FROM, supple, no  patient, providing counseling/education to patient/family.      Karyn Brock MD  3/6/2024

## 2024-03-07 LAB
ANION GAP SERPL CALCULATED.3IONS-SCNC: 12 MMOL/L (ref 7–16)
BASOPHILS # BLD: 0.03 K/UL (ref 0–0.2)
BASOPHILS NFR BLD: 0 % (ref 0–2)
BNP SERPL-MCNC: 1000 PG/ML (ref 0–450)
BUN SERPL-MCNC: 28 MG/DL (ref 6–23)
CALCIUM SERPL-MCNC: 7.8 MG/DL (ref 8.6–10.2)
CHLORIDE SERPL-SCNC: 101 MMOL/L (ref 98–107)
CO2 SERPL-SCNC: 26 MMOL/L (ref 22–29)
CREAT SERPL-MCNC: 0.7 MG/DL (ref 0.7–1.2)
EOSINOPHIL # BLD: 0 K/UL (ref 0.05–0.5)
EOSINOPHILS RELATIVE PERCENT: 0 % (ref 0–6)
ERYTHROCYTE [DISTWIDTH] IN BLOOD BY AUTOMATED COUNT: 18.4 % (ref 11.5–15)
GFR SERPL CREATININE-BSD FRML MDRD: >60 ML/MIN/1.73M2
GLUCOSE SERPL-MCNC: 110 MG/DL (ref 74–99)
HCT VFR BLD AUTO: 32.7 % (ref 37–54)
HGB BLD-MCNC: 9.8 G/DL (ref 12.5–16.5)
IMM GRANULOCYTES # BLD AUTO: 0.14 K/UL (ref 0–0.58)
IMM GRANULOCYTES NFR BLD: 2 % (ref 0–5)
LYMPHOCYTES NFR BLD: 0.58 K/UL (ref 1.5–4)
LYMPHOCYTES RELATIVE PERCENT: 6 % (ref 20–42)
MAGNESIUM SERPL-MCNC: 1.8 MG/DL (ref 1.6–2.6)
MCH RBC QN AUTO: 30.6 PG (ref 26–35)
MCHC RBC AUTO-ENTMCNC: 30 G/DL (ref 32–34.5)
MCV RBC AUTO: 102.2 FL (ref 80–99.9)
MICROORGANISM SPEC CULT: ABNORMAL
MONOCYTES NFR BLD: 0.42 K/UL (ref 0.1–0.95)
MONOCYTES NFR BLD: 4 % (ref 2–12)
NEUTROPHILS NFR BLD: 88 % (ref 43–80)
NEUTS SEG NFR BLD: 8.28 K/UL (ref 1.8–7.3)
PLATELET # BLD AUTO: 239 K/UL (ref 130–450)
PMV BLD AUTO: 10.5 FL (ref 7–12)
POTASSIUM SERPL-SCNC: 4.5 MMOL/L (ref 3.5–5)
RBC # BLD AUTO: 3.2 M/UL (ref 3.8–5.8)
RBC # BLD: ABNORMAL 10*6/UL
SODIUM SERPL-SCNC: 139 MMOL/L (ref 132–146)
SPECIMEN DESCRIPTION: ABNORMAL
WBC OTHER # BLD: 9.5 K/UL (ref 4.5–11.5)

## 2024-03-07 PROCEDURE — 85025 COMPLETE CBC W/AUTO DIFF WBC: CPT

## 2024-03-07 PROCEDURE — 6370000000 HC RX 637 (ALT 250 FOR IP): Performed by: NURSE PRACTITIONER

## 2024-03-07 PROCEDURE — 6360000002 HC RX W HCPCS: Performed by: INTERNAL MEDICINE

## 2024-03-07 PROCEDURE — 2580000003 HC RX 258: Performed by: NURSE PRACTITIONER

## 2024-03-07 PROCEDURE — 2700000000 HC OXYGEN THERAPY PER DAY

## 2024-03-07 PROCEDURE — 80048 BASIC METABOLIC PNL TOTAL CA: CPT

## 2024-03-07 PROCEDURE — 2580000003 HC RX 258: Performed by: FAMILY MEDICINE

## 2024-03-07 PROCEDURE — 36415 COLL VENOUS BLD VENIPUNCTURE: CPT

## 2024-03-07 PROCEDURE — 6360000002 HC RX W HCPCS: Performed by: FAMILY MEDICINE

## 2024-03-07 PROCEDURE — 83880 ASSAY OF NATRIURETIC PEPTIDE: CPT

## 2024-03-07 PROCEDURE — 83735 ASSAY OF MAGNESIUM: CPT

## 2024-03-07 PROCEDURE — 97535 SELF CARE MNGMENT TRAINING: CPT

## 2024-03-07 PROCEDURE — 6360000002 HC RX W HCPCS: Performed by: NURSE PRACTITIONER

## 2024-03-07 PROCEDURE — 94640 AIRWAY INHALATION TREATMENT: CPT

## 2024-03-07 PROCEDURE — 2060000000 HC ICU INTERMEDIATE R&B

## 2024-03-07 PROCEDURE — 97165 OT EVAL LOW COMPLEX 30 MIN: CPT

## 2024-03-07 RX ORDER — FUROSEMIDE 10 MG/ML
20 INJECTION INTRAMUSCULAR; INTRAVENOUS ONCE
Status: COMPLETED | OUTPATIENT
Start: 2024-03-07 | End: 2024-03-07

## 2024-03-07 RX ADMIN — DIVALPROEX SODIUM 125 MG: 125 CAPSULE, COATED PELLETS ORAL at 12:40

## 2024-03-07 RX ADMIN — TAMSULOSIN HYDROCHLORIDE 0.4 MG: 0.4 CAPSULE ORAL at 08:27

## 2024-03-07 RX ADMIN — SODIUM CHLORIDE, PRESERVATIVE FREE 10 ML: 5 INJECTION INTRAVENOUS at 20:52

## 2024-03-07 RX ADMIN — METHYLPREDNISOLONE SODIUM SUCCINATE 40 MG: 40 INJECTION, POWDER, LYOPHILIZED, FOR SOLUTION INTRAMUSCULAR; INTRAVENOUS at 05:34

## 2024-03-07 RX ADMIN — CLOPIDOGREL BISULFATE 75 MG: 75 TABLET ORAL at 08:27

## 2024-03-07 RX ADMIN — GUAIFENESIN 400 MG: 400 TABLET ORAL at 12:40

## 2024-03-07 RX ADMIN — ROSUVASTATIN CALCIUM 5 MG: 10 TABLET, FILM COATED ORAL at 20:58

## 2024-03-07 RX ADMIN — DIVALPROEX SODIUM 125 MG: 125 CAPSULE, COATED PELLETS ORAL at 05:34

## 2024-03-07 RX ADMIN — IPRATROPIUM BROMIDE AND ALBUTEROL SULFATE 1 DOSE: 2.5; .5 SOLUTION RESPIRATORY (INHALATION) at 20:14

## 2024-03-07 RX ADMIN — METHYLPREDNISOLONE SODIUM SUCCINATE 40 MG: 40 INJECTION, POWDER, LYOPHILIZED, FOR SOLUTION INTRAMUSCULAR; INTRAVENOUS at 20:52

## 2024-03-07 RX ADMIN — PANTOPRAZOLE SODIUM 40 MG: 40 TABLET, DELAYED RELEASE ORAL at 08:27

## 2024-03-07 RX ADMIN — GUAIFENESIN 400 MG: 400 TABLET ORAL at 20:52

## 2024-03-07 RX ADMIN — CEFEPIME 2000 MG: 2 INJECTION, POWDER, FOR SOLUTION INTRAVENOUS at 12:40

## 2024-03-07 RX ADMIN — Medication 1000 UNITS: at 08:27

## 2024-03-07 RX ADMIN — DIVALPROEX SODIUM 125 MG: 125 CAPSULE, COATED PELLETS ORAL at 20:52

## 2024-03-07 RX ADMIN — CEFEPIME 2000 MG: 2 INJECTION, POWDER, FOR SOLUTION INTRAVENOUS at 23:04

## 2024-03-07 RX ADMIN — METHYLPREDNISOLONE SODIUM SUCCINATE 40 MG: 40 INJECTION, POWDER, LYOPHILIZED, FOR SOLUTION INTRAMUSCULAR; INTRAVENOUS at 12:40

## 2024-03-07 RX ADMIN — IPRATROPIUM BROMIDE AND ALBUTEROL SULFATE 1 DOSE: 2.5; .5 SOLUTION RESPIRATORY (INHALATION) at 09:53

## 2024-03-07 RX ADMIN — GUAIFENESIN 400 MG: 400 TABLET ORAL at 05:34

## 2024-03-07 RX ADMIN — SODIUM CHLORIDE, PRESERVATIVE FREE 10 ML: 5 INJECTION INTRAVENOUS at 08:28

## 2024-03-07 RX ADMIN — SODIUM CHLORIDE, PRESERVATIVE FREE 10 ML: 5 INJECTION INTRAVENOUS at 12:43

## 2024-03-07 RX ADMIN — FUROSEMIDE 20 MG: 10 INJECTION, SOLUTION INTRAMUSCULAR; INTRAVENOUS at 12:40

## 2024-03-07 RX ADMIN — ASPIRIN 81 MG: 81 TABLET, COATED ORAL at 08:27

## 2024-03-07 ASSESSMENT — PAIN SCALES - GENERAL: PAINLEVEL_OUTOF10: 0

## 2024-03-07 NOTE — PROGRESS NOTES
[facility]                                Progress note    Subjective:    The patient is awake and alert.  Lying in bed.  Discussion on needing placement as he is now agreeable.  No acute events overnight.    Denies chest pain, angina, continues on 3 liters    Objective:    /73   Pulse 80   Temp 97.5 °F (36.4 °C) (Temporal)   Resp 18   Ht 1.702 m (5' 7\")   Wt 80.2 kg (176 lb 12.9 oz)   SpO2 98%   BMI 27.69 kg/m²     In: 480 [P.O.:480]  Out: 2225   In: 480   Out: 2225 [Urine:2225]    General appearance: NAD, conversant, pleasant  HEENT: AT/NC, MMM  Neck: FROM, supple  Lungs: Clear to auscultation  CV: RRR, no MRGs  Vasc: Radial pulses 2+  Abdomen: Soft, non-tender; no masses or HSM  Extremities: No peripheral edema or digital cyanosis, ble edema  Skin: no rash, lesions or ulcers  Psych: Alert and oriented to person, place and time  Neuro: Alert and interactive     Recent Labs     03/05/24  1626 03/06/24  0839 03/07/24  0554   WBC 9.3 10.1 9.5   HGB 8.9* 10.2* 9.8*   HCT 29.4* 33.4* 32.7*    221 239       Recent Labs     03/05/24  1626 03/06/24  0839 03/07/24  0554    143 139   K 3.8 4.1 4.5    104 101   CO2 27 29 26   BUN 26* 25* 28*   CREATININE 0.9 0.8 0.7   CALCIUM 7.6* 8.0* 7.8*       Assessment:    Principal Problem:    CHF (congestive heart failure), NYHA class I, acute on chronic, combined (HCC)  Active Problems:    H/O aortic valve replacement    UTI (urinary tract infection)    Generalized weakness    Acute on chronic respiratory failure with hypoxia (HCC)    Acute on chronic congestive heart failure (HCC)    Nonrheumatic tricuspid valve regurgitation    Pulmonary HTN (HCC)  Resolved Problems:    * No resolved hospital problems. *      Plan:  83 year old male with a history of A-fib, recent MI and aortic stenosis, recent history of septic shock presents to the ED with complaints of shortness of breath and is admitted with     Congestive heart

## 2024-03-07 NOTE — PROGRESS NOTES
OCCUPATIONAL THERAPY INITIAL EVALUATION    [facility]  [address] [place], [place]      Date:3/7/2024                                                  Patient Name: Shiv Lee  MRN: [id number]  : 1940  Room: [address] [place] [address]    Evaluating OT: Candace Lambert, MOT, OTR/L  # [id number]    Referring Provider:  Mica, April, APRN - CNP   Specific Provider Orders:  \"OT Eval and Treat\"  3-5-24    Diagnosis: Shortness of breath [R06.02]  CHF (congestive heart failure), NYHA class I, acute on chronic, combined (HCC) [I50.43]  Urinary tract infection without hematuria, site unspecified [N39.0]  Acute on chronic congestive heart failure, unspecified heart failure type (HCC) [I50.9]    Pt was admitted w/ SOB, Edema Den LE.  Recently hospitalized for MI, Respiratory Distress 24 - 3/1/24 - returned home.    Pertinent Medical History:  Pt has a past medical history of Abnormal EKG, Aortic stenosis, Atrial fibrillation (HCC), Coronary artery disease, HTN (hypertension), Infrarenal abdominal aortic aneurysm (AAA) without rupture (HCC), Left atrial dilatation, Mitral regurgitation, Nontraumatic retroperitoneal hematoma, and SBE (subacute bacterial endocarditis) prophylaxis candidate.,  has a past surgical history that includes Diagnostic Cardiac Cath Lab Procedure (09); Coronary artery bypass graft (09); Aortic valve surgery (09); eye surgery (); Cardiac procedure (N/A, 2024); Cardiac procedure (N/A, 2024); and Cardiac procedure (N/A, 2024).    Surgeries this admission: None     Precautions:  Fall Risk  3-5L O2 continuously - monitor O2 sats  Reese Catheter  Very [place] - speak into Right Ear  Anxious    Assessment of current deficits   [x] Functional mobility  [x]ADLs  [x] Strength               [x]Cognition   [x] Functional transfers   [x] IADLs         [x] Safety Awareness   [x]Endurance   [] Fine Coordination             during session and pt's response to tx ax    Therapeutic Exercises- Instruction on L UE ROM to improve strength and function of LUE for improved indep with ADLs    Pt/family ed re: benefits of participate in post-acute therapy program    Consulted RN, Family     Made all appropriate Environmental Modifications to facilitate pt's level of IND and safety.    Recommendations for Continued Participation in OT services during Hospitalization and at D/C     Pt and/or Family verbalized/demonstrated a Fair(+) understanding of education provided.  Will Review PRN.         Rehab Potential: Good(-) for established goals     Patient / Family Goal: Participate in Post-Acute Therapy Program      Patient and/or family were instructed on functional diagnosis, prognosis/goals and OT plan of care. Demonstrated Fair(+) understanding.     Eval Complexity: Low    Time In: 1119  Time Out: 1214  Total Treatment Time: 40 minutes    Min Units   OT Eval Low 97165  X  1   OT Eval Medium 16933      OT Eval High 38299      OT Re-Eval 23899       Therapeutic Ex 68668       Therapeutic Activities 39600       ADL/Self Care 92033  40  3   Orthotic Management 41317       Manual 52142     Neuro Re-Ed 91923       Non-Billable Time              Evaluation Time additionally includes thorough review of current medical information, gathering information on past medical history/social history and prior level of function, completion of standardized testing/informal observation of tasks, assessment of data and education on plan of care and goals.            Candace Lambert, MOT, OTR/L  # [id number]

## 2024-03-07 NOTE — CONSULTS
[facility]   Inpatient CHF Nurse Navigator Consult      Cardiologist: Dr. Esther Montalvodomjason is a 83 y.o. (1940) male with a history of HFpEF, most recent EF:  No results found for: \"LVEF\", \"LVEFMODE\"    Patient was awake and alert, laying in bed during the consultation and is agreeable to heart failure education. His wife Scott was at the bedside. He was engaged and asked appropriate questions throughout the education session. He was recently discharged from the hospital to home and was so weak he remained in his recliner and his wife had trouble getting him up. They are look into acute rehab at [facility]. They are interested in the CHF clinic. Post hospital follow up appointments schedule. They will self monitor and weigh daily. Numbers provided if they have any further questions.     Barriers identified during consult contributing to HF Hospitalization:  [] Limited medication adherence   [] Poor health literacy, education regarding HF medications provided   [] Pill box provided to patient  [] Difficulty affording medications  [] Difficulty obtaining/ managing medications  [] Prescription assistance information given     [] Not weighing themselves daily  [x] Weight log provided for easy monitoring  [] Scale provided     [] Not following low sodium diet  [] Food insecurity   [x] 2 gram sodium diet education provided   [] Low sodium recipes provided  [] Sodium free seasoning provided   [] Low sodium meal delivery options given to patient  [x] Dietician consulted     [] Lack of transportation to appointments     [] Depression, given chronic illness  [] Primary team notified     [] Goals of care need addressed  [] Palliative care consulted     [] CHF CHW consulted, to assist with         Chart Reviewed:  Diet: ADULT DIET; Regular; Low Fat/Low Chol/High Fiber/LUANA; Low Sodium (2 gm); 1500 ml   Daily Weights: Patient Vitals for the past 96 hrs (Last 3 readings):    Weight   03/05/24 1511 80.2 kg (176 lb 12.9 oz)     I/O:   Intake/Output Summary (Last 24 hours) at 3/7/2024 1037  Last data filed at 3/6/2024 2257  Gross per 24 hour   Intake 480 ml   Output 2225 ml   Net -1745 ml       [] Nursing staff/manager notified of inaccurate guillen weights or I/O        Discharge Plan:  Above identified barriers reviewed and needs addressed    Patient/family educated on daily monitoring tools for CHF, made aware of signs and symptoms of worsening HF and to notify provider immediately of change in symptoms.     Heart Failure Home Instructions placed in patient's discharge instructions    Per AHA guidelines patient to be closely monitored following discharge with 7 day follow up appointment    Scheduling with the CHF clinic New consult to CHF clinic, appointment scheduled    Future Appointments   Date Time Provider Department Center   [facility]   4/16/2024  1:00 PM Amadou Santana DO [facility]           Patient Education:  Self Monitoring/management:  Reviewed the introduction to Heart Failure, the HF zones, signs and symptoms to report on day 1 of onset, medications, medication compliance, the importance of obtaining daily weights, following a low sodium diet, reading food labels for the sodium content, keeping physician appointments, and smoking cessation.  Discussed writing / tracking daily weights on a calendar / log because a 5 pound gain in 1 week can sneak up if you are not tracking it.   Advised patient they can reduce the risk for Heart Failure exacerbations by modifying / controlling the risk factors.  Discussed self-managed care which includes the following: take medications as prescribed, report any intolerable side effects of medications to the medical provider (PCP or cardiologist), do not just stop taking the medication; follow a cardiac heart healthy / low sodium diet; weigh yourself daily, exercise regularly- per doctor

## 2024-03-07 NOTE — CARE COORDINATION
CM Update: Met with patient and Scott (wife) at bedside to discuss transition of care plan. Discharge plan is [facility] Precert Pending. PT 14/24. Need OT to see before precert can be started. Came to ED with SOB and leg swelling and weakness. IV Cefepime. CM/SW to follow. MT

## 2024-03-07 NOTE — DISCHARGE INSTRUCTIONS

## 2024-03-08 LAB
ANION GAP SERPL CALCULATED.3IONS-SCNC: 12 MMOL/L (ref 7–16)
BASOPHILS # BLD: 0 K/UL (ref 0–0.2)
BASOPHILS NFR BLD: 0 % (ref 0–2)
BUN SERPL-MCNC: 30 MG/DL (ref 6–23)
CALCIUM SERPL-MCNC: 8 MG/DL (ref 8.6–10.2)
CHLORIDE SERPL-SCNC: 99 MMOL/L (ref 98–107)
CO2 SERPL-SCNC: 26 MMOL/L (ref 22–29)
CREAT SERPL-MCNC: 0.8 MG/DL (ref 0.7–1.2)
EOSINOPHIL # BLD: 0 K/UL (ref 0.05–0.5)
EOSINOPHILS RELATIVE PERCENT: 0 % (ref 0–6)
ERYTHROCYTE [DISTWIDTH] IN BLOOD BY AUTOMATED COUNT: 18.1 % (ref 11.5–15)
GFR SERPL CREATININE-BSD FRML MDRD: >60 ML/MIN/1.73M2
GLUCOSE SERPL-MCNC: 122 MG/DL (ref 74–99)
HCT VFR BLD AUTO: 31.8 % (ref 37–54)
HGB BLD-MCNC: 9.9 G/DL (ref 12.5–16.5)
LYMPHOCYTES NFR BLD: 0.21 K/UL (ref 1.5–4)
LYMPHOCYTES RELATIVE PERCENT: 2 % (ref 20–42)
MAGNESIUM SERPL-MCNC: 1.9 MG/DL (ref 1.6–2.6)
MCH RBC QN AUTO: 30.9 PG (ref 26–35)
MCHC RBC AUTO-ENTMCNC: 31.1 G/DL (ref 32–34.5)
MCV RBC AUTO: 99.4 FL (ref 80–99.9)
MONOCYTES NFR BLD: 0.85 K/UL (ref 0.1–0.95)
MONOCYTES NFR BLD: 7 % (ref 2–12)
NEUTROPHILS NFR BLD: 91 % (ref 43–80)
NEUTS SEG NFR BLD: 11.14 K/UL (ref 1.8–7.3)
PLATELET # BLD AUTO: 259 K/UL (ref 130–450)
PMV BLD AUTO: 9.9 FL (ref 7–12)
POTASSIUM SERPL-SCNC: 4.3 MMOL/L (ref 3.5–5)
RBC # BLD AUTO: 3.2 M/UL (ref 3.8–5.8)
RBC # BLD: ABNORMAL 10*6/UL
RBC # BLD: ABNORMAL 10*6/UL
SODIUM SERPL-SCNC: 137 MMOL/L (ref 132–146)
WBC OTHER # BLD: 12.2 K/UL (ref 4.5–11.5)

## 2024-03-08 PROCEDURE — 2580000003 HC RX 258: Performed by: NURSE PRACTITIONER

## 2024-03-08 PROCEDURE — 6370000000 HC RX 637 (ALT 250 FOR IP): Performed by: NURSE PRACTITIONER

## 2024-03-08 PROCEDURE — 2060000000 HC ICU INTERMEDIATE R&B

## 2024-03-08 PROCEDURE — 83735 ASSAY OF MAGNESIUM: CPT

## 2024-03-08 PROCEDURE — 94640 AIRWAY INHALATION TREATMENT: CPT

## 2024-03-08 PROCEDURE — 2580000003 HC RX 258: Performed by: FAMILY MEDICINE

## 2024-03-08 PROCEDURE — 2700000000 HC OXYGEN THERAPY PER DAY

## 2024-03-08 PROCEDURE — 85025 COMPLETE CBC W/AUTO DIFF WBC: CPT

## 2024-03-08 PROCEDURE — 36415 COLL VENOUS BLD VENIPUNCTURE: CPT

## 2024-03-08 PROCEDURE — 97530 THERAPEUTIC ACTIVITIES: CPT

## 2024-03-08 PROCEDURE — 6360000002 HC RX W HCPCS: Performed by: NURSE PRACTITIONER

## 2024-03-08 PROCEDURE — 80048 BASIC METABOLIC PNL TOTAL CA: CPT

## 2024-03-08 PROCEDURE — 6360000002 HC RX W HCPCS: Performed by: FAMILY MEDICINE

## 2024-03-08 RX ORDER — METOPROLOL SUCCINATE 25 MG/1
25 TABLET, EXTENDED RELEASE ORAL DAILY
Qty: 90 TABLET | Refills: 3
Start: 2024-03-09

## 2024-03-08 RX ORDER — CEFDINIR 300 MG/1
300 CAPSULE ORAL 2 TIMES DAILY
Qty: 10 CAPSULE | Refills: 0 | DISCHARGE
Start: 2024-03-08 | End: 2024-03-13

## 2024-03-08 RX ORDER — PREDNISONE 10 MG/1
TABLET ORAL
Qty: 30 TABLET | Refills: 0 | DISCHARGE
Start: 2024-03-08

## 2024-03-08 RX ORDER — ISOSORBIDE MONONITRATE 30 MG/1
30 TABLET, EXTENDED RELEASE ORAL DAILY
Qty: 30 TABLET | Refills: 3
Start: 2024-03-09

## 2024-03-08 RX ADMIN — IPRATROPIUM BROMIDE AND ALBUTEROL SULFATE 1 DOSE: 2.5; .5 SOLUTION RESPIRATORY (INHALATION) at 07:56

## 2024-03-08 RX ADMIN — CLOPIDOGREL BISULFATE 75 MG: 75 TABLET ORAL at 09:38

## 2024-03-08 RX ADMIN — DIVALPROEX SODIUM 125 MG: 125 CAPSULE, COATED PELLETS ORAL at 14:17

## 2024-03-08 RX ADMIN — IPRATROPIUM BROMIDE AND ALBUTEROL SULFATE 1 DOSE: 2.5; .5 SOLUTION RESPIRATORY (INHALATION) at 12:07

## 2024-03-08 RX ADMIN — PANTOPRAZOLE SODIUM 40 MG: 40 TABLET, DELAYED RELEASE ORAL at 09:38

## 2024-03-08 RX ADMIN — GUAIFENESIN 400 MG: 400 TABLET ORAL at 05:11

## 2024-03-08 RX ADMIN — DIVALPROEX SODIUM 125 MG: 125 CAPSULE, COATED PELLETS ORAL at 05:11

## 2024-03-08 RX ADMIN — CEFEPIME 2000 MG: 2 INJECTION, POWDER, FOR SOLUTION INTRAVENOUS at 14:22

## 2024-03-08 RX ADMIN — GUAIFENESIN 400 MG: 400 TABLET ORAL at 14:17

## 2024-03-08 RX ADMIN — METHYLPREDNISOLONE SODIUM SUCCINATE 40 MG: 40 INJECTION, POWDER, LYOPHILIZED, FOR SOLUTION INTRAMUSCULAR; INTRAVENOUS at 05:11

## 2024-03-08 RX ADMIN — TAMSULOSIN HYDROCHLORIDE 0.4 MG: 0.4 CAPSULE ORAL at 09:38

## 2024-03-08 RX ADMIN — Medication 1000 UNITS: at 09:38

## 2024-03-08 RX ADMIN — METHYLPREDNISOLONE SODIUM SUCCINATE 40 MG: 40 INJECTION, POWDER, LYOPHILIZED, FOR SOLUTION INTRAMUSCULAR; INTRAVENOUS at 14:17

## 2024-03-08 RX ADMIN — ASPIRIN 81 MG: 81 TABLET, COATED ORAL at 09:38

## 2024-03-08 RX ADMIN — IPRATROPIUM BROMIDE AND ALBUTEROL SULFATE 1 DOSE: 2.5; .5 SOLUTION RESPIRATORY (INHALATION) at 19:07

## 2024-03-08 RX ADMIN — SODIUM CHLORIDE, PRESERVATIVE FREE 10 ML: 5 INJECTION INTRAVENOUS at 09:38

## 2024-03-08 ASSESSMENT — PAIN SCALES - GENERAL: PAINLEVEL_OUTOF10: 0

## 2024-03-08 ASSESSMENT — EJECTION FRACTION
EF_SOURCE: 2D ECHO
EF_VALUE: 60%

## 2024-03-08 NOTE — DISCHARGE INSTR - COC
Continuity of Care Form    Patient Name: Shiv Lee   :  1940  MRN:  [id number]    Admit date:  3/5/2024  Discharge date:  ***    Code Status Order: Full Code   Advance Directives:     Admitting Physician:  Karyn Brock MD  PCP: Gilson Ramon III, DO    Discharging Nurse: ***  Discharging Hospital Unit/Room#: 4508/4508-B  Discharging Unit Phone Number: ***    Emergency Contact:   Extended Emergency Contact Information  Primary Emergency Contact: Scott Lee  Address: [address] [place] [address]  Home Phone: [phone]  Mobile Phone: [phone]  Relation: Spouse   needed? No  Secondary Emergency Contact: Truong Lee  Home Phone: [phone]  Mobile Phone: [phone]  Relation: Child  Preferred language: English   needed? No    Past Surgical History:  Past Surgical History:   Procedure Laterality Date    AORTIC VALVE SURGERY  09    Dr. Villa    CARDIAC PROCEDURE N/A 2024    Left heart cath / coronary angiography performed by Dipika Curtis MD at [facility]    CARDIAC PROCEDURE N/A 2024    Percutaneous coronary intervention performed by Dipika Curtis MD at [facility] CATH [facility]    CARDIAC PROCEDURE N/A 2024    Percutaneous coronary intervention performed by Amadou Santana DO at [facility]ARDI[facility] CATH [facility]    CORONARY ARTERY BYPASS GRAFT  09    DIAGNOSTIC CARDIAC CATH LAB PROCEDURE  09    EYE SURGERY         Immunization History:   Immunization History   Administered Date(s) Administered    Influenza, FLUCELVAX, (age 6 mo+), MDCK, MDV, 0.5mL 2020    Pneumococcal, PPSV23, PNEUMOVAX 23, (age 2y+), SC/IM, 0.5mL 2020    TDaP, ADACEL (age 10y-64y), BOOSTRIX (age 10y+), IM, 0.5mL 2013       Active Problems:  Patient Active Problem List   Diagnosis Code    Coronary artery disease I25.10    Atrial fibrillation (HCC) I48.91    Abnormal EKG R94.31    Left atrial dilatation I51.7  weight bearing restrictions  Other Medical Equipment (for information only, NOT a DME order):  walker  Other Treatments:     Patient's personal belongings (please select all that are sent with patient):  None    RN SIGNATURE:  Electronically signed by Candace Haile RN on 3/8/24 at 3:41 PM EST    CASE MANAGEMENT/SOCIAL WORK SECTION    Inpatient Status Date: 3/5/2024    Readmission Risk Assessment Score:  Readmission Risk              Risk of Unplanned Readmission:  28           Discharging to Facility/ Agency   Name: [facility]  Address: [address] [place] [id number]  Phone: [phone]  Fax: [phone]    Dialysis Facility (if applicable)   Name:  Address:  Dialysis Schedule:  Phone:  Fax:    / signature: Electronically signed by Santi Bajwa RN on 3/8/24 at 10:17 AM EST    PHYSICIAN SECTION    Prognosis: {Prognosis:7624904832}    Condition at Discharge: { Patient Condition:681465916}    Rehab Potential (if transferring to Rehab): {Prognosis:5251323036}    Recommended Labs or Other Treatments After Discharge: ***    Physician Certification: I certify the above information and transfer of Shiv Lee  is necessary for the continuing treatment of the diagnosis listed and that he requires Skilled Nursing Facility for less 30 days.     Update Admission H&P: {CHP DME Changes in HandP:208448780}    PHYSICIAN SIGNATURE:  {Esignature:[id number]}

## 2024-03-08 NOTE — CARE COORDINATION
CM Update: Met with patient at bedside to discuss transition of care plan. Discharge plan is [facility] Precert Pending. Precert has not been started. We need a PT update to start precert. PT notified. Destination and JEFF updated. PASRR, Face Sheet, [facility] Form, and Envelope in soft chart. Came to ED with SOB and leg swelling and weakness. IV Cefepime Solumedrol. CM/SW to follow. MT     1430-Discharge Order Noted. Transport [facility] with [facility] ([facility] for 2000. Nursing Notified, Family Notified, Facility Notified. CM/SW to follow. MT

## 2024-03-08 NOTE — PROGRESS NOTES
Physical Therapy  Treatment Note     Name: Shiv Lee  : 1940  MRN: [id number]      Date of Service: 3/8/2024    Evaluating PT: Pee Morocho, PT, DPT [id number]      Room #:  4508/4508-B  Diagnosis:  Shortness of breath [R06.02]  CHF (congestive heart failure), NYHA class I, acute on chronic, combined (HCC) [I50.43]  Urinary tract infection without hematuria, site unspecified [N39.0]  Acute on chronic congestive heart failure, unspecified heart failure type (HCC) [I50.9]  PMHx/PSHx:   has a past medical history of Abnormal EKG, Aortic stenosis, Atrial fibrillation (HCC), Coronary artery disease, HTN (hypertension), Infrarenal abdominal aortic aneurysm (AAA) without rupture (HCC), Left atrial dilatation, Mitral regurgitation, Nontraumatic retroperitoneal hematoma, and SBE (subacute bacterial endocarditis) prophylaxis candidate.  Precautions:  Fall risk, [place] (R ear better than L), O2, [place], Alarm, Anxious    SUBJECTIVE:    Pt [place] with 3+8 stair(s) and 1 rail(s) to enter. Since recent discharge home from this hospital on 3/1, pt states he \"has not been doing much\". HHPT visited 2x but pt was barely able to stand due to SOB. Pt is on 3 L O2/min at home. Pt owns rollator.    OBJECTIVE:   Initial Evaluation  Date: 3/6/24 Treatment Date:  Date: 3/8/24 Short Term/ Long Term   Goals   AM-PAC 6 Clicks     Was pt agreeable to Eval/treatment? Yes Yes    Does pt have pain? No complaints of pain No complaints of pain    Bed Mobility  Rolling: NT  Supine to sit: SBA  Sit to supine: Ramakrishna  Scooting: SBA to EOB Rolling: NT  Supine to sit: SBA  Sit to supine: NT  Scooting: SBA to EOB Rolling: Independent   Supine to sit: Independent   Sit to supine: Independent   Scooting: Independent    Transfers Sit to stand: Ramakrishna  Stand to sit: Ramakrishna  Stand pivot: NT Sit to stand: ModA  Stand to sit: Ramakrishna  Stand pivot: Ramakrishna with WW Sit to stand: Supervision  Stand to sit: Supervision  Stand pivot:

## 2024-03-08 NOTE — DISCHARGE SUMMARY
[facility]   Discharge summary   Patient ID:  Shiv Lee  [id number]  83 y.o.  1940    Admit date: 3/5/2024    Discharge date and time: 3/8/2024    Admission Diagnoses:   Patient Active Problem List   Diagnosis    Coronary artery disease    Atrial fibrillation (HCC)    Abnormal EKG    Left atrial dilatation    Mitral regurgitation    SBE (subacute bacterial endocarditis) prophylaxis candidate    HTN (hypertension)    Septic shock (HCC)    NSTEMI (non-ST elevated myocardial infarction) ([facility])    Pneumonia due to organism    COVID-19    Acute hypoxic respiratory failure ([facility])    H/O aortic valve replacement    Palliative care encounter    Goals of care, counseling/discussion    STEMI (ST elevation myocardial infarction) ([facility])    Status post coronary artery bypass graft    Acute kidney injury superimposed on chronic kidney disease ([facility])    Chronic anemia    Nontraumatic retroperitoneal hematoma    Infrarenal abdominal aortic aneurysm (AAA) without rupture ([facility])    CHF (congestive heart failure), NYHA class I, acute on chronic, combined ([facility])    UTI (urinary tract infection)    Generalized weakness    Acute on chronic respiratory failure with hypoxia (HCC)    Acute on chronic congestive heart failure ([facility])    Nonrheumatic tricuspid valve regurgitation    Pulmonary HTN ([facility])       Discharge Diagnoses: ***    Consults: {consultation:21029}    Procedures: ***    Hospital Course: The patient is a 83 y.o. male of Gilson Ramon III, DO with significant past medical history of *** who presents with ***    Recent Labs     03/06/24  0839 03/07/24  0554 03/08/24  0538   WBC 10.1 9.5 12.2*   HGB 10.2* 9.8* 9.9*   HCT 33.4* 32.7* 31.8*    239 259       Recent Labs     03/06/24  0839 03/07/24  0554 03/08/24  0538    139 137   K 4.1 4.5 4.3    101 99   CO2 29 26 26   BUN 25* 28* 30*   CREATININE 0.8 0.7 0.8   CALCIUM 8.0* 7.8* 8.0*       XR CHEST PORTABLE    Result Date:  available    Ask your nurse or doctor about these medications  isosorbide mononitrate 30 MG extended release tablet  metoprolol succinate 25 MG extended release tablet  predniSONE 10 MG tablet       Activity: activity as tolerated  Diet: cardiac diet    Pt has been advised to:    Follow-up with Gilson Ramon III, DO in 1 week.  Follow-up with consultants as recommended by them    Note that over 30 minutes was spent in preparing discharge papers, discussing discharge with patient, medication review, etc.    Signed:  ANA Butcher CNP    Above note edited to reflect my thoughts     I personally saw, examined and provided care for the patient. Radiographs, labs and medication list were reviewed by me independently.  The case was discussed in detail and plans for care were established. Review of ALIZA Butcher   , documentation was conducted and revisions were made as appropriate directly by me. I agree with the above documented exam, problem list, and plan of care.     Karyn Brock MD

## 2024-03-08 NOTE — PROGRESS NOTES
[facility]                                Progress note    Subjective:    Up in the chair on assessment  Wife at bedside, case discussed    Objective:    BP 93/65   Pulse (!) 107   Temp 97.5 °F (36.4 °C) (Oral)   Resp 19   Ht 1.702 m (5' 7\")   Wt 80.2 kg (176 lb 12.9 oz)   SpO2 93%   BMI 27.69 kg/m²     In: 180 [P.O.:180]  Out: 1475   In: 180   Out: 1475 [Urine:1475]    General appearance: NAD, conversant, pleasant  HEENT: AT/NC, MMM  Neck: FROM, supple  Lungs: Diminished, 3L NC  CV: RRR, no MRGs  Vasc: Radial pulses 2+  Abdomen: Soft, non-tender; no masses or HSM  Extremities: No peripheral edema or digital cyanosis, ble edema  Skin: no rash, lesions or ulcers  Psych: Alert and oriented to person\  Neuro: Alert and interactive     Recent Labs     03/06/24  0839 03/07/24  0554 03/08/24  0538   WBC 10.1 9.5 12.2*   HGB 10.2* 9.8* 9.9*   HCT 33.4* 32.7* 31.8*    239 259         Recent Labs     03/06/24  0839 03/07/24  0554 03/08/24  0538    139 137   K 4.1 4.5 4.3    101 99   CO2 29 26 26   BUN 25* 28* 30*   CREATININE 0.8 0.7 0.8   CALCIUM 8.0* 7.8* 8.0*         Assessment:    Principal Problem:    CHF (congestive heart failure), NYHA class I, acute on chronic, combined (HCC)  Active Problems:    H/O aortic valve replacement    UTI (urinary tract infection)    Generalized weakness    Acute on chronic respiratory failure with hypoxia ([facility])    Acute on chronic congestive heart failure ([facility])    Nonrheumatic tricuspid valve regurgitation    Pulmonary HTN ([facility])  Resolved Problems:    * No resolved hospital problems. *      Plan:  83 year old male with a history of A-fib, recent MI and aortic stenosis, recent history of septic shock presents to the ED with complaints of shortness of breath and is admitted with     Congestive heart failure/Bilateral pleural effusions  -Diurese cautiously so as to avoid renal insufficiency  -BUN/Creat: 26/0.9-continue IV diuresis with Lasix 40 mg  IV twice daily  -BNP-1713  -Daily weights strict I's and O's  -Low-sodium diet  -Supplement O2 to maintain pulse ox duration greater than 93% wean as tolerated.  Patient is currently on 5 L O2 baseline 3 liters.  -Echo-recent 2/7/2024-EF 60%  Consult cardiology-patient known to them from previous admissions     Recent history of sepsis now with urinary tract infection  Unremarkable respiratory panel  Blood cultures negative today  Hemodynamically stable  Monitor labs-WBC 9.3  Cefepime 1 g every 24 hours  Await cultures  Monitor for temps    3/7/2024  Vital signs stable  Continues on 3 liters  BNP improved -1000  Cefepime 2 gm q 12 hours  Culture growing pseudomonas and sensitive to cefepime   Patient needs placement and is now agreeable  Await discharge planning  Will transition atb's to PO on discharge    3/8/24:  -Remains on IV cefepime for UTI  -Excepted to [facility], awaiting pre-CERT  -Soft Bps, continue to hold Lasix Imdur and metoprolol at this time  -Give authorization is obtained for rehab patient can be discharged        Code status: Full  Consultants: Cardiology     DVT Prophylaxis   PT/OT  Discharge planning       I have spent a total time of 30 minutes of this patient encounter reviewing chart, labs, coordinating care with interdisciplinary teams, face to face encounter with patient, providing counseling/education to patient/family.      Anastacia Alvarado, ANA - CNP  1:24 PM  3/8/2024

## 2024-03-08 NOTE — DISCHARGE INSTR - DIET
Good nutrition is important when healing from an illness, injury, or surgery.  Follow any nutrition recommendations given to you during your hospital stay.   If you were given an oral nutrition supplement while in the hospital, continue to take this supplement at home.  You can take it with meals, in-between meals, and/or before bedtime. These supplements can be purchased at most local grocery stores, pharmacies, and chain super-stores.   If you have any questions about your diet or nutrition, call the hospital and ask for the dietitian.      Heart Failure Nutrition Therapy  This nutrition therapy will help you feel better and support your heart.    This plan focuses on:  Limiting sodium in your diet. Salt (sodium) makes your body hold water. When your body holds too much water, you can feel shortness of breath and swelling. You can prevent these symptoms by eating less salt.  Limiting fluid in your diet. For some patients, drinking too much fluid can make heart failure worse. It can cause symptoms such as shortness of breath and swelling. Limiting fluids can help relieve some of your symptoms.  Managing your weight. Your registered dietitian nutritionist (RDN) can help you choose a healthy weight for your body type.  You can achieve these goals by:  Reading food labels to keep track of how much sodium is in the foods you eat.  Limiting foods that are high in sodium.  Checking your weight to make sure you're not retaining too much fluid.  Reading the Food Label: How Much Sodium Is Too Much?  The nutrition plan for heart failure usually limits the sodium you get from food and drinks to 2,000 milligrams per day. Salt is the main source of sodium. Read the nutrition label to find out how much sodium is in 1 serving of a food.  Select foods with 140 milligrams of sodium or less per serving.  Foods with more than 300 milligrams of sodium per serving may not fit into a reduced-sodium meal plan.  Check serving sizes. If

## 2024-03-08 NOTE — PROGRESS NOTES
Nutrition Note    Type and Reason for Visit: Consult, Patient Education    Nutrition Assessment:  Consult received for CHF Diet Ed.  Chart reviewed.  Provided written edu on the Cardiac/CHF Diet which includes main diet guidelines, sample meal plan and ways to reduce sodium intake.  Handout and contact info placed in d/c instructions.  Will follow-up per policy.    Santi Dillon RD, LD  Contact: ext [phone]

## 2024-03-09 ENCOUNTER — OUTSIDE SERVICES (OUTPATIENT)
Dept: PRIMARY CARE CLINIC | Age: 84
End: 2024-03-09

## 2024-03-09 VITALS
HEART RATE: 99 BPM | BODY MASS INDEX: 27.75 KG/M2 | RESPIRATION RATE: 18 BRPM | WEIGHT: 176.81 LBS | OXYGEN SATURATION: 98 % | SYSTOLIC BLOOD PRESSURE: 105 MMHG | DIASTOLIC BLOOD PRESSURE: 83 MMHG | HEIGHT: 67 IN | TEMPERATURE: 98.1 F

## 2024-03-09 DIAGNOSIS — I50.9 ACUTE ON CHRONIC CONGESTIVE HEART FAILURE, UNSPECIFIED HEART FAILURE TYPE (HCC): Primary | ICD-10-CM

## 2024-03-09 DIAGNOSIS — N17.9 AKI (ACUTE KIDNEY INJURY) (HCC): ICD-10-CM

## 2024-03-09 DIAGNOSIS — R53.1 GENERALIZED WEAKNESS: ICD-10-CM

## 2024-03-09 DIAGNOSIS — Z97.8 FOLEY CATHETER IN PLACE: ICD-10-CM

## 2024-03-09 DIAGNOSIS — I71.43 INFRARENAL ABDOMINAL AORTIC ANEURYSM (AAA) WITHOUT RUPTURE (HCC): ICD-10-CM

## 2024-03-09 DIAGNOSIS — R53.81 PHYSICAL DECONDITIONING: ICD-10-CM

## 2024-03-09 DIAGNOSIS — I21.19 ST ELEVATION MYOCARDIAL INFARCTION (STEMI) INVOLVING OTHER CORONARY ARTERY OF INFERIOR WALL (HCC): ICD-10-CM

## 2024-03-09 DIAGNOSIS — R57.0 CARDIOGENIC SHOCK (HCC): ICD-10-CM

## 2024-03-09 DIAGNOSIS — I26.99 ACUTE PULMONARY EMBOLISM, UNSPECIFIED PULMONARY EMBOLISM TYPE, UNSPECIFIED WHETHER ACUTE COR PULMONALE PRESENT (HCC): ICD-10-CM

## 2024-03-09 DIAGNOSIS — Z91.81 AT MAXIMUM RISK FOR FALL: ICD-10-CM

## 2024-03-09 DIAGNOSIS — Z95.5 HISTORY OF HEART ARTERY STENT: ICD-10-CM

## 2024-03-09 DIAGNOSIS — J96.21 ACUTE ON CHRONIC RESPIRATORY FAILURE WITH HYPOXIA (HCC): ICD-10-CM

## 2024-03-09 DIAGNOSIS — N30.01 ACUTE CYSTITIS WITH HEMATURIA: ICD-10-CM

## 2024-03-09 DIAGNOSIS — D64.9 ACUTE ON CHRONIC ANEMIA: ICD-10-CM

## 2024-03-10 LAB
MICROORGANISM SPEC CULT: NORMAL
MICROORGANISM SPEC CULT: NORMAL
SERVICE CMNT-IMP: NORMAL
SERVICE CMNT-IMP: NORMAL
SPECIMEN DESCRIPTION: NORMAL
SPECIMEN DESCRIPTION: NORMAL

## 2024-03-11 LAB
25(OH)D3 SERPL-MCNC: 34.7 NG/ML (ref 30–100)
ALBUMIN SERPL-MCNC: 3.7 G/DL (ref 3.5–5.2)
ALP SERPL-CCNC: 98 U/L (ref 40–129)
ALT SERPL-CCNC: 22 U/L (ref 0–40)
ANION GAP SERPL CALCULATED.3IONS-SCNC: 12 MMOL/L (ref 7–16)
AST SERPL-CCNC: 19 U/L (ref 0–39)
BILIRUB SERPL-MCNC: 0.8 MG/DL (ref 0–1.2)
BUN SERPL-MCNC: 25 MG/DL (ref 6–23)
CALCIUM SERPL-MCNC: 8.4 MG/DL (ref 8.6–10.2)
CHLORIDE SERPL-SCNC: 101 MMOL/L (ref 98–107)
CHOLEST SERPL-MCNC: 182 MG/DL
CO2 SERPL-SCNC: 26 MMOL/L (ref 22–29)
CREAT SERPL-MCNC: 0.8 MG/DL (ref 0.7–1.2)
DATE LAST DOSE: ABNORMAL
ERYTHROCYTE [DISTWIDTH] IN BLOOD BY AUTOMATED COUNT: 18.5 % (ref 11.5–15)
GFR SERPL CREATININE-BSD FRML MDRD: >60 ML/MIN/1.73M2
GLUCOSE SERPL-MCNC: 87 MG/DL (ref 74–99)
HCT VFR BLD AUTO: 33.2 % (ref 37–54)
HDLC SERPL-MCNC: 74 MG/DL
HGB BLD-MCNC: 10 G/DL (ref 12.5–16.5)
LDLC SERPL CALC-MCNC: 96 MG/DL
MCH RBC QN AUTO: 30.5 PG (ref 26–35)
MCHC RBC AUTO-ENTMCNC: 30.1 G/DL (ref 32–34.5)
MCV RBC AUTO: 101.2 FL (ref 80–99.9)
PLATELET # BLD AUTO: 267 K/UL (ref 130–450)
PMV BLD AUTO: 10.2 FL (ref 7–12)
POTASSIUM SERPL-SCNC: 4.1 MMOL/L (ref 3.5–5)
PROT SERPL-MCNC: 6.4 G/DL (ref 6.4–8.3)
RBC # BLD AUTO: 3.28 M/UL (ref 3.8–5.8)
SODIUM SERPL-SCNC: 139 MMOL/L (ref 132–146)
TME LAST DOSE: ABNORMAL H
TRIGL SERPL-MCNC: 61 MG/DL
VALPROATE SERPL-MCNC: 17 UG/ML (ref 50–100)
VANCOMYCIN DOSE: ABNORMAL MG
VLDLC SERPL CALC-MCNC: 12 MG/DL
WBC OTHER # BLD: 13.1 K/UL (ref 4.5–11.5)

## 2024-03-12 ENCOUNTER — OUTSIDE SERVICES (OUTPATIENT)
Dept: PRIMARY CARE CLINIC | Age: 84
End: 2024-03-12

## 2024-03-12 DIAGNOSIS — I26.99 ACUTE PULMONARY EMBOLISM, UNSPECIFIED PULMONARY EMBOLISM TYPE, UNSPECIFIED WHETHER ACUTE COR PULMONALE PRESENT (HCC): ICD-10-CM

## 2024-03-12 DIAGNOSIS — Z97.8 FOLEY CATHETER IN PLACE: ICD-10-CM

## 2024-03-12 DIAGNOSIS — D64.9 ACUTE ON CHRONIC ANEMIA: ICD-10-CM

## 2024-03-12 DIAGNOSIS — I50.9 ACUTE ON CHRONIC CONGESTIVE HEART FAILURE, UNSPECIFIED HEART FAILURE TYPE (HCC): Primary | ICD-10-CM

## 2024-03-12 DIAGNOSIS — I48.0 PAROXYSMAL ATRIAL FIBRILLATION (HCC): ICD-10-CM

## 2024-03-12 DIAGNOSIS — I10 PRIMARY HYPERTENSION: ICD-10-CM

## 2024-03-12 DIAGNOSIS — I21.19 ST ELEVATION MYOCARDIAL INFARCTION (STEMI) INVOLVING OTHER CORONARY ARTERY OF INFERIOR WALL (HCC): ICD-10-CM

## 2024-03-12 DIAGNOSIS — I71.43 INFRARENAL ABDOMINAL AORTIC ANEURYSM (AAA) WITHOUT RUPTURE (HCC): ICD-10-CM

## 2024-03-12 DIAGNOSIS — J96.21 ACUTE ON CHRONIC RESPIRATORY FAILURE WITH HYPOXIA (HCC): ICD-10-CM

## 2024-03-12 DIAGNOSIS — N30.01 ACUTE CYSTITIS WITH HEMATURIA: ICD-10-CM

## 2024-03-12 DIAGNOSIS — R57.0 CARDIOGENIC SHOCK (HCC): ICD-10-CM

## 2024-03-12 DIAGNOSIS — N17.9 AKI (ACUTE KIDNEY INJURY) (HCC): ICD-10-CM

## 2024-03-13 LAB
ALBUMIN SERPL-MCNC: 3.5 G/DL (ref 3.5–5.2)
ALP SERPL-CCNC: 90 U/L (ref 40–129)
ALT SERPL-CCNC: 22 U/L (ref 0–40)
ANION GAP SERPL CALCULATED.3IONS-SCNC: 12 MMOL/L (ref 7–16)
AST SERPL-CCNC: 17 U/L (ref 0–39)
BILIRUB SERPL-MCNC: 0.7 MG/DL (ref 0–1.2)
BUN SERPL-MCNC: 23 MG/DL (ref 6–23)
CALCIUM SERPL-MCNC: 8.2 MG/DL (ref 8.6–10.2)
CHLORIDE SERPL-SCNC: 106 MMOL/L (ref 98–107)
CO2 SERPL-SCNC: 23 MMOL/L (ref 22–29)
CREAT SERPL-MCNC: 0.7 MG/DL (ref 0.7–1.2)
ERYTHROCYTE [DISTWIDTH] IN BLOOD BY AUTOMATED COUNT: 18.6 % (ref 11.5–15)
GFR SERPL CREATININE-BSD FRML MDRD: >60 ML/MIN/1.73M2
GLUCOSE SERPL-MCNC: 89 MG/DL (ref 74–99)
HCT VFR BLD AUTO: 30.1 % (ref 37–54)
HGB BLD-MCNC: 9.1 G/DL (ref 12.5–16.5)
MCH RBC QN AUTO: 30.5 PG (ref 26–35)
MCHC RBC AUTO-ENTMCNC: 30.2 G/DL (ref 32–34.5)
MCV RBC AUTO: 101 FL (ref 80–99.9)
PLATELET # BLD AUTO: 242 K/UL (ref 130–450)
PMV BLD AUTO: 10.2 FL (ref 7–12)
POTASSIUM SERPL-SCNC: 3.7 MMOL/L (ref 3.5–5)
PROT SERPL-MCNC: 5.8 G/DL (ref 6.4–8.3)
RBC # BLD AUTO: 2.98 M/UL (ref 3.8–5.8)
SODIUM SERPL-SCNC: 141 MMOL/L (ref 132–146)
WBC OTHER # BLD: 13.9 K/UL (ref 4.5–11.5)

## 2024-03-16 ASSESSMENT — ENCOUNTER SYMPTOMS
PHOTOPHOBIA: 0
RHINORRHEA: 0
DIARRHEA: 0
ABDOMINAL PAIN: 0
EYE REDNESS: 0
NAUSEA: 0
EYE ITCHING: 0
ABDOMINAL DISTENTION: 0
CONSTIPATION: 0
SINUS PRESSURE: 0
EYE PAIN: 0
COUGH: 0
SORE THROAT: 0
VOMITING: 0
EYE DISCHARGE: 0
CHEST TIGHTNESS: 0
WHEEZING: 0
SHORTNESS OF BREATH: 1

## 2024-03-18 ENCOUNTER — APPOINTMENT (OUTPATIENT)
Dept: GENERAL RADIOLOGY | Age: 84
End: 2024-03-18
Payer: MEDICARE

## 2024-03-18 ENCOUNTER — HOSPITAL ENCOUNTER (INPATIENT)
Age: 84
LOS: 3 days | Discharge: OTHER INSTITUTION | End: 2024-03-21
Attending: EMERGENCY MEDICINE | Admitting: FAMILY MEDICINE
Payer: MEDICARE

## 2024-03-18 DIAGNOSIS — I50.9 ACUTE ON CHRONIC CONGESTIVE HEART FAILURE, UNSPECIFIED HEART FAILURE TYPE (HCC): Primary | ICD-10-CM

## 2024-03-18 LAB
ALBUMIN SERPL-MCNC: 3.5 G/DL (ref 3.5–5.2)
ALP SERPL-CCNC: 92 U/L (ref 40–129)
ALT SERPL-CCNC: 28 U/L (ref 0–40)
ANION GAP SERPL CALCULATED.3IONS-SCNC: 15 MMOL/L (ref 7–16)
ANION GAP SERPL CALCULATED.3IONS-SCNC: 8 MMOL/L (ref 7–16)
AST SERPL-CCNC: 18 U/L (ref 0–39)
BASOPHILS # BLD: 0.03 K/UL (ref 0–0.2)
BASOPHILS NFR BLD: 0 % (ref 0–2)
BILIRUB SERPL-MCNC: 0.6 MG/DL (ref 0–1.2)
BNP SERPL-MCNC: 1616 PG/ML (ref 0–450)
BUN SERPL-MCNC: 20 MG/DL (ref 6–23)
BUN SERPL-MCNC: 22 MG/DL (ref 6–23)
CALCIUM SERPL-MCNC: 8.2 MG/DL (ref 8.6–10.2)
CALCIUM SERPL-MCNC: 8.3 MG/DL (ref 8.6–10.2)
CHLORIDE SERPL-SCNC: 102 MMOL/L (ref 98–107)
CHLORIDE SERPL-SCNC: 106 MMOL/L (ref 98–107)
CO2 SERPL-SCNC: 22 MMOL/L (ref 22–29)
CO2 SERPL-SCNC: 26 MMOL/L (ref 22–29)
CREAT SERPL-MCNC: 0.7 MG/DL (ref 0.7–1.2)
CREAT SERPL-MCNC: 0.8 MG/DL (ref 0.7–1.2)
EOSINOPHIL # BLD: 0.33 K/UL (ref 0.05–0.5)
EOSINOPHILS RELATIVE PERCENT: 3 % (ref 0–6)
ERYTHROCYTE [DISTWIDTH] IN BLOOD BY AUTOMATED COUNT: 18.9 % (ref 11.5–15)
ERYTHROCYTE [DISTWIDTH] IN BLOOD BY AUTOMATED COUNT: 19 % (ref 11.5–15)
GFR SERPL CREATININE-BSD FRML MDRD: >60 ML/MIN/1.73M2
GFR SERPL CREATININE-BSD FRML MDRD: >60 ML/MIN/1.73M2
GLUCOSE SERPL-MCNC: 101 MG/DL (ref 74–99)
GLUCOSE SERPL-MCNC: 86 MG/DL (ref 74–99)
HCT VFR BLD AUTO: 31 % (ref 37–54)
HCT VFR BLD AUTO: 32.3 % (ref 37–54)
HGB BLD-MCNC: 9.2 G/DL (ref 12.5–16.5)
HGB BLD-MCNC: 9.9 G/DL (ref 12.5–16.5)
IMM GRANULOCYTES # BLD AUTO: 0.25 K/UL (ref 0–0.58)
IMM GRANULOCYTES NFR BLD: 2 % (ref 0–5)
LYMPHOCYTES NFR BLD: 1.59 K/UL (ref 1.5–4)
LYMPHOCYTES RELATIVE PERCENT: 13 % (ref 20–42)
MCH RBC QN AUTO: 31 PG (ref 26–35)
MCH RBC QN AUTO: 31.3 PG (ref 26–35)
MCHC RBC AUTO-ENTMCNC: 29.7 G/DL (ref 32–34.5)
MCHC RBC AUTO-ENTMCNC: 30.7 G/DL (ref 32–34.5)
MCV RBC AUTO: 102.2 FL (ref 80–99.9)
MCV RBC AUTO: 104.4 FL (ref 80–99.9)
MONOCYTES NFR BLD: 0.69 K/UL (ref 0.1–0.95)
MONOCYTES NFR BLD: 5 % (ref 2–12)
NEUTROPHILS NFR BLD: 77 % (ref 43–80)
NEUTS SEG NFR BLD: 9.78 K/UL (ref 1.8–7.3)
PLATELET # BLD AUTO: 164 K/UL (ref 130–450)
PLATELET # BLD AUTO: 188 K/UL (ref 130–450)
PMV BLD AUTO: 10 FL (ref 7–12)
PMV BLD AUTO: 9.9 FL (ref 7–12)
POTASSIUM SERPL-SCNC: 3.7 MMOL/L (ref 3.5–5)
POTASSIUM SERPL-SCNC: 3.9 MMOL/L (ref 3.5–5)
PROT SERPL-MCNC: 6.1 G/DL (ref 6.4–8.3)
RBC # BLD AUTO: 2.97 M/UL (ref 3.8–5.8)
RBC # BLD AUTO: 3.16 M/UL (ref 3.8–5.8)
SODIUM SERPL-SCNC: 136 MMOL/L (ref 132–146)
SODIUM SERPL-SCNC: 143 MMOL/L (ref 132–146)
TROPONIN I SERPL HS-MCNC: 71 NG/L (ref 0–11)
TROPONIN I SERPL HS-MCNC: 71 NG/L (ref 0–11)
WBC OTHER # BLD: 12.7 K/UL (ref 4.5–11.5)
WBC OTHER # BLD: 12.7 K/UL (ref 4.5–11.5)

## 2024-03-18 PROCEDURE — 99285 EMERGENCY DEPT VISIT HI MDM: CPT

## 2024-03-18 PROCEDURE — 2060000000 HC ICU INTERMEDIATE R&B

## 2024-03-18 PROCEDURE — 6360000002 HC RX W HCPCS: Performed by: NURSE PRACTITIONER

## 2024-03-18 PROCEDURE — 84484 ASSAY OF TROPONIN QUANT: CPT

## 2024-03-18 PROCEDURE — 85025 COMPLETE CBC W/AUTO DIFF WBC: CPT

## 2024-03-18 PROCEDURE — 83880 ASSAY OF NATRIURETIC PEPTIDE: CPT

## 2024-03-18 PROCEDURE — 80048 BASIC METABOLIC PNL TOTAL CA: CPT

## 2024-03-18 PROCEDURE — 6360000002 HC RX W HCPCS

## 2024-03-18 PROCEDURE — 2580000003 HC RX 258: Performed by: NURSE PRACTITIONER

## 2024-03-18 PROCEDURE — 71045 X-RAY EXAM CHEST 1 VIEW: CPT

## 2024-03-18 PROCEDURE — 6370000000 HC RX 637 (ALT 250 FOR IP): Performed by: NURSE PRACTITIONER

## 2024-03-18 PROCEDURE — 93005 ELECTROCARDIOGRAM TRACING: CPT

## 2024-03-18 RX ORDER — ONDANSETRON 2 MG/ML
4 INJECTION INTRAMUSCULAR; INTRAVENOUS EVERY 6 HOURS PRN
Status: DISCONTINUED | OUTPATIENT
Start: 2024-03-18 | End: 2024-03-21 | Stop reason: HOSPADM

## 2024-03-18 RX ORDER — ACETAMINOPHEN 650 MG/1
650 SUPPOSITORY RECTAL EVERY 6 HOURS PRN
Status: DISCONTINUED | OUTPATIENT
Start: 2024-03-18 | End: 2024-03-21 | Stop reason: HOSPADM

## 2024-03-18 RX ORDER — METOPROLOL SUCCINATE 25 MG/1
25 TABLET, EXTENDED RELEASE ORAL EVERY MORNING
COMMUNITY

## 2024-03-18 RX ORDER — DIVALPROEX SODIUM 125 MG/1
125 CAPSULE, COATED PELLETS ORAL EVERY 8 HOURS SCHEDULED
Status: DISCONTINUED | OUTPATIENT
Start: 2024-03-18 | End: 2024-03-21 | Stop reason: HOSPADM

## 2024-03-18 RX ORDER — IPRATROPIUM BROMIDE AND ALBUTEROL SULFATE 2.5; .5 MG/3ML; MG/3ML
1 SOLUTION RESPIRATORY (INHALATION) EVERY 4 HOURS
COMMUNITY

## 2024-03-18 RX ORDER — BUMETANIDE 0.25 MG/ML
0.5 INJECTION INTRAMUSCULAR; INTRAVENOUS 2 TIMES DAILY
Status: DISCONTINUED | OUTPATIENT
Start: 2024-03-18 | End: 2024-03-21 | Stop reason: HOSPADM

## 2024-03-18 RX ORDER — TAMSULOSIN HYDROCHLORIDE 0.4 MG/1
0.4 CAPSULE ORAL DAILY
Status: DISCONTINUED | OUTPATIENT
Start: 2024-03-18 | End: 2024-03-21 | Stop reason: HOSPADM

## 2024-03-18 RX ORDER — POTASSIUM CHLORIDE 7.45 MG/ML
10 INJECTION INTRAVENOUS PRN
Status: DISCONTINUED | OUTPATIENT
Start: 2024-03-18 | End: 2024-03-21 | Stop reason: HOSPADM

## 2024-03-18 RX ORDER — METOPROLOL SUCCINATE 25 MG/1
25 TABLET, EXTENDED RELEASE ORAL DAILY
Status: DISCONTINUED | OUTPATIENT
Start: 2024-03-18 | End: 2024-03-21 | Stop reason: HOSPADM

## 2024-03-18 RX ORDER — GUAIFENESIN 400 MG/1
400 TABLET ORAL EVERY 8 HOURS
Status: DISCONTINUED | OUTPATIENT
Start: 2024-03-18 | End: 2024-03-21 | Stop reason: HOSPADM

## 2024-03-18 RX ORDER — CLOPIDOGREL BISULFATE 75 MG/1
75 TABLET ORAL DAILY
Status: DISCONTINUED | OUTPATIENT
Start: 2024-03-19 | End: 2024-03-21 | Stop reason: HOSPADM

## 2024-03-18 RX ORDER — MAGNESIUM SULFATE IN WATER 40 MG/ML
2000 INJECTION, SOLUTION INTRAVENOUS PRN
Status: DISCONTINUED | OUTPATIENT
Start: 2024-03-18 | End: 2024-03-21 | Stop reason: HOSPADM

## 2024-03-18 RX ORDER — ENOXAPARIN SODIUM 100 MG/ML
40 INJECTION SUBCUTANEOUS DAILY
Status: DISCONTINUED | OUTPATIENT
Start: 2024-03-18 | End: 2024-03-21 | Stop reason: HOSPADM

## 2024-03-18 RX ORDER — PANTOPRAZOLE SODIUM 40 MG/1
40 TABLET, DELAYED RELEASE ORAL DAILY
Status: DISCONTINUED | OUTPATIENT
Start: 2024-03-19 | End: 2024-03-21 | Stop reason: HOSPADM

## 2024-03-18 RX ORDER — SODIUM CHLORIDE 9 MG/ML
INJECTION, SOLUTION INTRAVENOUS PRN
Status: DISCONTINUED | OUTPATIENT
Start: 2024-03-18 | End: 2024-03-21 | Stop reason: HOSPADM

## 2024-03-18 RX ORDER — ONDANSETRON 4 MG/1
4 TABLET, ORALLY DISINTEGRATING ORAL EVERY 8 HOURS PRN
Status: DISCONTINUED | OUTPATIENT
Start: 2024-03-18 | End: 2024-03-21 | Stop reason: HOSPADM

## 2024-03-18 RX ORDER — IPRATROPIUM BROMIDE AND ALBUTEROL SULFATE 2.5; .5 MG/3ML; MG/3ML
1 SOLUTION RESPIRATORY (INHALATION) EVERY 4 HOURS PRN
COMMUNITY

## 2024-03-18 RX ORDER — SODIUM CHLORIDE 0.9 % (FLUSH) 0.9 %
10 SYRINGE (ML) INJECTION PRN
Status: DISCONTINUED | OUTPATIENT
Start: 2024-03-18 | End: 2024-03-21 | Stop reason: HOSPADM

## 2024-03-18 RX ORDER — PANTOPRAZOLE SODIUM 40 MG/1
40 TABLET, DELAYED RELEASE ORAL DAILY
Status: DISCONTINUED | OUTPATIENT
Start: 2024-03-18 | End: 2024-03-18

## 2024-03-18 RX ORDER — ACETAMINOPHEN 325 MG/1
650 TABLET ORAL EVERY 6 HOURS PRN
Status: DISCONTINUED | OUTPATIENT
Start: 2024-03-18 | End: 2024-03-21 | Stop reason: HOSPADM

## 2024-03-18 RX ORDER — POTASSIUM CHLORIDE 20 MEQ/1
40 TABLET, EXTENDED RELEASE ORAL PRN
Status: DISCONTINUED | OUTPATIENT
Start: 2024-03-18 | End: 2024-03-21 | Stop reason: HOSPADM

## 2024-03-18 RX ORDER — SODIUM CHLORIDE 0.9 % (FLUSH) 0.9 %
5-40 SYRINGE (ML) INJECTION EVERY 12 HOURS SCHEDULED
Status: DISCONTINUED | OUTPATIENT
Start: 2024-03-18 | End: 2024-03-21 | Stop reason: HOSPADM

## 2024-03-18 RX ORDER — ASPIRIN 81 MG/1
81 TABLET ORAL DAILY
Status: DISCONTINUED | OUTPATIENT
Start: 2024-03-19 | End: 2024-03-21 | Stop reason: HOSPADM

## 2024-03-18 RX ORDER — AMOXICILLIN AND CLAVULANATE POTASSIUM 875; 125 MG/1; MG/1
1 TABLET, FILM COATED ORAL 2 TIMES DAILY
Status: ON HOLD | COMMUNITY
Start: 2024-03-14 | End: 2024-03-21 | Stop reason: HOSPADM

## 2024-03-18 RX ORDER — ACETAMINOPHEN 325 MG/1
650 TABLET ORAL EVERY 4 HOURS PRN
COMMUNITY

## 2024-03-18 RX ORDER — PREDNISONE 10 MG/1
10 TABLET ORAL DAILY
COMMUNITY
Start: 2024-03-18 | End: 2024-03-21

## 2024-03-18 RX ORDER — ROSUVASTATIN CALCIUM 5 MG/1
5 TABLET, COATED ORAL NIGHTLY
Status: DISCONTINUED | OUTPATIENT
Start: 2024-03-18 | End: 2024-03-21 | Stop reason: HOSPADM

## 2024-03-18 RX ORDER — ISOSORBIDE MONONITRATE 30 MG/1
30 TABLET, EXTENDED RELEASE ORAL DAILY
Status: DISCONTINUED | OUTPATIENT
Start: 2024-03-18 | End: 2024-03-21 | Stop reason: HOSPADM

## 2024-03-18 RX ORDER — FUROSEMIDE 10 MG/ML
40 INJECTION INTRAMUSCULAR; INTRAVENOUS ONCE
Status: COMPLETED | OUTPATIENT
Start: 2024-03-18 | End: 2024-03-18

## 2024-03-18 RX ORDER — CLOTRIMAZOLE AND BETAMETHASONE DIPROPIONATE 10; .64 MG/G; MG/G
CREAM TOPICAL 2 TIMES DAILY
COMMUNITY
Start: 2024-03-18 | End: 2024-04-01

## 2024-03-18 RX ORDER — GUAIFENESIN 400 MG/1
400 TABLET ORAL 3 TIMES DAILY
COMMUNITY

## 2024-03-18 RX ADMIN — SODIUM CHLORIDE, PRESERVATIVE FREE 10 ML: 5 INJECTION INTRAVENOUS at 21:12

## 2024-03-18 RX ADMIN — ENOXAPARIN SODIUM 40 MG: 100 INJECTION SUBCUTANEOUS at 21:02

## 2024-03-18 RX ADMIN — GUAIFENESIN 400 MG: 400 TABLET ORAL at 21:02

## 2024-03-18 RX ADMIN — FUROSEMIDE 40 MG: 10 INJECTION, SOLUTION INTRAMUSCULAR; INTRAVENOUS at 17:25

## 2024-03-18 RX ADMIN — ROSUVASTATIN CALCIUM 5 MG: 5 TABLET, FILM COATED ORAL at 21:02

## 2024-03-18 RX ADMIN — DIVALPROEX SODIUM 125 MG: 125 CAPSULE, COATED PELLETS ORAL at 21:12

## 2024-03-18 RX ADMIN — TAMSULOSIN HYDROCHLORIDE 0.4 MG: 0.4 CAPSULE ORAL at 21:02

## 2024-03-18 ASSESSMENT — PAIN SCALES - GENERAL: PAINLEVEL_OUTOF10: 0

## 2024-03-18 ASSESSMENT — LIFESTYLE VARIABLES
HOW OFTEN DO YOU HAVE A DRINK CONTAINING ALCOHOL: NEVER
HOW MANY STANDARD DRINKS CONTAINING ALCOHOL DO YOU HAVE ON A TYPICAL DAY: PATIENT DOES NOT DRINK

## 2024-03-18 NOTE — PROGRESS NOTES
Fall socks XXL do not fit patient with swelling.  TEDs applied. Fall band and fall sign applied.  Bed alarm on. Call light and table within reach.  Telemetry initiated and called into CMR  Patient has carvalho and absorbant briefs.  Patient states he is continent of stool. Absorbant briefs removed.    Patient is hard of hearing and oriented.  Distracted during interaction but redirectable.    Wife at bedside stated that patient just got started with physical therapy and walked 26ft with walker and assistance.   Wound noted behind right ear under oxygen tubing.  RT called for ear cushions. Applied.  Wound on coccyx.  Consult to wound care ordered and sent.  Low airloss bed ordered. S.O.S. initiated.

## 2024-03-18 NOTE — PROGRESS NOTES
4 Eyes Skin Assessment     NAME:  Shiv Lee  YOB: 1940  MEDICAL RECORD NUMBER:  [id number]    The patient is being assessed for  Admission    I agree that at least one RN has performed a thorough Head to Toe Skin Assessment on the patient. ALL assessment sites listed below have been assessed.      Areas assessed by both nurses:    Head, Face, Ears, Shoulders, Back, Chest, Arms, Elbows, Hands, Sacrum. Buttock, Coccyx, Ischium, Legs. Feet and Heels, Under Medical Devices , and Other          Does the Patient have a Wound? Yes wound(s) were present on assessment. LDA wound assessment was Initiated and completed by RN   0.5x0.3x0.1 ulcer on coccyx ssurrounded by blanchable and non-blanchable redness over coccyx, sacruum, and buttocks.  Patient arrived with mepilex that was saturated. It was removed.  0.5x0.5x0.2cm ulcer under oxygen tubing behind right ear.  Pink and slough. No odor.  Small serous drainage (wet and dried),.     blanchable redness on left heel (mild)  bruising on outer left foot  blanchable redness on right inner heel  dry skin-toned lesion (callous-like) on right plantar foot  reddened vascular discoloration on both lower legs  blanchable redness behind left ear under oxygen tubing       Bulmaro Prevention initiated by RN: Yes  Wound Care Orders initiated by RN: Yes    Pressure Injury (Stage 3,4, Unstageable, DTI, NWPT, and Complex wounds) if present, place Wound referral order by RN under : Yes    New Ostomies, if present place, Ostomy referral order under : No     Nurse 1 eSignature: Electronically signed by Shoshana Villatoro RN on 3/18/24 at 6:55 PM EDT    **SHARE this note so that the co-signing nurse can place an eSignature**    Nurse 2 eSignature: Electronically signed by Heike Garcia RN on 3/18/24 at 7:48 PM EDT

## 2024-03-18 NOTE — PROGRESS NOTES
Patient was discharged from previous hospital stay with carvalho catheter under the care of Dr. Hunter.  Patient had his follow-up appointment today with Dr. Hunter and it was decided to keep carvalho in for an additional 10 days.  This carvalho is not to be removed.

## 2024-03-18 NOTE — ED PROVIDER NOTES
Department of Emergency Medicine     Written by: Monica King MD  Patient Name: Shiv Lee  Admit Date: 3/18/2024  2:28 PM  MRN: [id number]                   : 1940    HPI  Chief Complaint   Patient presents with    Weight Gain     12 lb weight gain this week; has been in and out of the hospital the past 2 mos with pneumonia, CHF and UTI. At [facility] for rehab     Shortness of Breath     Wears 2L at baseline; currently 96% on 6L NC        Shiv Lee is a 83 y.o. male that presents to the ED with complaint of shortness of breath and weakness.  Patient currently residing at [facility].  The patient was recent admitted to the hospital for pneumonia and UTI, and again for CHF.  The patient was discharged a couple days ago to [facility] for rehab.  The patient was given diuresis while admitted to the hospital, however was discharged without diuresis.  He is normally on 5 L nasal cannula oxygen according to wife at bedside.  Nursing triage note says 2 L, which the wife says is not correct.  She says that when he was admitted to the hospital he was being diuresed however since being discharged, he has not been given any diuretics, and she is concerned that his legs are swelling up again and he is becoming more short of breath on any type of exertion.  No fevers chills.  No chest pain.  No abdominal pain nausea vomiting      Review of systems:  Pertinent positives and negatives mentioned in the HPI/MDM.    Physical Exam  Constitutional:       General: He is not in acute distress.     Appearance: Normal appearance.   Eyes:      Extraocular Movements: Extraocular movements intact.      Pupils: Pupils are equal, round, and reactive to light.   Cardiovascular:      Rate and Rhythm: Normal rate and regular rhythm.   Pulmonary:      Effort: Pulmonary effort is normal. No respiratory distress.      Breath sounds: Rales present.   Chest:      Chest wall: No mass or tenderness.   Abdominal:       ---------------------------  Date / Time Roomed:  3/18/2024  2:28 PM  ED Bed Assignment:  11/11    The nursing notes within the ED encounter and vital signs as below have been reviewed.   Patient Vitals for the past 24 hrs:   BP Temp Temp src Pulse Resp SpO2 Height Weight   03/18/24 1531 95/67 -- -- 96 16 99 % -- --   03/18/24 1435 (!) 91/58 97.5 °F (36.4 °C) Oral (!) 102 24 93 % 1.727 m (5' 8\") 86.2 kg (190 lb)       ------------------------------ RESULTS -----------------------------    Medications administered today:  Medications   furosemide (LASIX) injection 40 mg (has no administration in time range)       Labs reviewed and interpreted by me:  Results for orders placed or performed during the hospital encounter of 03/18/24   CBC with Auto Differential   Result Value Ref Range    WBC 12.7 (H) 4.5 - 11.5 k/uL    RBC 2.97 (L) 3.80 - 5.80 m/uL    Hemoglobin 9.2 (L) 12.5 - 16.5 g/dL    Hematocrit 31.0 (L) 37.0 - 54.0 %    .4 (H) 80.0 - 99.9 fL    MCH 31.0 26.0 - 35.0 pg    MCHC 29.7 (L) 32.0 - 34.5 g/dL    RDW 19.0 (H) 11.5 - 15.0 %    Platelets 164 130 - 450 k/uL    MPV 9.9 7.0 - 12.0 fL    Neutrophils % 77 43.0 - 80.0 %    Lymphocytes % 13 (L) 20.0 - 42.0 %    Monocytes % 5 2.0 - 12.0 %    Eosinophils % 3 0 - 6 %    Basophils % 0 0.0 - 2.0 %    Immature Granulocytes 2 0.0 - 5.0 %    Neutrophils Absolute 9.78 (H) 1.80 - 7.30 k/uL    Lymphocytes Absolute 1.59 1.50 - 4.00 k/uL    Monocytes Absolute 0.69 0.10 - 0.95 k/uL    Eosinophils Absolute 0.33 0.05 - 0.50 k/uL    Basophils Absolute 0.03 0.00 - 0.20 k/uL    Absolute Immature Granulocyte 0.25 0.00 - 0.58 k/uL   BMP   Result Value Ref Range    Sodium 136 132 - 146 mmol/L    Potassium 3.7 3.5 - 5.0 mmol/L    Chloride 102 98 - 107 mmol/L    CO2 26 22 - 29 mmol/L    Anion Gap 8 7 - 16 mmol/L    Glucose 101 (H) 74 - 99 mg/dL    BUN 22 6 - 23 mg/dL    Creatinine 0.8 0.70 - 1.20 mg/dL    Est, Glom Filt Rate >60 >60 mL/min/1.73m2    Calcium 8.2 (L) 8.6 - 10.2

## 2024-03-18 NOTE — ED NOTES
.ED to Inpatient Handoff Report    Notified 6W that electronic handoff available and patient ready for transport to room 618.    Safety Risks: Risk of falls    Patient in Restraints: no    Constant Observer or Patient : no    Telemetry Monitoring Ordered: Yes          Order to transfer to unit without monitor:yes    Last MEWS: 2 Time completed: 1731    Deterioration Index: 38.11    Vitals:    03/18/24 1435 03/18/24 1531 03/18/24 1731   BP: (!) 91/58 95/67 97/69   Pulse: (!) 102 96 89   Resp: 24 16 20   Temp: 97.5 °F (36.4 °C)  97.5 °F (36.4 °C)   TempSrc: Oral  Oral   SpO2: 93% 99% 98%   Weight: 86.2 kg (190 lb)     Height: 1.727 m (5' 8\")         Opportunity for questions and clarification was provided.

## 2024-03-18 NOTE — PROGRESS NOTES
Database initiated. Patient is A&O comes in from [facility]. He's been requiring a wheelchair and 5 liters at baseline. 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 He has fallen recently.

## 2024-03-19 LAB
ANION GAP SERPL CALCULATED.3IONS-SCNC: 10 MMOL/L (ref 7–16)
BASOPHILS # BLD: 0.04 K/UL (ref 0–0.2)
BASOPHILS NFR BLD: 1 % (ref 0–2)
BUN SERPL-MCNC: 19 MG/DL (ref 6–23)
CALCIUM SERPL-MCNC: 8.2 MG/DL (ref 8.6–10.2)
CHLORIDE SERPL-SCNC: 104 MMOL/L (ref 98–107)
CHOLEST SERPL-MCNC: 179 MG/DL
CO2 SERPL-SCNC: 27 MMOL/L (ref 22–29)
CREAT SERPL-MCNC: 0.8 MG/DL (ref 0.7–1.2)
EKG ATRIAL RATE: 98 BPM
EKG P AXIS: -6 DEGREES
EKG P-R INTERVAL: 178 MS
EKG Q-T INTERVAL: 350 MS
EKG QRS DURATION: 74 MS
EKG QTC CALCULATION (BAZETT): 446 MS
EKG R AXIS: -11 DEGREES
EKG T AXIS: 13 DEGREES
EKG VENTRICULAR RATE: 98 BPM
EOSINOPHIL # BLD: 0.33 K/UL (ref 0.05–0.5)
EOSINOPHILS RELATIVE PERCENT: 4 % (ref 0–6)
ERYTHROCYTE [DISTWIDTH] IN BLOOD BY AUTOMATED COUNT: 19 % (ref 11.5–15)
GFR SERPL CREATININE-BSD FRML MDRD: >60 ML/MIN/1.73M2
GLUCOSE SERPL-MCNC: 81 MG/DL (ref 74–99)
HCT VFR BLD AUTO: 30.3 % (ref 37–54)
HDLC SERPL-MCNC: 73 MG/DL
HGB BLD-MCNC: 9.2 G/DL (ref 12.5–16.5)
IMM GRANULOCYTES # BLD AUTO: 0.19 K/UL (ref 0–0.58)
IMM GRANULOCYTES NFR BLD: 2 % (ref 0–5)
LDLC SERPL CALC-MCNC: 89 MG/DL
LYMPHOCYTES NFR BLD: 1.24 K/UL (ref 1.5–4)
LYMPHOCYTES RELATIVE PERCENT: 15 % (ref 20–42)
MAGNESIUM SERPL-MCNC: 1.8 MG/DL (ref 1.6–2.6)
MCH RBC QN AUTO: 31.2 PG (ref 26–35)
MCHC RBC AUTO-ENTMCNC: 30.4 G/DL (ref 32–34.5)
MCV RBC AUTO: 102.7 FL (ref 80–99.9)
MONOCYTES NFR BLD: 0.52 K/UL (ref 0.1–0.95)
MONOCYTES NFR BLD: 6 % (ref 2–12)
NEUTROPHILS NFR BLD: 73 % (ref 43–80)
NEUTS SEG NFR BLD: 6.2 K/UL (ref 1.8–7.3)
PLATELET # BLD AUTO: 167 K/UL (ref 130–450)
PMV BLD AUTO: 10.4 FL (ref 7–12)
POTASSIUM SERPL-SCNC: 4.1 MMOL/L (ref 3.5–5)
RBC # BLD AUTO: 2.95 M/UL (ref 3.8–5.8)
SODIUM SERPL-SCNC: 141 MMOL/L (ref 132–146)
TRIGL SERPL-MCNC: 86 MG/DL
VLDLC SERPL CALC-MCNC: 17 MG/DL
WBC OTHER # BLD: 8.5 K/UL (ref 4.5–11.5)

## 2024-03-19 PROCEDURE — P9047 ALBUMIN (HUMAN), 25%, 50ML: HCPCS | Performed by: FAMILY MEDICINE

## 2024-03-19 PROCEDURE — 85025 COMPLETE CBC W/AUTO DIFF WBC: CPT

## 2024-03-19 PROCEDURE — 2580000003 HC RX 258: Performed by: NURSE PRACTITIONER

## 2024-03-19 PROCEDURE — 2700000000 HC OXYGEN THERAPY PER DAY

## 2024-03-19 PROCEDURE — 6370000000 HC RX 637 (ALT 250 FOR IP): Performed by: FAMILY MEDICINE

## 2024-03-19 PROCEDURE — 6360000002 HC RX W HCPCS: Performed by: FAMILY MEDICINE

## 2024-03-19 PROCEDURE — 80048 BASIC METABOLIC PNL TOTAL CA: CPT

## 2024-03-19 PROCEDURE — 2060000000 HC ICU INTERMEDIATE R&B

## 2024-03-19 PROCEDURE — 93010 ELECTROCARDIOGRAM REPORT: CPT | Performed by: INTERNAL MEDICINE

## 2024-03-19 PROCEDURE — 6360000002 HC RX W HCPCS: Performed by: NURSE PRACTITIONER

## 2024-03-19 PROCEDURE — 83735 ASSAY OF MAGNESIUM: CPT

## 2024-03-19 PROCEDURE — 36415 COLL VENOUS BLD VENIPUNCTURE: CPT

## 2024-03-19 PROCEDURE — 80061 LIPID PANEL: CPT

## 2024-03-19 PROCEDURE — 6370000000 HC RX 637 (ALT 250 FOR IP): Performed by: NURSE PRACTITIONER

## 2024-03-19 RX ORDER — ALBUMIN (HUMAN) 12.5 G/50ML
50 SOLUTION INTRAVENOUS ONCE
Status: COMPLETED | OUTPATIENT
Start: 2024-03-19 | End: 2024-03-19

## 2024-03-19 RX ORDER — MIDODRINE HYDROCHLORIDE 2.5 MG/1
2.5 TABLET ORAL
Status: DISCONTINUED | OUTPATIENT
Start: 2024-03-19 | End: 2024-03-20

## 2024-03-19 RX ADMIN — ROSUVASTATIN CALCIUM 5 MG: 5 TABLET, FILM COATED ORAL at 21:10

## 2024-03-19 RX ADMIN — ENOXAPARIN SODIUM 40 MG: 100 INJECTION SUBCUTANEOUS at 09:23

## 2024-03-19 RX ADMIN — MIDODRINE HYDROCHLORIDE 2.5 MG: 2.5 TABLET ORAL at 16:38

## 2024-03-19 RX ADMIN — ASPIRIN 81 MG: 81 TABLET, COATED ORAL at 09:24

## 2024-03-19 RX ADMIN — PANTOPRAZOLE SODIUM 40 MG: 40 TABLET, DELAYED RELEASE ORAL at 09:24

## 2024-03-19 RX ADMIN — CLOPIDOGREL BISULFATE 75 MG: 75 TABLET ORAL at 09:23

## 2024-03-19 RX ADMIN — DIVALPROEX SODIUM 125 MG: 125 CAPSULE, COATED PELLETS ORAL at 06:17

## 2024-03-19 RX ADMIN — DIVALPROEX SODIUM 125 MG: 125 CAPSULE, COATED PELLETS ORAL at 16:38

## 2024-03-19 RX ADMIN — MIDODRINE HYDROCHLORIDE 2.5 MG: 2.5 TABLET ORAL at 12:37

## 2024-03-19 RX ADMIN — BUMETANIDE 0.5 MG: 0.25 INJECTION INTRAMUSCULAR; INTRAVENOUS at 21:10

## 2024-03-19 RX ADMIN — ALBUMIN (HUMAN) 50 G: 0.25 INJECTION, SOLUTION INTRAVENOUS at 09:50

## 2024-03-19 RX ADMIN — SODIUM CHLORIDE, PRESERVATIVE FREE 10 ML: 5 INJECTION INTRAVENOUS at 09:27

## 2024-03-19 RX ADMIN — GUAIFENESIN 400 MG: 400 TABLET ORAL at 09:24

## 2024-03-19 RX ADMIN — SODIUM CHLORIDE, PRESERVATIVE FREE 10 ML: 5 INJECTION INTRAVENOUS at 21:12

## 2024-03-19 RX ADMIN — TAMSULOSIN HYDROCHLORIDE 0.4 MG: 0.4 CAPSULE ORAL at 09:24

## 2024-03-19 RX ADMIN — DIVALPROEX SODIUM 125 MG: 125 CAPSULE, COATED PELLETS ORAL at 21:10

## 2024-03-19 RX ADMIN — GUAIFENESIN 400 MG: 400 TABLET ORAL at 16:38

## 2024-03-19 NOTE — ACP (ADVANCE CARE PLANNING)
Advance Care Planning   Healthcare Decision Maker:    Primary Decision Maker: KatiaadrielScott - Spouse - [phone]    Secondary Decision Maker: RosaTruong - Cora - [phone]    Click here to complete Healthcare Decision Makers including selection of the Healthcare Decision Maker Relationship (ie \"Primary\").

## 2024-03-19 NOTE — H&P
[facility]  History and Physical      CHIEF COMPLAINT:    Chief Complaint   Patient presents with    Weight Gain     12 lb weight gain this week; has been in and out of the hospital the past 2 mos with pneumonia, CHF and UTI. At [facility] for rehab     Shortness of Breath     Wears 2L at baseline; currently 96% on 6L NC         Patient of Gilson Ramon AMENA III, DO presents with:  Acute on chronic congestive heart failure, unspecified heart failure type (HCC)    History of Present Illness:   Patient is an 83-year-old male with a past medical history of abnormal EKG, aortic stenosis, A-fib, CAD, infrarenal abdominal aortic aneurysm, left atrial dilation, mitral regurg, STEMI, SBE who presents to the emergency room for shortness of breath and weight gain at his nursing home.  Patient was recently admitted on 3/6 and discharged on 3/9 following a previous admission 2/12-3/1 where he had a STEMI and was successfully stented to the SVG will was recommended patient discharged to facility however patient and wife both were adamant about patient coming home.  Once home patient was unable to take care of himself prompting wife to bring him back to the emergency room where then he was discharged on 3/9 to [facility].  On arrival patient had a chest x-ray which revealed a left lung opacity and pleural effusion.  Labs revealed an elevated proBNP of 1, 616, leukocytosis of 12.7, mild anemia of 9.2/31.0.  Patient is admitted to intermediate telemetry unit for further workup and treatment.      REVIEW OF SYSTEMS:  Pertinent negatives are above in HPI.  10 point ROS otherwise negative.      Past Medical History:   Diagnosis Date    Abnormal EKG     Aortic stenosis     Atrial fibrillation (HCC)     Postop    Coronary artery disease     HTN (hypertension)     Infrarenal abdominal aortic aneurysm (AAA) without rupture ([facility]) 02/26/2024    3.5 x 3.6 cm CT scan February 2024    Left atrial dilatation     Mild

## 2024-03-19 NOTE — CARE COORDINATION
Social work / Discharge planning:          Patient is a readmission from [facility].     Patient is not a bed hold and will need precert if bed available when ready for discharge.     JEFF and transport form completed.    Electronically signed by NENO Sung on 3/19/2024 at 10:20 AM

## 2024-03-19 NOTE — DISCHARGE INSTRUCTIONS
weight gain of 3 pounds or more in 1 day         OR 5 pounds or more in one week  More shortness of breath  More swelling of your stomach, legs, ankles or feet  Feeling more tired, No energy  Dry hacky cough  Dizziness  More chest pain / discomfort       (CALL 916 IF ANY OF THE FOLLOWING OCCURS  Chest pain (not relieved with nitroglycerine, if you have been prescribed this medication)  Severe shortness of breath  Faint / Pass out  Confusion / cannot think clearly  If symptoms get worse           SMOKING - TOBACCO USE:  * IF YOU SMOKE - STOP!  Kick the habit.  [facility] Program is offered at [facility] and [facility]. Call [phone] extension [phone] for more information.             ACTIVITY:   (Ask your doctor when you will be able to return to work and before starting any exercise program.  Do not drive unless unless your doctor has given you permission to do so).  Start light exercise.  Even if you can only do a small amount, exercise will help you get stronger, have more energy, help manage your weight and decrease  stress. Walking is an easy way to get exercise. Start out slowly and  increase the amount you walk as tolerated  If you become short of breath, dizzy or have chest pain; stop, sit down, and rest.  If you feel \"wiped out\" the day after you exercise, walk at a slower pace or for a shorter distance. You can gradually increase the pace or amount of time.            (Do not exercise right after a meal or in extreme temperatures, such as above 85 degrees, if the air is really humid, or wind chill is less than 20 degrees)                                             ADDITIONAL INFORMATION:  Avoid getting sick from colds and the flu. Stay away from friends or family that you know may have a contagious illness  Get plenty of rest   Get a flu shot each year.  Get a pneumococcal vaccine shot. If you have had one before, ask your  doctor whether you need another dose.       My Goal for Self-management of Heart Failure Includes 5 steps :    1. Notice a change in symptoms ( weight gain, short of breath, leg swelling, decreased activity level, bloating....)    2. Evaluate the change: (use the Heart Failure Zones )     3. Decide to take action: decide what your options are, such as: (call your doctor for an extra visit, take a prescribed medication, such as your water pill if your doctor has given you directions to do so, CALL YOUR DOCTOR)    4. Come up with a strategy:  (now you call the doctor for advice / appointment. This is where you take action!!!  Do not wait, catch the symptom early and treat it before it worsens.    5. Evaluate the response: The next day, check your Heart Failure Zones: are you in the GREEN ZONE (safe zone)?  Worsening symptoms of YELLOW ZONE? Or have you moved to the RED ZONE and need to call 911 or go to the Emergency Room for evaluation? Call your doctor's office to update them on your symptoms of heart failure.

## 2024-03-19 NOTE — PROGRESS NOTES
Kristine Alvarado NP contacted regarding patient's blood pressure of 87/56. Nurse was told to hold this evening's dose of IMDUR, metoprolol, and Bumex.

## 2024-03-19 NOTE — DISCHARGE INSTR - COC
Continuity of Care Form    Patient Name: Shiv Lee   :  1940  MRN:  [id number]    Admit date:  3/18/2024  Discharge date:  2024      Code Status Order: Full Code   Advance Directives:     Admitting Physician:  Mireya Coley DO  PCP: Gilson Ramon III, DO    Discharging Nurse: Zoila Bergeron RN    Discharging Hospital Unit/Room#: 0618/0618-A  Discharging Unit Phone Number: [phone]    Emergency Contact:   Extended Emergency Contact Information  Primary Emergency Contact: Scott Lee  Address: [address] [place], [place] [address] [place] [address]  Home Phone: [phone]  Mobile Phone: [phone]  Relation: Spouse   needed? No  Secondary Emergency Contact: Truong Lee  Address: [address] [place] [address] [facility]  Home Phone: [phone]  Mobile Phone: [phone]  Relation: Child  Preferred language: English   needed? No    Past Surgical History:  Past Surgical History:   Procedure Laterality Date    AORTIC VALVE SURGERY  09    Dr. Villa    CARDIAC PROCEDURE N/A 2024    Left heart cath / coronary angiography performed by Dipika Curtis MD at [facility] CARDIAC CATH LAB    CARDIAC PROCEDURE N/A 2024    Percutaneous coronary intervention performed by Dipika Curtis MD at [facility] CARDIAC CATH LAB    CARDIAC PROCEDURE N/A 2024    Percutaneous coronary intervention performed by Amadou Santana DO at [facility] CARDIAC CATH LAB    CORONARY ARTERY BYPASS GRAFT  09    DIAGNOSTIC CARDIAC CATH LAB PROCEDURE  09    EYE SURGERY  2016       Immunization History:   Immunization History   Administered Date(s) Administered    Influenza, FLUCELVAX, (age 6 mo+), MDCK, MDV, 0.5mL 2020    Pneumococcal, PPSV23, PNEUMOVAX 23, (age 2y+), SC/IM, 0.5mL 2020    TDaP, ADACEL (age 10y-64y), BOOSTRIX (age 10y+), IM, 0.5mL 2013       Active Problems:  Patient Active Problem List   Diagnosis Code     Therapy and Occupational Therapy  Weight Bearing Status/Restrictions: No weight bearing restrictions  Other Medical Equipment (for information only, NOT a DME order):  walker  Other Treatments: ***    Patient's personal belongings (please select all that are sent with patient):  None    RN SIGNATURE:  Electronically signed by Zoila Bergeron RN on 3/21/24 at 3:34 PM EDT    CASE MANAGEMENT/SOCIAL WORK SECTION    Inpatient Status Date: ***    Readmission Risk Assessment Score:  Readmission Risk              Risk of Unplanned Readmission:  29           Discharging to Facility/ Agency   Name: [facility]  Address:[address] [place] [address], [place] [address]  Phone:[phone]  Fax:[phone]    Dialysis Facility (if applicable)   Name:  Address:  Dialysis Schedule:  Phone:  Fax:    / signature: Electronically signed by NENO Sung on 3/19/24 at 10:23 AM EDT    PHYSICIAN SECTION    Prognosis: Good    Condition at Discharge: Stable    Rehab Potential (if transferring to Rehab): Good    Recommended Labs or Other Treatments After Discharge: cbc and bmp in 3 days    Physician Certification: I certify the above information and transfer of Shiv Lee  is necessary for the continuing treatment of the diagnosis listed and that he requires Skilled Nursing Facility for less 30 days.     Update Admission H&P: No change in H&P    PHYSICIAN SIGNATURE:  Electronically signed by ANA Salvador CNP on 3/21/24 at 11:46 AM EDT

## 2024-03-19 NOTE — CONSULTS
Comprehensive Nutrition Assessment    Type and Reason for Visit:  Initial, Consult (Bulmaor score consult)    Nutrition Recommendations/Plan:   Continue current Low Na diet and Wound Healing ONS BID, as tolerated  Continue inpatient monitoring     Malnutrition Assessment:  Malnutrition Status:  At risk for malnutrition (Comment) (03/19/24 2867)    Context:  Acute Illness     Findings of the 6 clinical characteristics of malnutrition:  Energy Intake:  Mild decrease in energy intake (Comment)  Weight Loss:  Unable to assess (wt fluctuations with fluid status)     Body Fat Loss:  No significant body fat loss     Muscle Mass Loss:  No significant muscle mass loss    Fluid Accumulation:  Unable to assess (multiple factors contributing)     Strength:  Not Performed    Nutrition Assessment:    Pt admit from rehab 2/2 acute on chronic HF. Recent admissions for CHF, UTI, PNA. Wound Care consulted d/t open areas behind ear and coccyx. Will add wound healing ONS BID and ccntinue inpatient monitoring.    Nutrition Related Findings:    A&Ox4, [place], umbilical hernia, +BS, +1 edema, -I/O 3L Wound Type: Wound Consult Pending       Current Nutrition Intake & Therapies:    Average Meal Intake: 51-75%, %  Average Supplements Intake: None Ordered  ADULT DIET; Regular; Low Sodium (2 gm)  ADULT ORAL NUTRITION SUPPLEMENT; Dinner, Breakfast; Wound Healing Oral Supplement    Anthropometric Measures:  Height: 172.7 cm (5' 8\")  Ideal Body Weight (IBW): 154 lbs (70 kg)    Admission Body Weight: 80.2 kg (176 lb 13 oz) (3/5)  Current Body Weight: 86.2 kg (190 lb 0.6 oz),   IBW. Weight Source: Stated (3/18)  Current BMI (kg/m2): 28.9  Usual Body Weight: 95.4 kg (210 lb 5 oz) (2/10/24 [facility])  % Weight Change (Calculated): -9.6                    BMI Categories: Overweight (BMI 25.0-29.9)    Estimated Daily Nutrient Needs:  Energy Requirements Based On: Formula ([place])  Weight Used for Energy Requirements: Current  Energy

## 2024-03-19 NOTE — PLAN OF CARE
Patient's chart updated to reflect:      .    - HF care plan, HF education points and HF discharge instructions.  -Orders: 2 gram sodium diet, daily weights, I/O.  -PCP and cardiology follow up appointments to be scheduled within 7 days of hospital discharge.  -CHF education session will be provided to the patient prior to hospital discharge.    Pooja Daimond RN   Heart Failure Navigator

## 2024-03-19 NOTE — CONSULTS
Nutrition Education    Educated on educated on CHF diet guidelines. Provided pt and wife with handout on HF guidelines and answered the wife's questions on processed foods. Informed her on salt substitute and label reading.   Learners: Patient and significant other  Readiness: Acceptance  Method: Explanation and Handout  Response: Verbalizes Understanding  Contact name and number provided.    Nika Riggins  Contact Number: [phone]

## 2024-03-19 NOTE — PROGRESS NOTES
SPIRITUAL HEALTH SERVICES - ALYSSA Shepard Encounter    Name: Shiv Lee                  Referral: Routine Visit    Sacraments  Anointed (Last Rites): Yes  Apostolic [place]: Yes  Confession: Yes  Communion: Yes     Assessment:  Patient receptive to  visit.      Intervention:   provided spiritual support and sacramental ministry for patient.     Outcome:  Patient expressed gratitude for visit.    Plan:  Chaplains will remain available to offer spiritual and emotional support as needed.      Electronically signed by Chaplain Adrian, on 3/19/2024 at 6:04 PM.  Spiritual Care Department  [facility]  [phone]

## 2024-03-19 NOTE — CONSULTS
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 (1940) male with a history of HFpEF, most recent EF:    02/07/2024 EF 60%    Patient was awake and alert, laying in bed during the consultation and is agreeable to heart failure education. He was engaged and asked appropriate questions throughout the education session. He was recently admitted at [facility]. He was scheduled clinic and cardiology follow up appointments during that admission.     Barriers identified during consult contributing to HF Hospitalization:  [] Limited medication adherence   [] Poor health literacy, education regarding HF medications provided   [] Pill box provided to patient  [] Difficulty affording medications  [] Difficulty obtaining/ managing medications  [] Prescription assistance information given     [] Not weighing themselves daily  [x] Weight log provided for easy monitoring  [] Scale provided     [] Not following low sodium diet  [] Food insecurity   [x] 2 gram sodium diet education provided   [] Low sodium recipes provided  [] Sodium free seasoning provided   [] Low sodium meal delivery options given to patient  [] Dietician consulted     [] Lack of transportation to appointments     [] Depression, given chronic illness  [] Primary team notified     [] Goals of care need addressed  [] Palliative care consulted     [] CHF CHW consulted, to assist with     Chart Reviewed:  Diet: ADULT DIET; Regular; Low Sodium (2 gm)   Daily Weights: Patient Vitals for the past 96 hrs (Last 3 readings):   Weight   03/18/24 1435 86.2 kg (190 lb)     I/O:   Intake/Output Summary (Last 24 hours) at 3/19/2024 1048  Last data filed at 3/19/2024 0404  Gross per 24 hour   Intake --   Output 2100 ml   Net -2100 ml       [] Nursing staff/manager notified of inaccurate guillen weights or I/O        Discharge Plan:  Above identified barriers reviewed and needs  need to decrease or hold the diuretic dose. On days you feels nauseated and not eating / drinking, having emesis or diarrhea,  informed to call the cardiologist  / doctor, they may need to decrease or hold diuretic to avoid dehydration.  Again, stressed the importance of informing their medical provider the first day of onset of any of the signs and symptoms in the \"Yellow Zone\" of the HF Zones.     The Heart Failure Booklet given to the patient with additional patient education addressing:  What is Heart Failure?  Things You Can Do to Live Well with HF  How to Take Your Medications  How to Eat Less Salt  Mower its Salt?  Exercising Well with Heart Failure  Signs and symptoms of HF to report  Weight Yourself Each Day  Home Self Management- activity, weight tracking, taking medications as prescribed, meals /diet planning (sodium and fluid restriction), how to read food labels, keeping physician follow ups, smoking cessation, follow the “Heart Failure Zones”  The Heart Failure zones  Every Dose Every Day    Instructed to call 911 if you have any of the following symptoms:  Struggling to breathe unrelieved with rest  Having chest pain  Confusion or can’t think clearly      Patient verbalizes understanding of above.   Greater than 30 minutes was spent educating patient.        Pooja Diamond RN   Heart Failure Navigator

## 2024-03-20 LAB
ANION GAP SERPL CALCULATED.3IONS-SCNC: 10 MMOL/L (ref 7–16)
BASOPHILS # BLD: 0.02 K/UL (ref 0–0.2)
BASOPHILS NFR BLD: 0 % (ref 0–2)
BNP SERPL-MCNC: 1240 PG/ML (ref 0–450)
BUN SERPL-MCNC: 17 MG/DL (ref 6–23)
CALCIUM SERPL-MCNC: 8.4 MG/DL (ref 8.6–10.2)
CHLORIDE SERPL-SCNC: 101 MMOL/L (ref 98–107)
CO2 SERPL-SCNC: 29 MMOL/L (ref 22–29)
CREAT SERPL-MCNC: 0.7 MG/DL (ref 0.7–1.2)
EOSINOPHIL # BLD: 0.32 K/UL (ref 0.05–0.5)
EOSINOPHILS RELATIVE PERCENT: 4 % (ref 0–6)
ERYTHROCYTE [DISTWIDTH] IN BLOOD BY AUTOMATED COUNT: 18.6 % (ref 11.5–15)
GFR SERPL CREATININE-BSD FRML MDRD: >60 ML/MIN/1.73M2
GLUCOSE SERPL-MCNC: 90 MG/DL (ref 74–99)
HCT VFR BLD AUTO: 30.2 % (ref 37–54)
HGB BLD-MCNC: 9.1 G/DL (ref 12.5–16.5)
IMM GRANULOCYTES # BLD AUTO: 0.13 K/UL (ref 0–0.58)
IMM GRANULOCYTES NFR BLD: 2 % (ref 0–5)
LYMPHOCYTES NFR BLD: 0.87 K/UL (ref 1.5–4)
LYMPHOCYTES RELATIVE PERCENT: 11 % (ref 20–42)
MAGNESIUM SERPL-MCNC: 1.9 MG/DL (ref 1.6–2.6)
MCH RBC QN AUTO: 30.8 PG (ref 26–35)
MCHC RBC AUTO-ENTMCNC: 30.1 G/DL (ref 32–34.5)
MCV RBC AUTO: 102.4 FL (ref 80–99.9)
MONOCYTES NFR BLD: 0.51 K/UL (ref 0.1–0.95)
MONOCYTES NFR BLD: 6 % (ref 2–12)
NEUTROPHILS NFR BLD: 77 % (ref 43–80)
NEUTS SEG NFR BLD: 6.18 K/UL (ref 1.8–7.3)
PLATELET # BLD AUTO: 154 K/UL (ref 130–450)
PMV BLD AUTO: 9.8 FL (ref 7–12)
POTASSIUM SERPL-SCNC: 3.7 MMOL/L (ref 3.5–5)
RBC # BLD AUTO: 2.95 M/UL (ref 3.8–5.8)
SODIUM SERPL-SCNC: 140 MMOL/L (ref 132–146)
WBC OTHER # BLD: 8 K/UL (ref 4.5–11.5)

## 2024-03-20 PROCEDURE — 97165 OT EVAL LOW COMPLEX 30 MIN: CPT

## 2024-03-20 PROCEDURE — 99223 1ST HOSP IP/OBS HIGH 75: CPT | Performed by: NURSE PRACTITIONER

## 2024-03-20 PROCEDURE — 97161 PT EVAL LOW COMPLEX 20 MIN: CPT

## 2024-03-20 PROCEDURE — 83880 ASSAY OF NATRIURETIC PEPTIDE: CPT

## 2024-03-20 PROCEDURE — 6370000000 HC RX 637 (ALT 250 FOR IP): Performed by: INTERNAL MEDICINE

## 2024-03-20 PROCEDURE — 6370000000 HC RX 637 (ALT 250 FOR IP): Performed by: FAMILY MEDICINE

## 2024-03-20 PROCEDURE — 36415 COLL VENOUS BLD VENIPUNCTURE: CPT

## 2024-03-20 PROCEDURE — 2580000003 HC RX 258: Performed by: NURSE PRACTITIONER

## 2024-03-20 PROCEDURE — 80048 BASIC METABOLIC PNL TOTAL CA: CPT

## 2024-03-20 PROCEDURE — 1200000000 HC SEMI PRIVATE

## 2024-03-20 PROCEDURE — 83735 ASSAY OF MAGNESIUM: CPT

## 2024-03-20 PROCEDURE — 85025 COMPLETE CBC W/AUTO DIFF WBC: CPT

## 2024-03-20 PROCEDURE — 6360000002 HC RX W HCPCS: Performed by: NURSE PRACTITIONER

## 2024-03-20 PROCEDURE — 6370000000 HC RX 637 (ALT 250 FOR IP): Performed by: NURSE PRACTITIONER

## 2024-03-20 PROCEDURE — 2700000000 HC OXYGEN THERAPY PER DAY

## 2024-03-20 RX ORDER — MIDODRINE HYDROCHLORIDE 5 MG/1
5 TABLET ORAL
Status: DISCONTINUED | OUTPATIENT
Start: 2024-03-20 | End: 2024-03-21 | Stop reason: HOSPADM

## 2024-03-20 RX ADMIN — DIVALPROEX SODIUM 125 MG: 125 CAPSULE, COATED PELLETS ORAL at 20:26

## 2024-03-20 RX ADMIN — MIDODRINE HYDROCHLORIDE 5 MG: 5 TABLET ORAL at 12:33

## 2024-03-20 RX ADMIN — ASPIRIN 81 MG: 81 TABLET, COATED ORAL at 08:57

## 2024-03-20 RX ADMIN — ENOXAPARIN SODIUM 40 MG: 100 INJECTION SUBCUTANEOUS at 08:59

## 2024-03-20 RX ADMIN — GUAIFENESIN 400 MG: 400 TABLET ORAL at 03:11

## 2024-03-20 RX ADMIN — GUAIFENESIN 400 MG: 400 TABLET ORAL at 17:23

## 2024-03-20 RX ADMIN — SODIUM CHLORIDE, PRESERVATIVE FREE 10 ML: 5 INJECTION INTRAVENOUS at 20:28

## 2024-03-20 RX ADMIN — ROSUVASTATIN CALCIUM 5 MG: 5 TABLET, FILM COATED ORAL at 20:26

## 2024-03-20 RX ADMIN — GUAIFENESIN 400 MG: 400 TABLET ORAL at 10:19

## 2024-03-20 RX ADMIN — MIDODRINE HYDROCHLORIDE 2.5 MG: 2.5 TABLET ORAL at 08:59

## 2024-03-20 RX ADMIN — SODIUM CHLORIDE, PRESERVATIVE FREE 10 ML: 5 INJECTION INTRAVENOUS at 09:05

## 2024-03-20 RX ADMIN — ANORECTAL OINTMENT: 15.7; .44; 24; 20.6 OINTMENT TOPICAL at 23:26

## 2024-03-20 RX ADMIN — BUMETANIDE 0.5 MG: 0.25 INJECTION INTRAMUSCULAR; INTRAVENOUS at 08:59

## 2024-03-20 RX ADMIN — BUMETANIDE 0.5 MG: 0.25 INJECTION INTRAMUSCULAR; INTRAVENOUS at 20:26

## 2024-03-20 RX ADMIN — DIVALPROEX SODIUM 125 MG: 125 CAPSULE, COATED PELLETS ORAL at 12:33

## 2024-03-20 RX ADMIN — MIDODRINE HYDROCHLORIDE 5 MG: 5 TABLET ORAL at 17:23

## 2024-03-20 RX ADMIN — METOPROLOL SUCCINATE 25 MG: 25 TABLET, FILM COATED, EXTENDED RELEASE ORAL at 08:59

## 2024-03-20 RX ADMIN — DIVALPROEX SODIUM 125 MG: 125 CAPSULE, COATED PELLETS ORAL at 06:09

## 2024-03-20 RX ADMIN — PANTOPRAZOLE SODIUM 40 MG: 40 TABLET, DELAYED RELEASE ORAL at 08:59

## 2024-03-20 RX ADMIN — TAMSULOSIN HYDROCHLORIDE 0.4 MG: 0.4 CAPSULE ORAL at 08:59

## 2024-03-20 RX ADMIN — SODIUM CHLORIDE, PRESERVATIVE FREE 10 ML: 5 INJECTION INTRAVENOUS at 12:21

## 2024-03-20 RX ADMIN — CLOPIDOGREL BISULFATE 75 MG: 75 TABLET ORAL at 08:59

## 2024-03-20 ASSESSMENT — PAIN SCALES - WONG BAKER: WONGBAKER_NUMERICALRESPONSE: NO HURT

## 2024-03-20 NOTE — PROGRESS NOTES
Siri Courtney NP notified of patient's blood pressure of 99/62. She instructed the nurse to give tonight's dose of bumex. See nursing communication order.

## 2024-03-20 NOTE — PROGRESS NOTES
Monitor off antibiotics     Occupational Therapy  OCCUPATIONAL THERAPY INITIAL EVALUATION  [facility]  [address], [place]    Date: 3/20/2024     Patient Name: Shiv Lee  MRN: [id number]  : 1940  Room: [address] [place] [address]    Evaluating OT: Olga Ambrocio, OTR/L - [id number]    Referring Provider: Magy Lainez APRN - CNP   Specific Provider Orders/Date: \"OT eval and treat\" - 3/20/2024    Diagnosis: Acute on chronic congestive heart failure, unspecified heart failure type (HCC) [I50.9]      Pertinent Medical History: aortic stenosis, A fib, CAD, HTN, CABG     Precautions: fall risk, skin integrity    Assessment of Current Deficits:    [x] Functional mobility   [x]ADLs  [x] Strength               []Cognition   [x] Functional transfers   [x] IADLs         [x] Safety Awareness   [x]Endurance   [] Fine Coordination              [x] Balance      [] Vision/perception   []Sensation    []Gross Motor Coordination  [] ROM  [] Delirium                   [] Motor Control     OT PLAN OF CARE   OT POC is based on physician orders, patient diagnosis, and results of clinical assessment.  Frequency/Duration 2-5 days/week for 2 weeks PRN   Specific OT Treatment Interventions to Include:   * Instruction/training on adapted ADL techniques and AE recommendations to increase functional independence within precautions       * Training on energy conservation strategies, correct breathing pattern and techniques to improve independence/tolerance for self-care routine  * Functional transfer/mobility training/DME recommendations for increased independence, safety, and fall prevention  * Patient/Family education to increase follow through with safety techniques and functional independence  * Recommendation of environmental modifications for increased safety with functional transfers/mobility and ADLs  * Therapeutic exercise to improve motor endurance, ROM, and functional strength for

## 2024-03-20 NOTE — PROGRESS NOTES
[facility]                                Progress note    Subjective:    The patient is awake and alert.  Patient says he is tired of being in the hospital became very cry - weepy.    No acute events overnight.    Denies chest pain, angina, SOB     Objective:    BP (!) 78/48   Pulse 92   Temp 98.1 °F (36.7 °C) (Oral)   Resp 19   Ht 1.727 m (5' 8\")   Wt 85.7 kg (189 lb)   SpO2 92%   BMI 28.74 kg/m²     In: 660 [P.O.:660]  Out: 3950   In: 660   Out: 3950 [Urine:3950]    General appearance: NAD, conversant, sad  HEENT: AT/NC, MMM  Neck: FROM, supple  Lungs: Clear to auscultation  CV: RRR, no MRGs  Vasc: Radial pulses 2+  Abdomen: Soft, non-tender; no masses or HSM  Extremities: No peripheral edema or digital cyanosis, slight bilateral lower ext. edema  Skin: no rash, lesions or ulcers  Psych: Alert and oriented to person, place and time  Neuro: Alert and interactive     Recent Labs     03/18/24  1500 03/19/24  0631 03/20/24  0700   WBC 12.7* 8.5 8.0   HGB 9.2* 9.2* 9.1*   HCT 31.0* 30.3* 30.2*    167 154       Recent Labs     03/18/24  1500 03/19/24  0631 03/20/24  0519    141 140   K 3.7 4.1 3.7    104 101   CO2 26 27 29   BUN 22 19 17   CREATININE 0.8 0.8 0.7   CALCIUM 8.2* 8.2* 8.4*       Assessment:    Principal Problem:    Acute on chronic congestive heart failure, unspecified heart failure type ([facility])  Resolved Problems:    * No resolved hospital problems. *      Plan:    Patient is an 83-year-old male admitted to [facility] for  Acute on chronic heart failure with hypoxia  -Monitor labs  -proBNP 1, 616  -IV Bumex 0.5 mg twice daily  -Strict I's and O's and daily weights  -Echo 2/7/24: EF 60%, aortic bioprosthetic valve with mild AR, TR  -Currently utilizing 5 L nasal cannula satting 94%  -Continue GDMT as able  --tubigrips  --boost pressures with albumin and initiate low dose midodrine to help so we can appropriately diurese  --cardiac diet     3/20/2024  SBP soft  increase midodrine to 5mg tid  Patient quite upset about situation  Consult palliative med  BNP - 1240  H&H 9.1/30.2  Continue GDMT if pressure upholds  Tubigrips to bilateral lower ext.  Discussed case with wife over phone    Code status: Full  Consultants:      DVT Prophylaxis   PT/OT  Discharge planning       I have spent a total time of 30 minutes of this patient encounter reviewing chart, labs, coordinating care with interdisciplinary teams, face to face encounter with patient, providing counseling/education to patient/family.      Magy Lainez, APRN - CNP,  12:25 PM  3/20/2024

## 2024-03-20 NOTE — FLOWSHEET NOTE
Inpatient Wound Care    Admit Date: 3/18/2024  2:28 PM    Reason for consult:  Ears, buttocks    Significant history:  Admitted with chronic CHF.    Wound history:  POA    Findings:     03/20/24 1155   Wound 03/06/24 Ear Upper;Right   Date First Assessed/Time First Assessed: 03/06/24 1500   Present on Original Admission: Yes  Location: Ear  Wound Location Orientation: Upper;Right   Wound Image    Wound Etiology Pressure Stage 2   Dressing Status New dressing applied   Wound Cleansed Cleansed with saline   Dressing/Treatment Dry dressing   Dressing Change Due 03/21/24   Wound Length (cm) 1 cm   Wound Width (cm) 1 cm   Wound Depth (cm) 0.2 cm   Wound Surface Area (cm^2) 1 cm^2   Change in Wound Size % (l*w) -300   Wound Volume (cm^3) 0.2 cm^3   Wound Healing % -300   Wound Assessment Pink/red   Drainage Amount Scant (moist but unmeasurable)   Drainage Description Serosanguinous   Odor None   Valentina-wound Assessment Blanchable erythema   Wound Thickness Description not for Pressure Injury Partial thickness   Wound 03/06/24 Ear Left;Upper   Date First Assessed/Time First Assessed: 03/06/24 1500   Present on Original Admission: Yes  Primary Wound Type: Pressure Injury  Location: Ear  Wound Location Orientation: Left;Upper   Wound Etiology   (scab)   Wound Length (cm) 0.3 cm   Wound Width (cm) 0.3 cm   Wound Surface Area (cm^2) 0.09 cm^2   Wound Assessment   (scab)     Buttock    Impression:  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 Wife at the bedside who states he has been ill for the last 2 to 3 months.   Ear wounds caused by O2 tubing. Oxy ears recommended. Dry dressing to R ear.   Buttocks MAD. Recommend Calmoseptine.   SOS precautions are in place.     Plan:Oxy ears for O2 tubing, dry dressing to R ear.  Calmoseptine to gluteal crease,   Low air los bed  SOS precautions.  **Informed Consent**    The patient has given verbal consent to have photos taken of

## 2024-03-20 NOTE — PROGRESS NOTES
Physical Therapy  Facility/Department: [address] MED SURG  Physical Therapy Initial Assessment    Name: Shiv Lee  : 1940  MRN: [id number]  Date of Service: 3/20/2024    Patient Diagnosis(es): The encounter diagnosis was Acute on chronic congestive heart failure, unspecified heart failure type (HCC).  Past Medical History:  has a past medical history of Abnormal EKG, Aortic stenosis, Atrial fibrillation (HCC), Coronary artery disease, HTN (hypertension), Infrarenal abdominal aortic aneurysm (AAA) without rupture (HCC), Left atrial dilatation, Mitral regurgitation, Nontraumatic retroperitoneal hematoma, and SBE (subacute bacterial endocarditis) prophylaxis candidate.  Past Surgical History:  has a past surgical history that includes Diagnostic Cardiac Cath Lab Procedure (09); Coronary artery bypass graft (09); Aortic valve surgery (09); eye surgery (); Cardiac procedure (N/A, 2024); Cardiac procedure (N/A, 2024); and Cardiac procedure (N/A, 2024).      Referring provider:  Karyn Brock MD    PT Order:  PT eval and treat     Evaluating PT:  Regi Shapr, PT, DPT PT [id number]    Room #:  0618/0618-A  Diagnosis:  Acute on chronic congestive heart failure, unspecified heart failure type (HCC) [I50.9]  Precautions:  fall risk, O2  Equipment Needs:  none.    SUBJECTIVE:    Pt lives with his wife but pt admitted from nursing facility.  Pt reported he was uses a walker in rehab but was not ambulating very far.  Pt reported he has not been out of bed in awhile.    OBJECTIVE:   Initial Evaluation  Date: 3/20 Treatment Short Term/ Long Term   Goals   Was pt agreeable to Eval/treatment? yes     Does pt have pain? None reported     Bed Mobility  Rolling: NT  Supine to sit: Min A  Sit to supine: NT  Scooting: Min A to sitting EOB  SBA   Transfers Sit to stand: Min A  Stand to sit: Min A  Stand pivot: Min A with w/w  SBA   Ambulation    4 side steps with w/w Min A.    20+ feet with w/w Min A

## 2024-03-20 NOTE — CARE COORDINATION
Social work / Discharge planning:           Plan is to return to [facility].    Will need to check bed availability.   PT/OT evaluations ordered.    Patient will need precert to return.    JEFF and transport form completed.   Electronically signed by NENO Sung on 3/20/2024 at 10:32 AM

## 2024-03-21 VITALS
RESPIRATION RATE: 18 BRPM | SYSTOLIC BLOOD PRESSURE: 92 MMHG | DIASTOLIC BLOOD PRESSURE: 56 MMHG | OXYGEN SATURATION: 95 % | BODY MASS INDEX: 28.64 KG/M2 | WEIGHT: 189 LBS | HEART RATE: 98 BPM | HEIGHT: 68 IN | TEMPERATURE: 98.6 F

## 2024-03-21 LAB
ANION GAP SERPL CALCULATED.3IONS-SCNC: 8 MMOL/L (ref 7–16)
BASOPHILS # BLD: 0.02 K/UL (ref 0–0.2)
BASOPHILS NFR BLD: 0 % (ref 0–2)
BUN SERPL-MCNC: 20 MG/DL (ref 6–23)
CALCIUM SERPL-MCNC: 8.5 MG/DL (ref 8.6–10.2)
CHLORIDE SERPL-SCNC: 100 MMOL/L (ref 98–107)
CO2 SERPL-SCNC: 29 MMOL/L (ref 22–29)
CREAT SERPL-MCNC: 0.8 MG/DL (ref 0.7–1.2)
EOSINOPHIL # BLD: 0.41 K/UL (ref 0.05–0.5)
EOSINOPHILS RELATIVE PERCENT: 5 % (ref 0–6)
ERYTHROCYTE [DISTWIDTH] IN BLOOD BY AUTOMATED COUNT: 18 % (ref 11.5–15)
GFR SERPL CREATININE-BSD FRML MDRD: >60 ML/MIN/1.73M2
GLUCOSE SERPL-MCNC: 87 MG/DL (ref 74–99)
HCT VFR BLD AUTO: 35.2 % (ref 37–54)
HGB BLD-MCNC: 10.6 G/DL (ref 12.5–16.5)
IMM GRANULOCYTES # BLD AUTO: 0.09 K/UL (ref 0–0.58)
IMM GRANULOCYTES NFR BLD: 1 % (ref 0–5)
LYMPHOCYTES NFR BLD: 0.99 K/UL (ref 1.5–4)
LYMPHOCYTES RELATIVE PERCENT: 11 % (ref 20–42)
MAGNESIUM SERPL-MCNC: 2 MG/DL (ref 1.6–2.6)
MCH RBC QN AUTO: 30.8 PG (ref 26–35)
MCHC RBC AUTO-ENTMCNC: 30.1 G/DL (ref 32–34.5)
MCV RBC AUTO: 102.3 FL (ref 80–99.9)
MONOCYTES NFR BLD: 0.55 K/UL (ref 0.1–0.95)
MONOCYTES NFR BLD: 6 % (ref 2–12)
NEUTROPHILS NFR BLD: 77 % (ref 43–80)
NEUTS SEG NFR BLD: 6.7 K/UL (ref 1.8–7.3)
PLATELET # BLD AUTO: 155 K/UL (ref 130–450)
PMV BLD AUTO: 9.9 FL (ref 7–12)
POTASSIUM SERPL-SCNC: 3.5 MMOL/L (ref 3.5–5)
RBC # BLD AUTO: 3.44 M/UL (ref 3.8–5.8)
SODIUM SERPL-SCNC: 137 MMOL/L (ref 132–146)
WBC OTHER # BLD: 8.8 K/UL (ref 4.5–11.5)

## 2024-03-21 PROCEDURE — 6360000002 HC RX W HCPCS: Performed by: NURSE PRACTITIONER

## 2024-03-21 PROCEDURE — 2700000000 HC OXYGEN THERAPY PER DAY

## 2024-03-21 PROCEDURE — 99231 SBSQ HOSP IP/OBS SF/LOW 25: CPT | Performed by: NURSE PRACTITIONER

## 2024-03-21 PROCEDURE — 85025 COMPLETE CBC W/AUTO DIFF WBC: CPT

## 2024-03-21 PROCEDURE — 2580000003 HC RX 258: Performed by: NURSE PRACTITIONER

## 2024-03-21 PROCEDURE — 83735 ASSAY OF MAGNESIUM: CPT

## 2024-03-21 PROCEDURE — 6370000000 HC RX 637 (ALT 250 FOR IP): Performed by: NURSE PRACTITIONER

## 2024-03-21 PROCEDURE — 92610 EVALUATE SWALLOWING FUNCTION: CPT

## 2024-03-21 PROCEDURE — 6370000000 HC RX 637 (ALT 250 FOR IP): Performed by: FAMILY MEDICINE

## 2024-03-21 PROCEDURE — 36415 COLL VENOUS BLD VENIPUNCTURE: CPT

## 2024-03-21 PROCEDURE — 80048 BASIC METABOLIC PNL TOTAL CA: CPT

## 2024-03-21 RX ORDER — MIDODRINE HYDROCHLORIDE 5 MG/1
5 TABLET ORAL
Qty: 90 TABLET | Refills: 3 | Status: SHIPPED | OUTPATIENT
Start: 2024-03-21

## 2024-03-21 RX ADMIN — ENOXAPARIN SODIUM 40 MG: 100 INJECTION SUBCUTANEOUS at 08:57

## 2024-03-21 RX ADMIN — ANORECTAL OINTMENT: 15.7; .44; 24; 20.6 OINTMENT TOPICAL at 11:23

## 2024-03-21 RX ADMIN — CLOPIDOGREL BISULFATE 75 MG: 75 TABLET ORAL at 08:56

## 2024-03-21 RX ADMIN — SODIUM CHLORIDE, PRESERVATIVE FREE 10 ML: 5 INJECTION INTRAVENOUS at 11:23

## 2024-03-21 RX ADMIN — TAMSULOSIN HYDROCHLORIDE 0.4 MG: 0.4 CAPSULE ORAL at 08:55

## 2024-03-21 RX ADMIN — ASPIRIN 81 MG: 81 TABLET, COATED ORAL at 08:56

## 2024-03-21 RX ADMIN — GUAIFENESIN 400 MG: 400 TABLET ORAL at 03:42

## 2024-03-21 RX ADMIN — PANTOPRAZOLE SODIUM 40 MG: 40 TABLET, DELAYED RELEASE ORAL at 08:56

## 2024-03-21 RX ADMIN — BUMETANIDE 0.5 MG: 0.25 INJECTION INTRAMUSCULAR; INTRAVENOUS at 08:57

## 2024-03-21 RX ADMIN — DIVALPROEX SODIUM 125 MG: 125 CAPSULE, COATED PELLETS ORAL at 14:54

## 2024-03-21 RX ADMIN — MIDODRINE HYDROCHLORIDE 5 MG: 5 TABLET ORAL at 08:56

## 2024-03-21 RX ADMIN — MIDODRINE HYDROCHLORIDE 5 MG: 5 TABLET ORAL at 12:31

## 2024-03-21 RX ADMIN — GUAIFENESIN 400 MG: 400 TABLET ORAL at 08:56

## 2024-03-21 RX ADMIN — DIVALPROEX SODIUM 125 MG: 125 CAPSULE, COATED PELLETS ORAL at 06:43

## 2024-03-21 NOTE — PROGRESS NOTES
SPEECH/LANGUAGE PATHOLOGY  CLINICAL ASSESSMENT OF SWALLOWING FUNCTION   and PLAN OF CARE  PATIENT NAME:  Shiv Lee  (male)     MRN:  [id number]    :  1940  (83 y.o.)  STATUS:  Inpatient: Room -A    TODAY'S DATE:  3/21/2024  REFERRING PROVIDER:   Dr. Brock  SPECIFIC PROVIDER ORDER: SLP eval and treat Date of order:  3/21/24  REASON FOR REFERRAL: Assess swallow function    EVALUATING THERAPIST: MARCEL Hancock                 ASSESSMENT:    DYSPHAGIA DIAGNOSIS:   Clinical indicators of functional oropharyngeal swallow for age/premorbid functioning and unable to rule out silent aspiration bedside      DIET RECOMMENDATIONS:  Regular consistency solids (IDDSI level 7) with  thin liquids (IDDSI level 0)     FEEDING RECOMMENDATIONS:     Assistance level:  No assistance needed      Compensatory strategies recommended: No strategies are recommended at this time      Discussed recommendations with nursing and/or faxed report to referring provider: Yes    SPEECH THERAPY  PLAN OF CARE   The dysphagia POC is established based on physician order, dysphagia diagnosis and results of clinical assessment     Dysphagia therapy is not recommended     Conditions Requiring Skilled Therapeutic Intervention for dysphagia:    not applicable    Specific dysphagia interventions to include:     Not applicable    Specific instructions for next treatment:  not applicable   Patient Treatment Goals:    Short Term Goals:  Not applicable no therapy warranted     Long Term Goals:   Not applicable no therapy warranted      Patient/family Goal:    not applicable                    ADMITTING DIAGNOSIS: Acute on chronic congestive heart failure, unspecified heart failure type (HCC) [I50.9]    VISIT DIAGNOSIS:      PATIENT REPORT/COMPLAINT: denies difficulty swallowing  RN cleared patient for participation in assessment     yes     PRIOR LEVEL OF SWALLOW FUNCTION:    PAST HISTORY OF DYSPHAGIA?: yes    Home diet: Regular consistency

## 2024-03-21 NOTE — CARE COORDINATION
Social work / Discharge planning:         Precert approved for return to [facility].    Discharge order noted.      Transport set up for 4:30pm via [employer] [facility]lette.    Social work updated charge nurse and facility liaison.    Wife needs to be updated after swallow evaluation.    Electronically signed by NENO Sung on 3/21/2024 at 12:34 PM

## 2024-03-21 NOTE — PROGRESS NOTES
Palliative Care Department  [phone]  Palliative Care Progress Note  Provider Roddy Henry, ANA - MARIA L     Shiv Lee  [id number]  Hospital Day: 4  Date of Initial Consult: 3/20/2024  Referring Provider:  Magy Lainez APRN - CNP  Palliative Medicine was consulted for assistance with: goals of care    HPI:   Shiv Lee is a 83 y.o. with a medical history of aortic stenosis, atrial fibrillation, CAD, hypertension, AAA, mitral regurgitation, SBE,  who was admitted on 3/18/2024 from nursing facility with a CHIEF COMPLAINT of shortness of breath, weight gain. Patient was recently admitted on 3/6 and discharged on 3/9 following a previous admission 2/12-3/1 where he had a STEMI and was successfully stented to the SVG will was recommended patient discharged to facility however patient and wife both were adamant about patient coming home.  Once home patient was unable to take care of himself prompting wife to bring him back to the emergency room where then he was discharged on 3/9 to [facility].  On arrival patient had a chest x-ray which revealed a left lung opacity and pleural effusion.  Labs revealed an elevated proBNP of 1, 616, leukocytosis of 12.7, mild anemia of 9.2/31.0.  Palliative medicine consulted for further assistance.    ASSESSMENT/PLAN:     Pertinent Hospital Diagnoses     Acute on chronic heart failure with hypoxia      Palliative Care Encounter / Counseling Regarding Goals of Care  Please see detailed goals of care discussion as below  At this time, Shiv Lee, Does have capacity for medical decision-making.  Capacity is time limited and situation/question specific  During encounter no surrogate medical decision-maker was needed  Outcome of goals of care meeting:   Continue limited code  Continue current manage  Code status Limited : no intubation, no compressions, no defibrillation, no resuscitative medications  Advanced Directives: no POA or living will in

## 2024-03-21 NOTE — CARE COORDINATION
Social work / Discharge planning:         [facility] is submitting precert at this time.    Will need to wait for authorization.    JEFF and transport form completed.    Electronically signed by NENO Sung on 3/21/2024 at 9:52 AM

## 2024-03-21 NOTE — PROGRESS NOTES
Nurse to nurse called to Johanne at [facility] rehab. AVS and JEFF faxed to facility.

## 2024-03-21 NOTE — PLAN OF CARE
Problem: ABCDS Injury Assessment  Goal: Absence of physical injury  3/20/2024 2125 by Evelyn Enriquez RN  Outcome: Progressing  3/20/2024 2125 by Evelyn Enriquez RN  Outcome: Progressing     Problem: Skin/Tissue Integrity  Goal: Absence of new skin breakdown  Description: 1.  Monitor for areas of redness and/or skin breakdown  2.  Assess vascular access sites hourly  3.  Every 4-6 hours minimum:  Change oxygen saturation probe site  4.  Every 4-6 hours:  If on nasal continuous positive airway pressure, respiratory therapy assess nares and determine need for appliance change or resting period.  3/20/2024 2125 by Evelyn Enriquez RN  Outcome: Progressing  3/20/2024 2125 by Evelyn Enriquez RN  Outcome: Progressing     Problem: Discharge Planning  Goal: Discharge to home or other facility with appropriate resources  Outcome: Progressing     Problem: Pain  Goal: Verbalizes/displays adequate comfort level or baseline comfort level  Outcome: Progressing     Problem: Safety - Adult  Goal: Free from fall injury  Outcome: Progressing     Problem: Chronic Conditions and Co-morbidities  Goal: Patient's chronic conditions and co-morbidity symptoms are monitored and maintained or improved  Outcome: Progressing     Problem: Nutrition Deficit:  Goal: Optimize nutritional status  Outcome: Progressing

## 2024-03-22 ENCOUNTER — OUTSIDE SERVICES (OUTPATIENT)
Dept: PRIMARY CARE CLINIC | Age: 84
End: 2024-03-22

## 2024-03-22 ENCOUNTER — APPOINTMENT (OUTPATIENT)
Dept: GENERAL RADIOLOGY | Age: 84
DRG: 189 | End: 2024-03-22
Payer: MEDICARE

## 2024-03-22 ENCOUNTER — HOSPITAL ENCOUNTER (INPATIENT)
Age: 84
LOS: 4 days | Discharge: NURSING, REHAB OR LONG-TERM CARE | DRG: 189 | End: 2024-03-29
Attending: EMERGENCY MEDICINE | Admitting: FAMILY MEDICINE
Payer: MEDICARE

## 2024-03-22 DIAGNOSIS — I48.0 PAROXYSMAL ATRIAL FIBRILLATION (HCC): ICD-10-CM

## 2024-03-22 DIAGNOSIS — I50.9 ACUTE ON CHRONIC CONGESTIVE HEART FAILURE, UNSPECIFIED HEART FAILURE TYPE (HCC): Primary | ICD-10-CM

## 2024-03-22 DIAGNOSIS — R53.81 PHYSICAL DECONDITIONING: ICD-10-CM

## 2024-03-22 DIAGNOSIS — J96.21 ACUTE ON CHRONIC RESPIRATORY FAILURE WITH HYPOXIA (HCC): ICD-10-CM

## 2024-03-22 DIAGNOSIS — I95.9 HYPOTENSION, UNSPECIFIED HYPOTENSION TYPE: ICD-10-CM

## 2024-03-22 DIAGNOSIS — Z86.711 HISTORY OF PULMONARY EMBOLISM: ICD-10-CM

## 2024-03-22 DIAGNOSIS — R53.1 GENERALIZED WEAKNESS: ICD-10-CM

## 2024-03-22 DIAGNOSIS — Z91.81 AT MAXIMUM RISK FOR FALL: ICD-10-CM

## 2024-03-22 DIAGNOSIS — Z97.8 FOLEY CATHETER IN PLACE: ICD-10-CM

## 2024-03-22 DIAGNOSIS — J96.21 ACUTE ON CHRONIC RESPIRATORY FAILURE WITH HYPOXIA (HCC): Primary | ICD-10-CM

## 2024-03-22 DIAGNOSIS — D64.9 ACUTE ON CHRONIC ANEMIA: ICD-10-CM

## 2024-03-22 PROBLEM — R06.03 RESPIRATORY DISTRESS: Status: ACTIVE | Noted: 2024-03-22

## 2024-03-22 LAB
25(OH)D3 SERPL-MCNC: 20.6 NG/ML (ref 30–100)
ALBUMIN SERPL-MCNC: 3.4 G/DL (ref 3.5–5.2)
ALBUMIN SERPL-MCNC: 3.7 G/DL (ref 3.5–5.2)
ALP SERPL-CCNC: 61 U/L (ref 40–129)
ALP SERPL-CCNC: 69 U/L (ref 40–129)
ALT SERPL-CCNC: 13 U/L (ref 0–40)
ALT SERPL-CCNC: 15 U/L (ref 0–40)
ANION GAP SERPL CALCULATED.3IONS-SCNC: 10 MMOL/L (ref 7–16)
ANION GAP SERPL CALCULATED.3IONS-SCNC: 9 MMOL/L (ref 7–16)
AST SERPL-CCNC: 12 U/L (ref 0–39)
AST SERPL-CCNC: 15 U/L (ref 0–39)
B PARAP IS1001 DNA NPH QL NAA+NON-PROBE: NOT DETECTED
B PERT DNA SPEC QL NAA+PROBE: NOT DETECTED
B.E.: 2.4 MMOL/L (ref -3–3)
BASOPHILS # BLD: 0 K/UL (ref 0–0.2)
BASOPHILS NFR BLD: 0 % (ref 0–2)
BILIRUB SERPL-MCNC: 0.9 MG/DL (ref 0–1.2)
BILIRUB SERPL-MCNC: 0.9 MG/DL (ref 0–1.2)
BNP SERPL-MCNC: 1064 PG/ML (ref 0–450)
BUN SERPL-MCNC: 24 MG/DL (ref 6–23)
BUN SERPL-MCNC: 26 MG/DL (ref 6–23)
C PNEUM DNA NPH QL NAA+NON-PROBE: NOT DETECTED
CALCIUM SERPL-MCNC: 8.5 MG/DL (ref 8.6–10.2)
CALCIUM SERPL-MCNC: 8.7 MG/DL (ref 8.6–10.2)
CHLORIDE SERPL-SCNC: 100 MMOL/L (ref 98–107)
CHLORIDE SERPL-SCNC: 101 MMOL/L (ref 98–107)
CHOLEST SERPL-MCNC: 163 MG/DL
CO2 SERPL-SCNC: 29 MMOL/L (ref 22–29)
CO2 SERPL-SCNC: 30 MMOL/L (ref 22–29)
COHB: 0.8 % (ref 0–1.5)
CREAT SERPL-MCNC: 0.8 MG/DL (ref 0.7–1.2)
CREAT SERPL-MCNC: 0.8 MG/DL (ref 0.7–1.2)
CRITICAL: ABNORMAL
DATE ANALYZED: ABNORMAL
DATE OF COLLECTION: ABNORMAL
EKG ATRIAL RATE: 101 BPM
EKG P AXIS: 16 DEGREES
EKG P-R INTERVAL: 186 MS
EKG Q-T INTERVAL: 368 MS
EKG QRS DURATION: 86 MS
EKG QTC CALCULATION (BAZETT): 477 MS
EKG R AXIS: -27 DEGREES
EKG T AXIS: -2 DEGREES
EKG VENTRICULAR RATE: 101 BPM
EOSINOPHIL # BLD: 0 K/UL (ref 0.05–0.5)
EOSINOPHILS RELATIVE PERCENT: 0 % (ref 0–6)
ERYTHROCYTE [DISTWIDTH] IN BLOOD BY AUTOMATED COUNT: 17.8 % (ref 11.5–15)
ERYTHROCYTE [DISTWIDTH] IN BLOOD BY AUTOMATED COUNT: 18.1 % (ref 11.5–15)
FLUAV RNA NPH QL NAA+NON-PROBE: NOT DETECTED
FLUBV RNA NPH QL NAA+NON-PROBE: NOT DETECTED
GFR SERPL CREATININE-BSD FRML MDRD: >60 ML/MIN/1.73M2
GFR SERPL CREATININE-BSD FRML MDRD: >60 ML/MIN/1.73M2
GLUCOSE SERPL-MCNC: 164 MG/DL (ref 74–99)
GLUCOSE SERPL-MCNC: 86 MG/DL (ref 74–99)
HADV DNA NPH QL NAA+NON-PROBE: NOT DETECTED
HCO3: 26.2 MMOL/L (ref 22–26)
HCOV 229E RNA NPH QL NAA+NON-PROBE: NOT DETECTED
HCOV HKU1 RNA NPH QL NAA+NON-PROBE: NOT DETECTED
HCOV NL63 RNA NPH QL NAA+NON-PROBE: NOT DETECTED
HCOV OC43 RNA NPH QL NAA+NON-PROBE: NOT DETECTED
HCT VFR BLD AUTO: 31.6 % (ref 37–54)
HCT VFR BLD AUTO: 31.9 % (ref 37–54)
HDLC SERPL-MCNC: 56 MG/DL
HGB BLD-MCNC: 10 G/DL (ref 12.5–16.5)
HGB BLD-MCNC: 9.7 G/DL (ref 12.5–16.5)
HHB: 12.8 % (ref 0–5)
HMPV RNA NPH QL NAA+NON-PROBE: NOT DETECTED
HPIV1 RNA NPH QL NAA+NON-PROBE: NOT DETECTED
HPIV2 RNA NPH QL NAA+NON-PROBE: NOT DETECTED
HPIV3 RNA NPH QL NAA+NON-PROBE: NOT DETECTED
HPIV4 RNA NPH QL NAA+NON-PROBE: NOT DETECTED
INFLUENZA A BY PCR: NOT DETECTED
INFLUENZA B BY PCR: NOT DETECTED
LAB: ABNORMAL
LDLC SERPL CALC-MCNC: 95 MG/DL
LYMPHOCYTES NFR BLD: 0.31 K/UL (ref 1.5–4)
LYMPHOCYTES RELATIVE PERCENT: 4 % (ref 20–42)
Lab: 1140
M PNEUMO DNA NPH QL NAA+NON-PROBE: NOT DETECTED
MCH RBC QN AUTO: 31.5 PG (ref 26–35)
MCH RBC QN AUTO: 31.8 PG (ref 26–35)
MCHC RBC AUTO-ENTMCNC: 30.4 G/DL (ref 32–34.5)
MCHC RBC AUTO-ENTMCNC: 31.6 G/DL (ref 32–34.5)
MCV RBC AUTO: 100.6 FL (ref 80–99.9)
MCV RBC AUTO: 103.6 FL (ref 80–99.9)
METHB: 0.3 % (ref 0–1.5)
MODE: ABNORMAL
MONOCYTES NFR BLD: 0.23 K/UL (ref 0.1–0.95)
MONOCYTES NFR BLD: 3 % (ref 2–12)
NEUTROPHILS NFR BLD: 94 % (ref 43–80)
NEUTS SEG NFR BLD: 8.26 K/UL (ref 1.8–7.3)
O2 CONTENT: 13.3 ML/DL
O2 SATURATION: 87.1 % (ref 92–98.5)
O2HB: 86.1 % (ref 94–97)
OPERATOR ID: 1023
PATIENT TEMP: 37 C
PCO2: 37.3 MMHG (ref 35–45)
PH BLOOD GAS: 7.46 (ref 7.35–7.45)
PLATELET # BLD AUTO: 154 K/UL (ref 130–450)
PLATELET # BLD AUTO: 158 K/UL (ref 130–450)
PMV BLD AUTO: 9.7 FL (ref 7–12)
PMV BLD AUTO: 9.7 FL (ref 7–12)
PO2: 51.3 MMHG (ref 75–100)
POTASSIUM SERPL-SCNC: 4 MMOL/L (ref 3.5–5)
POTASSIUM SERPL-SCNC: 4.1 MMOL/L (ref 3.5–5)
PROT SERPL-MCNC: 5.7 G/DL (ref 6.4–8.3)
PROT SERPL-MCNC: 6.4 G/DL (ref 6.4–8.3)
RBC # BLD AUTO: 3.08 M/UL (ref 3.8–5.8)
RBC # BLD AUTO: 3.14 M/UL (ref 3.8–5.8)
RBC # BLD: ABNORMAL 10*6/UL
RSV RNA NPH QL NAA+NON-PROBE: NOT DETECTED
RV+EV RNA NPH QL NAA+NON-PROBE: NOT DETECTED
SARS-COV-2 RDRP RESP QL NAA+PROBE: NOT DETECTED
SARS-COV-2 RNA NPH QL NAA+NON-PROBE: NOT DETECTED
SODIUM SERPL-SCNC: 139 MMOL/L (ref 132–146)
SODIUM SERPL-SCNC: 140 MMOL/L (ref 132–146)
SOURCE, BLOOD GAS: ABNORMAL
SPECIMEN DESCRIPTION: NORMAL
SPECIMEN DESCRIPTION: NORMAL
THB: 11 G/DL (ref 11.5–16.5)
TIME ANALYZED: 1154
TRIGL SERPL-MCNC: 60 MG/DL
TROPONIN I SERPL HS-MCNC: 52 NG/L (ref 0–11)
VLDLC SERPL CALC-MCNC: 12 MG/DL
WBC OTHER # BLD: 8.1 K/UL (ref 4.5–11.5)
WBC OTHER # BLD: 8.8 K/UL (ref 4.5–11.5)

## 2024-03-22 PROCEDURE — 87635 SARS-COV-2 COVID-19 AMP PRB: CPT

## 2024-03-22 PROCEDURE — 6370000000 HC RX 637 (ALT 250 FOR IP): Performed by: EMERGENCY MEDICINE

## 2024-03-22 PROCEDURE — 82805 BLOOD GASES W/O2 SATURATION: CPT

## 2024-03-22 PROCEDURE — 94640 AIRWAY INHALATION TREATMENT: CPT

## 2024-03-22 PROCEDURE — G0378 HOSPITAL OBSERVATION PER HR: HCPCS

## 2024-03-22 PROCEDURE — 99285 EMERGENCY DEPT VISIT HI MDM: CPT

## 2024-03-22 PROCEDURE — 0202U NFCT DS 22 TRGT SARS-COV-2: CPT

## 2024-03-22 PROCEDURE — 71046 X-RAY EXAM CHEST 2 VIEWS: CPT

## 2024-03-22 PROCEDURE — 94660 CPAP INITIATION&MGMT: CPT

## 2024-03-22 PROCEDURE — 87502 INFLUENZA DNA AMP PROBE: CPT

## 2024-03-22 PROCEDURE — 80053 COMPREHEN METABOLIC PANEL: CPT

## 2024-03-22 PROCEDURE — 2700000000 HC OXYGEN THERAPY PER DAY

## 2024-03-22 PROCEDURE — 6370000000 HC RX 637 (ALT 250 FOR IP): Performed by: FAMILY MEDICINE

## 2024-03-22 PROCEDURE — 85025 COMPLETE CBC W/AUTO DIFF WBC: CPT

## 2024-03-22 PROCEDURE — 93005 ELECTROCARDIOGRAM TRACING: CPT

## 2024-03-22 PROCEDURE — 84484 ASSAY OF TROPONIN QUANT: CPT

## 2024-03-22 PROCEDURE — 93010 ELECTROCARDIOGRAM REPORT: CPT | Performed by: INTERNAL MEDICINE

## 2024-03-22 PROCEDURE — 5A09557 ASSISTANCE WITH RESPIRATORY VENTILATION, GREATER THAN 96 CONSECUTIVE HOURS, CONTINUOUS POSITIVE AIRWAY PRESSURE: ICD-10-PCS | Performed by: FAMILY MEDICINE

## 2024-03-22 PROCEDURE — 2580000003 HC RX 258: Performed by: FAMILY MEDICINE

## 2024-03-22 PROCEDURE — 83880 ASSAY OF NATRIURETIC PEPTIDE: CPT

## 2024-03-22 RX ORDER — ISOSORBIDE MONONITRATE 30 MG/1
30 TABLET, EXTENDED RELEASE ORAL EVERY MORNING
Status: DISCONTINUED | OUTPATIENT
Start: 2024-03-23 | End: 2024-03-29 | Stop reason: HOSPADM

## 2024-03-22 RX ORDER — MIDODRINE HYDROCHLORIDE 5 MG/1
5 TABLET ORAL ONCE
Status: COMPLETED | OUTPATIENT
Start: 2024-03-22 | End: 2024-03-22

## 2024-03-22 RX ORDER — PANTOPRAZOLE SODIUM 40 MG/1
40 TABLET, DELAYED RELEASE ORAL
Status: DISCONTINUED | OUTPATIENT
Start: 2024-03-23 | End: 2024-03-29 | Stop reason: HOSPADM

## 2024-03-22 RX ORDER — MAGNESIUM SULFATE IN WATER 40 MG/ML
2000 INJECTION, SOLUTION INTRAVENOUS PRN
Status: DISCONTINUED | OUTPATIENT
Start: 2024-03-22 | End: 2024-03-29 | Stop reason: HOSPADM

## 2024-03-22 RX ORDER — CLOPIDOGREL BISULFATE 75 MG/1
75 TABLET ORAL EVERY MORNING
Status: DISCONTINUED | OUTPATIENT
Start: 2024-03-23 | End: 2024-03-29 | Stop reason: HOSPADM

## 2024-03-22 RX ORDER — PREDNISONE 10 MG/1
10 TABLET ORAL EVERY MORNING
Status: ON HOLD | COMMUNITY
End: 2024-03-29

## 2024-03-22 RX ORDER — SODIUM CHLORIDE 0.9 % (FLUSH) 0.9 %
10 SYRINGE (ML) INJECTION PRN
Status: DISCONTINUED | OUTPATIENT
Start: 2024-03-22 | End: 2024-03-29 | Stop reason: HOSPADM

## 2024-03-22 RX ORDER — POTASSIUM CHLORIDE 7.45 MG/ML
10 INJECTION INTRAVENOUS PRN
Status: DISCONTINUED | OUTPATIENT
Start: 2024-03-22 | End: 2024-03-29 | Stop reason: HOSPADM

## 2024-03-22 RX ORDER — ONDANSETRON 4 MG/1
4 TABLET, ORALLY DISINTEGRATING ORAL EVERY 8 HOURS PRN
Status: DISCONTINUED | OUTPATIENT
Start: 2024-03-22 | End: 2024-03-29 | Stop reason: HOSPADM

## 2024-03-22 RX ORDER — ACETAMINOPHEN 650 MG/1
650 SUPPOSITORY RECTAL EVERY 6 HOURS PRN
Status: DISCONTINUED | OUTPATIENT
Start: 2024-03-22 | End: 2024-03-29 | Stop reason: HOSPADM

## 2024-03-22 RX ORDER — SODIUM CHLORIDE 9 MG/ML
INJECTION, SOLUTION INTRAVENOUS PRN
Status: DISCONTINUED | OUTPATIENT
Start: 2024-03-22 | End: 2024-03-29 | Stop reason: HOSPADM

## 2024-03-22 RX ORDER — ISOSORBIDE MONONITRATE 30 MG/1
30 TABLET, EXTENDED RELEASE ORAL EVERY MORNING
COMMUNITY

## 2024-03-22 RX ORDER — FUROSEMIDE 10 MG/ML
20 INJECTION INTRAMUSCULAR; INTRAVENOUS ONCE
Status: DISCONTINUED | OUTPATIENT
Start: 2024-03-22 | End: 2024-03-29 | Stop reason: HOSPADM

## 2024-03-22 RX ORDER — IPRATROPIUM BROMIDE AND ALBUTEROL SULFATE 2.5; .5 MG/3ML; MG/3ML
1 SOLUTION RESPIRATORY (INHALATION) EVERY 4 HOURS PRN
Status: DISCONTINUED | OUTPATIENT
Start: 2024-03-22 | End: 2024-03-25

## 2024-03-22 RX ORDER — TIMOLOL MALEATE 5 MG/ML
1 SOLUTION/ DROPS OPHTHALMIC EVERY MORNING
Status: DISCONTINUED | OUTPATIENT
Start: 2024-03-23 | End: 2024-03-29 | Stop reason: HOSPADM

## 2024-03-22 RX ORDER — METOPROLOL SUCCINATE 25 MG/1
25 TABLET, EXTENDED RELEASE ORAL EVERY MORNING
Status: DISCONTINUED | OUTPATIENT
Start: 2024-03-23 | End: 2024-03-29 | Stop reason: HOSPADM

## 2024-03-22 RX ORDER — TAMSULOSIN HYDROCHLORIDE 0.4 MG/1
0.4 CAPSULE ORAL EVERY MORNING
COMMUNITY

## 2024-03-22 RX ORDER — CLOPIDOGREL BISULFATE 75 MG/1
75 TABLET ORAL EVERY MORNING
COMMUNITY

## 2024-03-22 RX ORDER — POLYETHYLENE GLYCOL 3350 17 G/17G
17 POWDER, FOR SOLUTION ORAL DAILY PRN
COMMUNITY

## 2024-03-22 RX ORDER — PANTOPRAZOLE SODIUM 40 MG/1
40 TABLET, DELAYED RELEASE ORAL
COMMUNITY

## 2024-03-22 RX ORDER — ONDANSETRON 2 MG/ML
4 INJECTION INTRAMUSCULAR; INTRAVENOUS EVERY 6 HOURS PRN
Status: DISCONTINUED | OUTPATIENT
Start: 2024-03-22 | End: 2024-03-29 | Stop reason: HOSPADM

## 2024-03-22 RX ORDER — ACETAMINOPHEN 325 MG/1
650 TABLET ORAL EVERY 4 HOURS PRN
Status: DISCONTINUED | OUTPATIENT
Start: 2024-03-22 | End: 2024-03-22 | Stop reason: SDUPTHER

## 2024-03-22 RX ORDER — IPRATROPIUM BROMIDE AND ALBUTEROL SULFATE 2.5; .5 MG/3ML; MG/3ML
1 SOLUTION RESPIRATORY (INHALATION)
Status: DISCONTINUED | OUTPATIENT
Start: 2024-03-23 | End: 2024-03-25

## 2024-03-22 RX ORDER — MIDODRINE HYDROCHLORIDE 5 MG/1
5 TABLET ORAL
Status: DISCONTINUED | OUTPATIENT
Start: 2024-03-23 | End: 2024-03-25

## 2024-03-22 RX ORDER — SODIUM CHLORIDE 0.9 % (FLUSH) 0.9 %
5-40 SYRINGE (ML) INJECTION EVERY 12 HOURS SCHEDULED
Status: DISCONTINUED | OUTPATIENT
Start: 2024-03-22 | End: 2024-03-29 | Stop reason: HOSPADM

## 2024-03-22 RX ORDER — ENOXAPARIN SODIUM 100 MG/ML
40 INJECTION SUBCUTANEOUS DAILY
Status: DISCONTINUED | OUTPATIENT
Start: 2024-03-23 | End: 2024-03-29 | Stop reason: HOSPADM

## 2024-03-22 RX ORDER — GUAIFENESIN 400 MG/1
400 TABLET ORAL 3 TIMES DAILY
Status: DISCONTINUED | OUTPATIENT
Start: 2024-03-22 | End: 2024-03-29 | Stop reason: HOSPADM

## 2024-03-22 RX ORDER — ROSUVASTATIN CALCIUM 5 MG/1
5 TABLET, COATED ORAL NIGHTLY
Status: DISCONTINUED | OUTPATIENT
Start: 2024-03-22 | End: 2024-03-29 | Stop reason: HOSPADM

## 2024-03-22 RX ORDER — BUMETANIDE 0.25 MG/ML
2 INJECTION INTRAMUSCULAR; INTRAVENOUS DAILY
Status: DISCONTINUED | OUTPATIENT
Start: 2024-03-23 | End: 2024-03-28

## 2024-03-22 RX ORDER — ACETAMINOPHEN 325 MG/1
650 TABLET ORAL EVERY 6 HOURS PRN
Status: DISCONTINUED | OUTPATIENT
Start: 2024-03-22 | End: 2024-03-29 | Stop reason: HOSPADM

## 2024-03-22 RX ORDER — PREDNISONE 5 MG/1
10 TABLET ORAL EVERY MORNING
Status: DISCONTINUED | OUTPATIENT
Start: 2024-03-23 | End: 2024-03-29 | Stop reason: HOSPADM

## 2024-03-22 RX ORDER — ASPIRIN 81 MG/1
81 TABLET ORAL EVERY MORNING
Status: DISCONTINUED | OUTPATIENT
Start: 2024-03-23 | End: 2024-03-29 | Stop reason: HOSPADM

## 2024-03-22 RX ORDER — CLOTRIMAZOLE AND BETAMETHASONE DIPROPIONATE 10; .64 MG/G; MG/G
CREAM TOPICAL 2 TIMES DAILY
Status: DISCONTINUED | OUTPATIENT
Start: 2024-03-22 | End: 2024-03-29 | Stop reason: HOSPADM

## 2024-03-22 RX ORDER — POTASSIUM CHLORIDE 20 MEQ/1
40 TABLET, EXTENDED RELEASE ORAL PRN
Status: DISCONTINUED | OUTPATIENT
Start: 2024-03-22 | End: 2024-03-29 | Stop reason: HOSPADM

## 2024-03-22 RX ORDER — TAMSULOSIN HYDROCHLORIDE 0.4 MG/1
0.4 CAPSULE ORAL EVERY MORNING
Status: DISCONTINUED | OUTPATIENT
Start: 2024-03-23 | End: 2024-03-29 | Stop reason: HOSPADM

## 2024-03-22 RX ADMIN — GUAIFENESIN 400 MG: 400 TABLET ORAL at 23:08

## 2024-03-22 RX ADMIN — ROSUVASTATIN CALCIUM 5 MG: 5 TABLET, FILM COATED ORAL at 23:08

## 2024-03-22 RX ADMIN — MIDODRINE HYDROCHLORIDE 5 MG: 5 TABLET ORAL at 12:48

## 2024-03-22 RX ADMIN — IPRATROPIUM BROMIDE AND ALBUTEROL SULFATE 1 DOSE: 2.5; .5 SOLUTION RESPIRATORY (INHALATION) at 23:26

## 2024-03-22 RX ADMIN — CLOTRIMAZOLE AND BETAMETHASONE DIPROPIONATE: 10; .5 CREAM TOPICAL at 23:08

## 2024-03-22 RX ADMIN — SODIUM CHLORIDE, PRESERVATIVE FREE 10 ML: 5 INJECTION INTRAVENOUS at 23:09

## 2024-03-22 ASSESSMENT — PAIN SCALES - GENERAL: PAINLEVEL_OUTOF10: 0

## 2024-03-22 NOTE — PROGRESS NOTES
ED to Inpatient Handoff Report    Notified Carolynn that electronic handoff available and patient ready for transport to room 526.    Safety Risks: Fall Risk    Patient in Restraints: no    Constant Observer or Patient : no    Telemetry Monitoring Ordered: Yes          Order to transfer to unit without monitor: NA    Last MEWS: . Time completed:   .    Deterioration Index: (!) 55.56    Vitals:    03/22/24 1745 03/22/24 1815 03/22/24 1845 03/22/24 1900   BP: (!) 89/64 (!) 88/62 105/77 99/71   Pulse: 89 87 92 98   Resp: 29 24 23 (!) 31   Temp:    98.1 °F (36.7 °C)   TempSrc:    Oral   SpO2: 94% 95%  97%   Weight:       Height:           Opportunity for questions and clarification was provided.

## 2024-03-22 NOTE — PROGRESS NOTES
Physician Progress Note      PATIENT:               CHAPIN SULLIVAN  [facility] #:                  [id number]  :                       1940  ADMIT DATE:       3/18/2024 2:28 PM  DISCH DATE:        3/21/2024 5:40 PM  RESPONDING  PROVIDER #:        Karyn Brock MD          QUERY TEXT:    Pt admitted with acute on chronic HF. If possible, please document in progress   notes and discharge summary further specificity regarding the type and acuity   of CHF:    The medical record reflects the following:  Risk Factors: HTN, CAD  Clinical Indicators: BNP 1616. CXR with Left lung opacities and pleural   effusion.  H&P \"Acute on chronic heart failure with hypoxia. IV Bumex 0.5 mg twice daily.   Echo 24: EF 60%, aortic bioprosthetic valve with mild AR, TR.\"  Treatment: Bumex IV, continue GDMT    Thank you,  Elizabeth Rodriguez RN, CCDS  Clinical Documentation   [email or web address]  [phone]  (M-F 6am-2:30 pm)  Options provided:  -- Acute on Chronic Diastolic CHF/HFpEF  -- Acute on Chronic Systolic CHF/HFrEF  -- Acute on Chronic Systolic and Diastolic CHF  -- Acute Systolic CHF/HFrEF  -- Acute Diastolic CHF/HFpEF  -- Acute Systolic and Diastolic CHF  -- Chronic Systolic CHF/HFrEF  -- Chronic Diastolic CHF/HFpEF  -- Chronic Systolic and Diastolic CHF  -- Other - I will add my own diagnosis  -- Disagree - Not applicable / Not valid  -- Disagree - Clinically unable to determine / Unknown  -- Refer to Clinical Documentation Reviewer    PROVIDER RESPONSE TEXT:    This patient is in acute on chronic systolic and diastolic CHF.    Query created by: Elizabeth Rodriguez on 3/20/2024 11:28 AM      QUERY TEXT:    Pt admitted with CHF.  Pt noted to wear 5L NC continuous. If possible, please   document in the progress notes and discharge summary if you are evaluating   and/or treating any of the following:    The medical record reflects the following:  Risk Factors: CHF on continuous oxygen  Clinical Indicators: ED \"He is

## 2024-03-22 NOTE — PROGRESS NOTES
Database initiated. Patient is A&O comes in from [facility]. He's been requiring a wheelchair and 5 liters at baseline. 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 He has fallen recently.

## 2024-03-22 NOTE — ED PROVIDER NOTES
[facility] EMERGENCY DEPARTMENT  EMERGENCY DEPARTMENT ENCOUNTER        Pt Name: Shiv Lee  MRN: [id number]  Birthdate 1940  Date of evaluation: 3/22/2024  Provider: Riky Solis MD  PCP: Gilson Ramon III, DO  Note Started: 2:06 PM EDT 3/22/24    CHIEF COMPLAINT       Chief Complaint   Patient presents with    Shortness of Breath     SOB from [facility] cough congestion. Wears 6 liters at facility. Was here recently for the same        HISTORY OF PRESENT ILLNESS: 1 or more Elements   History From: Patient.    Limitations to history : None    Shiv Lee is a 83 y.o. male who presents to the ED with complaints of shortness of breath.  Patient was discharged yesterday from the hospital, was admitted here for acute on chronic CHF.  According to EMS, patient started feeling progressively short of breath since this morning.  He is on 6 L nasal cannula at the nursing facility on his baseline.  He has been placed on 15 L nonrebreather by EMS.  According to wife, patient was discharged to the nursing facility without any diuretics.  His last echocardiogram on February showed ejection fraction of 60%.  Patient denies any chest pain.  Patient denies any recent fever, headache, nausea, abdominal pain, constipation, diarrhea or any other active complaints or injury.    Nursing Notes were all reviewed and agreed with or any disagreements were addressed in the HPI.    ROS:   Pertinent positives and negatives are stated within HPI, all other systems reviewed and are negative.    --------------------------------------------- PAST HISTORY ---------------------------------------------  Past Medical History:  has a past medical history of Abnormal EKG, Aortic stenosis, Atrial fibrillation (HCC), Coronary artery disease, HTN (hypertension), Infrarenal abdominal aortic aneurysm (AAA) without rupture (HCC), Left atrial dilatation, Mitral regurgitation, Nontraumatic  recent fever, headache, nausea, abdominal pain, constipation, diarrhea or any other active complaints or injury.       Patient has been placed on 15 L nonrebreather mask upon arrival to the ED.  Patient is saturating fine on 15 L nonrebreather.  Patient has been given midodrine 5 mg for his hypertension.  He is on midodrine for his baseline hypertension.  My differentials include but is not limited to COPD, pneumonia, pulmonary embolism, acute on chronic CHF.  CBC does not show any acute abnormalities.  BNP is 1064.  Troponin is downtrending.  Viral panel is negative.  CMP does not show any acute abnormalities.  ABG shows pH of 7.4, pO2 of 51, pCO2 37, bicarb 26.2.  EKG does not show any acute changes, no ST elevation or ST depression.  On imaging, chest x-ray shows    1. No change in moderate patchy infiltrate throughout the left lower lobe,  atelectasis versus pneumonia.  2. No change in mild congestive failure.  3. Hyperinflation consistent with COPD.  4. Multiple midthoracic compression fractures of indeterminate age    On repeat evaluation, blood pressure is 90/60.  Lasix has been held due to low blood pressure.  Patient is on 10 L high flow nasal cannula now.  He is saturating fine on 10 L high flow nasal cannula.  Patient is requiring more oxygen than his baseline.  Patient is to be admitted for hypoxic respiratory failure.  Inpatient admitting team has been consulted.  Decision to admit the patient has been made with shared decision making.      Vital signs upon arrival BP 91/67   Pulse 90   Temp 99.9 °F (37.7 °C) (Oral)   Resp 25   Ht 1.829 m (6')   Wt 83.9 kg (185 lb)   SpO2 100%   BMI 25.09 kg/m²         Medications this visit include:   Orders Placed This Encounter   Medications    midodrine (PROAMATINE) tablet 5 mg    furosemide (LASIX) injection 20 mg       Is this patient to be included in the SEP-1 core measure? No Exclusion criteria - the patient is NOT to be included for SEP-1 Core Measure

## 2024-03-22 NOTE — DISCHARGE SUMMARY
AP chest diameter.  These are of indeterminate age.     1. No change in moderate patchy infiltrate throughout the left lower lobe, atelectasis versus pneumonia. 2. No change in mild congestive failure. 3. Hyperinflation consistent with COPD. 4. Multiple midthoracic compression fractures of indeterminate age.       Discharge Exam:    HEENT: NCAT,  PERRLA, No JVD  Heart:  RRR, no murmurs, gallops, or rubs.  Lungs:  CTA bilaterally, no wheeze, rales or rhonchi  Abd: bowel sounds present, nontender, nondistended, no masses  Extrem:  No clubbing, cyanosis, or edema    Disposition: [facility]     Patient Condition at Discharge: stable    Patient Instructions:      Medication List        START taking these medications      midodrine 5 MG tablet  Commonly known as: PROAMATINE  Take 1 tablet by mouth 3 times daily (with meals)            CONTINUE taking these medications      acetaminophen 325 MG tablet  Commonly known as: TYLENOL     aspirin 81 MG EC tablet     clotrimazole-betamethasone 1-0.05 % cream  Commonly known as: LOTRISONE     guaiFENesin 400 MG tablet     * ipratropium 0.5 mg-albuterol 2.5 mg 0.5-2.5 (3) MG/3ML Soln nebulizer solution  Commonly known as: DUONEB     * ipratropium 0.5 mg-albuterol 2.5 mg 0.5-2.5 (3) MG/3ML Soln nebulizer solution  Commonly known as: DUONEB     metoprolol succinate 25 MG extended release tablet  Commonly known as: TOPROL XL     OXYGEN     rosuvastatin 5 MG tablet  Commonly known as: CRESTOR  Take 1 tablet by mouth nightly     sodium chloride 0.65 % nasal spray  Commonly known as: OCEAN, BABY AYR     timolol 0.5 % ophthalmic solution  Commonly known as: TIMOPTIC           * This list has 2 medication(s) that are the same as other medications prescribed for you. Read the directions carefully, and ask your doctor or other care provider to review them with you.                STOP taking these medications      amoxicillin-clavulanate 875-125 MG per tablet  Commonly known as: AUGMENTIN             ASK your doctor about these medications      predniSONE 10 MG tablet  Commonly known as: DELTASONE  Ask about: Should I take this medication?               Where to Get Your Medications        These medications were sent to [facility] - [place], [place] - [address] - P [phone] - F [phone]  [address] [place] [address]      Phone: [phone]   midodrine 5 MG tablet       Activity: activity as tolerated  Diet: cardiac diet, diabetic diet, and renal diet    Pt has been advised to:    Follow-up with Gilson Ramon III, DO in 1 week.  Follow-up with consultants as recommended by them    Note that over 30 minutes was spent in preparing discharge papers, discussing discharge with patient, medication review, etc.    Signed:  ANA Salvador CNP  3/21/2024  3:18 PM    Above note edited to reflect my thoughts     I personally provided care for the patient. Radiographs, labs and medication list were reviewed by me independently.  The case was discussed in detail and plans for care were established. Review of ALIZA Butcher   , documentation was conducted and revisions were made as appropriate directly by me. I agree with the above documented exam, problem list, and plan of care.     Karyn Brock MD  3/21/2024

## 2024-03-22 NOTE — ED NOTES
Called respiratory in regards to placing patient on PAP. RT spoke w/ Dr. Burger as well as family and deemed patient wound not be a good candidate for PAP.

## 2024-03-23 ENCOUNTER — APPOINTMENT (OUTPATIENT)
Dept: GENERAL RADIOLOGY | Age: 84
DRG: 189 | End: 2024-03-23
Payer: MEDICARE

## 2024-03-23 LAB
ANION GAP SERPL CALCULATED.3IONS-SCNC: 10 MMOL/L (ref 7–16)
BUN SERPL-MCNC: 20 MG/DL (ref 6–23)
CALCIUM SERPL-MCNC: 8.5 MG/DL (ref 8.6–10.2)
CHLORIDE SERPL-SCNC: 101 MMOL/L (ref 98–107)
CHOLEST SERPL-MCNC: 168 MG/DL
CO2 SERPL-SCNC: 28 MMOL/L (ref 22–29)
CREAT SERPL-MCNC: 0.7 MG/DL (ref 0.7–1.2)
GFR SERPL CREATININE-BSD FRML MDRD: >60 ML/MIN/1.73M2
GLUCOSE SERPL-MCNC: 93 MG/DL (ref 74–99)
HDLC SERPL-MCNC: 56 MG/DL
IRON SATN MFR SERPL: 29 % (ref 20–55)
IRON SERPL-MCNC: 58 UG/DL (ref 59–158)
LDLC SERPL CALC-MCNC: 99 MG/DL
MAGNESIUM SERPL-MCNC: 2.1 MG/DL (ref 1.6–2.6)
POTASSIUM SERPL-SCNC: 3.9 MMOL/L (ref 3.5–5)
SODIUM SERPL-SCNC: 139 MMOL/L (ref 132–146)
TIBC SERPL-MCNC: 199 UG/DL (ref 250–450)
TRIGL SERPL-MCNC: 64 MG/DL
VLDLC SERPL CALC-MCNC: 13 MG/DL

## 2024-03-23 PROCEDURE — 2580000003 HC RX 258: Performed by: FAMILY MEDICINE

## 2024-03-23 PROCEDURE — 2700000000 HC OXYGEN THERAPY PER DAY

## 2024-03-23 PROCEDURE — 73610 X-RAY EXAM OF ANKLE: CPT

## 2024-03-23 PROCEDURE — 83550 IRON BINDING TEST: CPT

## 2024-03-23 PROCEDURE — 94660 CPAP INITIATION&MGMT: CPT

## 2024-03-23 PROCEDURE — 96374 THER/PROPH/DIAG INJ IV PUSH: CPT

## 2024-03-23 PROCEDURE — G0378 HOSPITAL OBSERVATION PER HR: HCPCS

## 2024-03-23 PROCEDURE — 6360000002 HC RX W HCPCS: Performed by: FAMILY MEDICINE

## 2024-03-23 PROCEDURE — 83540 ASSAY OF IRON: CPT

## 2024-03-23 PROCEDURE — 94640 AIRWAY INHALATION TREATMENT: CPT

## 2024-03-23 PROCEDURE — 96372 THER/PROPH/DIAG INJ SC/IM: CPT

## 2024-03-23 PROCEDURE — 6370000000 HC RX 637 (ALT 250 FOR IP): Performed by: FAMILY MEDICINE

## 2024-03-23 PROCEDURE — 80048 BASIC METABOLIC PNL TOTAL CA: CPT

## 2024-03-23 PROCEDURE — 80061 LIPID PANEL: CPT

## 2024-03-23 PROCEDURE — 83735 ASSAY OF MAGNESIUM: CPT

## 2024-03-23 PROCEDURE — 84550 ASSAY OF BLOOD/URIC ACID: CPT

## 2024-03-23 RX ADMIN — PANTOPRAZOLE SODIUM 40 MG: 40 TABLET, DELAYED RELEASE ORAL at 05:10

## 2024-03-23 RX ADMIN — IPRATROPIUM BROMIDE AND ALBUTEROL SULFATE 1 DOSE: 2.5; .5 SOLUTION RESPIRATORY (INHALATION) at 16:28

## 2024-03-23 RX ADMIN — MIDODRINE HYDROCHLORIDE 5 MG: 5 TABLET ORAL at 12:07

## 2024-03-23 RX ADMIN — CLOTRIMAZOLE AND BETAMETHASONE DIPROPIONATE: 10; .5 CREAM TOPICAL at 19:47

## 2024-03-23 RX ADMIN — GUAIFENESIN 400 MG: 400 TABLET ORAL at 19:48

## 2024-03-23 RX ADMIN — SODIUM CHLORIDE, PRESERVATIVE FREE 10 ML: 5 INJECTION INTRAVENOUS at 09:13

## 2024-03-23 RX ADMIN — PREDNISONE 10 MG: 5 TABLET ORAL at 09:12

## 2024-03-23 RX ADMIN — ROSUVASTATIN CALCIUM 5 MG: 5 TABLET, FILM COATED ORAL at 19:48

## 2024-03-23 RX ADMIN — CLOTRIMAZOLE AND BETAMETHASONE DIPROPIONATE: 10; .5 CREAM TOPICAL at 09:11

## 2024-03-23 RX ADMIN — TAMSULOSIN HYDROCHLORIDE 0.4 MG: 0.4 CAPSULE ORAL at 09:12

## 2024-03-23 RX ADMIN — MIDODRINE HYDROCHLORIDE 5 MG: 5 TABLET ORAL at 16:58

## 2024-03-23 RX ADMIN — CLOPIDOGREL BISULFATE 75 MG: 75 TABLET ORAL at 09:11

## 2024-03-23 RX ADMIN — ENOXAPARIN SODIUM 40 MG: 100 INJECTION SUBCUTANEOUS at 09:12

## 2024-03-23 RX ADMIN — IPRATROPIUM BROMIDE AND ALBUTEROL SULFATE 1 DOSE: 2.5; .5 SOLUTION RESPIRATORY (INHALATION) at 09:56

## 2024-03-23 RX ADMIN — TIMOLOL MALEATE 1 DROP: 5 SOLUTION OPHTHALMIC at 09:13

## 2024-03-23 RX ADMIN — SODIUM CHLORIDE, PRESERVATIVE FREE 10 ML: 5 INJECTION INTRAVENOUS at 19:49

## 2024-03-23 RX ADMIN — GUAIFENESIN 400 MG: 400 TABLET ORAL at 14:58

## 2024-03-23 RX ADMIN — MIDODRINE HYDROCHLORIDE 5 MG: 5 TABLET ORAL at 09:13

## 2024-03-23 RX ADMIN — ASPIRIN 81 MG: 81 TABLET, COATED ORAL at 09:11

## 2024-03-23 RX ADMIN — BUMETANIDE 2 MG: 0.25 INJECTION INTRAMUSCULAR; INTRAVENOUS at 09:11

## 2024-03-23 RX ADMIN — IPRATROPIUM BROMIDE AND ALBUTEROL SULFATE 1 DOSE: 2.5; .5 SOLUTION RESPIRATORY (INHALATION) at 14:21

## 2024-03-23 RX ADMIN — IPRATROPIUM BROMIDE AND ALBUTEROL SULFATE 1 DOSE: 2.5; .5 SOLUTION RESPIRATORY (INHALATION) at 20:29

## 2024-03-23 RX ADMIN — GUAIFENESIN 400 MG: 400 TABLET ORAL at 09:12

## 2024-03-23 ASSESSMENT — PAIN SCALES - GENERAL: PAINLEVEL_OUTOF10: 0

## 2024-03-23 NOTE — H&P
11:51 AM    HGB 9.7 03/22/2024 11:51 AM    HCT 31.9 03/22/2024 11:51 AM     03/22/2024 11:51 AM    .6 03/22/2024 11:51 AM    MCH 31.5 03/22/2024 11:51 AM    MCHC 30.4 03/22/2024 11:51 AM    RDW 17.8 03/22/2024 11:51 AM    NRBC Unable to perform testing: Specimen clotted. 02/24/2024 05:33 AM    METASPCT 1 03/06/2024 08:39 AM    LYMPHOPCT 4 03/22/2024 11:51 AM    PROMYELOPCT Unable to perform testing: Specimen clotted. 02/24/2024 05:33 AM    MONOPCT 3 03/22/2024 11:51 AM    MYELOPCT Unable to perform testing: Specimen clotted. 02/24/2024 05:33 AM    BASOPCT 0 03/22/2024 11:51 AM    MONOSABS 0.23 03/22/2024 11:51 AM    LYMPHSABS 0.31 03/22/2024 11:51 AM    EOSABS 0.00 03/22/2024 11:51 AM    BASOSABS 0.00 03/22/2024 11:51 AM     CMP:    Lab Results   Component Value Date/Time     03/23/2024 05:24 AM    K 3.9 03/23/2024 05:24 AM     03/23/2024 05:24 AM    CO2 28 03/23/2024 05:24 AM    BUN 20 03/23/2024 05:24 AM    CREATININE 0.7 03/23/2024 05:24 AM    GFRAA >60 06/18/2020 09:49 AM    LABGLOM >60 03/23/2024 05:24 AM    GLUCOSE 93 03/23/2024 05:24 AM    PROT 6.4 03/22/2024 11:51 AM    LABALBU 3.7 03/22/2024 11:51 AM    CALCIUM 8.5 03/23/2024 05:24 AM    BILITOT 0.9 03/22/2024 11:51 AM    ALKPHOS 69 03/22/2024 11:51 AM    AST 15 03/22/2024 11:51 AM    ALT 15 03/22/2024 11:51 AM       Imaging:  CXR: No change in moderate patchy infiltrate throughout the left lower lobe, atelectasis versus pneumonia.  No change of mild congestive failure.  Hyperinflation consistent with COPD.  Multiple midthoracic compression fractures of indeterminate age.      Telemetry:  I've personally reviewed the patient's telemetry:      ASSESSMENT/PLAN:  Principal Problem:    Respiratory distress  Resolved Problems:    * No resolved hospital problems. *    83-year-old male who was recently discharged 2 days ago presents to the ED from facility with complaints of respiratory distress and is admitted to telemetry unit

## 2024-03-23 NOTE — PLAN OF CARE

## 2024-03-23 NOTE — PROGRESS NOTES
4 Eyes Skin Assessment     NAME:  Shiv Lee  YOB: 1940  MEDICAL RECORD NUMBER:  [id number]    The patient is being assessed for  Admission    I agree that at least one RN has performed a thorough Head to Toe Skin Assessment on the patient. ALL assessment sites listed below have been assessed.      Areas assessed by both nurses:    Head, Face, Ears, Shoulders, Back, Chest, Arms, Elbows, Hands, Sacrum. Buttock, Coccyx, Ischium, Legs. Feet and Heels, and Under Medical Devices         Does the Patient have a Wound? Yes wound(s) were present on assessment. LDA wound assessment was Initiated and completed by RN       Bulmaro Prevention initiated by RN: Yes  Wound Care Orders initiated by RN: Yes    Pressure Injury (Stage 3,4, Unstageable, DTI, NWPT, and Complex wounds) if present, place Wound referral order by RN under : No    New Ostomies, if present place, Ostomy referral order under : No     Nurse 1 eSignature: Electronically signed by Yola Huntley RN on 3/23/24 at 4:39 AM EDT    **SHARE this note so that the co-signing nurse can place an eSignature**    Nurse 2 eSignature: Electronically signed by Sara Cesar RN on 3/23/24 at 4:40 AM EDT

## 2024-03-23 NOTE — PLAN OF CARE
Problem: ABCDS Injury Assessment  Goal: Absence of physical injury  3/23/2024 1606 by Zoey Pedraza RN  Outcome: Progressing  3/23/2024 0500 by Yola Huntley RN  Outcome: Progressing     Problem: Skin/Tissue Integrity  Goal: Absence of new skin breakdown  Description: 1.  Monitor for areas of redness and/or skin breakdown  2.  Assess vascular access sites hourly  3.  Every 4-6 hours minimum:  Change oxygen saturation probe site  4.  Every 4-6 hours:  If on nasal continuous positive airway pressure, respiratory therapy assess nares and determine need for appliance change or resting period.  3/23/2024 1606 by Zoey Pedraza RN  Outcome: Progressing  3/23/2024 0500 by Yola Huntley RN  Outcome: Progressing     Problem: Discharge Planning  Goal: Discharge to home or other facility with appropriate resources  3/23/2024 1606 by Zoey Pedraza RN  Outcome: Progressing  3/23/2024 0500 by Yola Huntley RN  Outcome: Progressing     Problem: Safety - Adult  Goal: Free from fall injury  3/23/2024 1606 by Zoey Pedraza RN  Outcome: Progressing  3/23/2024 0500 by Yola Huntley RN  Outcome: Progressing

## 2024-03-24 LAB
ANION GAP SERPL CALCULATED.3IONS-SCNC: 8 MMOL/L (ref 7–16)
BNP SERPL-MCNC: 969 PG/ML (ref 0–450)
BUN SERPL-MCNC: 30 MG/DL (ref 6–23)
CALCIUM SERPL-MCNC: 8.5 MG/DL (ref 8.6–10.2)
CHLORIDE SERPL-SCNC: 98 MMOL/L (ref 98–107)
CO2 SERPL-SCNC: 29 MMOL/L (ref 22–29)
CREAT SERPL-MCNC: 0.9 MG/DL (ref 0.7–1.2)
GFR SERPL CREATININE-BSD FRML MDRD: >60 ML/MIN/1.73M2
GLUCOSE SERPL-MCNC: 107 MG/DL (ref 74–99)
MAGNESIUM SERPL-MCNC: 2 MG/DL (ref 1.6–2.6)
POTASSIUM SERPL-SCNC: 3.7 MMOL/L (ref 3.5–5)
SODIUM SERPL-SCNC: 135 MMOL/L (ref 132–146)
URATE SERPL-MCNC: 6.4 MG/DL (ref 3.4–7)

## 2024-03-24 PROCEDURE — 2580000003 HC RX 258: Performed by: FAMILY MEDICINE

## 2024-03-24 PROCEDURE — 83735 ASSAY OF MAGNESIUM: CPT

## 2024-03-24 PROCEDURE — 94640 AIRWAY INHALATION TREATMENT: CPT

## 2024-03-24 PROCEDURE — 96376 TX/PRO/DX INJ SAME DRUG ADON: CPT

## 2024-03-24 PROCEDURE — 94660 CPAP INITIATION&MGMT: CPT

## 2024-03-24 PROCEDURE — 96372 THER/PROPH/DIAG INJ SC/IM: CPT

## 2024-03-24 PROCEDURE — 6370000000 HC RX 637 (ALT 250 FOR IP): Performed by: FAMILY MEDICINE

## 2024-03-24 PROCEDURE — 80048 BASIC METABOLIC PNL TOTAL CA: CPT

## 2024-03-24 PROCEDURE — 83880 ASSAY OF NATRIURETIC PEPTIDE: CPT

## 2024-03-24 PROCEDURE — 2700000000 HC OXYGEN THERAPY PER DAY

## 2024-03-24 PROCEDURE — 6360000002 HC RX W HCPCS: Performed by: FAMILY MEDICINE

## 2024-03-24 PROCEDURE — G0378 HOSPITAL OBSERVATION PER HR: HCPCS

## 2024-03-24 RX ADMIN — GUAIFENESIN 400 MG: 400 TABLET ORAL at 22:46

## 2024-03-24 RX ADMIN — TAMSULOSIN HYDROCHLORIDE 0.4 MG: 0.4 CAPSULE ORAL at 09:27

## 2024-03-24 RX ADMIN — CLOPIDOGREL BISULFATE 75 MG: 75 TABLET ORAL at 09:27

## 2024-03-24 RX ADMIN — CLOTRIMAZOLE AND BETAMETHASONE DIPROPIONATE: 10; .5 CREAM TOPICAL at 22:47

## 2024-03-24 RX ADMIN — GUAIFENESIN 400 MG: 400 TABLET ORAL at 09:27

## 2024-03-24 RX ADMIN — IPRATROPIUM BROMIDE AND ALBUTEROL SULFATE 1 DOSE: 2.5; .5 SOLUTION RESPIRATORY (INHALATION) at 13:43

## 2024-03-24 RX ADMIN — IPRATROPIUM BROMIDE AND ALBUTEROL SULFATE 1 DOSE: 2.5; .5 SOLUTION RESPIRATORY (INHALATION) at 19:58

## 2024-03-24 RX ADMIN — GUAIFENESIN 400 MG: 400 TABLET ORAL at 15:52

## 2024-03-24 RX ADMIN — METOPROLOL SUCCINATE 25 MG: 25 TABLET, FILM COATED, EXTENDED RELEASE ORAL at 09:27

## 2024-03-24 RX ADMIN — IPRATROPIUM BROMIDE AND ALBUTEROL SULFATE 1 DOSE: 2.5; .5 SOLUTION RESPIRATORY (INHALATION) at 16:22

## 2024-03-24 RX ADMIN — SODIUM CHLORIDE, PRESERVATIVE FREE 10 ML: 5 INJECTION INTRAVENOUS at 22:47

## 2024-03-24 RX ADMIN — ASPIRIN 81 MG: 81 TABLET, COATED ORAL at 09:27

## 2024-03-24 RX ADMIN — IPRATROPIUM BROMIDE AND ALBUTEROL SULFATE 1 DOSE: 2.5; .5 SOLUTION RESPIRATORY (INHALATION) at 00:16

## 2024-03-24 RX ADMIN — ISOSORBIDE MONONITRATE 30 MG: 30 TABLET, EXTENDED RELEASE ORAL at 09:27

## 2024-03-24 RX ADMIN — MIDODRINE HYDROCHLORIDE 5 MG: 5 TABLET ORAL at 15:52

## 2024-03-24 RX ADMIN — IPRATROPIUM BROMIDE AND ALBUTEROL SULFATE 1 DOSE: 2.5; .5 SOLUTION RESPIRATORY (INHALATION) at 03:23

## 2024-03-24 RX ADMIN — TIMOLOL MALEATE 1 DROP: 5 SOLUTION OPHTHALMIC at 09:32

## 2024-03-24 RX ADMIN — IPRATROPIUM BROMIDE AND ALBUTEROL SULFATE 1 DOSE: 2.5; .5 SOLUTION RESPIRATORY (INHALATION) at 08:50

## 2024-03-24 RX ADMIN — PANTOPRAZOLE SODIUM 40 MG: 40 TABLET, DELAYED RELEASE ORAL at 05:55

## 2024-03-24 RX ADMIN — CLOTRIMAZOLE AND BETAMETHASONE DIPROPIONATE: 10; .5 CREAM TOPICAL at 09:32

## 2024-03-24 RX ADMIN — PREDNISONE 10 MG: 5 TABLET ORAL at 09:27

## 2024-03-24 RX ADMIN — MIDODRINE HYDROCHLORIDE 5 MG: 5 TABLET ORAL at 12:51

## 2024-03-24 RX ADMIN — BUMETANIDE 2 MG: 0.25 INJECTION INTRAMUSCULAR; INTRAVENOUS at 09:26

## 2024-03-24 RX ADMIN — ROSUVASTATIN CALCIUM 5 MG: 5 TABLET, FILM COATED ORAL at 22:46

## 2024-03-24 RX ADMIN — SODIUM CHLORIDE, PRESERVATIVE FREE 10 ML: 5 INJECTION INTRAVENOUS at 09:33

## 2024-03-24 RX ADMIN — IPRATROPIUM BROMIDE AND ALBUTEROL SULFATE 1 DOSE: 2.5; .5 SOLUTION RESPIRATORY (INHALATION) at 23:32

## 2024-03-24 RX ADMIN — ENOXAPARIN SODIUM 40 MG: 100 INJECTION SUBCUTANEOUS at 09:26

## 2024-03-24 ASSESSMENT — PAIN SCALES - GENERAL
PAINLEVEL_OUTOF10: 0
PAINLEVEL_OUTOF10: 0

## 2024-03-24 NOTE — PROGRESS NOTES
a.m. to guide therapy    Code Status: Limited code on previous admission  Consultants: None  DVT Prophylaxis   PT/OT  Discharge planning         I have spent a total time of 25 minutes of this patient encounter reviewing chart, labs, radiological reports coordinating care with interdisciplinary teams, face to face encounter with patient, providing counseling/education to patient/family, and formulating plan.       Karyn Brock MD  1:40 PM  3/24/2024

## 2024-03-24 NOTE — PLAN OF CARE
Problem: ABCDS Injury Assessment  Goal: Absence of physical injury  3/24/2024 1315 by Sheila Babcock RN  Outcome: Progressing  3/24/2024 0111 by Muna Avilez RN  Outcome: Progressing     Problem: Skin/Tissue Integrity  Goal: Absence of new skin breakdown  Description: 1.  Monitor for areas of redness and/or skin breakdown  2.  Assess vascular access sites hourly  3.  Every 4-6 hours minimum:  Change oxygen saturation probe site  4.  Every 4-6 hours:  If on nasal continuous positive airway pressure, respiratory therapy assess nares and determine need for appliance change or resting period.  3/24/2024 1315 by Sheila Babcock, RN  Outcome: Progressing  3/24/2024 0111 by Muna Avilez RN  Outcome: Progressing     Problem: Safety - Adult  Goal: Free from fall injury  3/24/2024 1315 by Sheila Babcock RN  Outcome: Progressing  3/24/2024 0111 by Muna Avilez, RN  Outcome: Progressing

## 2024-03-25 PROBLEM — J96.20 ACUTE ON CHRONIC RESPIRATORY FAILURE (HCC): Status: ACTIVE | Noted: 2024-03-25

## 2024-03-25 LAB
ANION GAP SERPL CALCULATED.3IONS-SCNC: 11 MMOL/L (ref 7–16)
BASOPHILS # BLD: 0.04 K/UL (ref 0–0.2)
BASOPHILS NFR BLD: 1 % (ref 0–2)
BUN SERPL-MCNC: 28 MG/DL (ref 6–23)
CALCIUM SERPL-MCNC: 8.5 MG/DL (ref 8.6–10.2)
CHLORIDE SERPL-SCNC: 101 MMOL/L (ref 98–107)
CO2 SERPL-SCNC: 30 MMOL/L (ref 22–29)
CREAT SERPL-MCNC: 0.7 MG/DL (ref 0.7–1.2)
CRP SERPL HS-MCNC: 25 MG/L (ref 0–5)
EOSINOPHIL # BLD: 0.35 K/UL (ref 0.05–0.5)
EOSINOPHILS RELATIVE PERCENT: 4 % (ref 0–6)
ERYTHROCYTE [DISTWIDTH] IN BLOOD BY AUTOMATED COUNT: 17.2 % (ref 11.5–15)
ERYTHROCYTE [SEDIMENTATION RATE] IN BLOOD BY WESTERGREN METHOD: 66 MM/HR (ref 0–15)
GFR SERPL CREATININE-BSD FRML MDRD: >60 ML/MIN/1.73M2
GLUCOSE SERPL-MCNC: 114 MG/DL (ref 74–99)
HCT VFR BLD AUTO: 30.1 % (ref 37–54)
HGB BLD-MCNC: 9.1 G/DL (ref 12.5–16.5)
IMM GRANULOCYTES # BLD AUTO: 0.05 K/UL (ref 0–0.58)
IMM GRANULOCYTES NFR BLD: 1 % (ref 0–5)
LYMPHOCYTES NFR BLD: 1.26 K/UL (ref 1.5–4)
LYMPHOCYTES RELATIVE PERCENT: 16 % (ref 20–42)
MAGNESIUM SERPL-MCNC: 1.9 MG/DL (ref 1.6–2.6)
MCH RBC QN AUTO: 30.8 PG (ref 26–35)
MCHC RBC AUTO-ENTMCNC: 30.2 G/DL (ref 32–34.5)
MCV RBC AUTO: 102 FL (ref 80–99.9)
MONOCYTES NFR BLD: 0.71 K/UL (ref 0.1–0.95)
MONOCYTES NFR BLD: 9 % (ref 2–12)
NEUTROPHILS NFR BLD: 70 % (ref 43–80)
NEUTS SEG NFR BLD: 5.68 K/UL (ref 1.8–7.3)
PLATELET # BLD AUTO: 202 K/UL (ref 130–450)
PMV BLD AUTO: 10 FL (ref 7–12)
POTASSIUM SERPL-SCNC: 3.3 MMOL/L (ref 3.5–5)
RBC # BLD AUTO: 2.95 M/UL (ref 3.8–5.8)
SODIUM SERPL-SCNC: 142 MMOL/L (ref 132–146)
URATE SERPL-MCNC: 6.9 MG/DL (ref 3.4–7)
WBC OTHER # BLD: 8.1 K/UL (ref 4.5–11.5)

## 2024-03-25 PROCEDURE — 2580000003 HC RX 258: Performed by: FAMILY MEDICINE

## 2024-03-25 PROCEDURE — 85652 RBC SED RATE AUTOMATED: CPT

## 2024-03-25 PROCEDURE — G0378 HOSPITAL OBSERVATION PER HR: HCPCS

## 2024-03-25 PROCEDURE — 80048 BASIC METABOLIC PNL TOTAL CA: CPT

## 2024-03-25 PROCEDURE — 6370000000 HC RX 637 (ALT 250 FOR IP): Performed by: FAMILY MEDICINE

## 2024-03-25 PROCEDURE — 36415 COLL VENOUS BLD VENIPUNCTURE: CPT

## 2024-03-25 PROCEDURE — 1200000000 HC SEMI PRIVATE

## 2024-03-25 PROCEDURE — 6360000002 HC RX W HCPCS: Performed by: FAMILY MEDICINE

## 2024-03-25 PROCEDURE — 94660 CPAP INITIATION&MGMT: CPT

## 2024-03-25 PROCEDURE — 86140 C-REACTIVE PROTEIN: CPT

## 2024-03-25 PROCEDURE — 94640 AIRWAY INHALATION TREATMENT: CPT

## 2024-03-25 PROCEDURE — 84550 ASSAY OF BLOOD/URIC ACID: CPT

## 2024-03-25 PROCEDURE — 85025 COMPLETE CBC W/AUTO DIFF WBC: CPT

## 2024-03-25 PROCEDURE — 83735 ASSAY OF MAGNESIUM: CPT

## 2024-03-25 PROCEDURE — 2700000000 HC OXYGEN THERAPY PER DAY

## 2024-03-25 RX ORDER — MIDODRINE HYDROCHLORIDE 5 MG/1
10 TABLET ORAL
Status: DISCONTINUED | OUTPATIENT
Start: 2024-03-25 | End: 2024-03-29 | Stop reason: HOSPADM

## 2024-03-25 RX ORDER — INDOMETHACIN 75 MG/1
75 CAPSULE, EXTENDED RELEASE ORAL
Status: DISCONTINUED | OUTPATIENT
Start: 2024-03-26 | End: 2024-03-29 | Stop reason: HOSPADM

## 2024-03-25 RX ORDER — OXYCODONE HYDROCHLORIDE AND ACETAMINOPHEN 5; 325 MG/1; MG/1
1 TABLET ORAL EVERY 4 HOURS PRN
Status: DISCONTINUED | OUTPATIENT
Start: 2024-03-25 | End: 2024-03-29 | Stop reason: HOSPADM

## 2024-03-25 RX ORDER — LEVALBUTEROL INHALATION SOLUTION 0.63 MG/3ML
0.63 SOLUTION RESPIRATORY (INHALATION) EVERY 8 HOURS PRN
Status: DISCONTINUED | OUTPATIENT
Start: 2024-03-25 | End: 2024-03-29 | Stop reason: HOSPADM

## 2024-03-25 RX ORDER — ARFORMOTEROL TARTRATE 15 UG/2ML
15 SOLUTION RESPIRATORY (INHALATION)
Status: DISCONTINUED | OUTPATIENT
Start: 2024-03-25 | End: 2024-03-29 | Stop reason: HOSPADM

## 2024-03-25 RX ADMIN — GUAIFENESIN 400 MG: 400 TABLET ORAL at 09:15

## 2024-03-25 RX ADMIN — PANTOPRAZOLE SODIUM 40 MG: 40 TABLET, DELAYED RELEASE ORAL at 06:05

## 2024-03-25 RX ADMIN — MIDODRINE HYDROCHLORIDE 10 MG: 5 TABLET ORAL at 17:49

## 2024-03-25 RX ADMIN — TIMOLOL MALEATE 1 DROP: 5 SOLUTION OPHTHALMIC at 09:19

## 2024-03-25 RX ADMIN — PREDNISONE 10 MG: 5 TABLET ORAL at 09:16

## 2024-03-25 RX ADMIN — IPRATROPIUM BROMIDE AND ALBUTEROL SULFATE 1 DOSE: 2.5; .5 SOLUTION RESPIRATORY (INHALATION) at 08:13

## 2024-03-25 RX ADMIN — CLOTRIMAZOLE AND BETAMETHASONE DIPROPIONATE: 10; .5 CREAM TOPICAL at 20:32

## 2024-03-25 RX ADMIN — TAMSULOSIN HYDROCHLORIDE 0.4 MG: 0.4 CAPSULE ORAL at 09:17

## 2024-03-25 RX ADMIN — ARFORMOTEROL TARTRATE 15 MCG: 15 SOLUTION RESPIRATORY (INHALATION) at 11:48

## 2024-03-25 RX ADMIN — IPRATROPIUM BROMIDE 0.5 MG: 0.5 SOLUTION RESPIRATORY (INHALATION) at 16:40

## 2024-03-25 RX ADMIN — ASPIRIN 81 MG: 81 TABLET, COATED ORAL at 09:16

## 2024-03-25 RX ADMIN — ROSUVASTATIN CALCIUM 5 MG: 5 TABLET, FILM COATED ORAL at 20:32

## 2024-03-25 RX ADMIN — IPRATROPIUM BROMIDE 0.5 MG: 0.5 SOLUTION RESPIRATORY (INHALATION) at 11:48

## 2024-03-25 RX ADMIN — SODIUM CHLORIDE, PRESERVATIVE FREE 10 ML: 5 INJECTION INTRAVENOUS at 09:20

## 2024-03-25 RX ADMIN — BUMETANIDE 2 MG: 0.25 INJECTION INTRAMUSCULAR; INTRAVENOUS at 09:17

## 2024-03-25 RX ADMIN — GUAIFENESIN 400 MG: 400 TABLET ORAL at 14:14

## 2024-03-25 RX ADMIN — ARFORMOTEROL TARTRATE 15 MCG: 15 SOLUTION RESPIRATORY (INHALATION) at 22:09

## 2024-03-25 RX ADMIN — MIDODRINE HYDROCHLORIDE 5 MG: 5 TABLET ORAL at 12:38

## 2024-03-25 RX ADMIN — IPRATROPIUM BROMIDE 0.5 MG: 0.5 SOLUTION RESPIRATORY (INHALATION) at 22:08

## 2024-03-25 RX ADMIN — CLOPIDOGREL BISULFATE 75 MG: 75 TABLET ORAL at 09:17

## 2024-03-25 RX ADMIN — MIDODRINE HYDROCHLORIDE 5 MG: 5 TABLET ORAL at 09:16

## 2024-03-25 RX ADMIN — CLOTRIMAZOLE AND BETAMETHASONE DIPROPIONATE: 10; .5 CREAM TOPICAL at 09:19

## 2024-03-25 RX ADMIN — IPRATROPIUM BROMIDE AND ALBUTEROL SULFATE 1 DOSE: 2.5; .5 SOLUTION RESPIRATORY (INHALATION) at 04:10

## 2024-03-25 RX ADMIN — GUAIFENESIN 400 MG: 400 TABLET ORAL at 20:32

## 2024-03-25 RX ADMIN — ENOXAPARIN SODIUM 40 MG: 100 INJECTION SUBCUTANEOUS at 09:15

## 2024-03-25 RX ADMIN — SODIUM CHLORIDE, PRESERVATIVE FREE 10 ML: 5 INJECTION INTRAVENOUS at 20:32

## 2024-03-25 ASSESSMENT — PAIN SCALES - WONG BAKER: WONGBAKER_NUMERICALRESPONSE: NO HURT

## 2024-03-25 ASSESSMENT — PAIN SCALES - GENERAL
PAINLEVEL_OUTOF10: 0
PAINLEVEL_OUTOF10: 0

## 2024-03-25 NOTE — PLAN OF CARE
Problem: ABCDS Injury Assessment  Goal: Absence of physical injury  3/25/2024 1714 by Zoey Pedraza RN  Outcome: Progressing  3/25/2024 1205 by Sheila Babcock RN  Outcome: Progressing     Problem: Skin/Tissue Integrity  Goal: Absence of new skin breakdown  Description: 1.  Monitor for areas of redness and/or skin breakdown  2.  Assess vascular access sites hourly  3.  Every 4-6 hours minimum:  Change oxygen saturation probe site  4.  Every 4-6 hours:  If on nasal continuous positive airway pressure, respiratory therapy assess nares and determine need for appliance change or resting period.  3/25/2024 1714 by Zoey Pedraza RN  Outcome: Progressing  3/25/2024 1205 by Sheila Babcock RN  Outcome: Progressing     Problem: Discharge Planning  Goal: Discharge to home or other facility with appropriate resources  3/25/2024 1714 by Zoey Pedraza RN  Outcome: Progressing  3/25/2024 1205 by Sheila Babcock RN  Outcome: Progressing     Problem: Safety - Adult  Goal: Free from fall injury  3/25/2024 1714 by Zoey Pedraza RN  Outcome: Progressing  3/25/2024 1205 by Sheila aBbcock RN  Outcome: Progressing     Problem: Pain  Goal: Verbalizes/displays adequate comfort level or baseline comfort level  3/25/2024 1205 by Sheila Babcock RN  Outcome: Progressing     Problem: Chronic Conditions and Co-morbidities  Goal: Patient's chronic conditions and co-morbidity symptoms are monitored and maintained or improved  3/25/2024 1205 by Sheila Babcock RN  Outcome: Progressing

## 2024-03-25 NOTE — DISCHARGE INSTR - COC
Continuity of Care Form    Patient Name: Shiv Lee   :  1940  MRN:  [id number]    Admit date:  3/22/2024  Discharge date:  ***    Code Status Order: DNR-CCA   Advance Directives:     Admitting Physician:  Mireya Coley DO  PCP: Gilson Ramon III, DO    Discharging Nurse: ***  Discharging Hospital Unit/Room#: 0526/0526-A  Discharging Unit Phone Number: ***    Emergency Contact:   Extended Emergency Contact Information  Primary Emergency Contact: Scott Lee  Address: [address] [place] [address]  Home Phone: [phone]  Mobile Phone: [phone]  Relation: Spouse   needed? No  Secondary Emergency Contact: Truong Lee  Address: [address], [place] [id number] [place] [address]  Home Phone: [phone]  Mobile Phone: [phone]  Relation: Child  Preferred language: English   needed? No    Past Surgical History:  Past Surgical History:   Procedure Laterality Date    AORTIC VALVE SURGERY  09    Dr. Villa    CARDIAC PROCEDURE N/A 2024    [place] heart cath / coronary [place] [facility] [place] [facility]    CARDIAC PROCEDURE N/A 2024    Percutaneous coronary intervention [place] [facility] [place] [place] [facility]    CARDIAC PROCEDURE N/A 2024    Percutaneous coronary intervention performed [place] Amadou Santana [place] [facility] [place] [place] [facility]    CORONARY ARTERY BYPASS GRAFT  09    DIAGNOSTIC CARDIAC CATH LAB PROCEDURE  09    EYE SURGERY  2016       Immunization History:   Immunization History   Administered Date(s) Administered    Influenza, FLUCELVAX, (age 6 mo+), MDCK, MDV, 0.5mL 2020    Pneumococcal, PPSV23, PNEUMOVAX 23, (age 2y+), SC/IM, 0.5mL 2020    TDaP, ADACEL (age 10y-64y), BOOSTRIX (age 10y+), IM, 0.5mL 2013       Active Problems:  Patient Active Problem List   Diagnosis Code    Coronary artery disease I25.10     Score:  Readmission Risk              Risk of Unplanned Readmission:  38           Discharging to Facility/ Agency   Name: [facility]   Address: [address] [place] [address]  Phone: [phone]  Fax: [phone]    Dialysis Facility (if applicable)   Name:  Address:  Dialysis Schedule:  Phone:  Fax:    / signature: Electronically signed by Arelis Landis RN on 3/25/24 at 10:58 AM EDT    PHYSICIAN SECTION    Prognosis: {Prognosis:6228189429}    Condition at Discharge: { Patient Condition:124498581}    Rehab Potential (if transferring to Rehab): {Prognosis:6562849535}    Recommended Labs or Other Treatments After Discharge: ***    Physician Certification: I certify the above information and transfer of Shiv Lee  is necessary for the continuing treatment of the diagnosis listed and that he requires {Admit to Appropriate Level of Care:06729} for {GREATER/LESS:709686704} 30 days.     Update Admission H&P: {CHP DME Changes in HandP:817260338}    PHYSICIAN SIGNATURE:  {Esignature:[id number]}

## 2024-03-25 NOTE — ACP (ADVANCE CARE PLANNING)
Advance Care Planning   Healthcare Decision Maker:    Primary Decision Maker: Scott eLe - Spouse - [phone]    Secondary Decision Maker: Truong Lee - Child - [phone]    Click here to complete Healthcare Decision Makers including selection of the Healthcare Decision Maker Relationship (ie \"Primary\").  Today we documented Decision Maker(s) consistent with Legal Next of Kin hierarchy.

## 2024-03-25 NOTE — PROGRESS NOTES
[facility]   Progress note      Subjective:    The patient is awake and alert, complains of left ankle pain. No acute issues overnight.     Objective:    BP (!) 101/57   Pulse 95   Temp 97.7 °F (36.5 °C) (Oral)   Resp 18   Ht 1.829 m (6')   Wt 81.6 kg (180 lb)   SpO2 95%   BMI 24.41 kg/m²     In: 420 [P.O.:420]  Out: 1500   In: 420   Out: 1500 [Urine:1500]    General appearance: NAD, conversant  HEENT: AT/NC, MMM  Neck: FROM, supple  Lungs: diminished bl, on high flow nc at 6 L  CV: RRR, systolic murmur present  Vasc: Radial pulses 2+  Abdomen: Soft, non-tender; no masses or HSM  Extremities: No peripheral edema or digital cyanosis  Skin: no rash, lesions or ulcers  Psych: Alert and oriented to person, place and time  Neuro: no tremors or seizures     Recent Labs     03/22/24  1151 03/25/24  0630   WBC 8.8 8.1   HGB 9.7* 9.1*   HCT 31.9* 30.1*    202       Recent Labs     03/23/24  0524 03/24/24  0300 03/25/24  0630    135 142   K 3.9 3.7 3.3*    98 101   CO2 28 29 30*   BUN 20 30* 28*   CREATININE 0.7 0.9 0.7   CALCIUM 8.5* 8.5* 8.5*       Assessment:    Principal Problem:    Respiratory distress  Active Problems:    Acute on chronic respiratory failure (HCC)  Resolved Problems:    * No resolved hospital problems. *      Plan:    83-year-old male who was recently discharged 2 days ago presents to the ED from facility with complaints of respiratory distress and is admitted to telemetry unit with     Respiratory distress  Supplemental O2 to keep saturation greater than 93%-patient currently on 8 L.  Bumex 2 mg daily  Monitor kidney function-BUN/creatinine-20/0.7-caution with diuresis  Obtain BNP-most recent 1064   Daily weights  Respiratory panel-negative  Will likely require placement    3/24/2024  No acute events or issues overnight  BMP within normal limits today-continue Bumex-watch renal function closely  BNP only minimally elevated at 969  No other acute  issues  Await placement at nursing facility due to recurrent hospitalizations  Check CBC and BMP in a.m. to guide therapy    3/25/2024  --replace K today  --hgb at baseline  --renal fxn at baseline  --wean oxygen as able  --bph  --bronchodilators via nebs, adjusted  --awaiting placement  --remains on iv diuresis, monitor I&O  --niv prn and qhs  --check esr, crp and uric acid  --start indomethacin, pain medication, might be starting gout flare ofleft ankle, reviewed imaging  --placement options? Appreciate case management effort on this    Code Status: Limited code on previous admission  Consultants: None  DVT Prophylaxis   PT/OT  Discharge planning         I have spent a total time of 25 minutes of this patient encounter reviewing chart, labs, radiological reports coordinating care with interdisciplinary teams, face to face encounter with patient, providing counseling/education to patient/family, and formulating plan.       Mireya Coley,   10:06 AM  3/25/2024

## 2024-03-25 NOTE — PLAN OF CARE
Patient's chart updated to reflect:      .    - HF care plan, HF education points and HF discharge instructions.  -Orders: 2 gram sodium diet, daily weights, I/O.  -PCP and cardiology follow up appointments to be scheduled within 7 days of hospital discharge.  -CHF education session will be provided to the patient prior to hospital discharge.    Pooja Diamond RN   Heart Failure Navigator

## 2024-03-25 NOTE — CARE COORDINATION
Introduced my self and provided explanation of CM role to patient. Patient is awake, alert, and aware of current diagnosis and discharge planning. Patient is admitted from [facility] with SOB. Currently on 6L O2, baseline is room air. Spoke to Priya with [facility], patient can return pending bed availability. Patient will need precert. Spoke to the patient at bedside. Discharge plan is to return to [facility]. JEFF, demos, transport form completed and is in soft chart. Patient currently on IV bumex.   Electronically signed by Arelis Landis RN on 3/25/2024 at 10:54 AM

## 2024-03-25 NOTE — PLAN OF CARE
Problem: ABCDS Injury Assessment  Goal: Absence of physical injury  3/25/2024 1205 by Sheila Babcock RN  Outcome: Progressing  3/25/2024 0119 by Muna Avilez RN  Outcome: Progressing     Problem: Skin/Tissue Integrity  Goal: Absence of new skin breakdown  Description: 1.  Monitor for areas of redness and/or skin breakdown  2.  Assess vascular access sites hourly  3.  Every 4-6 hours minimum:  Change oxygen saturation probe site  4.  Every 4-6 hours:  If on nasal continuous positive airway pressure, respiratory therapy assess nares and determine need for appliance change or resting period.  3/25/2024 1205 by Sheila Babcock RN  Outcome: Progressing  3/25/2024 0119 by Muna Avilez RN  Outcome: Progressing     Problem: Safety - Adult  Goal: Free from fall injury  3/25/2024 1205 by Sheila Babcock RN  Outcome: Progressing  3/25/2024 0119 by Muna Avilez RN  Outcome: Progressing     Problem: Pain  Goal: Verbalizes/displays adequate comfort level or baseline comfort level  3/25/2024 1205 by Sheila Babcock RN  Outcome: Progressing  3/25/2024 0119 by Muna Avilez RN  Outcome: Progressing     Problem: Chronic Conditions and Co-morbidities  Goal: Patient's chronic conditions and co-morbidity symptoms are monitored and maintained or improved  Outcome: Progressing

## 2024-03-25 NOTE — CONSULTS
[facility]   Inpatient CHF Nurse Navigator Consult      Cardiologist: Dr Esther Lee is a 83 y.o. (1940) male with a history of HFpEF, most recent EF:    EF 60% 02/07/2024     Patient was awake and alert, siting in the chair during the consultation and is agreeable to heart failure education. He was engaged and asked appropriate questions throughout the education session. He is feeling better today.     Barriers identified during consult contributing to HF Hospitalization:  [] Limited medication adherence   [] Poor health literacy, education regarding HF medications provided   [] Pill box provided to patient  [] Difficulty affording medications  [] Difficulty obtaining/ managing medications  [] Prescription assistance information given     [] Not weighing themselves daily  [x] Weight log provided for easy monitoring  [] Scale provided     [] Not following low sodium diet  [] Food insecurity   [x] 2 gram sodium diet education provided   [] Low sodium recipes provided  [] Sodium free seasoning provided   [] Low sodium meal delivery options given to patient  [] Dietician consulted     [] Lack of transportation to appointments     [] Depression, given chronic illness  [] Primary team notified     [] Goals of care need addressed  [] Palliative care consulted     [] CHF CHW consulted, to assist with       Chart Reviewed:  Diet: ADULT DIET; Regular; Low Sodium (2 gm)   Daily Weights: Patient Vitals for the past 96 hrs (Last 3 readings):   Weight   03/25/24 0530 81.6 kg (180 lb)   03/24/24 0102 82 kg (180 lb 12.4 oz)   03/23/24 0230 83.6 kg (184 lb 4.9 oz)     I/O:   Intake/Output Summary (Last 24 hours) at 3/25/2024 1224  Last data filed at 3/25/2024 1138  Gross per 24 hour   Intake --   Output 1950 ml   Net -1950 ml       [] Nursing staff/manager notified of inaccurate guillen weights or I/O        Discharge Plan:  Above identified barriers reviewed and needs

## 2024-03-25 NOTE — PLAN OF CARE
Problem: ABCDS Injury Assessment  Goal: Absence of physical injury  3/25/2024 0119 by Muna Avilez, RN  Outcome: Progressing  3/24/2024 1315 by Sheila Babcock RN  Outcome: Progressing     Problem: Skin/Tissue Integrity  Goal: Absence of new skin breakdown  Description: 1.  Monitor for areas of redness and/or skin breakdown  2.  Assess vascular access sites hourly  3.  Every 4-6 hours minimum:  Change oxygen saturation probe site  4.  Every 4-6 hours:  If on nasal continuous positive airway pressure, respiratory therapy assess nares and determine need for appliance change or resting period.  3/25/2024 0119 by Muna Avilez, RN  Outcome: Progressing  3/24/2024 1315 by Sheila Babcock RN  Outcome: Progressing     Problem: Safety - Adult  Goal: Free from fall injury  3/25/2024 0119 by Muna Avilez, RN  Outcome: Progressing  3/24/2024 1315 by Sheila Babcock RN  Outcome: Progressing

## 2024-03-25 NOTE — DISCHARGE INSTRUCTIONS
***HEART FAILURE - CONGESTIVE HEART FAILURE***  DISCHARGE INSTRUCTIONS:  GUIDELINES TO FOLLOW AT [facility]/LTAC/SNF/ Assisted Living    Future Appointments   Date Time Provider Department Center   4/2/2024 12:00 PM [facility] ROOM 1 [facility]   4/11/2024 12:00 PM [facility]   4/16/2024  1:00 PM Amadou Santana DO [facility]          MEDICATIONS:  Please notify the doctor if patient is not able to take their medications or if medications are being held for any reasons (such as low blood pressure ect.)  Do not give the patient ibuprofen (Advil or Motrin), naproxen (Aleve) without talking to the doctor first. This could make their heart failure worse.         WEIGHT MONITORING:   Weigh patient every day in the morning after they void (If patient is able to stand, please get a standing weight.)   Notify the doctor of a weight gain of 3 pounds or more in 1 day   OR  a total of 5 pounds or more in 1 week             DIET   Cardiac heart healthy diet:  Low sodium diet: no  more than 2,000mg (2 grams) of salt / sodium per day (which equals to a little less than  a teaspoon of salt)/ Cardiac Diet: Low saturated / low trans fat, no added salt, caffeine restricted    If patient is there for rehab and will be returning home in the near future; reinforce with the patient and the family to follow a low sodium diet (2,000 mg)- avoid using salt at the table, avoid / limit use of canned soups, processed / packaged foods, salted snacks, olives and pickles.  Do not use a salt substitute without checking with the doctor. (Mrs. Dupont is safe to use).       NOTIFY THE DOCTOR THE FIRST DAY OF ONSET OF ANY OF THESE   SYMPTOMS:   Weight gain of 3 pounds or more in 1 day         OR 5 pounds or more in one week  More shortness of breath  More swelling in stomach, legs, ankles or feet  Feeling more tired, No energy  Dry hacky cough  Dizziness  More chest pain / discomfort  Hard time breathing laying

## 2024-03-26 LAB
ANION GAP SERPL CALCULATED.3IONS-SCNC: 10 MMOL/L (ref 7–16)
BNP SERPL-MCNC: 878 PG/ML (ref 0–450)
BUN SERPL-MCNC: 30 MG/DL (ref 6–23)
CALCIUM SERPL-MCNC: 8.6 MG/DL (ref 8.6–10.2)
CHLORIDE SERPL-SCNC: 101 MMOL/L (ref 98–107)
CO2 SERPL-SCNC: 32 MMOL/L (ref 22–29)
CREAT SERPL-MCNC: 0.8 MG/DL (ref 0.7–1.2)
GFR SERPL CREATININE-BSD FRML MDRD: 87 ML/MIN/1.73M2
GLUCOSE SERPL-MCNC: 89 MG/DL (ref 74–99)
MAGNESIUM SERPL-MCNC: 1.9 MG/DL (ref 1.6–2.6)
POTASSIUM SERPL-SCNC: 3.5 MMOL/L (ref 3.5–5)
SODIUM SERPL-SCNC: 143 MMOL/L (ref 132–146)

## 2024-03-26 PROCEDURE — 6370000000 HC RX 637 (ALT 250 FOR IP): Performed by: FAMILY MEDICINE

## 2024-03-26 PROCEDURE — 1200000000 HC SEMI PRIVATE

## 2024-03-26 PROCEDURE — 83880 ASSAY OF NATRIURETIC PEPTIDE: CPT

## 2024-03-26 PROCEDURE — 6360000002 HC RX W HCPCS: Performed by: FAMILY MEDICINE

## 2024-03-26 PROCEDURE — 94660 CPAP INITIATION&MGMT: CPT

## 2024-03-26 PROCEDURE — 2580000003 HC RX 258: Performed by: FAMILY MEDICINE

## 2024-03-26 PROCEDURE — 83735 ASSAY OF MAGNESIUM: CPT

## 2024-03-26 PROCEDURE — 36415 COLL VENOUS BLD VENIPUNCTURE: CPT

## 2024-03-26 PROCEDURE — 94640 AIRWAY INHALATION TREATMENT: CPT

## 2024-03-26 PROCEDURE — 80048 BASIC METABOLIC PNL TOTAL CA: CPT

## 2024-03-26 PROCEDURE — 2700000000 HC OXYGEN THERAPY PER DAY

## 2024-03-26 PROCEDURE — 97530 THERAPEUTIC ACTIVITIES: CPT

## 2024-03-26 PROCEDURE — 97165 OT EVAL LOW COMPLEX 30 MIN: CPT

## 2024-03-26 RX ORDER — METHYLPREDNISOLONE SODIUM SUCCINATE 125 MG/2ML
60 INJECTION, POWDER, LYOPHILIZED, FOR SOLUTION INTRAMUSCULAR; INTRAVENOUS EVERY 12 HOURS
Status: DISCONTINUED | OUTPATIENT
Start: 2024-03-26 | End: 2024-03-28

## 2024-03-26 RX ORDER — LIDOCAINE 4 G/G
1 PATCH TOPICAL DAILY
Status: DISCONTINUED | OUTPATIENT
Start: 2024-03-26 | End: 2024-03-29 | Stop reason: HOSPADM

## 2024-03-26 RX ORDER — HYDROCODONE BITARTRATE AND HOMATROPINE METHYLBROMIDE ORAL SOLUTION 5; 1.5 MG/5ML; MG/5ML
5 LIQUID ORAL EVERY 4 HOURS PRN
Status: DISCONTINUED | OUTPATIENT
Start: 2024-03-26 | End: 2024-03-29 | Stop reason: HOSPADM

## 2024-03-26 RX ADMIN — BUMETANIDE 2 MG: 0.25 INJECTION INTRAMUSCULAR; INTRAVENOUS at 08:51

## 2024-03-26 RX ADMIN — TAMSULOSIN HYDROCHLORIDE 0.4 MG: 0.4 CAPSULE ORAL at 08:49

## 2024-03-26 RX ADMIN — METHYLPREDNISOLONE SODIUM SUCCINATE 60 MG: 125 INJECTION INTRAMUSCULAR; INTRAVENOUS at 13:06

## 2024-03-26 RX ADMIN — GUAIFENESIN 400 MG: 400 TABLET ORAL at 14:50

## 2024-03-26 RX ADMIN — ROSUVASTATIN CALCIUM 5 MG: 5 TABLET, FILM COATED ORAL at 21:03

## 2024-03-26 RX ADMIN — INDOMETHACIN 75 MG: 75 CAPSULE, EXTENDED RELEASE ORAL at 07:41

## 2024-03-26 RX ADMIN — SODIUM CHLORIDE, PRESERVATIVE FREE 10 ML: 5 INJECTION INTRAVENOUS at 21:06

## 2024-03-26 RX ADMIN — TIMOLOL MALEATE 1 DROP: 5 SOLUTION OPHTHALMIC at 09:09

## 2024-03-26 RX ADMIN — PANTOPRAZOLE SODIUM 40 MG: 40 TABLET, DELAYED RELEASE ORAL at 06:25

## 2024-03-26 RX ADMIN — CLOTRIMAZOLE AND BETAMETHASONE DIPROPIONATE: 10; .5 CREAM TOPICAL at 09:10

## 2024-03-26 RX ADMIN — GUAIFENESIN 400 MG: 400 TABLET ORAL at 21:03

## 2024-03-26 RX ADMIN — IPRATROPIUM BROMIDE 0.5 MG: 0.5 SOLUTION RESPIRATORY (INHALATION) at 13:03

## 2024-03-26 RX ADMIN — MIDODRINE HYDROCHLORIDE 10 MG: 5 TABLET ORAL at 18:06

## 2024-03-26 RX ADMIN — MIDODRINE HYDROCHLORIDE 10 MG: 5 TABLET ORAL at 12:25

## 2024-03-26 RX ADMIN — GUAIFENESIN 400 MG: 400 TABLET ORAL at 08:50

## 2024-03-26 RX ADMIN — CLOTRIMAZOLE AND BETAMETHASONE DIPROPIONATE: 10; .5 CREAM TOPICAL at 21:02

## 2024-03-26 RX ADMIN — MIDODRINE HYDROCHLORIDE 10 MG: 5 TABLET ORAL at 07:41

## 2024-03-26 RX ADMIN — CLOPIDOGREL BISULFATE 75 MG: 75 TABLET ORAL at 08:49

## 2024-03-26 RX ADMIN — ASPIRIN 81 MG: 81 TABLET, COATED ORAL at 08:50

## 2024-03-26 RX ADMIN — ENOXAPARIN SODIUM 40 MG: 100 INJECTION SUBCUTANEOUS at 08:50

## 2024-03-26 RX ADMIN — PREDNISONE 10 MG: 5 TABLET ORAL at 08:49

## 2024-03-26 RX ADMIN — SODIUM CHLORIDE, PRESERVATIVE FREE 10 ML: 5 INJECTION INTRAVENOUS at 08:53

## 2024-03-26 RX ADMIN — ARFORMOTEROL TARTRATE 15 MCG: 15 SOLUTION RESPIRATORY (INHALATION) at 20:17

## 2024-03-26 RX ADMIN — IPRATROPIUM BROMIDE 0.5 MG: 0.5 SOLUTION RESPIRATORY (INHALATION) at 20:17

## 2024-03-26 RX ADMIN — IPRATROPIUM BROMIDE 0.5 MG: 0.5 SOLUTION RESPIRATORY (INHALATION) at 16:31

## 2024-03-26 ASSESSMENT — PAIN SCALES - GENERAL: PAINLEVEL_OUTOF10: 7

## 2024-03-26 ASSESSMENT — PAIN DESCRIPTION - DESCRIPTORS: DESCRIPTORS: ACHING;DISCOMFORT

## 2024-03-26 ASSESSMENT — PAIN DESCRIPTION - ORIENTATION: ORIENTATION: LOWER;MID;LEFT

## 2024-03-26 NOTE — PROGRESS NOTES
SPIRITUAL HEALTH SERVICES - [facility] Encounter    Name: Shiv Lee                  Referral: Routine Visit    Sacraments  Anointed (Last Rites): Yes  Apostolic [place]: No  Confession: No  Communion: Yes     Assessment:  Patient receptive to  visit.      Intervention:   provided spiritual support and sacramental ministry for patient.     Outcome:  Patient expressed gratitude for visit.    Plan:  Chaplains will remain available to offer spiritual and emotional support as needed.      Electronically signed by Chaplain Adrian, on 3/26/2024 at 7:36 PM.  Spiritual Care Department  [facility]  [phone]

## 2024-03-26 NOTE — PLAN OF CARE
Problem: Safety - Adult  Goal: Free from fall injury  3/26/2024 0338 by Muna Avilez, RN  Outcome: Progressing  3/25/2024 1714 by Zoey Pedraza RN  Outcome: Progressing     Problem: Pain  Goal: Verbalizes/displays adequate comfort level or baseline comfort level  Outcome: Progressing     Problem: Chronic Conditions and Co-morbidities  Goal: Patient's chronic conditions and co-morbidity symptoms are monitored and maintained or improved  Outcome: Progressing

## 2024-03-26 NOTE — PLAN OF CARE
Problem: ABCDS Injury Assessment  Goal: Absence of physical injury  3/26/2024 1104 by Sheila Babcock RN  Outcome: Progressing  3/26/2024 0338 by Muna Avilez RN  Outcome: Progressing     Problem: Skin/Tissue Integrity  Goal: Absence of new skin breakdown  Description: 1.  Monitor for areas of redness and/or skin breakdown  2.  Assess vascular access sites hourly  3.  Every 4-6 hours minimum:  Change oxygen saturation probe site  4.  Every 4-6 hours:  If on nasal continuous positive airway pressure, respiratory therapy assess nares and determine need for appliance change or resting period.  3/26/2024 1104 by Sheila Babcock RN  Outcome: Progressing  3/26/2024 0338 by Muna Avilez RN  Outcome: Progressing     Problem: Safety - Adult  Goal: Free from fall injury  3/26/2024 1104 by Sheila Babcock RN  Outcome: Progressing  3/26/2024 0338 by Muna Avilez RN  Outcome: Progressing     Problem: Pain  Goal: Verbalizes/displays adequate comfort level or baseline comfort level  3/26/2024 1104 by Sheila Babcock RN  Outcome: Progressing  3/26/2024 0338 by Muna Avilez RN  Outcome: Progressing

## 2024-03-26 NOTE — PROGRESS NOTES
[facility]   Progress note      Subjective:    The patient is awake and alert, complains of left ankle pain and right knee pain and swelling, just finished working with therapy. Spouse is present at bedside. No acute issues overnight.     Objective:    /65   Pulse 92   Temp 97.8 °F (36.6 °C) (Oral)   Resp 18   Ht 1.829 m (6')   Wt 84.4 kg (186 lb 1.1 oz)   SpO2 96%   BMI 25.24 kg/m²     In: 5 [I.V.:5]  Out: 1750   In: 5   Out: 1750 [Urine:1750]    General appearance: NAD, conversant  HEENT: AT/NC, MMM  Neck: FROM, supple  Lungs: diminished bl, on high flow nc at 6 L  CV: RRR, systolic murmur present  Vasc: Radial pulses 2+  Abdomen: Soft, non-tender; no masses or HSM  Extremities: No peripheral edema or digital cyanosis  Skin: no rash, lesions or ulcers  Psych: Alert and oriented to person, place and time  Neuro: no tremors or seizures     Recent Labs     03/25/24  0630   WBC 8.1   HGB 9.1*   HCT 30.1*          Recent Labs     03/24/24  0300 03/25/24  0630 03/26/24  0327    142 143   K 3.7 3.3* 3.5   CL 98 101 101   CO2 29 30* 32*   BUN 30* 28* 30*   CREATININE 0.9 0.7 0.8   CALCIUM 8.5* 8.5* 8.6       Assessment:    Principal Problem:    Respiratory distress  Active Problems:    Acute on chronic respiratory failure (HCC)  Resolved Problems:    * No resolved hospital problems. *      Plan:    83-year-old male who was recently discharged 2 days ago presents to the ED from facility with complaints of respiratory distress and is admitted to telemetry unit with     Respiratory distress  Supplemental O2 to keep saturation greater than 93%-patient currently on 8 L.  Bumex 2 mg daily  Monitor kidney function-BUN/creatinine-20/0.7-caution with diuresis  Obtain BNP-most recent 1064   Daily weights  Respiratory panel-negative  Will likely require placement    3/24/2024  No acute events or issues overnight  BMP within normal limits today-continue Bumex-watch renal function  closely  BNP only minimally elevated at 969  No other acute issues  Await placement at nursing facility due to recurrent hospitalizations  Check CBC and BMP in a.m. to guide therapy    3/25/2024  --replace K today  --hgb at baseline  --renal fxn at baseline  --wean oxygen as able  --bph  --bronchodilators via nebs, adjusted  --awaiting placement  --remains on iv diuresis, monitor I&O  --niv prn and qhs  --check esr, crp and uric acid  --start indomethacin, pain medication, might be starting gout flare ofleft ankle, reviewed imaging  --placement options? Appreciate case management effort on this    3/26/2024  --wean oxygen as able/tolerated, currently at 6 L  --pressures stable, continue midodrine  --remains on iv diuresis, monitor I&O  --daily prednisone, may have to increase dosing-->switched to iv solumedrol and will taper down  --bph  --bronchodilators via nebs  --control pain in his foot, uric acid ok, unsure if this is pseudogout, started indomethacin, hold off on colchicine at this time  --niv prn and qhs  --lidocaine patches for joints  --cough syrup    Code Status: Limited code on previous admission  Consultants: None  DVT Prophylaxis   PT/OT  Discharge planning         I have spent a total time of 25 minutes of this patient encounter reviewing chart, labs, radiological reports coordinating care with interdisciplinary teams, face to face encounter with patient, providing counseling/education to patient/family, and formulating plan.       Mireya Coley,   9:37 AM  3/26/2024

## 2024-03-26 NOTE — PROGRESS NOTES
OCCUPATIONAL THERAPY INITIAL EVALUATION    [facility]   [address] [facility]        Date:3/26/2024                                                  Patient Name: Shiv Lee    MRN: [id number]    : 1940    Room: [address] [place] [address]    Evaluating OT: Zunilda Bruno OTR/L #[id number]    Referring Provider:  Mireya Coley DO     Specific Provider Orders/Date:  OT Eval and Treat , 3/25/2024     Diagnosis:   1. Acute on chronic respiratory failure with hypoxia (HCC)         Surgery: None       Pertinent Medical History: Aortic stenosis, A-fib, HTN, CABG     Precautions:  Fall Risk, alarm, 6L high flow via NC      Assessment of current deficits    [x] Functional mobility  [x]ADLs  [x] Strength               []Cognition    [x] Functional transfers   [x] IADLs         [x] Safety Awareness   [x]Endurance    [] Fine Coordination              [x] Balance      [] Vision/perception   []Sensation     []Gross Motor Coordination  [x] ROM  [] Delirium                   [] Motor Control     OT PLAN OF CARE   OT POC based on physician orders, patient diagnosis and results of clinical assessment    Frequency/Duration 2-5 days/wk for 2-4 weeks PRN     Specific OT Treatment Interventions to include:   * Instruction/training on adapted ADL techniques and AE recommendations to increase functional independence within precautions       * Training on energy conservation strategies, correct breathing pattern and techniques to improve independence/tolerance for self-care routine  * Functional transfer/mobility training/DME recommendations for increased independence, safety, and fall prevention  * Patient/Family education to increase follow through with safety techniques and functional independence  * Recommendation of environmental modifications for increased safety with functional transfers/mobility and ADLs  * Therapeutic exercise to improve motor endurance, ROM, and  established goals.      Patient / Family Goal: Pt/wife in agreement with POC      Patient and/or family were instructed on functional diagnosis, prognosis/goals and OT plan of care. Demonstrated fair plus understanding.     Eval Complexity: Low    Time In: 11:48 AM   Time Out: 12:14 PM    Total Treatment Time: 11      Min Units   OT Eval Low 97165  X  1    OT Eval Medium 26548      OT Eval High 68186      OT Re-Eval 03145            ADL/Self Care 85124     Therapeutic Activities 08797  11 1   Therapeutic Ex 92860       Orthotic Management 76982       Manual 64616     Neuro Re-Ed 93759       Non-Billable Time        Evaluation Time additionally includes thorough review of current medical information, gathering information on past medical history/social history and prior level of function, interpretation of standardized testing/informal observation of tasks, assessment of data and development of plan of care and goals.        Evaluating OT: Zunilda Bruno OTR/L #[id number]

## 2024-03-26 NOTE — CARE COORDINATION
06/24/24Social Work Discharge Planning:Plan is to return to [facility] pending bed availability and will need a precert. Sw prompted them to start precert when therapy evals are in. JEFF  and transport form are in chart. Electronically signed by NENO Dye on 3/26/2024 at 9:57 AM

## 2024-03-27 LAB
ANION GAP SERPL CALCULATED.3IONS-SCNC: 12 MMOL/L (ref 7–16)
BUN SERPL-MCNC: 35 MG/DL (ref 6–23)
CALCIUM SERPL-MCNC: 8.8 MG/DL (ref 8.6–10.2)
CHLORIDE SERPL-SCNC: 97 MMOL/L (ref 98–107)
CO2 SERPL-SCNC: 29 MMOL/L (ref 22–29)
CREAT SERPL-MCNC: 0.8 MG/DL (ref 0.7–1.2)
GFR SERPL CREATININE-BSD FRML MDRD: 89 ML/MIN/1.73M2
GLUCOSE SERPL-MCNC: 110 MG/DL (ref 74–99)
MAGNESIUM SERPL-MCNC: 2 MG/DL (ref 1.6–2.6)
POTASSIUM SERPL-SCNC: 3.6 MMOL/L (ref 3.5–5)
SODIUM SERPL-SCNC: 138 MMOL/L (ref 132–146)

## 2024-03-27 PROCEDURE — 94660 CPAP INITIATION&MGMT: CPT

## 2024-03-27 PROCEDURE — 94640 AIRWAY INHALATION TREATMENT: CPT

## 2024-03-27 PROCEDURE — 1200000000 HC SEMI PRIVATE

## 2024-03-27 PROCEDURE — 6360000002 HC RX W HCPCS: Performed by: FAMILY MEDICINE

## 2024-03-27 PROCEDURE — 2580000003 HC RX 258: Performed by: FAMILY MEDICINE

## 2024-03-27 PROCEDURE — 36415 COLL VENOUS BLD VENIPUNCTURE: CPT

## 2024-03-27 PROCEDURE — 83735 ASSAY OF MAGNESIUM: CPT

## 2024-03-27 PROCEDURE — 97530 THERAPEUTIC ACTIVITIES: CPT

## 2024-03-27 PROCEDURE — 2700000000 HC OXYGEN THERAPY PER DAY

## 2024-03-27 PROCEDURE — 6370000000 HC RX 637 (ALT 250 FOR IP): Performed by: FAMILY MEDICINE

## 2024-03-27 PROCEDURE — 80048 BASIC METABOLIC PNL TOTAL CA: CPT

## 2024-03-27 PROCEDURE — 97161 PT EVAL LOW COMPLEX 20 MIN: CPT

## 2024-03-27 RX ADMIN — MIDODRINE HYDROCHLORIDE 10 MG: 5 TABLET ORAL at 12:18

## 2024-03-27 RX ADMIN — TAMSULOSIN HYDROCHLORIDE 0.4 MG: 0.4 CAPSULE ORAL at 08:22

## 2024-03-27 RX ADMIN — ARFORMOTEROL TARTRATE 15 MCG: 15 SOLUTION RESPIRATORY (INHALATION) at 20:49

## 2024-03-27 RX ADMIN — ASPIRIN 81 MG: 81 TABLET, COATED ORAL at 08:21

## 2024-03-27 RX ADMIN — SODIUM CHLORIDE, PRESERVATIVE FREE 10 ML: 5 INJECTION INTRAVENOUS at 08:23

## 2024-03-27 RX ADMIN — MIDODRINE HYDROCHLORIDE 10 MG: 5 TABLET ORAL at 08:22

## 2024-03-27 RX ADMIN — BUMETANIDE 2 MG: 0.25 INJECTION INTRAMUSCULAR; INTRAVENOUS at 08:23

## 2024-03-27 RX ADMIN — IPRATROPIUM BROMIDE 0.5 MG: 0.5 SOLUTION RESPIRATORY (INHALATION) at 14:15

## 2024-03-27 RX ADMIN — CLOTRIMAZOLE AND BETAMETHASONE DIPROPIONATE: 10; .5 CREAM TOPICAL at 08:29

## 2024-03-27 RX ADMIN — GUAIFENESIN 400 MG: 400 TABLET ORAL at 19:22

## 2024-03-27 RX ADMIN — ARFORMOTEROL TARTRATE 15 MCG: 15 SOLUTION RESPIRATORY (INHALATION) at 09:24

## 2024-03-27 RX ADMIN — IPRATROPIUM BROMIDE 0.5 MG: 0.5 SOLUTION RESPIRATORY (INHALATION) at 20:49

## 2024-03-27 RX ADMIN — INDOMETHACIN 75 MG: 75 CAPSULE, EXTENDED RELEASE ORAL at 08:22

## 2024-03-27 RX ADMIN — SODIUM CHLORIDE, PRESERVATIVE FREE 10 ML: 5 INJECTION INTRAVENOUS at 19:22

## 2024-03-27 RX ADMIN — ROSUVASTATIN CALCIUM 5 MG: 5 TABLET, FILM COATED ORAL at 19:22

## 2024-03-27 RX ADMIN — GUAIFENESIN 400 MG: 400 TABLET ORAL at 08:22

## 2024-03-27 RX ADMIN — ENOXAPARIN SODIUM 40 MG: 100 INJECTION SUBCUTANEOUS at 08:23

## 2024-03-27 RX ADMIN — MIDODRINE HYDROCHLORIDE 10 MG: 5 TABLET ORAL at 16:40

## 2024-03-27 RX ADMIN — TIMOLOL MALEATE 1 DROP: 5 SOLUTION OPHTHALMIC at 08:29

## 2024-03-27 RX ADMIN — IPRATROPIUM BROMIDE 0.5 MG: 0.5 SOLUTION RESPIRATORY (INHALATION) at 17:30

## 2024-03-27 RX ADMIN — GUAIFENESIN 400 MG: 400 TABLET ORAL at 12:18

## 2024-03-27 RX ADMIN — CLOPIDOGREL BISULFATE 75 MG: 75 TABLET ORAL at 08:23

## 2024-03-27 RX ADMIN — METHYLPREDNISOLONE SODIUM SUCCINATE 60 MG: 125 INJECTION INTRAMUSCULAR; INTRAVENOUS at 12:19

## 2024-03-27 RX ADMIN — CLOTRIMAZOLE AND BETAMETHASONE DIPROPIONATE: 10; .5 CREAM TOPICAL at 19:29

## 2024-03-27 RX ADMIN — METHYLPREDNISOLONE SODIUM SUCCINATE 60 MG: 125 INJECTION INTRAMUSCULAR; INTRAVENOUS at 00:06

## 2024-03-27 RX ADMIN — IPRATROPIUM BROMIDE 0.5 MG: 0.5 SOLUTION RESPIRATORY (INHALATION) at 09:24

## 2024-03-27 RX ADMIN — PANTOPRAZOLE SODIUM 40 MG: 40 TABLET, DELAYED RELEASE ORAL at 05:20

## 2024-03-27 ASSESSMENT — PAIN DESCRIPTION - DESCRIPTORS: DESCRIPTORS: SORE

## 2024-03-27 ASSESSMENT — PAIN DESCRIPTION - LOCATION: LOCATION: OTHER (COMMENT)

## 2024-03-27 ASSESSMENT — PAIN DESCRIPTION - ORIENTATION: ORIENTATION: RIGHT

## 2024-03-27 ASSESSMENT — PAIN SCALES - GENERAL: PAINLEVEL_OUTOF10: 4

## 2024-03-27 ASSESSMENT — PAIN - FUNCTIONAL ASSESSMENT: PAIN_FUNCTIONAL_ASSESSMENT: ACTIVITIES ARE NOT PREVENTED

## 2024-03-27 NOTE — PROGRESS NOTES
03/27/24 0002   NIV Type   $NIV $Daily Charge   Mode (S)  Bilevel  (refuses use , remains standby)

## 2024-03-27 NOTE — PROGRESS NOTES
Physical Therapy  Facility/Department: [address] MED SURG/TELE  Physical Therapy Initial Assessment    Name: Shiv Lee  : 1940  MRN: [id number]  Date of Service: 3/27/2024    Attending Provider:  Mireya Coley DO    Evaluating PT:  Bobby Loredo Jr., P.T.    Room #:  [facility]/26-  Diagnosis:  Respiratory distress [R06.03]  Acute on chronic respiratory failure with hypoxia (HCC) [J96.21]  Acute on chronic respiratory failure (HCC) [J96.20]  Precautions:  falls, bed/chair alarm, O2 sat drops with activity    SUBJECTIVE:    Pt lives with his wife in a 2 story home with 3 stairs and 1 rail to enter.  There are 8 steps and 1 rail to 2nd floor bed and bath.  Pt came in from [facility] and has been ambulating with ww short distances.    OBJECTIVE:   Initial Evaluation  Date: 3/27/24 Treatment Short Term/ Long Term   Goals   Was pt agreeable to Eval/treatment? yes     Does pt have pain? No c/o pain     Bed Mobility  Rolling: SBA  Supine to sit: SBA  Sit to supine: SBA  Scooting: SBA  Independent    Transfers Sit to stand: MIN A  Stand to sit: MIN A  Stand pivot: MIN A  Independent    Ambulation   20 feet with ww MIN A/MOD A  200 feet with ww supervision   Stair negotiation: ascended and descended NA, pt fatigued, SOB, and O2 sat dropped with amb  8 steps with 1 rail SBA   AM-PAC 6 Clicks        BLE ROM is WFL.   BLE strength is grossly 4-/5 to 4/5.   Sensation:  Pt denies numbness and tingling to extremities  Balance: sitting is supervision and standing with ww is MIN A  Endurance: fair-    Vitals:  Pt was on 2L O2 throughout PT session.  After amb he was SOB and fatigued and O2 sat was 82%.  It took 3 min for O2 sat to come up to 90%.  Pt then laid down in bed, got up on other side, and then stood and walked with ww to chair.  After sitting down he had mild SOB and O2 sat was 87%, but proceeded to drop with time to 82% and then after 3 min came back up to 90%.      Patient education  Pt educated on  breathing technique with NC in place.     Patient response to education:   Pt verbalized understanding Pt demonstrated skill Pt requires further education in this area   yes yes yes     ASSESSMENT:    Conditions Requiring Skilled Therapeutic Intervention:    [x]Decreased strength     []Decreased ROM  [x]Decreased functional mobility  [x]Decreased balance   [x]Decreased endurance   []Decreased posture  []Decreased sensation  []Decreased coordination   []Decreased vision  []Decreased safety awareness   []Increased pain       Comments:  Pt was found sitting up in chair with BLE elevated and pt was agreeable to PT.  He stood with ww and walked to far side of his bed and during amb became SOB and c/o fatigue and required more assist.  He sat down on EOB and vitals were as noted above with significant drop in O2 sat.  He then demonstrated ability to lie down in bed and get up on opposite side.  He stood with ww and walked 3 feet to chair and sat down and again his O2 sat dropped, but he was not as SOB.  Pt's SOB, drop in O2 sat, and fatigue are limiting his functional mobility at this time.  Pt is a risk for falling.     Treatment:  Patient practiced and was instructed in the following treatment:    Bed mobility, transfers, sitting on EOB, and gait with ww to improve functional strength and endurance.     Pt was left sitting up in chair with BLE elevated on footstool and a pillow with call light left by patient.    Chair/bed alarm: chair alarm was re-activated.     Pt's/ family goals   1. To get better and strong enough to go home.      Patient and or family understand(s) diagnosis, prognosis, and plan of care.    PHYSICAL THERAPY PLAN OF CARE:    PT POC is established based on physician order and patient diagnosis     Referring provider/PT Order:  PT eval and treat  Diagnosis:  Respiratory distress [R06.03]  Acute on chronic respiratory failure with hypoxia (HCC) [J96.21]  Acute on chronic respiratory failure (HCC)

## 2024-03-27 NOTE — CARE COORDINATION
3/27/2024  Social Work Discharge Planning:Awaiting therapy evals for precert to [facility]. JEFF and transport form are in chart. Electronically signed by NENO Dye on 3/27/2024 at 9:02 AM

## 2024-03-27 NOTE — PROGRESS NOTES
TEXT:    Pt admitted with respiratory distress.  Pt noted to have recent discharge for   CHF on supplemental oxygen at 6L. Arrives on 15L NRB and weaned to 6-10L HFNC.   If possible, please document in the progress notes and discharge summary if   you are evaluating and/or treating any of the following:    The medical record reflects the following:  Risk Factors: CHF  Clinical Indicators: Per ED \"Just discharged yesterday for chf on 5L of   oxygen, he appears fluid overloaded again requiring 8-10 L of oxygen. Placed   on 15L NRB by EMS. Per wife was discharged to facility w/o any diuretics.   Patient is on 10 L high flow nasal cannula now.  He is saturating fine on 10 L   high flow nasal cannula.  Patient is requiring more oxygen than his baseline.    Patient is to be admitted for hypoxic respiratory failure.\"  Treatment: Bumex IV, oxygen to keep sat >9%    Thank you,  Elizabeth Rodriguez RN, CCDS  Clinical Documentation   [email or web address]  [phone]  (M-F 6am-2:30 pm)  Options provided:  -- Acute on chronic respiratory failure with hypoxia  -- Acute on chronic respiratory failure with hypercapnia  -- Acute on chronic respiratory failure with hypoxia and hypercapnia  -- Chronic respiratory failure with hypoxia  -- Chronic respiratory failure with hypercapnia  -- Chronic respiratory failure with hypoxia and hypercapnia  -- Other - I will add my own diagnosis  -- Disagree - Not applicable / Not valid  -- Disagree - Clinically unable to determine / Unknown  -- Refer to Clinical Documentation Reviewer    PROVIDER RESPONSE TEXT:    This patient is in acute on chronic respiratory failure with hypoxia.    Query created by: Elizabeth Rodriguez on 3/25/2024 3:12 PM      Electronically signed by:  Mireya Coley DO 3/27/2024 3:54 PM

## 2024-03-27 NOTE — PROGRESS NOTES
[facility]   Progress note      Subjective:    The patient is awake and alert, NO COMPLAINTS OF PAIN. This is resolved. He has weaned down significantly on o2 and has been out of bed more.    Objective:    /64   Pulse 79   Temp 97.6 °F (36.4 °C) (Oral)   Resp 28   Ht 1.829 m (6')   Wt 78.5 kg (173 lb)   SpO2 96%   BMI 23.46 kg/m²     In: 965 [P.O.:960; I.V.:5]  Out: 1650   In: 965   Out: 1650 [Urine:1650]    General appearance: NAD, conversant  HEENT: AT/NC, MMM  Neck: FROM, supple  Lungs: diminished bl, on high flow nc at 2 L  CV: RRR, systolic murmur present  Vasc: Radial pulses 2+  Abdomen: Soft, non-tender; no masses or HSM  Extremities: No peripheral edema or digital cyanosis  Skin: no rash, lesions or ulcers  Psych: Alert and oriented to person, place and time  Neuro: no tremors or seizures     Recent Labs     03/25/24  0630   WBC 8.1   HGB 9.1*   HCT 30.1*          Recent Labs     03/25/24  0630 03/26/24  0327 03/27/24  0540    143 138   K 3.3* 3.5 3.6    101 97*   CO2 30* 32* 29   BUN 28* 30* 35*   CREATININE 0.7 0.8 0.8   CALCIUM 8.5* 8.6 8.8       Assessment:    Principal Problem:    Respiratory distress  Active Problems:    Acute on chronic respiratory failure (HCC)  Resolved Problems:    * No resolved hospital problems. *      Plan:    83-year-old male who was recently discharged 2 days ago presents to the ED from facility with complaints of respiratory distress and is admitted to telemetry unit with     Respiratory distress  Supplemental O2 to keep saturation greater than 93%-patient currently on 8 L.  Bumex 2 mg daily  Monitor kidney function-BUN/creatinine-20/0.7-caution with diuresis  Obtain BNP-most recent 1064   Daily weights  Respiratory panel-negative  Will likely require placement    3/24/2024  No acute events or issues overnight  BMP within normal limits today-continue Bumex-watch renal function closely  BNP only minimally elevated at 969  No

## 2024-03-27 NOTE — PLAN OF CARE
Problem: ABCDS Injury Assessment  Goal: Absence of physical injury  3/27/2024 0940 by Candace Babcock, RN  Outcome: Progressing     Problem: Skin/Tissue Integrity  Goal: Absence of new skin breakdown  Description: 1.  Monitor for areas of redness and/or skin breakdown  2.  Assess vascular access sites hourly  3.  Every 4-6 hours minimum:  Change oxygen saturation probe site  4.  Every 4-6 hours:  If on nasal continuous positive airway pressure, respiratory therapy assess nares and determine need for appliance change or resting period.  3/27/2024 0940 by Candace Babcock, RN  Outcome: Progressing

## 2024-03-28 LAB — BNP SERPL-MCNC: 965 PG/ML (ref 0–450)

## 2024-03-28 PROCEDURE — 1200000000 HC SEMI PRIVATE

## 2024-03-28 PROCEDURE — 6370000000 HC RX 637 (ALT 250 FOR IP): Performed by: FAMILY MEDICINE

## 2024-03-28 PROCEDURE — 6360000002 HC RX W HCPCS: Performed by: FAMILY MEDICINE

## 2024-03-28 PROCEDURE — 2580000003 HC RX 258: Performed by: FAMILY MEDICINE

## 2024-03-28 PROCEDURE — 83880 ASSAY OF NATRIURETIC PEPTIDE: CPT

## 2024-03-28 PROCEDURE — 97530 THERAPEUTIC ACTIVITIES: CPT

## 2024-03-28 PROCEDURE — 36415 COLL VENOUS BLD VENIPUNCTURE: CPT

## 2024-03-28 PROCEDURE — 2700000000 HC OXYGEN THERAPY PER DAY

## 2024-03-28 PROCEDURE — 94660 CPAP INITIATION&MGMT: CPT

## 2024-03-28 PROCEDURE — 94640 AIRWAY INHALATION TREATMENT: CPT

## 2024-03-28 RX ORDER — METHYLPREDNISOLONE SODIUM SUCCINATE 40 MG/ML
40 INJECTION, POWDER, LYOPHILIZED, FOR SOLUTION INTRAMUSCULAR; INTRAVENOUS DAILY
Status: DISCONTINUED | OUTPATIENT
Start: 2024-03-29 | End: 2024-03-29 | Stop reason: HOSPADM

## 2024-03-28 RX ORDER — BUMETANIDE 1 MG/1
0.5 TABLET ORAL 2 TIMES DAILY
Status: DISCONTINUED | OUTPATIENT
Start: 2024-03-29 | End: 2024-03-29 | Stop reason: HOSPADM

## 2024-03-28 RX ADMIN — CLOTRIMAZOLE AND BETAMETHASONE DIPROPIONATE: 10; .5 CREAM TOPICAL at 20:28

## 2024-03-28 RX ADMIN — ARFORMOTEROL TARTRATE 15 MCG: 15 SOLUTION RESPIRATORY (INHALATION) at 20:13

## 2024-03-28 RX ADMIN — ENOXAPARIN SODIUM 40 MG: 100 INJECTION SUBCUTANEOUS at 07:24

## 2024-03-28 RX ADMIN — PANTOPRAZOLE SODIUM 40 MG: 40 TABLET, DELAYED RELEASE ORAL at 05:30

## 2024-03-28 RX ADMIN — GUAIFENESIN 400 MG: 400 TABLET ORAL at 20:27

## 2024-03-28 RX ADMIN — TAMSULOSIN HYDROCHLORIDE 0.4 MG: 0.4 CAPSULE ORAL at 07:25

## 2024-03-28 RX ADMIN — MIDODRINE HYDROCHLORIDE 10 MG: 5 TABLET ORAL at 07:24

## 2024-03-28 RX ADMIN — METOPROLOL SUCCINATE 25 MG: 25 TABLET, FILM COATED, EXTENDED RELEASE ORAL at 07:25

## 2024-03-28 RX ADMIN — IPRATROPIUM BROMIDE 0.5 MG: 0.5 SOLUTION RESPIRATORY (INHALATION) at 20:13

## 2024-03-28 RX ADMIN — IPRATROPIUM BROMIDE 0.5 MG: 0.5 SOLUTION RESPIRATORY (INHALATION) at 06:29

## 2024-03-28 RX ADMIN — ARFORMOTEROL TARTRATE 15 MCG: 15 SOLUTION RESPIRATORY (INHALATION) at 06:29

## 2024-03-28 RX ADMIN — GUAIFENESIN 400 MG: 400 TABLET ORAL at 07:24

## 2024-03-28 RX ADMIN — IPRATROPIUM BROMIDE 0.5 MG: 0.5 SOLUTION RESPIRATORY (INHALATION) at 13:10

## 2024-03-28 RX ADMIN — BUMETANIDE 2 MG: 0.25 INJECTION INTRAMUSCULAR; INTRAVENOUS at 07:23

## 2024-03-28 RX ADMIN — IPRATROPIUM BROMIDE 0.5 MG: 0.5 SOLUTION RESPIRATORY (INHALATION) at 16:07

## 2024-03-28 RX ADMIN — MIDODRINE HYDROCHLORIDE 10 MG: 5 TABLET ORAL at 16:52

## 2024-03-28 RX ADMIN — INDOMETHACIN 75 MG: 75 CAPSULE, EXTENDED RELEASE ORAL at 07:25

## 2024-03-28 RX ADMIN — CLOTRIMAZOLE AND BETAMETHASONE DIPROPIONATE: 10; .5 CREAM TOPICAL at 07:25

## 2024-03-28 RX ADMIN — CLOPIDOGREL BISULFATE 75 MG: 75 TABLET ORAL at 07:24

## 2024-03-28 RX ADMIN — ROSUVASTATIN CALCIUM 5 MG: 5 TABLET, FILM COATED ORAL at 20:27

## 2024-03-28 RX ADMIN — SODIUM CHLORIDE, PRESERVATIVE FREE 10 ML: 5 INJECTION INTRAVENOUS at 07:25

## 2024-03-28 RX ADMIN — GUAIFENESIN 400 MG: 400 TABLET ORAL at 12:07

## 2024-03-28 RX ADMIN — MIDODRINE HYDROCHLORIDE 10 MG: 5 TABLET ORAL at 12:07

## 2024-03-28 RX ADMIN — TIMOLOL MALEATE 1 DROP: 5 SOLUTION OPHTHALMIC at 07:26

## 2024-03-28 RX ADMIN — ISOSORBIDE MONONITRATE 30 MG: 30 TABLET, EXTENDED RELEASE ORAL at 07:24

## 2024-03-28 RX ADMIN — ASPIRIN 81 MG: 81 TABLET, COATED ORAL at 07:25

## 2024-03-28 RX ADMIN — SODIUM CHLORIDE, PRESERVATIVE FREE 10 ML: 5 INJECTION INTRAVENOUS at 20:27

## 2024-03-28 RX ADMIN — METHYLPREDNISOLONE SODIUM SUCCINATE 60 MG: 125 INJECTION INTRAMUSCULAR; INTRAVENOUS at 00:49

## 2024-03-28 NOTE — PROGRESS NOTES
[facility]   Progress note      Subjective:    Resting comfortably in the chair side of the bed  No complaints  Currently on 2 L nasal cannula    Objective:    /60   Pulse 92   Temp 97.6 °F (36.4 °C) (Oral)   Resp 18   Ht 1.829 m (6')   Wt 79.4 kg (175 lb)   SpO2 97%   BMI 23.73 kg/m²     In: 1800 [P.O.:1800]  Out: 1800   In: 1800   Out: 1800 [Urine:1800]    General appearance: NAD, conversant  HEENT: AT/NC, MMM  Neck: FROM, supple  Lungs: diminished bl, on high flow nc at 2 L  CV: RRR, systolic murmur present  Vasc: Radial pulses 2+  Abdomen: Soft, non-tender; no masses or HSM  Extremities: No peripheral edema or digital cyanosis  Skin: no rash, lesions or ulcers  Psych: Alert and oriented to person, place and time  Neuro: no tremors or seizures     No results for input(s): \"WBC\", \"HGB\", \"HCT\", \"PLT\" in the last 72 hours.      Recent Labs     03/26/24  0327 03/27/24  0540    138   K 3.5 3.6    97*   CO2 32* 29   BUN 30* 35*   CREATININE 0.8 0.8   CALCIUM 8.6 8.8         Assessment:    Principal Problem:    Respiratory distress  Active Problems:    Acute on chronic respiratory failure (HCC)  Resolved Problems:    * No resolved hospital problems. *      Plan:    83-year-old male who was recently discharged 2 days ago presents to the ED from facility with complaints of respiratory distress and is admitted to telemetry unit with     Respiratory distress  Supplemental O2 to keep saturation greater than 93%-patient currently on 8 L.  Bumex 2 mg daily  Monitor kidney function-BUN/creatinine-20/0.7-caution with diuresis  Obtain BNP-most recent 1064   Daily weights  Respiratory panel-negative  Will likely require placement    3/24/2024  No acute events or issues overnight  BMP within normal limits today-continue Bumex-watch renal function closely  BNP only minimally elevated at 969  No other acute issues  Await placement at nursing facility due to recurrent

## 2024-03-28 NOTE — PLAN OF CARE
Problem: ABCDS Injury Assessment  Goal: Absence of physical injury  3/27/2024 2127 by Mikaela Fernandez, RN  Outcome: Progressing  3/27/2024 0940 by Candace Babcock RN  Outcome: Progressing     Problem: Skin/Tissue Integrity  Goal: Absence of new skin breakdown  Description: 1.  Monitor for areas of redness and/or skin breakdown  2.  Assess vascular access sites hourly  3.  Every 4-6 hours minimum:  Change oxygen saturation probe site  4.  Every 4-6 hours:  If on nasal continuous positive airway pressure, respiratory therapy assess nares and determine need for appliance change or resting period.  3/27/2024 2127 by Mikaela Fernandez, RN  Outcome: Progressing  3/27/2024 0940 by Candace Babcock RN  Outcome: Progressing     Problem: Discharge Planning  Goal: Discharge to home or other facility with appropriate resources  3/27/2024 2127 by Mikaela Fernandez RN  Outcome: Progressing  3/27/2024 0940 by Candace Babcock RN  Outcome: Progressing     Problem: Safety - Adult  Goal: Free from fall injury  Outcome: Progressing

## 2024-03-28 NOTE — CARE COORDINATION
Transition of Care-Auth obtained yesterday afternoon for [facility]-will  by Friday 3/29 at 2300. Patient remains on IB Bumex QD, added extra dose of Lasix IV x1 today, also continues on IV Solu-Medrol Q12.  JEFF and transportation forms completed in the soft chart. Anticipate discharge in the next 12-24 hrs.      Stephanie VAUGHN, RN  [facility]

## 2024-03-28 NOTE — PLAN OF CARE
Problem: ABCDS Injury Assessment  Goal: Absence of physical injury  3/28/2024 0950 by Candace Babcock, RN  Outcome: Progressing  3/27/2024 2127 by Mikaela Fernandez RN  Outcome: Progressing     Problem: Skin/Tissue Integrity  Goal: Absence of new skin breakdown  Description: 1.  Monitor for areas of redness and/or skin breakdown  2.  Assess vascular access sites hourly  3.  Every 4-6 hours minimum:  Change oxygen saturation probe site  4.  Every 4-6 hours:  If on nasal continuous positive airway pressure, respiratory therapy assess nares and determine need for appliance change or resting period.  3/28/2024 0950 by Candace Babcock, RN  Outcome: Progressing  3/27/2024 2127 by Mikaela Fernandez, RN  Outcome: Progressing

## 2024-03-28 NOTE — PROGRESS NOTES
Occupational Therapy  OT BEDSIDE TREATMENT NOTE      Date:3/28/2024  Patient Name: Shiv Lee  MRN: [id number]  : 1940  Room: [address] [place], [place] [address]     Evaluating OT: Zunilda Bruno OTR/L #[id number]     Referring Provider:  Mireya Coley DO      Specific Provider Orders/Date:  OT Eval and Treat , 3/25/2024      Diagnosis:   1. Acute on chronic respiratory failure with hypoxia (HCC)         Surgery: None        Pertinent Medical History: Aortic stenosis, A-fib, HTN, CABG     Precautions:  Fall Risk, alarm, 6L high flow via NC       Assessment of current deficits    [x] Functional mobility            [x]ADLs           [x] Strength                   []Cognition    [x] Functional transfers          [x] IADLs          [x] Safety Awareness   [x]Endurance    [] Fine Coordination              [x] Balance      [] Vision/perception    []Sensation      []Gross Motor Coordination  [x] ROM           [] Delirium                   [] Motor Control      OT PLAN OF CARE   OT POC based on physician orders, patient diagnosis and results of clinical assessment     Frequency/Duration 2-5 days/wk for 2-4 weeks PRN      Specific OT Treatment Interventions to include:   * Instruction/training on adapted ADL techniques and AE recommendations to increase functional independence within precautions       * Training on energy conservation strategies, correct breathing pattern and techniques to improve independence/tolerance for self-care routine  * Functional transfer/mobility training/DME recommendations for increased independence, safety, and fall prevention  * Patient/Family education to increase follow through with safety techniques and functional independence  * Recommendation of environmental modifications for increased safety with functional transfers/mobility and ADLs  * Therapeutic exercise to improve motor endurance, ROM, and functional strength for ADLs/functional transfers  * Therapeutic activities to facilitate/challenge dynamic

## 2024-03-29 VITALS
HEART RATE: 90 BPM | OXYGEN SATURATION: 97 % | TEMPERATURE: 97.5 F | RESPIRATION RATE: 16 BRPM | BODY MASS INDEX: 25.98 KG/M2 | SYSTOLIC BLOOD PRESSURE: 110 MMHG | DIASTOLIC BLOOD PRESSURE: 76 MMHG | WEIGHT: 191.8 LBS | HEIGHT: 72 IN

## 2024-03-29 PROCEDURE — 94640 AIRWAY INHALATION TREATMENT: CPT

## 2024-03-29 PROCEDURE — 6360000002 HC RX W HCPCS: Performed by: FAMILY MEDICINE

## 2024-03-29 PROCEDURE — 94660 CPAP INITIATION&MGMT: CPT

## 2024-03-29 PROCEDURE — 6370000000 HC RX 637 (ALT 250 FOR IP): Performed by: FAMILY MEDICINE

## 2024-03-29 PROCEDURE — 6370000000 HC RX 637 (ALT 250 FOR IP): Performed by: NURSE PRACTITIONER

## 2024-03-29 PROCEDURE — 2700000000 HC OXYGEN THERAPY PER DAY

## 2024-03-29 PROCEDURE — 2580000003 HC RX 258: Performed by: FAMILY MEDICINE

## 2024-03-29 PROCEDURE — 6360000002 HC RX W HCPCS: Performed by: NURSE PRACTITIONER

## 2024-03-29 RX ORDER — PREDNISONE 10 MG/1
TABLET ORAL
Qty: 30 TABLET | Refills: 0 | DISCHARGE
Start: 2024-03-29 | End: 2024-04-02

## 2024-03-29 RX ORDER — BUMETANIDE 0.5 MG/1
0.5 TABLET ORAL 2 TIMES DAILY
Qty: 30 TABLET | Refills: 3 | DISCHARGE
Start: 2024-03-29

## 2024-03-29 RX ORDER — PREDNISONE 10 MG/1
10 TABLET ORAL EVERY MORNING
DISCHARGE
Start: 2024-03-29

## 2024-03-29 RX ADMIN — CLOPIDOGREL BISULFATE 75 MG: 75 TABLET ORAL at 08:14

## 2024-03-29 RX ADMIN — ENOXAPARIN SODIUM 40 MG: 100 INJECTION SUBCUTANEOUS at 08:11

## 2024-03-29 RX ADMIN — ISOSORBIDE MONONITRATE 30 MG: 30 TABLET, EXTENDED RELEASE ORAL at 08:14

## 2024-03-29 RX ADMIN — METHYLPREDNISOLONE SODIUM SUCCINATE 40 MG: 40 INJECTION INTRAMUSCULAR; INTRAVENOUS at 08:12

## 2024-03-29 RX ADMIN — BUMETANIDE 0.5 MG: 1 TABLET ORAL at 08:13

## 2024-03-29 RX ADMIN — TIMOLOL MALEATE 1 DROP: 5 SOLUTION OPHTHALMIC at 08:12

## 2024-03-29 RX ADMIN — GUAIFENESIN 400 MG: 400 TABLET ORAL at 08:13

## 2024-03-29 RX ADMIN — MIDODRINE HYDROCHLORIDE 10 MG: 5 TABLET ORAL at 08:13

## 2024-03-29 RX ADMIN — CLOTRIMAZOLE AND BETAMETHASONE DIPROPIONATE: 10; .5 CREAM TOPICAL at 08:12

## 2024-03-29 RX ADMIN — INDOMETHACIN 75 MG: 75 CAPSULE, EXTENDED RELEASE ORAL at 08:13

## 2024-03-29 RX ADMIN — METOPROLOL SUCCINATE 25 MG: 25 TABLET, FILM COATED, EXTENDED RELEASE ORAL at 08:13

## 2024-03-29 RX ADMIN — SODIUM CHLORIDE, PRESERVATIVE FREE 10 ML: 5 INJECTION INTRAVENOUS at 08:14

## 2024-03-29 RX ADMIN — ASPIRIN 81 MG: 81 TABLET, COATED ORAL at 08:13

## 2024-03-29 RX ADMIN — IPRATROPIUM BROMIDE 0.5 MG: 0.5 SOLUTION RESPIRATORY (INHALATION) at 08:35

## 2024-03-29 RX ADMIN — TAMSULOSIN HYDROCHLORIDE 0.4 MG: 0.4 CAPSULE ORAL at 08:13

## 2024-03-29 RX ADMIN — ARFORMOTEROL TARTRATE 15 MCG: 15 SOLUTION RESPIRATORY (INHALATION) at 08:35

## 2024-03-29 RX ADMIN — PANTOPRAZOLE SODIUM 40 MG: 40 TABLET, DELAYED RELEASE ORAL at 05:37

## 2024-03-29 NOTE — CARE COORDINATION
Transition of Care-[facility] to  patient at 0900 to [facility]. Nurse to Nurse to call report is [phone]. Facility liaison updated as well as patient's wife ScottZhane GARNICAN, RN  [facility]

## 2024-03-29 NOTE — PLAN OF CARE
Problem: Discharge Planning  Goal: Discharge to home or other facility with appropriate resources  Outcome: Progressing     Problem: Safety - Adult  Goal: Free from fall injury  Outcome: Progressing     Problem: Skin/Tissue Integrity  Goal: Absence of new skin breakdown  Description: 1.  Monitor for areas of redness and/or skin breakdown  2.  Assess vascular access sites hourly  3.  Every 4-6 hours minimum:  Change oxygen saturation probe site  4.  Every 4-6 hours:  If on nasal continuous positive airway pressure, respiratory therapy assess nares and determine need for appliance change or resting period.  3/28/2024 2307 by Mikaela Fernandez, RN  Outcome: Progressing  3/28/2024 0950 by Candace Babcock, RN  Outcome: Progressing

## 2024-03-29 NOTE — PLAN OF CARE
Problem: ABCDS Injury Assessment  Goal: Absence of physical injury  3/29/2024 0901 by Candace Babcock, RN  Outcome: Progressing  3/28/2024 2337 by Mikaela Fernandez RN  Outcome: Progressing     Problem: Skin/Tissue Integrity  Goal: Absence of new skin breakdown  Description: 1.  Monitor for areas of redness and/or skin breakdown  2.  Assess vascular access sites hourly  3.  Every 4-6 hours minimum:  Change oxygen saturation probe site  4.  Every 4-6 hours:  If on nasal continuous positive airway pressure, respiratory therapy assess nares and determine need for appliance change or resting period.  3/29/2024 0901 by Candace Babcock, RN  Outcome: Progressing  3/28/2024 2337 by Mikaela Fernandez, RN  Outcome: Progressing

## 2024-03-29 NOTE — DISCHARGE SUMMARY
cream  Commonly known as: LOTRISONE     guaiFENesin 400 MG tablet     * ipratropium 0.5 mg-albuterol 2.5 mg 0.5-2.5 (3) MG/3ML Soln nebulizer solution  Commonly known as: DUONEB     * ipratropium 0.5 mg-albuterol 2.5 mg 0.5-2.5 (3) MG/3ML Soln nebulizer solution  Commonly known as: DUONEB     isosorbide mononitrate 30 MG extended release tablet  Commonly known as: IMDUR     metoprolol succinate 25 MG extended release tablet  Commonly known as: TOPROL XL     midodrine 5 MG tablet  Commonly known as: PROAMATINE  Take 1 tablet by mouth 3 times daily (with meals)     OXYGEN     pantoprazole 40 MG tablet  Commonly known as: PROTONIX     polyethylene glycol 17 GM/SCOOP powder  Commonly known as: GLYCOLAX     rosuvastatin 5 MG tablet  Commonly known as: CRESTOR  Take 1 tablet by mouth nightly     sodium chloride 0.65 % nasal spray  Commonly known as: OCEAN, BABY AYR     tamsulosin 0.4 MG capsule  Commonly known as: FLOMAX     timolol 0.5 % ophthalmic solution  Commonly known as: TIMOPTIC           * This list has 2 medication(s) that are the same as other medications prescribed for you. Read the directions carefully, and ask your doctor or other care provider to review them with you.                   Where to Get Your Medications        Information about where to get these medications is not yet available    Ask your nurse or doctor about these medications  bumetanide 0.5 MG tablet  predniSONE 10 MG tablet  predniSONE 10 MG tablet       Activity: activity as tolerated  Diet: cardiac diet    Pt has been advised to:    Follow-up with Gilson Ramon III, DO in 1 week.  Follow-up with consultants as recommended by them    Note that over 30 minutes was spent in preparing discharge papers, discussing discharge with patient, medication review, etc.    Signed:  ANA Butcher - CNP  3/29/2024  8:49 AM    Above note edited to reflect my thoughts     I personally saw, examined and provided care for the patient.  Radiographs, labs and medication list were reviewed by me independently.  The case was discussed in detail and plans for care were established. Review of ALIZA Butcher   , documentation was conducted and revisions were made as appropriate directly by me. I agree with the above documented exam, problem list, and plan of care.     Karyn Brock MD

## 2024-03-30 ENCOUNTER — OUTSIDE SERVICES (OUTPATIENT)
Dept: PRIMARY CARE CLINIC | Age: 84
End: 2024-03-30

## 2024-03-30 DIAGNOSIS — D64.9 ACUTE ON CHRONIC ANEMIA: ICD-10-CM

## 2024-03-30 DIAGNOSIS — J96.21 ACUTE ON CHRONIC RESPIRATORY FAILURE WITH HYPOXIA (HCC): ICD-10-CM

## 2024-03-30 DIAGNOSIS — J44.9 CHRONIC OBSTRUCTIVE PULMONARY DISEASE, UNSPECIFIED COPD TYPE (HCC): ICD-10-CM

## 2024-03-30 DIAGNOSIS — I48.0 PAROXYSMAL ATRIAL FIBRILLATION (HCC): ICD-10-CM

## 2024-03-30 DIAGNOSIS — Z97.8 FOLEY CATHETER IN PLACE: ICD-10-CM

## 2024-03-30 DIAGNOSIS — R53.81 PHYSICAL DECONDITIONING: ICD-10-CM

## 2024-03-30 DIAGNOSIS — I50.9 ACUTE ON CHRONIC CONGESTIVE HEART FAILURE, UNSPECIFIED HEART FAILURE TYPE (HCC): Primary | ICD-10-CM

## 2024-03-30 DIAGNOSIS — R53.1 GENERALIZED WEAKNESS: ICD-10-CM

## 2024-03-30 DIAGNOSIS — Z91.81 AT MAXIMUM RISK FOR FALL: ICD-10-CM

## 2024-03-30 NOTE — PROGRESS NOTES
Shiv Lee (:  1940) is a 83 y.o. male.    Subjective   SUBJECTIVE/OBJECTIVE:  Past Medical History:   Diagnosis Date    Abnormal EKG     Aortic stenosis     Atrial fibrillation (HCC)     Postop    Coronary artery disease     HTN (hypertension)     Infrarenal abdominal aortic aneurysm (AAA) without rupture (HCC) 2024    3.5 x 3.6 cm CT scan 2024    Left atrial dilatation     Mild    Mitral regurgitation     Trace    Nontraumatic retroperitoneal hematoma 2024    SBE (subacute bacterial endocarditis) prophylaxis candidate       Past Surgical History:   Procedure Laterality Date    AORTIC VALVE SURGERY  09    Dr. Villa    CARDIAC PROCEDURE N/A 2024    Left heart cath / coronary angiography performed by Dipika Curtis MD at [facility] CARDIAC CATH LAB    CARDIAC PROCEDURE N/A 2024    Percutaneous coronary intervention performed by Dipika Curtis MD at [facility] CARDIAC CATH LAB    CARDIAC PROCEDURE N/A 2024    Percutaneous coronary intervention performed by Amadou Santana DO at [facility] CARDIAC CATH LAB    CORONARY ARTERY BYPASS GRAFT  09    DIAGNOSTIC CARDIAC CATH LAB PROCEDURE  09    EYE SURGERY  2016      Family History   Problem Relation Age of Onset    Diabetes Mother     Heart Failure Mother     Cancer Father       Social History     Socioeconomic History    Marital status:    Occupational History    Occupation: RETIRED-    Tobacco Use    Smoking status: Never    Smokeless tobacco: Never   Vaping Use    Vaping Use: Never used   Substance and Sexual Activity    Alcohol use: Yes     Comment: one drink per day occassionally    Drug use: No    Sexual activity: Not Currently     Social Determinants of Health     Food Insecurity: No Food Insecurity (3/22/2024)    Hunger Vital Sign     Worried About Running Out of Food in the Last Year: Never true     Ran Out of Food in the Last Year: Never true   Transportation Needs: No Transportation Needs (3/22/2024)

## 2024-04-01 ENCOUNTER — OUTSIDE SERVICES (OUTPATIENT)
Dept: PRIMARY CARE CLINIC | Age: 84
End: 2024-04-01

## 2024-04-01 DIAGNOSIS — J96.21 ACUTE ON CHRONIC RESPIRATORY FAILURE WITH HYPOXIA (HCC): ICD-10-CM

## 2024-04-01 DIAGNOSIS — I48.0 PAROXYSMAL ATRIAL FIBRILLATION (HCC): ICD-10-CM

## 2024-04-01 DIAGNOSIS — R57.0 CARDIOGENIC SHOCK (HCC): ICD-10-CM

## 2024-04-01 DIAGNOSIS — J44.9 CHRONIC OBSTRUCTIVE PULMONARY DISEASE, UNSPECIFIED COPD TYPE (HCC): ICD-10-CM

## 2024-04-01 DIAGNOSIS — R53.81 PHYSICAL DECONDITIONING: ICD-10-CM

## 2024-04-01 DIAGNOSIS — I71.43 INFRARENAL ABDOMINAL AORTIC ANEURYSM (AAA) WITHOUT RUPTURE (HCC): ICD-10-CM

## 2024-04-01 DIAGNOSIS — Z91.81 AT MAXIMUM RISK FOR FALL: ICD-10-CM

## 2024-04-01 DIAGNOSIS — N17.9 AKI (ACUTE KIDNEY INJURY) (HCC): ICD-10-CM

## 2024-04-01 DIAGNOSIS — I50.9 ACUTE ON CHRONIC CONGESTIVE HEART FAILURE, UNSPECIFIED HEART FAILURE TYPE (HCC): Primary | ICD-10-CM

## 2024-04-01 DIAGNOSIS — I21.19 ST ELEVATION MYOCARDIAL INFARCTION (STEMI) INVOLVING OTHER CORONARY ARTERY OF INFERIOR WALL (HCC): ICD-10-CM

## 2024-04-01 LAB
ALBUMIN SERPL-MCNC: 4 G/DL (ref 3.5–5.2)
ALP SERPL-CCNC: 86 U/L (ref 40–129)
ALT SERPL-CCNC: 16 U/L (ref 0–40)
ANION GAP SERPL CALCULATED.3IONS-SCNC: 18 MMOL/L (ref 7–16)
AST SERPL-CCNC: 13 U/L (ref 0–39)
BILIRUB SERPL-MCNC: 0.3 MG/DL (ref 0–1.2)
BUN SERPL-MCNC: 30 MG/DL (ref 6–23)
CALCIUM SERPL-MCNC: 9.1 MG/DL (ref 8.6–10.2)
CHLORIDE SERPL-SCNC: 104 MMOL/L (ref 98–107)
CHOLEST SERPL-MCNC: 192 MG/DL
CO2 SERPL-SCNC: 28 MMOL/L (ref 22–29)
CREAT SERPL-MCNC: 0.7 MG/DL (ref 0.7–1.2)
ERYTHROCYTE [DISTWIDTH] IN BLOOD BY AUTOMATED COUNT: 17.3 % (ref 11.5–15)
GFR SERPL CREATININE-BSD FRML MDRD: >90 ML/MIN/1.73M2
GLUCOSE SERPL-MCNC: 88 MG/DL (ref 74–99)
HCT VFR BLD AUTO: 33.8 % (ref 37–54)
HDLC SERPL-MCNC: 71 MG/DL
HGB BLD-MCNC: 10.5 G/DL (ref 12.5–16.5)
LDLC SERPL CALC-MCNC: 111 MG/DL
MCH RBC QN AUTO: 31.3 PG (ref 26–35)
MCHC RBC AUTO-ENTMCNC: 31.1 G/DL (ref 32–34.5)
MCV RBC AUTO: 100.6 FL (ref 80–99.9)
PLATELET # BLD AUTO: 342 K/UL (ref 130–450)
PMV BLD AUTO: 10 FL (ref 7–12)
POTASSIUM SERPL-SCNC: 3.6 MMOL/L (ref 3.5–5)
PROT SERPL-MCNC: 6.6 G/DL (ref 6.4–8.3)
RBC # BLD AUTO: 3.36 M/UL (ref 3.8–5.8)
SODIUM SERPL-SCNC: 150 MMOL/L (ref 132–146)
TRIGL SERPL-MCNC: 50 MG/DL
VLDLC SERPL CALC-MCNC: 10 MG/DL
WBC OTHER # BLD: 9.2 K/UL (ref 4.5–11.5)

## 2024-04-02 ENCOUNTER — HOSPITAL ENCOUNTER (OUTPATIENT)
Dept: OTHER | Age: 84
Setting detail: THERAPIES SERIES
Discharge: HOME | End: 2024-04-02
Payer: MEDICARE

## 2024-04-02 VITALS
DIASTOLIC BLOOD PRESSURE: 63 MMHG | BODY MASS INDEX: 24.55 KG/M2 | SYSTOLIC BLOOD PRESSURE: 96 MMHG | RESPIRATION RATE: 18 BRPM | HEART RATE: 80 BPM | WEIGHT: 181 LBS | OXYGEN SATURATION: 96 %

## 2024-04-02 LAB
ANION GAP SERPL CALCULATED.3IONS-SCNC: 10 MMOL/L (ref 7–16)
ANION GAP SERPL CALCULATED.3IONS-SCNC: 16 MMOL/L (ref 7–16)
BNP SERPL-MCNC: 883 PG/ML (ref 0–450)
BUN SERPL-MCNC: 32 MG/DL (ref 6–23)
BUN SERPL-MCNC: 32 MG/DL (ref 6–23)
CALCIUM SERPL-MCNC: 8.7 MG/DL (ref 8.6–10.2)
CALCIUM SERPL-MCNC: 9 MG/DL (ref 8.6–10.2)
CHLORIDE SERPL-SCNC: 98 MMOL/L (ref 98–107)
CHLORIDE SERPL-SCNC: 98 MMOL/L (ref 98–107)
CO2 SERPL-SCNC: 30 MMOL/L (ref 22–29)
CO2 SERPL-SCNC: 32 MMOL/L (ref 22–29)
CREAT SERPL-MCNC: 0.8 MG/DL (ref 0.7–1.2)
CREAT SERPL-MCNC: 0.9 MG/DL (ref 0.7–1.2)
GFR SERPL CREATININE-BSD FRML MDRD: 83 ML/MIN/1.73M2
GFR SERPL CREATININE-BSD FRML MDRD: 87 ML/MIN/1.73M2
GLUCOSE SERPL-MCNC: 132 MG/DL (ref 74–99)
GLUCOSE SERPL-MCNC: 72 MG/DL (ref 74–99)
POTASSIUM SERPL-SCNC: 3.5 MMOL/L (ref 3.5–5)
POTASSIUM SERPL-SCNC: 3.8 MMOL/L (ref 3.5–5)
SODIUM SERPL-SCNC: 140 MMOL/L (ref 132–146)
SODIUM SERPL-SCNC: 144 MMOL/L (ref 132–146)

## 2024-04-02 PROCEDURE — 36415 COLL VENOUS BLD VENIPUNCTURE: CPT

## 2024-04-02 PROCEDURE — 80048 BASIC METABOLIC PNL TOTAL CA: CPT

## 2024-04-02 PROCEDURE — 83880 ASSAY OF NATRIURETIC PEPTIDE: CPT

## 2024-04-02 PROCEDURE — 99205 OFFICE O/P NEW HI 60 MIN: CPT

## 2024-04-02 RX ORDER — FLUTICASONE PROPIONATE AND SALMETEROL XINAFOATE 45; 21 UG/1; UG/1
1 AEROSOL, METERED RESPIRATORY (INHALATION) 2 TIMES DAILY
COMMUNITY

## 2024-04-02 ASSESSMENT — PATIENT HEALTH QUESTIONNAIRE - PHQ9
SUM OF ALL RESPONSES TO PHQ QUESTIONS 1-9: 0
1. LITTLE INTEREST OR PLEASURE IN DOING THINGS: NOT AT ALL
2. FEELING DOWN, DEPRESSED OR HOPELESS: NOT AT ALL
SUM OF ALL RESPONSES TO PHQ QUESTIONS 1-9: 0
SUM OF ALL RESPONSES TO PHQ9 QUESTIONS 1 & 2: 0

## 2024-04-02 NOTE — RESULT ENCOUNTER NOTE
CHF clinic visit and labs reviewed: First visit     BP: 96/63   Pulse: 80    BNP down trending to 883  BUN 32>>32  Cr 0.8>>0.9    Can we confirm what dose of Bumex he is getting at the facility?   Thank you!

## 2024-04-02 NOTE — PROGRESS NOTES
Congestive Heart Failure Clinic   [facility]      Referring Provider: Dr Santana  Primary Care Physician: Gilson Ramon III, DO   Cardiologist: Dr Santana  Nephrologist: N/A       HISTORY OF PRESENT ILLNESS:     Shiv Lee is a 83 y.o. (1940) male with a history of HFpEF (EF> 50%)  Pre Cupid:   No results found for: \"LVEF\"  Post Cupid:    Lab Results   Component Value Date    EFBP 66 02/07/2024         He presents to the CHF clinic for ongoing evaluation and monitoring of heart failure.    In the CHF clinic today he denies any adverse symptoms except:  [] Dizziness or lightheadedness   [] Syncope or near syncope  [] Recent Fall  [] Shortness of breath at rest   [x] Dyspnea with exertion recovers with rest  [] Decline in functional capacity (unable to perform activities they had previously been able to do)  [] Fatigue   [] Orthopnea  [] PND  [] Nocturnal cough  [] Hemoptysis  [] Chest pain, pressure, or discomfort  [] Palpitations  [] Abdominal distention  [] Abdominal bloating  [] Early satiety  [] Blood in stool   [] Diarrhea  [] Constipation  [] Nausea/Vomiting  [] Decreased urinary response to oral diuretic   [] Scrotal swelling   [] Lower extremity edema  [] Used PRN doses of oral diuretic   [] Weight gain    Wt Readings from Last 3 Encounters:   04/02/24 82.1 kg (181 lb)   03/29/24 87 kg (191 lb 12.8 oz)   03/20/24 85.7 kg (189 lb)           SOCIAL HISTORY:  [x] Denies tobacco, alcohol or illicit drug abuse  [] Tobacco use:  [] ETOH use:  [] Illicit drug use:        MEDICATIONS:    Allergies   Allergen Reactions    Haldol [Haloperidol] Other (See Comments)     PT BECOMES MORE AGGRESSIVE    Zocor [Simvastatin] Other (See Comments)     MUSCLE ACHES    Ativan [Lorazepam] Other (See Comments)     \"ALTERED MENTAL STATUS.\"     Prior to Visit Medications    Medication Sig Taking? Authorizing Provider   fluticasone-salmeterol (ADVAIR HFA) 45-21

## 2024-04-02 NOTE — DISCHARGE INSTRUCTIONS
Learning About Heart Failure Zones  What are heart failure zones?     Heart failure zones give you an easy way to see changes in your heart failure symptoms. They also tell you when you need to get help. Check every day to see which zone you are in.  Green zone. You are doing well. This is where you want to be.  Your weight is stable. It's not going up or down.  You breathe easily.  You are sleeping well. You are able to lie flat without shortness of breath.  You can do your usual activities.  Yellow zone. Be careful. Your symptoms are changing. Call your doctor.  You have new or increased shortness of breath.  You are dizzy or lightheaded, or you feel like you may faint.  You have sudden weight gain, such as more than 2 to 3 pounds in a day or 5 pounds in a week. (Your doctor may suggest a different range of weight gain.)  You have increased swelling in your legs, ankles, or feet.  You are so tired or weak that you can't do your usual activities.  You are not sleeping well. Shortness of breath wakes you up at night. You need extra pillows.  Red zone. This is an emergency. Call 911.  You have symptoms of sudden heart failure. For example:  You have severe trouble breathing.  You cough up pink, foamy mucus.  You have a new irregular or fast heartbeat.  You have symptoms of a heart attack. These may include:  Chest pain or pressure, or a strange feeling in the chest.  Sweating.  Shortness of breath.  Nausea or vomiting.  Pain, pressure, or a strange feeling in the back, neck, jaw, or upper belly or in one or both shoulders or arms.  Lightheadedness or sudden weakness.  A fast or irregular heartbeat.  If you have symptoms of a heart attack: After you call 911, the  may tell you to chew 1 adult-strength or 2 to 4 low-dose aspirin. Wait for an ambulance. Do not try to drive yourself.  Follow-up care is a key part of your treatment and safety. Be sure to make and go to all appointments, and call your doctor

## 2024-04-03 ENCOUNTER — OUTSIDE SERVICES (OUTPATIENT)
Dept: PRIMARY CARE CLINIC | Age: 84
End: 2024-04-03
Payer: MEDICARE

## 2024-04-03 DIAGNOSIS — I50.9 ACUTE ON CHRONIC CONGESTIVE HEART FAILURE, UNSPECIFIED HEART FAILURE TYPE (HCC): Primary | ICD-10-CM

## 2024-04-03 DIAGNOSIS — J44.9 CHRONIC OBSTRUCTIVE PULMONARY DISEASE, UNSPECIFIED COPD TYPE (HCC): ICD-10-CM

## 2024-04-03 PROCEDURE — 99309 SBSQ NF CARE MODERATE MDM 30: CPT | Performed by: STUDENT IN AN ORGANIZED HEALTH CARE EDUCATION/TRAINING PROGRAM

## 2024-04-03 NOTE — PROGRESS NOTES
Shiv Lee (:  1940) is a 83 y.o. male seen today for skilled assessment    Subjective   SUBJECTIVE/OBJECTIVE:  Shiv states his breathing is not improving however is not worsening.  He denies any chest pain, fever, or chills.    No concerns per nursing.      [facility]  Nursing and progress notes reviewed.    Past Medical History:   Diagnosis Date    Abnormal EKG     Aortic stenosis     Atrial fibrillation (HCC)     Postop    Coronary artery disease     HTN (hypertension)     Infrarenal abdominal aortic aneurysm (AAA) without rupture (HCC) 2024    3.5 x 3.6 cm CT scan 2024    Left atrial dilatation     Mild    Mitral regurgitation     Trace    Nontraumatic retroperitoneal hematoma 2024    SBE (subacute bacterial endocarditis) prophylaxis candidate        Allergies   Allergen Reactions    Haldol [Haloperidol] Other (See Comments)     PT BECOMES MORE AGGRESSIVE    Zocor [Simvastatin] Other (See Comments)     MUSCLE ACHES    Ativan [Lorazepam] Other (See Comments)     \"ALTERED MENTAL STATUS.\"           ROS: As above    Objective   Physical Exam  Vitals and nursing note reviewed.   Constitutional:       General: He is not in acute distress.     Appearance: He is not ill-appearing.      Comments: Frail-appearing   HENT:      Head: Normocephalic and atraumatic.      Right Ear: External ear normal.      Left Ear: External ear normal.      Nose: Nose normal. No congestion or rhinorrhea.      Mouth/Throat:      Pharynx: Oropharynx is clear. No oropharyngeal exudate or posterior oropharyngeal erythema.   Eyes:      General:         Right eye: No discharge.         Left eye: No discharge.      Conjunctiva/sclera: Conjunctivae normal.      Pupils: Pupils are equal, round, and reactive to light.   Cardiovascular:      Rate and Rhythm: Normal rate and regular rhythm.      Heart sounds: No murmur heard.  Pulmonary:      Effort: No respiratory distress.

## 2024-04-04 PROBLEM — N39.0 UTI (URINARY TRACT INFECTION): Status: RESOLVED | Noted: 2024-03-05 | Resolved: 2024-04-04

## 2024-04-04 ASSESSMENT — ENCOUNTER SYMPTOMS
BACK PAIN: 0
CONSTIPATION: 0
ABDOMINAL PAIN: 0
VOMITING: 0
COUGH: 0
SORE THROAT: 0
SHORTNESS OF BREATH: 0
PHOTOPHOBIA: 0
WHEEZING: 0
RHINORRHEA: 0

## 2024-04-04 NOTE — PROGRESS NOTES
interpreting diagnostics      An electronic signature was used to authenticate this note.    --Deacon Garvin PA-C   This office note has been dictated.

## 2024-04-05 LAB
BACTERIA URNS QL MICRO: ABNORMAL
BILIRUB UR QL STRIP: NEGATIVE
CLARITY UR: CLEAR
COLOR UR: YELLOW
GLUCOSE UR STRIP-MCNC: NEGATIVE MG/DL
HGB UR QL STRIP.AUTO: ABNORMAL
KETONES UR STRIP-MCNC: NEGATIVE MG/DL
LEUKOCYTE ESTERASE UR QL STRIP: ABNORMAL
NITRITE UR QL STRIP: NEGATIVE
PH UR STRIP: 6 [PH] (ref 5–9)
PROT UR STRIP-MCNC: NEGATIVE MG/DL
RBC #/AREA URNS HPF: ABNORMAL /HPF
SP GR UR STRIP: 1.01 (ref 1–1.03)
UROBILINOGEN UR STRIP-ACNC: 0.2 EU/DL (ref 0–1)
WBC #/AREA URNS HPF: ABNORMAL /HPF

## 2024-04-07 LAB
MICROORGANISM SPEC CULT: ABNORMAL
SPECIMEN DESCRIPTION: ABNORMAL

## 2024-04-08 ENCOUNTER — OUTSIDE SERVICES (OUTPATIENT)
Dept: PRIMARY CARE CLINIC | Age: 84
End: 2024-04-08
Payer: MEDICARE

## 2024-04-08 DIAGNOSIS — N17.9 AKI (ACUTE KIDNEY INJURY) (HCC): ICD-10-CM

## 2024-04-08 DIAGNOSIS — I50.9 ACUTE ON CHRONIC CONGESTIVE HEART FAILURE, UNSPECIFIED HEART FAILURE TYPE (HCC): Primary | ICD-10-CM

## 2024-04-08 DIAGNOSIS — R57.0 CARDIOGENIC SHOCK (HCC): ICD-10-CM

## 2024-04-08 DIAGNOSIS — R53.81 PHYSICAL DECONDITIONING: ICD-10-CM

## 2024-04-08 DIAGNOSIS — I71.43 INFRARENAL ABDOMINAL AORTIC ANEURYSM (AAA) WITHOUT RUPTURE (HCC): ICD-10-CM

## 2024-04-08 DIAGNOSIS — J44.9 CHRONIC OBSTRUCTIVE PULMONARY DISEASE, UNSPECIFIED COPD TYPE (HCC): ICD-10-CM

## 2024-04-08 DIAGNOSIS — I21.19 ST ELEVATION MYOCARDIAL INFARCTION (STEMI) INVOLVING OTHER CORONARY ARTERY OF INFERIOR WALL (HCC): ICD-10-CM

## 2024-04-08 DIAGNOSIS — I48.0 PAROXYSMAL ATRIAL FIBRILLATION (HCC): ICD-10-CM

## 2024-04-08 DIAGNOSIS — Z91.81 AT MAXIMUM RISK FOR FALL: ICD-10-CM

## 2024-04-08 DIAGNOSIS — J96.21 ACUTE ON CHRONIC RESPIRATORY FAILURE WITH HYPOXIA (HCC): ICD-10-CM

## 2024-04-08 LAB
ALBUMIN SERPL-MCNC: 3.8 G/DL (ref 3.5–5.2)
ALP SERPL-CCNC: 71 U/L (ref 40–129)
ALT SERPL-CCNC: 15 U/L (ref 0–40)
ANION GAP SERPL CALCULATED.3IONS-SCNC: 14 MMOL/L (ref 7–16)
AST SERPL-CCNC: 13 U/L (ref 0–39)
BILIRUB SERPL-MCNC: 0.4 MG/DL (ref 0–1.2)
BUN SERPL-MCNC: 29 MG/DL (ref 6–23)
CALCIUM SERPL-MCNC: 8.6 MG/DL (ref 8.6–10.2)
CHLORIDE SERPL-SCNC: 101 MMOL/L (ref 98–107)
CO2 SERPL-SCNC: 29 MMOL/L (ref 22–29)
CREAT SERPL-MCNC: 0.8 MG/DL (ref 0.7–1.2)
ERYTHROCYTE [DISTWIDTH] IN BLOOD BY AUTOMATED COUNT: 17.3 % (ref 11.5–15)
GFR SERPL CREATININE-BSD FRML MDRD: 87 ML/MIN/1.73M2
GLUCOSE SERPL-MCNC: 83 MG/DL (ref 74–99)
HCT VFR BLD AUTO: 31.3 % (ref 37–54)
HGB BLD-MCNC: 9.7 G/DL (ref 12.5–16.5)
MCH RBC QN AUTO: 30.8 PG (ref 26–35)
MCHC RBC AUTO-ENTMCNC: 31 G/DL (ref 32–34.5)
MCV RBC AUTO: 99.4 FL (ref 80–99.9)
PLATELET # BLD AUTO: 272 K/UL (ref 130–450)
PMV BLD AUTO: 10.6 FL (ref 7–12)
POTASSIUM SERPL-SCNC: 3 MMOL/L (ref 3.5–5)
PROT SERPL-MCNC: 6 G/DL (ref 6.4–8.3)
RBC # BLD AUTO: 3.15 M/UL (ref 3.8–5.8)
SODIUM SERPL-SCNC: 144 MMOL/L (ref 132–146)
WBC OTHER # BLD: 11.7 K/UL (ref 4.5–11.5)

## 2024-04-08 PROCEDURE — 99309 SBSQ NF CARE MODERATE MDM 30: CPT

## 2024-04-09 LAB
ANION GAP SERPL CALCULATED.3IONS-SCNC: 12 MMOL/L (ref 7–16)
BUN SERPL-MCNC: 29 MG/DL (ref 6–23)
CALCIUM SERPL-MCNC: 8.4 MG/DL (ref 8.6–10.2)
CHLORIDE SERPL-SCNC: 104 MMOL/L (ref 98–107)
CO2 SERPL-SCNC: 29 MMOL/L (ref 22–29)
CREAT SERPL-MCNC: 0.8 MG/DL (ref 0.7–1.2)
GFR SERPL CREATININE-BSD FRML MDRD: 87 ML/MIN/1.73M2
GLUCOSE SERPL-MCNC: 88 MG/DL (ref 74–99)
POTASSIUM SERPL-SCNC: 3.4 MMOL/L (ref 3.5–5)
SODIUM SERPL-SCNC: 145 MMOL/L (ref 132–146)

## 2024-04-09 ASSESSMENT — ENCOUNTER SYMPTOMS
PHOTOPHOBIA: 0
SORE THROAT: 0
RHINORRHEA: 0
CONSTIPATION: 0
BACK PAIN: 0
SHORTNESS OF BREATH: 0
WHEEZING: 0
VOMITING: 0
ABDOMINAL PAIN: 0
DIARRHEA: 0
COUGH: 0

## 2024-04-09 NOTE — PROGRESS NOTES
Shiv Lee (:  1940) is a 83 y.o. male.    Subjective   Shiv is sitting upright in wheelchair in room. Seen for weekly skilled assessment. No concerns per nursing. Resident has been improving w therapies. He states when he takes a deep breath he has a cough. Feels occasionally SOB w exertion. Very anxious today. Remains on o2 NC. Very concerned about having to return to hospital. He is in no distress.    [facility]  All nursing and progress notes reviewed.    Past Medical History:   Diagnosis Date    Abnormal EKG     Aortic stenosis     Atrial fibrillation (HCC)     Postop    Coronary artery disease     HTN (hypertension)     Infrarenal abdominal aortic aneurysm (AAA) without rupture (HCC) 2024    3.5 x 3.6 cm CT scan 2024    Left atrial dilatation     Mild    Mitral regurgitation     Trace    Nontraumatic retroperitoneal hematoma 2024    SBE (subacute bacterial endocarditis) prophylaxis candidate        Allergies   Allergen Reactions    Haldol [Haloperidol] Other (See Comments)     PT BECOMES MORE AGGRESSIVE    Zocor [Simvastatin] Other (See Comments)     MUSCLE ACHES    Ativan [Lorazepam] Other (See Comments)     \"ALTERED MENTAL STATUS.\"      Review of Systems   Constitutional:  Negative for appetite change, chills, diaphoresis, fatigue and fever.   HENT:  Negative for congestion, hearing loss, rhinorrhea and sore throat.    Eyes:  Negative for photophobia and visual disturbance.   Respiratory:  Negative for cough, shortness of breath and wheezing.    Cardiovascular:  Negative for chest pain and palpitations.   Gastrointestinal:  Negative for abdominal pain, constipation, diarrhea and vomiting.   Musculoskeletal:  Positive for gait problem. Negative for arthralgias, back pain, neck pain and neck stiffness.   Skin:  Positive for wound. Negative for rash.   Neurological:  Positive for weakness.   Psychiatric/Behavioral:  The patient is

## 2024-04-11 ENCOUNTER — HOSPITAL ENCOUNTER (OUTPATIENT)
Dept: OTHER | Age: 84
Setting detail: THERAPIES SERIES
Discharge: HOME | End: 2024-04-11
Payer: MEDICARE

## 2024-04-11 VITALS
DIASTOLIC BLOOD PRESSURE: 63 MMHG | WEIGHT: 180 LBS | RESPIRATION RATE: 18 BRPM | HEART RATE: 92 BPM | SYSTOLIC BLOOD PRESSURE: 109 MMHG | BODY MASS INDEX: 24.41 KG/M2 | OXYGEN SATURATION: 97 %

## 2024-04-11 LAB
ANION GAP SERPL CALCULATED.3IONS-SCNC: 13 MMOL/L (ref 7–16)
BNP SERPL-MCNC: 677 PG/ML (ref 0–450)
BUN SERPL-MCNC: 31 MG/DL (ref 6–23)
CALCIUM SERPL-MCNC: 8.7 MG/DL (ref 8.6–10.2)
CHLORIDE SERPL-SCNC: 101 MMOL/L (ref 98–107)
CO2 SERPL-SCNC: 25 MMOL/L (ref 22–29)
CREAT SERPL-MCNC: 0.9 MG/DL (ref 0.7–1.2)
GFR SERPL CREATININE-BSD FRML MDRD: 83 ML/MIN/1.73M2
GLUCOSE SERPL-MCNC: 139 MG/DL (ref 74–99)
POTASSIUM SERPL-SCNC: 4 MMOL/L (ref 3.5–5)
SODIUM SERPL-SCNC: 139 MMOL/L (ref 132–146)

## 2024-04-11 PROCEDURE — 36415 COLL VENOUS BLD VENIPUNCTURE: CPT

## 2024-04-11 PROCEDURE — 83880 ASSAY OF NATRIURETIC PEPTIDE: CPT

## 2024-04-11 PROCEDURE — 99214 OFFICE O/P EST MOD 30 MIN: CPT

## 2024-04-11 PROCEDURE — 80048 BASIC METABOLIC PNL TOTAL CA: CPT

## 2024-04-11 RX ORDER — CIPROFLOXACIN 250 MG/1
250 TABLET, FILM COATED ORAL 2 TIMES DAILY
COMMUNITY

## 2024-04-11 RX ORDER — CEFDINIR 300 MG/1
300 CAPSULE ORAL 2 TIMES DAILY
COMMUNITY

## 2024-04-11 NOTE — PROGRESS NOTES
Congestive Heart Failure Clinic   [facility]      Referring Provider: Dr Santana  Primary Care Physician: Gilson Ramon III, DO   Cardiologist: Dr Santana  Nephrologist: N/A       HISTORY OF PRESENT ILLNESS:     Shiv Lee is a 83 y.o. (1940) male with a history of HFpEF (EF> 50%)  Pre Cupid:   No results found for: \"LVEF\"  Post Cupid:    Lab Results   Component Value Date    EFBP 66 02/07/2024         He presents to the CHF clinic for ongoing evaluation and monitoring of heart failure.    In the CHF clinic today he denies any adverse symptoms except:  [] Dizziness or lightheadedness   [] Syncope or near syncope  [] Recent Fall  [] Shortness of breath at rest   [x] Dyspnea with exertion recovers with rest  [] Decline in functional capacity (unable to perform activities they had previously been able to do)  [] Fatigue   [] Orthopnea  [] PND  [] Nocturnal cough  [] Hemoptysis  [] Chest pain, pressure, or discomfort  [] Palpitations  [] Abdominal distention  [] Abdominal bloating  [] Early satiety  [] Blood in stool   [] Diarrhea  [] Constipation  [] Nausea/Vomiting  [] Decreased urinary response to oral diuretic   [] Scrotal swelling   [] Lower extremity edema  [] Used PRN doses of oral diuretic   [] Weight gain    Wt Readings from Last 3 Encounters:   04/11/24 81.6 kg (180 lb)   04/02/24 82.1 kg (181 lb)   03/29/24 87 kg (191 lb 12.8 oz)     SOCIAL HISTORY:  [x] Denies tobacco, alcohol or illicit drug abuse  [] Tobacco use:  [] ETOH use:  [] Illicit drug use:        MEDICATIONS:    Allergies   Allergen Reactions    Haldol [Haloperidol] Other (See Comments)     PT BECOMES MORE AGGRESSIVE    Zocor [Simvastatin] Other (See Comments)     MUSCLE ACHES    Ativan [Lorazepam] Other (See Comments)     \"ALTERED MENTAL STATUS.\"     Prior to Visit Medications    Medication Sig Taking? Authorizing Provider   cefdinir (OMNICEF) 300 MG capsule Take 1 capsule

## 2024-04-29 ENCOUNTER — HOSPITAL ENCOUNTER (OUTPATIENT)
Dept: OTHER | Age: 84
Setting detail: THERAPIES SERIES
Discharge: HOME | End: 2024-04-29
Payer: MEDICARE

## 2024-04-29 VITALS
OXYGEN SATURATION: 99 % | SYSTOLIC BLOOD PRESSURE: 102 MMHG | WEIGHT: 180 LBS | HEART RATE: 80 BPM | BODY MASS INDEX: 24.41 KG/M2 | RESPIRATION RATE: 16 BRPM | DIASTOLIC BLOOD PRESSURE: 63 MMHG

## 2024-04-29 LAB
ANION GAP SERPL CALCULATED.3IONS-SCNC: 13 MMOL/L (ref 7–16)
BNP SERPL-MCNC: 447 PG/ML (ref 0–450)
BUN SERPL-MCNC: 27 MG/DL (ref 6–23)
CALCIUM SERPL-MCNC: 9.1 MG/DL (ref 8.6–10.2)
CHLORIDE SERPL-SCNC: 102 MMOL/L (ref 98–107)
CO2 SERPL-SCNC: 26 MMOL/L (ref 22–29)
CREAT SERPL-MCNC: 1 MG/DL (ref 0.7–1.2)
GFR SERPL CREATININE-BSD FRML MDRD: 76 ML/MIN/1.73M2
GLUCOSE SERPL-MCNC: 110 MG/DL (ref 74–99)
POTASSIUM SERPL-SCNC: 3.6 MMOL/L (ref 3.5–5)
SODIUM SERPL-SCNC: 141 MMOL/L (ref 132–146)

## 2024-04-29 PROCEDURE — 83880 ASSAY OF NATRIURETIC PEPTIDE: CPT

## 2024-04-29 PROCEDURE — 36415 COLL VENOUS BLD VENIPUNCTURE: CPT

## 2024-04-29 PROCEDURE — 80048 BASIC METABOLIC PNL TOTAL CA: CPT

## 2024-04-29 PROCEDURE — 99214 OFFICE O/P EST MOD 30 MIN: CPT

## 2024-04-29 RX ORDER — CLOTRIMAZOLE 1 %
CREAM (GRAM) TOPICAL 3 TIMES DAILY
COMMUNITY

## 2024-04-29 NOTE — PROGRESS NOTES
Congestive Heart Failure Clinic   [facility]      Referring Provider: Dr Santana  Primary Care Physician: Gilson Ramon III, DO   Cardiologist: Dr Santana  Nephrologist: N/A       HISTORY OF PRESENT ILLNESS:     Shiv Lee is a 83 y.o. (1940) male with a history of HFpEF (EF> 50%)  Pre Cupid:   No results found for: \"LVEF\"  Post Cupid:    Lab Results   Component Value Date    EFBP 66 02/07/2024         He presents to the CHF clinic for ongoing evaluation and monitoring of heart failure.    In the CHF clinic today he denies any adverse symptoms except:  [] Dizziness or lightheadedness   [] Syncope or near syncope  [] Recent Fall  [] Shortness of breath at rest   [x] Dyspnea with exertion recovers with rest  [] Decline in functional capacity (unable to perform activities they had previously been able to do)  [] Fatigue   [] Orthopnea  [] PND  [] Nocturnal cough  [] Hemoptysis  [] Chest pain, pressure, or discomfort  [] Palpitations  [] Abdominal distention  [] Abdominal bloating  [] Early satiety  [] Blood in stool   [] Diarrhea  [] Constipation  [] Nausea/Vomiting  [] Decreased urinary response to oral diuretic   [] Scrotal swelling   [] Lower extremity edema  [] Used PRN doses of oral diuretic   [] Weight gain    Wt Readings from Last 3 Encounters:   04/29/24 81.6 kg (180 lb)   04/11/24 81.6 kg (180 lb)   04/02/24 82.1 kg (181 lb)     SOCIAL HISTORY:  [x] Denies tobacco, alcohol or illicit drug abuse  [] Tobacco use:  [] ETOH use:  [] Illicit drug use:        MEDICATIONS:    Allergies   Allergen Reactions    Haldol [Haloperidol] Other (See Comments)     PT BECOMES MORE AGGRESSIVE    Zocor [Simvastatin] Other (See Comments)     MUSCLE ACHES    Ativan [Lorazepam] Other (See Comments)     \"ALTERED MENTAL STATUS.\"     Prior to Visit Medications    Medication Sig Taking? Authorizing Provider   clotrimazole (LOTRIMIN) 1 % cream Apply topically in

## 2024-04-30 ENCOUNTER — OFFICE VISIT (OUTPATIENT)
Dept: CARDIOLOGY CLINIC | Age: 84
End: 2024-04-30
Payer: MEDICARE

## 2024-04-30 ENCOUNTER — HOSPITAL ENCOUNTER (OUTPATIENT)
Dept: CT IMAGING | Age: 84
Discharge: HOME | End: 2024-05-02
Attending: INTERNAL MEDICINE
Payer: MEDICARE

## 2024-04-30 ENCOUNTER — TELEPHONE (OUTPATIENT)
Dept: CARDIOLOGY CLINIC | Age: 84
End: 2024-04-30

## 2024-04-30 ENCOUNTER — HOSPITAL ENCOUNTER (OUTPATIENT)
Age: 84
Discharge: HOME | End: 2024-04-30
Attending: INTERNAL MEDICINE
Payer: MEDICARE

## 2024-04-30 VITALS
BODY MASS INDEX: 24.45 KG/M2 | SYSTOLIC BLOOD PRESSURE: 106 MMHG | WEIGHT: 180.5 LBS | RESPIRATION RATE: 16 BRPM | HEIGHT: 72 IN | DIASTOLIC BLOOD PRESSURE: 74 MMHG | HEART RATE: 92 BPM

## 2024-04-30 DIAGNOSIS — I25.10 CORONARY ARTERY DISEASE INVOLVING NATIVE CORONARY ARTERY OF NATIVE HEART WITHOUT ANGINA PECTORIS: ICD-10-CM

## 2024-04-30 DIAGNOSIS — R06.09 DOE (DYSPNEA ON EXERTION): ICD-10-CM

## 2024-04-30 DIAGNOSIS — R05.9 COUGH IN ADULT: ICD-10-CM

## 2024-04-30 DIAGNOSIS — R06.02 SOB (SHORTNESS OF BREATH): Primary | ICD-10-CM

## 2024-04-30 DIAGNOSIS — J98.4 PNEUMONITIS: ICD-10-CM

## 2024-04-30 DIAGNOSIS — I10 HYPERTENSION, UNSPECIFIED TYPE: Primary | ICD-10-CM

## 2024-04-30 DIAGNOSIS — R06.02 SOB (SHORTNESS OF BREATH): ICD-10-CM

## 2024-04-30 LAB — PROCALCITONIN SERPL-MCNC: 0.07 NG/ML (ref 0–0.08)

## 2024-04-30 PROCEDURE — 3074F SYST BP LT 130 MM HG: CPT | Performed by: INTERNAL MEDICINE

## 2024-04-30 PROCEDURE — 71250 CT THORAX DX C-: CPT

## 2024-04-30 PROCEDURE — 99214 OFFICE O/P EST MOD 30 MIN: CPT | Performed by: INTERNAL MEDICINE

## 2024-04-30 PROCEDURE — 36415 COLL VENOUS BLD VENIPUNCTURE: CPT

## 2024-04-30 PROCEDURE — 1123F ACP DISCUSS/DSCN MKR DOCD: CPT | Performed by: INTERNAL MEDICINE

## 2024-04-30 PROCEDURE — 84145 PROCALCITONIN (PCT): CPT

## 2024-04-30 PROCEDURE — 93000 ELECTROCARDIOGRAM COMPLETE: CPT | Performed by: INTERNAL MEDICINE

## 2024-04-30 PROCEDURE — 3078F DIAST BP <80 MM HG: CPT | Performed by: INTERNAL MEDICINE

## 2024-04-30 NOTE — TELEPHONE ENCOUNTER
Per Dr. Santana, patient needs STAT CT Chest without contrast re: GONZALEZ, SOB, cough, pneumonia.  Auth #[id number] valid 4/30/24-7/28/24.      Spoke with Renetta in [facility]  CT chest scheduled for 4/30/24 ([facility]).  Patient to be notified to arrive immediately and they will fit him in; bring photo ID/ins cards.      Also per Dr. Santana, patient should check with PCP to see if CT still required as scheduled on 5/10/24.    Patient's wife (Scott) notified of Dr. Santana's recommendation as well as CT instructions.

## 2024-04-30 NOTE — PROGRESS NOTES
Shiv Lee  1940  Date of Service: 4/30/2024       [facility] 2/23/24 Dr. Santana: Percutaneous coronary intervention with stenting of the mid SVG to the RCA with a 3.5 x 38 mm drug-eluting stent       Current Outpatient Medications   Medication Sig Dispense Refill    clotrimazole (LOTRIMIN) 1 % cream Apply topically in the morning, at noon, and at bedtime      Evolocumab (REPATHA SC) Inject into the skin      POTASSIUM CHLORIDE ER PO Take 40 mEq by mouth daily      fluticasone-salmeterol (ADVAIR HFA) 45-21 MCG/ACT inhaler Inhale 1 puff into the lungs 2 times daily      bumetanide (BUMEX) 0.5 MG tablet Take 1 tablet by mouth 2 times daily (Patient taking differently: Take 2 tablets by mouth daily Started 4-2-24) 30 tablet 3    clopidogrel (PLAVIX) 75 MG tablet Take 1 tablet by mouth every morning      isosorbide mononitrate (IMDUR) 30 MG extended release tablet Take 1 tablet by mouth every morning      pantoprazole (PROTONIX) 40 MG tablet Take 1 tablet by mouth every morning (before breakfast)      tamsulosin (FLOMAX) 0.4 MG capsule Take 1 capsule by mouth every morning      polyethylene glycol (GLYCOLAX) 17 GM/SCOOP powder Take 17 g by mouth daily as needed (CONSTIPATION)      midodrine (PROAMATINE) 5 MG tablet Take 1 tablet by mouth 3 times daily (with meals) 90 tablet 3    OXYGEN Inhale 2 L/min into the lungs continuous      sodium chloride (OCEAN, BABY AYR) 0.65 % nasal spray 2 sprays by Nasal route every 12 hours as needed (DRY NARES)      ipratropium 0.5 mg-albuterol 2.5 mg (DUONEB) 0.5-2.5 (3) MG/3ML SOLN nebulizer solution Inhale 3 mLs into the lungs every 4 hours **SEE OTHER ORDERS**      metoprolol succinate (TOPROL XL) 25 MG extended release tablet Take 1 tablet by mouth every morning      rosuvastatin (CRESTOR) 5 MG tablet Take 1 tablet by mouth nightly 30 tablet 3    timolol (TIMOPTIC) 0.5 % ophthalmic solution Place 1 drop into both eyes every morning      aspirin 81 MG EC tablet Take 1 tablet

## 2024-04-30 NOTE — TELEPHONE ENCOUNTER
----- Message from Amadou Santana DO sent at 4/30/2024  3:58 PM EDT -----  This shows persistence of interstitial lung processes such as pneumonia or other inflammatory/infectious processes.  I will defer to others.  I will have this result forwarded to pulmonology.  Please forward this to Dr. Zaman, his pulmonologist.

## 2024-04-30 NOTE — TELEPHONE ENCOUNTER
Patient's wife (Scott) notified of CT scan results and Dr. Santana's recommendation. Note and CT scan faxed to Dr. Zaman [phone].

## 2024-05-01 ENCOUNTER — HOSPITAL ENCOUNTER (OUTPATIENT)
Age: 84
Discharge: HOME | End: 2024-05-01
Payer: MEDICARE

## 2024-05-01 LAB
BASOPHILS # BLD: 0.06 K/UL (ref 0–0.2)
BASOPHILS NFR BLD: 1 % (ref 0–2)
EOSINOPHIL # BLD: 0.19 K/UL (ref 0.05–0.5)
EOSINOPHILS RELATIVE PERCENT: 2 % (ref 0–6)
ERYTHROCYTE [DISTWIDTH] IN BLOOD BY AUTOMATED COUNT: 16.3 % (ref 11.5–15)
HCT VFR BLD AUTO: 38 % (ref 37–54)
HGB BLD-MCNC: 11.3 G/DL (ref 12.5–16.5)
IMM GRANULOCYTES # BLD AUTO: 0.09 K/UL (ref 0–0.58)
IMM GRANULOCYTES NFR BLD: 1 % (ref 0–5)
LYMPHOCYTES NFR BLD: 1.03 K/UL (ref 1.5–4)
LYMPHOCYTES RELATIVE PERCENT: 11 % (ref 20–42)
MCH RBC QN AUTO: 30.1 PG (ref 26–35)
MCHC RBC AUTO-ENTMCNC: 29.7 G/DL (ref 32–34.5)
MCV RBC AUTO: 101.1 FL (ref 80–99.9)
MONOCYTES NFR BLD: 0.89 K/UL (ref 0.1–0.95)
MONOCYTES NFR BLD: 10 % (ref 2–12)
NEUTROPHILS NFR BLD: 75 % (ref 43–80)
NEUTS SEG NFR BLD: 6.89 K/UL (ref 1.8–7.3)
PLATELET # BLD AUTO: 264 K/UL (ref 130–450)
PMV BLD AUTO: 10.1 FL (ref 7–12)
RBC # BLD AUTO: 3.76 M/UL (ref 3.8–5.8)
WBC OTHER # BLD: 9.2 K/UL (ref 4.5–11.5)

## 2024-05-01 PROCEDURE — 85025 COMPLETE CBC W/AUTO DIFF WBC: CPT

## 2024-05-01 PROCEDURE — 36415 COLL VENOUS BLD VENIPUNCTURE: CPT

## 2024-05-14 NOTE — PLAN OF CARE
Problem: Skin/Tissue Integrity  Goal: Absence of new skin breakdown  Description: 1.  Monitor for areas of redness and/or skin breakdown  2.  Assess vascular access sites hourly  3.  Every 4-6 hours minimum:  Change oxygen saturation probe site  4.  Every 4-6 hours:  If on nasal continuous positive airway pressure, respiratory therapy assess nares and determine need for appliance change or resting period.  3/24/2024 0111 by Muna Avilez, RN  Outcome: Progressing  3/23/2024 1606 by Zoey Pedraza RN  Outcome: Progressing     Problem: Safety - Adult  Goal: Free from fall injury  3/24/2024 0111 by Muna Avilez, RN  Outcome: Progressing  3/23/2024 1606 by Zoey Pedraza RN  Outcome: Progressing     Problem: Pain  Goal: Verbalizes/displays adequate comfort level or baseline comfort level  Outcome: Progressing      I personally spent

## 2024-05-29 ENCOUNTER — HOSPITAL ENCOUNTER (OUTPATIENT)
Dept: OTHER | Age: 84
Setting detail: THERAPIES SERIES
Discharge: HOME | End: 2024-05-29
Payer: MEDICARE

## 2024-05-29 VITALS
RESPIRATION RATE: 16 BRPM | SYSTOLIC BLOOD PRESSURE: 112 MMHG | OXYGEN SATURATION: 92 % | HEART RATE: 84 BPM | DIASTOLIC BLOOD PRESSURE: 60 MMHG | BODY MASS INDEX: 23.33 KG/M2 | WEIGHT: 172 LBS

## 2024-05-29 LAB
ANION GAP SERPL CALCULATED.3IONS-SCNC: 9 MMOL/L (ref 7–16)
BNP SERPL-MCNC: 296 PG/ML (ref 0–450)
BUN SERPL-MCNC: 16 MG/DL (ref 6–23)
CALCIUM SERPL-MCNC: 9.4 MG/DL (ref 8.6–10.2)
CHLORIDE SERPL-SCNC: 100 MMOL/L (ref 98–107)
CO2 SERPL-SCNC: 30 MMOL/L (ref 22–29)
CREAT SERPL-MCNC: 1 MG/DL (ref 0.7–1.2)
GFR, ESTIMATED: 77 ML/MIN/1.73M2
GLUCOSE SERPL-MCNC: 120 MG/DL (ref 74–99)
POTASSIUM SERPL-SCNC: 3.7 MMOL/L (ref 3.5–5)
SODIUM SERPL-SCNC: 139 MMOL/L (ref 132–146)

## 2024-05-29 PROCEDURE — 96374 THER/PROPH/DIAG INJ IV PUSH: CPT

## 2024-05-29 PROCEDURE — 6360000002 HC RX W HCPCS

## 2024-05-29 PROCEDURE — 99214 OFFICE O/P EST MOD 30 MIN: CPT

## 2024-05-29 PROCEDURE — 80048 BASIC METABOLIC PNL TOTAL CA: CPT

## 2024-05-29 PROCEDURE — 36415 COLL VENOUS BLD VENIPUNCTURE: CPT

## 2024-05-29 PROCEDURE — 2580000003 HC RX 258: Performed by: INTERNAL MEDICINE

## 2024-05-29 PROCEDURE — 83880 ASSAY OF NATRIURETIC PEPTIDE: CPT

## 2024-05-29 RX ORDER — BUMETANIDE 0.25 MG/ML
2 INJECTION INTRAMUSCULAR; INTRAVENOUS ONCE
Status: DISCONTINUED | OUTPATIENT
Start: 2024-05-29 | End: 2024-05-30 | Stop reason: HOSPADM

## 2024-05-29 RX ORDER — SODIUM CHLORIDE 0.9 % (FLUSH) 0.9 %
10 SYRINGE (ML) INJECTION ONCE
Status: COMPLETED | OUTPATIENT
Start: 2024-05-29 | End: 2024-05-29

## 2024-05-29 RX ORDER — BUMETANIDE 0.25 MG/ML
INJECTION INTRAMUSCULAR; INTRAVENOUS
Status: COMPLETED
Start: 2024-05-29 | End: 2024-05-29

## 2024-05-29 RX ADMIN — BUMETANIDE 2 MG: 0.25 INJECTION INTRAMUSCULAR; INTRAVENOUS at 10:45

## 2024-05-29 RX ADMIN — SODIUM CHLORIDE, PRESERVATIVE FREE 10 ML: 5 INJECTION INTRAVENOUS at 10:46

## 2024-05-29 NOTE — RESULT ENCOUNTER NOTE
CHF clinic visit and labs reviewed  Weight down 7 lbs   Given IV diuretic due to bilateral crackles, dry cough, and shortness of breath     Renal function normal   BNP down to 296    Follow up as scheduled, sooner if needed

## 2024-05-29 NOTE — PROGRESS NOTES
MD Lin   rosuvastatin (CRESTOR) 5 MG tablet Take 1 tablet by mouth nightly  Giovanny Starr DO   timolol (TIMOPTIC) 0.5 % ophthalmic solution Place 1 drop into both eyes every morning  Lin Bauer MD   aspirin 81 MG EC tablet Take 1 tablet by mouth every morning  Lin Bauer MD          GUIDELINE DIRECTED MEDICAL THERAPY for HFpEF:  SGLT2i:  (2a indication)  No  Aldosterone antagonist: (2b indication)  No  ARNI/ACE I/ARB: (2b indication, with LVEF on lower end of spectrum)  No      HEART FAILURE FOCUSED PHYSICAL EXAMINATION:    Vitals:    05/29/24 1015   BP: 112/60   Pulse: 84   Resp: 16   SpO2: 92%          Assessment  Charting Type: Shift assessment (chf clinic)  Neurological  Level of Consciousness: Alert (0)        Respiratory  Respiratory Quality/Effort: Unlabored, Dyspnea with exertion  Chest Assessment: Chest expansion symmetrical  Breath Sounds  Right Upper Lobe: Clear  Right Middle Lobe: Fine crackles  Right Lower Lobe: Fine crackles  Left Upper Lobe: Clear  Left Lower Lobe: Clear     Cardiac  Cardiac Rhythm: Sinus rhythm  Rhythm Interpretation  Cardiac Rhythm: Sinus rhythm  Pulse: 84     Gastrointestinal  Abdominal (WDL): Within Defined Limits  Abdomen Inspection: Soft         Peripheral Vascular  Peripheral Vascular (WDL): Within Defined Limits  Edema: Right lower extremity, Left lower extremity  RLE Edema: None  LLE Edema: None           Genitourinary  Genitourinary (WDL): Within Defined Limits  Psychosocial  Psychosocial (WDL): Within Defined Limits  Pain Assessment  Pain Assessment: None - Denies Pain           Pulse: 84               LAB DATA:  BMP:  Sodium (mmol/L)   Date Value   04/29/2024 141   04/11/2024 139   04/09/2024 145     Potassium (mmol/L)   Date Value   04/29/2024 3.6   04/11/2024 4.0   04/09/2024 3.4 (L)     Chloride (mmol/L)   Date Value   04/29/2024 102   04/11/2024 101   04/09/2024 104     CO2 (mmol/L)   Date Value   04/29/2024 26   04/11/2024 25

## 2024-06-06 ENCOUNTER — TELEPHONE (OUTPATIENT)
Dept: OTHER | Age: 84
End: 2024-06-06

## 2024-06-06 NOTE — TELEPHONE ENCOUNTER
Received a call from patient's nurse (from insurance company) with concern of increased SOB. Nurse did call PCP to which patient will see tomorrow morning.     Scheduled for CHF clinic:    Future Appointments   Date Time Provider Department Center   6/7/2024  1:45 PM [facility] ROOM 4 [facility]   6/12/2024  8:00 AM [facility] ROOM 2 [facility]   8/5/2024 10:00 AM Amadou Santana, DO [facility]

## 2024-06-07 ENCOUNTER — HOSPITAL ENCOUNTER (OUTPATIENT)
Age: 84
Discharge: HOME | End: 2024-06-07
Payer: MEDICARE

## 2024-06-07 ENCOUNTER — TELEPHONE (OUTPATIENT)
Dept: CARDIOLOGY CLINIC | Age: 84
End: 2024-06-07

## 2024-06-07 ENCOUNTER — HOSPITAL ENCOUNTER (OUTPATIENT)
Dept: OTHER | Age: 84
Setting detail: THERAPIES SERIES
Discharge: HOME | End: 2024-06-07
Payer: MEDICARE

## 2024-06-07 VITALS
OXYGEN SATURATION: 95 % | WEIGHT: 172 LBS | RESPIRATION RATE: 16 BRPM | BODY MASS INDEX: 23.33 KG/M2 | SYSTOLIC BLOOD PRESSURE: 106 MMHG | HEART RATE: 82 BPM | DIASTOLIC BLOOD PRESSURE: 64 MMHG

## 2024-06-07 LAB
ALBUMIN SERPL-MCNC: 4 G/DL (ref 3.5–5.2)
ALP SERPL-CCNC: 80 U/L (ref 40–129)
ALT SERPL-CCNC: 12 U/L (ref 0–40)
ANION GAP SERPL CALCULATED.3IONS-SCNC: 14 MMOL/L (ref 7–16)
AST SERPL-CCNC: 26 U/L (ref 0–39)
BACTERIA URNS QL MICRO: ABNORMAL
BILIRUB SERPL-MCNC: 0.7 MG/DL (ref 0–1.2)
BILIRUB UR QL STRIP: NEGATIVE
BNP SERPL-MCNC: 360 PG/ML (ref 0–450)
BUN SERPL-MCNC: 16 MG/DL (ref 6–23)
CALCIUM SERPL-MCNC: 9.3 MG/DL (ref 8.6–10.2)
CASTS #/AREA URNS LPF: ABNORMAL /LPF
CHLORIDE SERPL-SCNC: 100 MMOL/L (ref 98–107)
CHOLEST SERPL-MCNC: 163 MG/DL
CLARITY UR: CLEAR
CO2 SERPL-SCNC: 28 MMOL/L (ref 22–29)
COLOR UR: YELLOW
CREAT SERPL-MCNC: 1.2 MG/DL (ref 0.7–1.2)
ERYTHROCYTE [DISTWIDTH] IN BLOOD BY AUTOMATED COUNT: 15.8 % (ref 11.5–15)
GFR, ESTIMATED: 61 ML/MIN/1.73M2
GLUCOSE SERPL-MCNC: 110 MG/DL (ref 74–99)
GLUCOSE UR STRIP-MCNC: NEGATIVE MG/DL
HCT VFR BLD AUTO: 36.5 % (ref 37–54)
HDLC SERPL-MCNC: 48 MG/DL
HGB BLD-MCNC: 10.8 G/DL (ref 12.5–16.5)
HGB UR QL STRIP.AUTO: NEGATIVE
KETONES UR STRIP-MCNC: ABNORMAL MG/DL
LDLC SERPL CALC-MCNC: 95 MG/DL
LEUKOCYTE ESTERASE UR QL STRIP: ABNORMAL
MCH RBC QN AUTO: 29 PG (ref 26–35)
MCHC RBC AUTO-ENTMCNC: 29.6 G/DL (ref 32–34.5)
MCV RBC AUTO: 98.1 FL (ref 80–99.9)
MICROALBUMIN UR-MCNC: 48 MG/L (ref 0–19)
NITRITE UR QL STRIP: NEGATIVE
PH UR STRIP: 5.5 [PH] (ref 5–9)
PLATELET # BLD AUTO: 121 K/UL (ref 130–450)
PMV BLD AUTO: 11.3 FL (ref 7–12)
POTASSIUM SERPL-SCNC: 3.2 MMOL/L (ref 3.5–5)
PROT SERPL-MCNC: 7.3 G/DL (ref 6.4–8.3)
PROT UR STRIP-MCNC: NEGATIVE MG/DL
PSA SERPL-MCNC: 0.85 NG/ML (ref 0–4)
RBC # BLD AUTO: 3.72 M/UL (ref 3.8–5.8)
RBC #/AREA URNS HPF: ABNORMAL /HPF
SODIUM SERPL-SCNC: 142 MMOL/L (ref 132–146)
SP GR UR STRIP: 1.02 (ref 1–1.03)
T3 SERPL-MCNC: 91 NG/DL (ref 80–200)
T4 SERPL-MCNC: 7.6 UG/DL (ref 4.5–11.7)
TRIGL SERPL-MCNC: 100 MG/DL
TSH SERPL DL<=0.05 MIU/L-ACNC: 1.05 UIU/ML (ref 0.27–4.2)
URATE SERPL-MCNC: 10.6 MG/DL (ref 3.4–7)
UROBILINOGEN UR STRIP-ACNC: 0.2 EU/DL (ref 0–1)
VLDLC SERPL CALC-MCNC: 20 MG/DL
WBC #/AREA URNS HPF: ABNORMAL /HPF
WBC OTHER # BLD: 7.1 K/UL (ref 4.5–11.5)

## 2024-06-07 PROCEDURE — 84550 ASSAY OF BLOOD/URIC ACID: CPT

## 2024-06-07 PROCEDURE — 84436 ASSAY OF TOTAL THYROXINE: CPT

## 2024-06-07 PROCEDURE — 99214 OFFICE O/P EST MOD 30 MIN: CPT

## 2024-06-07 PROCEDURE — 82043 UR ALBUMIN QUANTITATIVE: CPT

## 2024-06-07 PROCEDURE — G0103 PSA SCREENING: HCPCS

## 2024-06-07 PROCEDURE — 80061 LIPID PANEL: CPT

## 2024-06-07 PROCEDURE — 80053 COMPREHEN METABOLIC PANEL: CPT

## 2024-06-07 PROCEDURE — 36415 COLL VENOUS BLD VENIPUNCTURE: CPT

## 2024-06-07 PROCEDURE — 84443 ASSAY THYROID STIM HORMONE: CPT

## 2024-06-07 PROCEDURE — 84480 ASSAY TRIIODOTHYRONINE (T3): CPT

## 2024-06-07 PROCEDURE — 85027 COMPLETE CBC AUTOMATED: CPT

## 2024-06-07 PROCEDURE — 81001 URINALYSIS AUTO W/SCOPE: CPT

## 2024-06-07 PROCEDURE — 83880 ASSAY OF NATRIURETIC PEPTIDE: CPT

## 2024-06-07 NOTE — PROGRESS NOTES
Congestive Heart Failure Clinic   [facility]      Referring Provider: Dr Santana  Primary Care Physician: Gilson Ramon III, DO   Cardiologist: Dr Santana  Nephrologist: N/A       HISTORY OF PRESENT ILLNESS:     Shiv Lee is a 84 y.o. (1940) male with a history of HFpEF (EF> 50%)  Pre Cupid:   No results found for: \"LVEF\"  Post Cupid:    Lab Results   Component Value Date    EFBP 66 02/07/2024         He presents to the CHF clinic for ongoing evaluation and monitoring of heart failure.    In the CHF clinic today he denies any adverse symptoms except:  [] Dizziness or lightheadedness   [] Syncope or near syncope  [] Recent Fall  [] Shortness of breath at rest   [x] Dyspnea with exertion   [] Decline in functional capacity (unable to perform activities they had previously been able to do)  [] Fatigue   [] Orthopnea  [] PND  [] Nocturnal cough  [] Hemoptysis  [] Chest pain, pressure, or discomfort  [] Palpitations  [] Abdominal distention  [] Abdominal bloating  [] Early satiety  [] Blood in stool   [] Diarrhea  [] Constipation  [] Nausea/Vomiting  [] Decreased urinary response to oral diuretic   [] Scrotal swelling   [] Lower extremity edema  [] Used PRN doses of oral diuretic   [] Weight gain    Wt Readings from Last 3 Encounters:   06/07/24 78 kg (172 lb)   05/29/24 78 kg (172 lb)   04/30/24 81.9 kg (180 lb 8 oz)     SOCIAL HISTORY:  [x] Denies tobacco, alcohol or illicit drug abuse  [] Tobacco use:  [] ETOH use:  [] Illicit drug use:        MEDICATIONS:    Allergies   Allergen Reactions    Haldol [Haloperidol] Other (See Comments)     PT BECOMES MORE AGGRESSIVE    Zocor [Simvastatin] Other (See Comments)     MUSCLE ACHES    Repatha [Evolocumab]     Ativan [Lorazepam] Other (See Comments)     \"ALTERED MENTAL STATUS.\"     Prior to Visit Medications    Medication Sig Taking? Authorizing Provider   clotrimazole (LOTRIMIN) 1 % cream Apply

## 2024-06-07 NOTE — PROGRESS NOTES
CMP results ordered by Dr. Ramon resulted with K+ 3.2.    Call placed to Dr. Ramon's office and reported to nurse Gomes.    Electronically signed by Mikaela Chen RN on 6/7/2024 at 3:36 PM

## 2024-06-07 NOTE — TELEPHONE ENCOUNTER
----- Message from Amadou Santana DO sent at 6/7/2024  3:09 PM EDT -----  Looks like he has a urinary tract infection.  Please make sure that he sees his PCP for this

## 2024-06-07 NOTE — TELEPHONE ENCOUNTER
Mireya in Dr. Ramon's office notified.  Mireya has the labs and they will address and call patient.  Patient's wife (Scott) notified.

## 2024-06-11 ENCOUNTER — TELEPHONE (OUTPATIENT)
Dept: CARDIOLOGY CLINIC | Age: 84
End: 2024-06-11

## 2024-06-11 NOTE — TELEPHONE ENCOUNTER
Per Mireya in Dr. Ramon's office on 6/7/24, Dr. Ramon already has these results and is addressing.

## 2024-06-17 ENCOUNTER — TELEPHONE (OUTPATIENT)
Dept: CARDIOLOGY CLINIC | Age: 84
End: 2024-06-17

## 2024-06-17 NOTE — TELEPHONE ENCOUNTER
Nurse was visiting patient at home, she called to inform us that his bp is 80/46, per verbal from  patient was instructed to hold imdur and flomax and recheck bp on Wednesday, nurse stated she will notify patient and have him call us on Wednesday with an update.

## 2024-06-19 ENCOUNTER — TELEPHONE (OUTPATIENT)
Dept: CARDIOLOGY CLINIC | Age: 84
End: 2024-06-19

## 2024-06-19 NOTE — TELEPHONE ENCOUNTER
Patient's wife (Scott) called stating BP/P today is 113/70 (70) after stopping Imdur and Flomax on 6/17/24 due to hypotension.

## 2024-07-09 ENCOUNTER — HOSPITAL ENCOUNTER (OUTPATIENT)
Dept: OTHER | Age: 84
Setting detail: THERAPIES SERIES
Discharge: HOME | End: 2024-07-09
Payer: MEDICARE

## 2024-07-09 VITALS
DIASTOLIC BLOOD PRESSURE: 58 MMHG | BODY MASS INDEX: 23.16 KG/M2 | RESPIRATION RATE: 16 BRPM | WEIGHT: 170.8 LBS | SYSTOLIC BLOOD PRESSURE: 120 MMHG | HEART RATE: 64 BPM | OXYGEN SATURATION: 99 %

## 2024-07-09 LAB
ANION GAP SERPL CALCULATED.3IONS-SCNC: 14 MMOL/L (ref 7–16)
BNP SERPL-MCNC: 341 PG/ML (ref 0–450)
BUN SERPL-MCNC: 15 MG/DL (ref 6–23)
CALCIUM SERPL-MCNC: 9.2 MG/DL (ref 8.6–10.2)
CHLORIDE SERPL-SCNC: 100 MMOL/L (ref 98–107)
CO2 SERPL-SCNC: 26 MMOL/L (ref 22–29)
CREAT SERPL-MCNC: 1.3 MG/DL (ref 0.7–1.2)
GFR, ESTIMATED: 57 ML/MIN/1.73M2
GLUCOSE SERPL-MCNC: 113 MG/DL (ref 74–99)
POTASSIUM SERPL-SCNC: 3.3 MMOL/L (ref 3.5–5)
SODIUM SERPL-SCNC: 140 MMOL/L (ref 132–146)

## 2024-07-09 PROCEDURE — 36415 COLL VENOUS BLD VENIPUNCTURE: CPT

## 2024-07-09 PROCEDURE — 80048 BASIC METABOLIC PNL TOTAL CA: CPT

## 2024-07-09 PROCEDURE — 83880 ASSAY OF NATRIURETIC PEPTIDE: CPT

## 2024-07-09 PROCEDURE — 99214 OFFICE O/P EST MOD 30 MIN: CPT

## 2024-07-09 ASSESSMENT — PATIENT HEALTH QUESTIONNAIRE - PHQ9
1. LITTLE INTEREST OR PLEASURE IN DOING THINGS: NOT AT ALL
SUM OF ALL RESPONSES TO PHQ QUESTIONS 1-9: 0
SUM OF ALL RESPONSES TO PHQ QUESTIONS 1-9: 0
SUM OF ALL RESPONSES TO PHQ9 QUESTIONS 1 & 2: 0
2. FEELING DOWN, DEPRESSED OR HOPELESS: NOT AT ALL
SUM OF ALL RESPONSES TO PHQ QUESTIONS 1-9: 0
SUM OF ALL RESPONSES TO PHQ QUESTIONS 1-9: 0

## 2024-07-09 NOTE — PROGRESS NOTES
Congestive Heart Failure Clinic   [facility]      Referring Provider: Dr Santana  Primary Care Physician: Gilson Ramon III, DO   Cardiologist: Dr Santana  Nephrologist: N/A       HISTORY OF PRESENT ILLNESS:     Shiv Lee is a 84 y.o. (1940) male with a history of HFpEF (EF> 50%)  Pre Cupid:   No results found for: \"LVEF\"  Post Cupid:    Lab Results   Component Value Date    EFBP 66 02/07/2024         He presents to the CHF clinic for ongoing evaluation and monitoring of heart failure.    In the CHF clinic today he denies any adverse symptoms except:  [] Dizziness or lightheadedness   [] Syncope or near syncope  [] Recent Fall  [] Shortness of breath at rest   [x] Dyspnea with exertion   [] Decline in functional capacity (unable to perform activities they had previously been able to do)  [] Fatigue   [] Orthopnea  [] PND  [] Nocturnal cough  [] Hemoptysis  [] Chest pain, pressure, or discomfort  [] Palpitations  [] Abdominal distention  [] Abdominal bloating  [] Early satiety  [] Blood in stool   [] Diarrhea  [] Constipation  [] Nausea/Vomiting  [] Decreased urinary response to oral diuretic   [] Scrotal swelling   [] Lower extremity edema  [] Used PRN doses of oral diuretic   [] Weight gain    Wt Readings from Last 3 Encounters:   07/09/24 77.5 kg (170 lb 12.8 oz)   06/07/24 78 kg (172 lb)   05/29/24 78 kg (172 lb)     SOCIAL HISTORY:  [x] Denies tobacco, alcohol or illicit drug abuse  [] Tobacco use:  [] ETOH use:  [] Illicit drug use:        MEDICATIONS:    Allergies   Allergen Reactions    Haldol [Haloperidol] Other (See Comments)     PT BECOMES MORE AGGRESSIVE    Zocor [Simvastatin] Other (See Comments)     MUSCLE ACHES    Repatha [Evolocumab]     Ativan [Lorazepam] Other (See Comments)     \"ALTERED MENTAL STATUS.\"     Prior to Visit Medications    Medication Sig Taking? Authorizing Provider   clotrimazole (LOTRIMIN) 1 % cream Apply

## 2024-07-12 ENCOUNTER — TELEPHONE (OUTPATIENT)
Dept: CARDIOLOGY CLINIC | Age: 84
End: 2024-07-12

## 2024-07-12 DIAGNOSIS — I10 PRIMARY HYPERTENSION: Primary | ICD-10-CM

## 2024-07-12 NOTE — TELEPHONE ENCOUNTER
----- Message from Amadou Santana DO sent at 7/10/2024  5:59 PM EDT -----  Have him take K-Dur for 40 mEq tomorrow

## 2024-07-15 ENCOUNTER — TELEPHONE (OUTPATIENT)
Dept: CARDIOLOGY CLINIC | Age: 84
End: 2024-07-15

## 2024-07-15 ENCOUNTER — HOSPITAL ENCOUNTER (OUTPATIENT)
Age: 84
Discharge: HOME | End: 2024-07-15
Payer: MEDICARE

## 2024-07-15 DIAGNOSIS — I10 PRIMARY HYPERTENSION: ICD-10-CM

## 2024-07-15 LAB
ANION GAP SERPL CALCULATED.3IONS-SCNC: 12 MMOL/L (ref 7–16)
BUN SERPL-MCNC: 15 MG/DL (ref 6–23)
CALCIUM SERPL-MCNC: 9.1 MG/DL (ref 8.6–10.2)
CHLORIDE SERPL-SCNC: 101 MMOL/L (ref 98–107)
CO2 SERPL-SCNC: 28 MMOL/L (ref 22–29)
CREAT SERPL-MCNC: 1.2 MG/DL (ref 0.7–1.2)
GFR, ESTIMATED: 63 ML/MIN/1.73M2
GLUCOSE SERPL-MCNC: 101 MG/DL (ref 74–99)
POTASSIUM SERPL-SCNC: 3.4 MMOL/L (ref 3.5–5)
SODIUM SERPL-SCNC: 141 MMOL/L (ref 132–146)

## 2024-07-15 PROCEDURE — 80048 BASIC METABOLIC PNL TOTAL CA: CPT

## 2024-07-15 PROCEDURE — 36415 COLL VENOUS BLD VENIPUNCTURE: CPT

## 2024-07-15 NOTE — TELEPHONE ENCOUNTER
----- Message from Amadou Santana DO sent at 7/15/2024  3:46 PM EDT -----  He is taking potassium 40 mill equivalents once a day.  Take an extra 40 mill equivalents today only.

## 2024-08-05 ENCOUNTER — OFFICE VISIT (OUTPATIENT)
Dept: CARDIOLOGY CLINIC | Age: 84
End: 2024-08-05
Payer: MEDICARE

## 2024-08-05 VITALS
HEIGHT: 69 IN | WEIGHT: 165 LBS | RESPIRATION RATE: 16 BRPM | HEART RATE: 64 BPM | SYSTOLIC BLOOD PRESSURE: 114 MMHG | DIASTOLIC BLOOD PRESSURE: 60 MMHG | BODY MASS INDEX: 24.44 KG/M2

## 2024-08-05 DIAGNOSIS — I25.10 CORONARY ARTERY DISEASE INVOLVING NATIVE CORONARY ARTERY OF NATIVE HEART WITHOUT ANGINA PECTORIS: Primary | ICD-10-CM

## 2024-08-05 LAB — PATH REV BLD -IMP: NORMAL

## 2024-08-05 PROCEDURE — 3074F SYST BP LT 130 MM HG: CPT | Performed by: INTERNAL MEDICINE

## 2024-08-05 PROCEDURE — 99214 OFFICE O/P EST MOD 30 MIN: CPT | Performed by: INTERNAL MEDICINE

## 2024-08-05 PROCEDURE — 93000 ELECTROCARDIOGRAM COMPLETE: CPT | Performed by: INTERNAL MEDICINE

## 2024-08-05 PROCEDURE — 1123F ACP DISCUSS/DSCN MKR DOCD: CPT | Performed by: INTERNAL MEDICINE

## 2024-08-05 PROCEDURE — 3078F DIAST BP <80 MM HG: CPT | Performed by: INTERNAL MEDICINE

## 2024-08-05 NOTE — PROGRESS NOTES
40 MG tablet Take 1 tablet by mouth every morning (before breakfast)      midodrine (PROAMATINE) 5 MG tablet Take 1 tablet by mouth 3 times daily (with meals) 90 tablet 3    OXYGEN Inhale 4 L/min into the lungs continuous      sodium chloride (OCEAN, BABY AYR) 0.65 % nasal spray 2 sprays by Nasal route every 12 hours as needed (DRY NARES)      ipratropium 0.5 mg-albuterol 2.5 mg (DUONEB) 0.5-2.5 (3) MG/3ML SOLN nebulizer solution Inhale 3 mLs into the lungs every 4 hours **SEE OTHER ORDERS**      metoprolol succinate (TOPROL XL) 25 MG extended release tablet Take 1 tablet by mouth every morning      acetaminophen (TYLENOL) 325 MG tablet Take 2 tablets by mouth every 4 hours as needed for Pain or Fever (DISCOMFORT; ELEV TEMP > 100.4F)      rosuvastatin (CRESTOR) 5 MG tablet Take 1 tablet by mouth nightly 30 tablet 3    timolol (TIMOPTIC) 0.5 % ophthalmic solution Place 1 drop into both eyes every morning      aspirin 81 MG EC tablet Take 1 tablet by mouth every morning      isosorbide mononitrate (IMDUR) 30 MG extended release tablet Take 1 tablet by mouth every morning (Patient not taking: Reported on 7/9/2024)      tamsulosin (FLOMAX) 0.4 MG capsule Take 1 capsule by mouth at bedtime (Patient not taking: Reported on 7/9/2024)       No current facility-administered medications for this visit.        Allergies   Allergen Reactions    Haldol [Haloperidol] Other (See Comments)     PT BECOMES MORE AGGRESSIVE    Zocor [Simvastatin] Other (See Comments)     MUSCLE ACHES    Repatha [Evolocumab]     Ativan [Lorazepam] Other (See Comments)     \"ALTERED MENTAL STATUS.\"       Chief Complaint:  Shiv Lee is here today for follow up and management/recomendations for CAD, AS, AVR, HTN.     History of Present Illness: Shiv Lee states that He uses a walker & goes shopping.  He does physical therapy twice a week.  He denies any chest discomfort, dyspnea on exertion, orthopnea/PND.  He denies any palpitations or

## 2024-08-11 ENCOUNTER — HOSPITAL ENCOUNTER (EMERGENCY)
Age: 84
Discharge: HOME | End: 2024-08-11
Attending: EMERGENCY MEDICINE
Payer: MEDICARE

## 2024-08-11 VITALS
HEART RATE: 68 BPM | DIASTOLIC BLOOD PRESSURE: 71 MMHG | OXYGEN SATURATION: 98 % | TEMPERATURE: 98.4 F | SYSTOLIC BLOOD PRESSURE: 116 MMHG | RESPIRATION RATE: 15 BRPM

## 2024-08-11 DIAGNOSIS — R04.0 EPISTAXIS: Primary | ICD-10-CM

## 2024-08-11 PROCEDURE — 99283 EMERGENCY DEPT VISIT LOW MDM: CPT

## 2024-08-11 PROCEDURE — 6370000000 HC RX 637 (ALT 250 FOR IP)

## 2024-08-11 PROCEDURE — 2500000003 HC RX 250 WO HCPCS

## 2024-08-11 PROCEDURE — 30903 CONTROL OF NOSEBLEED: CPT

## 2024-08-11 RX ORDER — OXYMETAZOLINE HYDROCHLORIDE 0.05 G/100ML
2 SPRAY NASAL ONCE
Status: COMPLETED | OUTPATIENT
Start: 2024-08-11 | End: 2024-08-11

## 2024-08-11 RX ORDER — TRANEXAMIC ACID 100 MG/ML
100 INJECTION, SOLUTION INTRAVENOUS ONCE
Status: COMPLETED | OUTPATIENT
Start: 2024-08-11 | End: 2024-08-11

## 2024-08-11 RX ADMIN — Medication 2 SPRAY: at 10:08

## 2024-08-11 RX ADMIN — TRANEXAMIC ACID 100 MG: 100 INJECTION, SOLUTION INTRAVENOUS at 10:08

## 2024-08-11 ASSESSMENT — PAIN - FUNCTIONAL ASSESSMENT: PAIN_FUNCTIONAL_ASSESSMENT: NONE - DENIES PAIN

## 2024-08-11 NOTE — ED PROVIDER NOTES
[facility] EMERGENCY DEPARTMENT  EMERGENCY DEPARTMENT ENCOUNTER        Pt Name: Shiv Lee  MRN: [id number]  Birthdate 1940  Date of evaluation: 8/11/2024  Provider: Demetrio Reyes MD  PCP: Gilson Ramon III, DO  Note Started: 10:03 AM EDT 8/11/24    CHIEF COMPLAINT       Chief Complaint   Patient presents with    Epistaxis     Since 4pm yesterday +ASA and +plavix       HISTORY OF PRESENT ILLNESS: 1 or more Elements   History from: Patient, patient's wife  Limitations to history : None    Shiv Lee is a 84 y.o. male who presents for with nosebleed since onset yesterday afternoon. The duration has been intermittent. The location is the right nare. The context was a spontaneous onset. The patient is taking ASA and plavix. The quality of the bleeding is bright red blood. There no aggravating or alleviating factors except for direct pressure that has slowed down the bleeding. The patient has no associated symptoms.    Nursing Notes were all reviewed and agreed with or any disagreements were addressed in the HPI.      REVIEW OF EXTERNAL NOTE :       I reviewed the patient's chart: Reviewed outpatient cardiology notes from 8/5/2024.  Patient seen evaluated for CAD, AS-AVR, paroxysmal A-fib, hypertension, mild lower extremity edema, hypercholesterolemia, history of asbestos exposure.    Chart Review/External Note Review    Last Echo reviewed by Me:  No results found for: \"LVEF\", \"LVEFMODE\"          Controlled Substance Monitoring:    Acute and Chronic Pain Monitoring:        No data to display                    REVIEW OF SYSTEMS :      Positives and Pertinent negatives as per HPI.     SURGICAL HISTORY     Past Surgical History:   Procedure Laterality Date    AORTIC VALVE SURGERY  1/27/09    Dr. Villa    CARDIAC PROCEDURE N/A 2/14/2024    Left heart cath / coronary angiography performed by Dipika Curtis MD at [facility]    CARDIAC PROCEDURE N/A

## 2024-08-12 ENCOUNTER — HOSPITAL ENCOUNTER (OUTPATIENT)
Dept: OTHER | Age: 84
Setting detail: THERAPIES SERIES
Discharge: HOME | End: 2024-08-12
Payer: MEDICARE

## 2024-08-12 ENCOUNTER — HOSPITAL ENCOUNTER (EMERGENCY)
Age: 84
Discharge: HOME | End: 2024-08-12
Payer: MEDICARE

## 2024-08-12 VITALS
OXYGEN SATURATION: 98 % | HEART RATE: 72 BPM | SYSTOLIC BLOOD PRESSURE: 120 MMHG | TEMPERATURE: 98.2 F | BODY MASS INDEX: 24.88 KG/M2 | RESPIRATION RATE: 16 BRPM | DIASTOLIC BLOOD PRESSURE: 70 MMHG | WEIGHT: 168 LBS | HEIGHT: 69 IN

## 2024-08-12 VITALS
HEART RATE: 70 BPM | DIASTOLIC BLOOD PRESSURE: 63 MMHG | OXYGEN SATURATION: 93 % | BODY MASS INDEX: 24.72 KG/M2 | SYSTOLIC BLOOD PRESSURE: 136 MMHG | RESPIRATION RATE: 16 BRPM | WEIGHT: 167.4 LBS

## 2024-08-12 DIAGNOSIS — Z48.00 ENCOUNTER FOR REMOVAL OF NASAL PACKING: Primary | ICD-10-CM

## 2024-08-12 LAB
ANION GAP SERPL CALCULATED.3IONS-SCNC: 12 MMOL/L (ref 7–16)
BNP SERPL-MCNC: 285 PG/ML (ref 0–450)
BUN SERPL-MCNC: 23 MG/DL (ref 6–23)
CALCIUM SERPL-MCNC: 9.6 MG/DL (ref 8.6–10.2)
CHLORIDE SERPL-SCNC: 100 MMOL/L (ref 98–107)
CO2 SERPL-SCNC: 26 MMOL/L (ref 22–29)
CREAT SERPL-MCNC: 1.2 MG/DL (ref 0.7–1.2)
GFR, ESTIMATED: 58 ML/MIN/1.73M2
GLUCOSE SERPL-MCNC: 144 MG/DL (ref 74–99)
POTASSIUM SERPL-SCNC: 3.5 MMOL/L (ref 3.5–5)
SODIUM SERPL-SCNC: 138 MMOL/L (ref 132–146)

## 2024-08-12 PROCEDURE — 36415 COLL VENOUS BLD VENIPUNCTURE: CPT

## 2024-08-12 PROCEDURE — 99282 EMERGENCY DEPT VISIT SF MDM: CPT

## 2024-08-12 PROCEDURE — 83880 ASSAY OF NATRIURETIC PEPTIDE: CPT

## 2024-08-12 PROCEDURE — 80048 BASIC METABOLIC PNL TOTAL CA: CPT

## 2024-08-12 PROCEDURE — 99214 OFFICE O/P EST MOD 30 MIN: CPT

## 2024-08-12 ASSESSMENT — PAIN - FUNCTIONAL ASSESSMENT: PAIN_FUNCTIONAL_ASSESSMENT: NONE - DENIES PAIN

## 2024-08-12 NOTE — PROGRESS NOTES
Congestive Heart Failure Clinic   [facility]      Referring Provider: Dr Santana  Primary Care Physician: Gilson Ramon III, DO   Cardiologist: Dr Santana  Nephrologist: N/A       HISTORY OF PRESENT ILLNESS:     Shiv Lee is a 84 y.o. (1940) male with a history of HFpEF EF 60% on 2/7/24.  Pre Cupid:   No results found for: \"LVEF\"  Post Cupid:    Lab Results   Component Value Date    EFBP 66 02/07/2024         He presents to the CHF clinic for ongoing evaluation and monitoring of heart failure.    In the CHF clinic today he denies any adverse symptoms except:  [] Dizziness or lightheadedness   [] Syncope or near syncope  [] Recent Fall  [] Shortness of breath at rest   [x] Dyspnea with exertion   [] Decline in functional capacity (unable to perform activities they had previously been able to do)  [] Fatigue   [] Orthopnea  [] PND  [] Nocturnal cough  [] Hemoptysis  [] Chest pain, pressure, or discomfort  [] Palpitations  [] Abdominal distention  [] Abdominal bloating  [] Early satiety  [] Blood in stool   [] Diarrhea  [] Constipation  [] Nausea/Vomiting  [] Decreased urinary response to oral diuretic   [] Scrotal swelling   [] Lower extremity edema  [] Used PRN doses of oral diuretic   [] Weight gain    Wt Readings from Last 3 Encounters:   08/12/24 76.2 kg (168 lb)   08/12/24 75.9 kg (167 lb 6.4 oz)   08/05/24 74.8 kg (165 lb)     SOCIAL HISTORY:  [x] Denies tobacco, alcohol or illicit drug abuse  [] Tobacco use:  [] ETOH use:  [] Illicit drug use:        MEDICATIONS:    Allergies   Allergen Reactions    Haldol [Haloperidol] Other (See Comments)     PT BECOMES MORE AGGRESSIVE    Zocor [Simvastatin] Other (See Comments)     MUSCLE ACHES    Repatha [Evolocumab]     Ativan [Lorazepam] Other (See Comments)     \"ALTERED MENTAL STATUS.\"     Prior to Visit Medications    Medication Sig Taking? Authorizing Provider   POTASSIUM CHLORIDE ER PO Take 40

## 2024-08-12 NOTE — PLAN OF CARE
Problem: Chronic Conditions and Co-morbidities  Goal: Patient's chronic conditions and co-morbidity symptoms are monitored and maintained or improved  8/12/2024 1438 by Cami Parikh, RN  Outcome: Progressing  8/12/2024 1430 by Cami Parikh, RN  Outcome: Progressing

## 2024-08-12 NOTE — ED PROVIDER NOTES
Independent [facility] Visit     Department of Emergency Medicine   ED  Provider Note  Admit Date/RoomTime: 8/12/2024 12:22 PM  ED Room: /    Chief Complaint:   here to have rinorocket removed and have ENT see patient    History of Present Illness      Shiv Lee is a 84 y.o. old male who presents to the ED for nasal packing removal.  Patient states that was placed yesterday.  He denies any bleeding down his throat.  He states he does have some tenderness around his right sinus but otherwise denies any pain.  He has no headache, dizziness, vision changes, or difficulty with ambulation or balance.  Patient has no other complaint or concern.  He has no chest pain, shortness of breath, or pain with breathing.  He was just at the cardiologist office today and advised to come to the emergency department to have his nasal packing removed.  Patient does take Plavix and aspirin.  Patient is alert and oriented x 3 and in no apparent distress at this exam.  He is nontoxic-appearing.  He is accompanied by his wife.    PCP: Gilson Ramon III,     ROS   Pertinent positives and negatives are stated within HPI, all other systems reviewed and are negative.    Past Medical History:  has a past medical history of Abnormal EKG, Aortic stenosis, Atrial fibrillation (HCC), Coronary artery disease, HTN (hypertension), Infrarenal abdominal aortic aneurysm (AAA) without rupture (HCC), Left atrial dilatation, Mitral regurgitation, Nontraumatic retroperitoneal hematoma, and SBE (subacute bacterial endocarditis) prophylaxis candidate.    Past Surgical History:  has a past surgical history that includes Diagnostic Cardiac Cath Lab Procedure (1/26/09); Coronary artery bypass graft (1/27/09); Aortic valve surgery (1/27/09); eye surgery (2016); Cardiac procedure (N/A, 2/14/2024); Cardiac procedure (N/A, 2/14/2024); and Cardiac procedure (N/A, 2/23/2024).    Social History:  reports that he has never smoked. He has never used smokeless  [address]  [facility] [address]  [phone]    Call today  for follow-up on ED visit    DuaneSantosh, DO  [address] [facility] [address]  [place] [id number]  [phone]    Call in 1 day  for follow-up on ED visit, as needed, if symptoms worsen      Electronically signed by Mireya Helton PA-C   DD: 8/12/24  I am the Primary Clinician of Record.    **This report was transcribed using voice recognition software. Every effort was made to ensure accuracy; however, inadvertent computerized transcription errors may be present.    END OF PROVIDER NOTE       Mireya Helton PA-C  08/12/24 1942

## 2024-09-12 ENCOUNTER — TELEPHONE (OUTPATIENT)
Dept: CARDIOLOGY CLINIC | Age: 84
End: 2024-09-12

## 2024-09-12 ENCOUNTER — HOSPITAL ENCOUNTER (OUTPATIENT)
Dept: OTHER | Age: 84
Setting detail: THERAPIES SERIES
Discharge: HOME | End: 2024-09-12
Payer: MEDICARE

## 2024-09-12 VITALS
OXYGEN SATURATION: 96 % | WEIGHT: 164 LBS | HEART RATE: 63 BPM | DIASTOLIC BLOOD PRESSURE: 59 MMHG | BODY MASS INDEX: 24.22 KG/M2 | SYSTOLIC BLOOD PRESSURE: 110 MMHG | RESPIRATION RATE: 14 BRPM

## 2024-09-12 DIAGNOSIS — E87.6 HYPOKALEMIA: Primary | ICD-10-CM

## 2024-09-12 LAB
ANION GAP SERPL CALCULATED.3IONS-SCNC: 14 MMOL/L (ref 7–16)
BNP SERPL-MCNC: 330 PG/ML (ref 0–450)
BUN SERPL-MCNC: 20 MG/DL (ref 6–23)
CALCIUM SERPL-MCNC: 9.1 MG/DL (ref 8.6–10.2)
CHLORIDE SERPL-SCNC: 102 MMOL/L (ref 98–107)
CO2 SERPL-SCNC: 24 MMOL/L (ref 22–29)
CREAT SERPL-MCNC: 1.2 MG/DL (ref 0.7–1.2)
GFR, ESTIMATED: 62 ML/MIN/1.73M2
GLUCOSE SERPL-MCNC: 121 MG/DL (ref 74–99)
POTASSIUM SERPL-SCNC: 3.2 MMOL/L (ref 3.5–5)
SODIUM SERPL-SCNC: 140 MMOL/L (ref 132–146)

## 2024-09-12 PROCEDURE — 80048 BASIC METABOLIC PNL TOTAL CA: CPT

## 2024-09-12 PROCEDURE — 83880 ASSAY OF NATRIURETIC PEPTIDE: CPT

## 2024-09-12 PROCEDURE — 99214 OFFICE O/P EST MOD 30 MIN: CPT

## 2024-09-12 PROCEDURE — 36415 COLL VENOUS BLD VENIPUNCTURE: CPT

## 2024-09-13 RX ORDER — POTASSIUM CHLORIDE 1500 MG/1
TABLET, EXTENDED RELEASE ORAL SEE ADMIN INSTRUCTIONS
COMMUNITY

## 2024-09-26 LAB
CRP SERPL HS-MCNC: 85 MG/L (ref 0–5)
D DIMER: 2002 NG/ML DDU (ref 0–232)

## 2024-10-04 ENCOUNTER — HOSPITAL ENCOUNTER (OUTPATIENT)
Age: 84
Discharge: HOME | End: 2024-10-04
Payer: MEDICARE

## 2024-10-04 DIAGNOSIS — E87.6 HYPOKALEMIA: ICD-10-CM

## 2024-10-04 LAB
ANION GAP SERPL CALCULATED.3IONS-SCNC: 15 MMOL/L (ref 7–16)
BUN SERPL-MCNC: 24 MG/DL (ref 6–23)
CALCIUM SERPL-MCNC: 9.3 MG/DL (ref 8.6–10.2)
CHLORIDE SERPL-SCNC: 101 MMOL/L (ref 98–107)
CO2 SERPL-SCNC: 24 MMOL/L (ref 22–29)
CREAT SERPL-MCNC: 1.3 MG/DL (ref 0.7–1.2)
GFR, ESTIMATED: 56 ML/MIN/1.73M2
GLUCOSE SERPL-MCNC: 108 MG/DL (ref 74–99)
POTASSIUM SERPL-SCNC: 3.7 MMOL/L (ref 3.5–5)
SODIUM SERPL-SCNC: 140 MMOL/L (ref 132–146)

## 2024-10-04 PROCEDURE — 80048 BASIC METABOLIC PNL TOTAL CA: CPT

## 2024-10-04 PROCEDURE — 36415 COLL VENOUS BLD VENIPUNCTURE: CPT

## 2024-10-10 RX ORDER — POTASSIUM CHLORIDE 1500 MG/1
TABLET, EXTENDED RELEASE ORAL
Qty: 135 TABLET | Refills: 3 | Status: SHIPPED | OUTPATIENT
Start: 2024-10-10

## 2024-11-12 ENCOUNTER — HOSPITAL ENCOUNTER (OUTPATIENT)
Dept: OTHER | Age: 84
Setting detail: THERAPIES SERIES
Discharge: HOME | End: 2024-11-12
Payer: MEDICARE

## 2024-11-12 VITALS
HEART RATE: 69 BPM | RESPIRATION RATE: 16 BRPM | DIASTOLIC BLOOD PRESSURE: 60 MMHG | SYSTOLIC BLOOD PRESSURE: 110 MMHG | BODY MASS INDEX: 23.81 KG/M2 | WEIGHT: 161.2 LBS | OXYGEN SATURATION: 95 %

## 2024-11-12 LAB
ANION GAP SERPL CALCULATED.3IONS-SCNC: 13 MMOL/L (ref 7–16)
BNP SERPL-MCNC: 295 PG/ML (ref 0–450)
BUN SERPL-MCNC: 21 MG/DL (ref 6–23)
CALCIUM SERPL-MCNC: 9.5 MG/DL (ref 8.6–10.2)
CHLORIDE SERPL-SCNC: 102 MMOL/L (ref 98–107)
CO2 SERPL-SCNC: 27 MMOL/L (ref 22–29)
CREAT SERPL-MCNC: 1.2 MG/DL (ref 0.7–1.2)
GFR, ESTIMATED: 58 ML/MIN/1.73M2
GLUCOSE SERPL-MCNC: 110 MG/DL (ref 74–99)
POTASSIUM SERPL-SCNC: 3.8 MMOL/L (ref 3.5–5)
SODIUM SERPL-SCNC: 142 MMOL/L (ref 132–146)

## 2024-11-12 PROCEDURE — 36415 COLL VENOUS BLD VENIPUNCTURE: CPT

## 2024-11-12 PROCEDURE — 83880 ASSAY OF NATRIURETIC PEPTIDE: CPT

## 2024-11-12 PROCEDURE — 99214 OFFICE O/P EST MOD 30 MIN: CPT

## 2024-11-12 PROCEDURE — 80048 BASIC METABOLIC PNL TOTAL CA: CPT

## 2024-11-12 RX ORDER — ALLOPURINOL 100 MG/1
100 TABLET ORAL DAILY
COMMUNITY

## 2024-11-12 NOTE — PLAN OF CARE
Problem: Chronic Conditions and Co-morbidities  Goal: Patient's chronic conditions and co-morbidity symptoms are monitored and maintained or improved  11/12/2024 1157 by Luzmaria Sotelo, RN  Outcome: Progressing  11/12/2024 1131 by Luzmaria Sotelo, RN  Outcome: Progressing

## 2024-11-12 NOTE — PROGRESS NOTES
Congestive Heart Failure Clinic   [facility]      Referring Provider: Dr Santana  Primary Care Physician: Gilson Ramon III, DO   Cardiologist: Dr Santana  Nephrologist: N/A       HISTORY OF PRESENT ILLNESS:     Shiv Lee is a 84 y.o. (1940) male with a history of HFpEF EF 60% on 2/7/24.  Pre Cupid:   No results found for: \"LVEF\"  Post Cupid:    Lab Results   Component Value Date    EFBP 66 02/07/2024     He presents to the CHF clinic for ongoing evaluation and monitoring of heart failure.  In the CHF clinic today he denies any adverse symptoms except:  [] Dizziness or lightheadedness   [] Syncope or near syncope  [] Recent Fall  [] Shortness of breath at rest   [] Dyspnea with exertion   [] Decline in functional capacity (unable to perform activities they had previously been able to do)  [] Fatigue   [] Orthopnea  [] PND  [] Nocturnal cough  [] Hemoptysis  [] Chest pain, pressure, or discomfort  [] Palpitations  [] Abdominal distention  [] Abdominal bloating  [] Early satiety  [] Blood in stool   [] Diarrhea  [] Constipation  [] Nausea/Vomiting  [] Decreased urinary response to oral diuretic   [] Scrotal swelling   [] Lower extremity edema  [] Used PRN doses of oral diuretic   [] Weight gain    Wt Readings from Last 3 Encounters:   11/12/24 73.1 kg (161 lb 3.2 oz)   09/12/24 74.4 kg (164 lb)   08/12/24 76.2 kg (168 lb)     SOCIAL HISTORY:  [x] Denies tobacco, alcohol or illicit drug abuse  [] Tobacco use:  [] ETOH use:  [] Illicit drug use:        MEDICATIONS:    Allergies   Allergen Reactions    Haldol [Haloperidol] Other (See Comments)     PT BECOMES MORE AGGRESSIVE    Zocor [Simvastatin] Other (See Comments)     MUSCLE ACHES    Repatha [Evolocumab]     Ativan [Lorazepam] Other (See Comments)     \"ALTERED MENTAL STATUS.\"     Prior to Visit Medications    Medication Sig Taking? Authorizing Provider   allopurinol (ZYLOPRIM) 100 MG tablet

## 2025-02-11 ENCOUNTER — HOSPITAL ENCOUNTER (OUTPATIENT)
Dept: OTHER | Age: 85
Setting detail: THERAPIES SERIES
Discharge: HOME | End: 2025-02-11
Payer: MEDICARE

## 2025-02-11 VITALS
SYSTOLIC BLOOD PRESSURE: 110 MMHG | OXYGEN SATURATION: 96 % | WEIGHT: 156 LBS | RESPIRATION RATE: 16 BRPM | HEART RATE: 72 BPM | BODY MASS INDEX: 23.04 KG/M2 | DIASTOLIC BLOOD PRESSURE: 61 MMHG

## 2025-02-11 LAB
ANION GAP SERPL CALCULATED.3IONS-SCNC: 12 MMOL/L (ref 7–16)
BNP SERPL-MCNC: 342 PG/ML (ref 0–450)
BUN SERPL-MCNC: 20 MG/DL (ref 6–23)
CALCIUM SERPL-MCNC: 9.4 MG/DL (ref 8.6–10.2)
CHLORIDE SERPL-SCNC: 102 MMOL/L (ref 98–107)
CO2 SERPL-SCNC: 27 MMOL/L (ref 22–29)
CREAT SERPL-MCNC: 1.2 MG/DL (ref 0.7–1.2)
GFR, ESTIMATED: 62 ML/MIN/1.73M2
GLUCOSE SERPL-MCNC: 92 MG/DL (ref 74–99)
POTASSIUM SERPL-SCNC: 4 MMOL/L (ref 3.5–5)
SODIUM SERPL-SCNC: 141 MMOL/L (ref 132–146)

## 2025-02-11 PROCEDURE — 99214 OFFICE O/P EST MOD 30 MIN: CPT

## 2025-02-11 PROCEDURE — 83880 ASSAY OF NATRIURETIC PEPTIDE: CPT

## 2025-02-11 PROCEDURE — 80048 BASIC METABOLIC PNL TOTAL CA: CPT

## 2025-02-11 PROCEDURE — 36415 COLL VENOUS BLD VENIPUNCTURE: CPT

## 2025-02-11 ASSESSMENT — PATIENT HEALTH QUESTIONNAIRE - PHQ9
SUM OF ALL RESPONSES TO PHQ QUESTIONS 1-9: 0
2. FEELING DOWN, DEPRESSED OR HOPELESS: NOT AT ALL
1. LITTLE INTEREST OR PLEASURE IN DOING THINGS: NOT AT ALL
SUM OF ALL RESPONSES TO PHQ9 QUESTIONS 1 & 2: 0

## 2025-02-11 NOTE — PROGRESS NOTES
Congestive Heart Failure Clinic   [facility]      Referring Provider: Dr Santana  Primary Care Physician: Gilson Ramon III, DO   Cardiologist: Dr Santana  Nephrologist: N/A       HISTORY OF PRESENT ILLNESS:     Shiv Lee is a 84 y.o. (1940) male with a history of HFpEF EF 60% on 2/7/24.  Pre Cupid:   No results found for: \"LVEF\"  Post Cupid:    Lab Results   Component Value Date    EFBP 66 02/07/2024     He presents to the CHF clinic for ongoing evaluation and monitoring of heart failure.  In the CHF clinic today he denies any adverse symptoms except:  [] Dizziness or lightheadedness   [] Syncope or near syncope  [] Recent Fall  [] Shortness of breath at rest   [x] Dyspnea with exertion   [] Decline in functional capacity (unable to perform activities they had previously been able to do)  [] Fatigue   [] Orthopnea  [] PND  [] Nocturnal cough  [] Hemoptysis  [] Chest pain, pressure, or discomfort  [] Palpitations  [] Abdominal distention  [] Abdominal bloating  [] Early satiety  [] Blood in stool   [] Diarrhea  [] Constipation  [] Nausea/Vomiting  [] Decreased urinary response to oral diuretic   [] Scrotal swelling   [] Lower extremity edema  [] Used PRN doses of oral diuretic   [] Weight gain    Wt Readings from Last 3 Encounters:   02/11/25 70.8 kg (156 lb)   11/12/24 73.1 kg (161 lb 3.2 oz)   09/12/24 74.4 kg (164 lb)     SOCIAL HISTORY:  [x] Denies tobacco, alcohol or illicit drug abuse  [] Tobacco use:  [] ETOH use:  [] Illicit drug use:        MEDICATIONS:    Allergies   Allergen Reactions    Haldol [Haloperidol] Other (See Comments)     PT BECOMES MORE AGGRESSIVE    Zocor [Simvastatin] Other (See Comments)     MUSCLE ACHES    Repatha [Evolocumab]     Ativan [Lorazepam] Other (See Comments)     \"ALTERED MENTAL STATUS.\"     Prior to Visit Medications    Medication Sig Taking? Authorizing Provider   allopurinol (ZYLOPRIM) 100 MG tablet

## 2025-05-08 ENCOUNTER — OFFICE VISIT (OUTPATIENT)
Dept: CARDIOLOGY CLINIC | Age: 85
End: 2025-05-08
Payer: MEDICARE

## 2025-05-08 VITALS
TEMPERATURE: 97.8 F | OXYGEN SATURATION: 94 % | RESPIRATION RATE: 18 BRPM | BODY MASS INDEX: 24.11 KG/M2 | HEART RATE: 68 BPM | SYSTOLIC BLOOD PRESSURE: 104 MMHG | DIASTOLIC BLOOD PRESSURE: 60 MMHG | HEIGHT: 67 IN | WEIGHT: 153.6 LBS

## 2025-05-08 DIAGNOSIS — I25.10 CORONARY ARTERY DISEASE DUE TO LIPID RICH PLAQUE: ICD-10-CM

## 2025-05-08 DIAGNOSIS — Z95.2 H/O AORTIC VALVE REPLACEMENT: ICD-10-CM

## 2025-05-08 DIAGNOSIS — I10 PRIMARY HYPERTENSION: Primary | ICD-10-CM

## 2025-05-08 DIAGNOSIS — I25.83 CORONARY ARTERY DISEASE DUE TO LIPID RICH PLAQUE: ICD-10-CM

## 2025-05-08 PROCEDURE — 3078F DIAST BP <80 MM HG: CPT | Performed by: INTERNAL MEDICINE

## 2025-05-08 PROCEDURE — 93000 ELECTROCARDIOGRAM COMPLETE: CPT | Performed by: INTERNAL MEDICINE

## 2025-05-08 PROCEDURE — G2211 COMPLEX E/M VISIT ADD ON: HCPCS | Performed by: INTERNAL MEDICINE

## 2025-05-08 PROCEDURE — 99214 OFFICE O/P EST MOD 30 MIN: CPT | Performed by: INTERNAL MEDICINE

## 2025-05-08 PROCEDURE — 1159F MED LIST DOCD IN RCRD: CPT | Performed by: INTERNAL MEDICINE

## 2025-05-08 PROCEDURE — 3074F SYST BP LT 130 MM HG: CPT | Performed by: INTERNAL MEDICINE

## 2025-05-08 PROCEDURE — 1123F ACP DISCUSS/DSCN MKR DOCD: CPT | Performed by: INTERNAL MEDICINE

## 2025-05-08 RX ORDER — TRIAMCINOLONE ACETONIDE 1 MG/G
CREAM TOPICAL
COMMUNITY
Start: 2025-03-13

## 2025-05-08 NOTE — PROGRESS NOTES
Shiv Lee  1940  Date of Service: 5/8/2025    Patient Active Problem List    Diagnosis Date Noted    Acute on chronic respiratory failure ([facility]) 03/25/2024    Respiratory distress 03/22/2024    Acute on chronic congestive heart failure, unspecified heart failure type ([facility]) 03/18/2024    Acute on chronic congestive heart failure ([facility]) 03/06/2024    Nonrheumatic tricuspid valve regurgitation 03/06/2024    Pulmonary HTN ([facility]) 03/06/2024    CHF (congestive heart failure), NYHA class I, acute on chronic, combined ([facility]) 03/05/2024    Generalized weakness 03/05/2024    Acute on chronic respiratory failure with hypoxia ([facility]) 03/05/2024    Nontraumatic retroperitoneal hematoma 02/26/2024    Infrarenal abdominal aortic aneurysm (AAA) without rupture 02/26/2024     Overview Note:     3.5 x 3.6 cm, CT scan, February 2024      Status post coronary artery bypass graft 02/13/2024    Acute kidney injury superimposed on chronic kidney disease 02/13/2024    Chronic anemia 02/13/2024    STEMI (ST elevation myocardial infarction) ([facility]) 02/12/2024    Palliative care encounter 02/08/2024    Goals of care, counseling/discussion 02/08/2024    Septic shock ([facility]) 02/07/2024    NSTEMI (non-ST elevated myocardial infarction) ([facility]) 02/07/2024    Pneumonia due to organism 02/07/2024    COVID-19 02/07/2024    Acute hypoxic respiratory failure ([facility]) 02/07/2024    H/O aortic valve replacement 02/07/2024    HTN (hypertension)     Coronary artery disease      Overview Note:     Cardiac catheterization (1/26/09).  Left main ostial 80%.   LAD mid to distal 90%.  RCA ostial 80%.  CABG (1/27/09 by Dr. Villa).    LIMA-LAD.  SVG-OM.  SVG-RCA.   LHC 2/23/24:  stenting of the mid SVG to the RCA with a 3.5 x 38 mm drug-eluting stent       Atrial fibrillation ([facility])      Overview Note:     Postop      Abnormal EKG      Overview Note:     EKG demonstrating chronic Q waves in leads III and AVF.   Stress test (December 1, 2003) Negative for either

## 2025-05-12 ENCOUNTER — HOSPITAL ENCOUNTER (OUTPATIENT)
Dept: OTHER | Age: 85
Setting detail: THERAPIES SERIES
Discharge: HOME | End: 2025-05-12
Payer: MEDICARE

## 2025-05-12 VITALS
RESPIRATION RATE: 18 BRPM | WEIGHT: 152.8 LBS | OXYGEN SATURATION: 96 % | HEART RATE: 65 BPM | SYSTOLIC BLOOD PRESSURE: 111 MMHG | DIASTOLIC BLOOD PRESSURE: 52 MMHG | BODY MASS INDEX: 23.93 KG/M2

## 2025-05-12 LAB
ANION GAP SERPL CALCULATED.3IONS-SCNC: 13 MMOL/L (ref 7–16)
BNP SERPL-MCNC: 273 PG/ML (ref 0–450)
BUN SERPL-MCNC: 25 MG/DL (ref 6–23)
CALCIUM SERPL-MCNC: 9.3 MG/DL (ref 8.6–10.2)
CHLORIDE SERPL-SCNC: 101 MMOL/L (ref 98–107)
CO2 SERPL-SCNC: 26 MMOL/L (ref 22–29)
CREAT SERPL-MCNC: 1.2 MG/DL (ref 0.7–1.2)
GFR, ESTIMATED: 63 ML/MIN/1.73M2
GLUCOSE SERPL-MCNC: 77 MG/DL (ref 74–99)
POTASSIUM SERPL-SCNC: 3.8 MMOL/L (ref 3.5–5)
SODIUM SERPL-SCNC: 140 MMOL/L (ref 132–146)

## 2025-05-12 PROCEDURE — 83880 ASSAY OF NATRIURETIC PEPTIDE: CPT

## 2025-05-12 PROCEDURE — 99214 OFFICE O/P EST MOD 30 MIN: CPT

## 2025-05-12 PROCEDURE — 36415 COLL VENOUS BLD VENIPUNCTURE: CPT

## 2025-05-12 PROCEDURE — 80048 BASIC METABOLIC PNL TOTAL CA: CPT

## 2025-05-12 NOTE — PROGRESS NOTES
Appointments   Date Time Provider Department Center   9/12/2025 10:15 AM AL [facility] ROOM 3 AL [facility]   11/21/2025 11:20 AM Amadou Santana DO [facility]

## 2025-05-20 ENCOUNTER — TELEPHONE (OUTPATIENT)
Dept: CARDIOLOGY CLINIC | Age: 85
End: 2025-05-20

## 2025-05-20 RX ORDER — CLOPIDOGREL BISULFATE 75 MG/1
75 TABLET ORAL DAILY
Qty: 90 TABLET | Refills: 3 | Status: SHIPPED | OUTPATIENT
Start: 2025-05-20

## 2025-05-20 RX ORDER — CLOPIDOGREL BISULFATE 75 MG/1
75 TABLET ORAL DAILY
COMMUNITY
End: 2025-05-20 | Stop reason: SDUPTHER

## 2025-05-20 NOTE — TELEPHONE ENCOUNTER
Patient's wife (Scott) called stating Plavix was changed to Brilinta a couple of weeks ago because patient had loss of taste and felt the Plavix was causing this.  She states his loss of taste has gotten worse and he has now developed a cough with the Brilinta.  Per wife, patient would like to go back to Plavix.

## (undated) DEVICE — DEVICE TORQ FLRESCNT PNK FOR HEMSTAS VLV

## (undated) DEVICE — CATHETER GUID 6FR 0.071IN COR AMPLATZ R 1 MID FLEXIBILITY

## (undated) DEVICE — GUIDEWIRE VASC L145CM 0.035IN J TIP L3MM PTFE FIX COR NAMIC

## (undated) DEVICE — PACK SURG CARDIAC CATH

## (undated) DEVICE — BAND COMPR L24CM REG CLR PLAS HEMSTAT EXT HK AND LOOP RETEN

## (undated) DEVICE — KIT MFLD ISOLATN NACL CNTRST PRT TBNG SPIK W/ PRSS TRNSDUC

## (undated) DEVICE — GUIDEWIRE WITH ICE™ HYDROPHILIC COATING: Brand: LUGE™

## (undated) DEVICE — SHEATH INTRO 6FR L11CM 0.038IN SIL TRICSP VLV W/ GWIRE

## (undated) DEVICE — CATH BLLN ANGIO 2.50X15MM SC EUPHORA RX

## (undated) DEVICE — ANGIO-SEAL VIP VASCULAR CLOSURE DEVICE: Brand: ANGIO-SEAL

## (undated) DEVICE — COPILOT BLEEDBACK CONTROL VALVE: Brand: COPILOT

## (undated) DEVICE — CATH BLLN ANGIO 2.50X12MM SC EUPHORA RX

## (undated) DEVICE — CATHETER COR DIAG JUDKINS L 3.5 CRV 6FR 100CM 0 SIDE H

## (undated) DEVICE — CATHETER COR DIAG MP MPA 5FR 100CM 2 SIDE H DXTERITY

## (undated) DEVICE — SHEATH INTRO 8FR L11CM 0038IN SIL TRICSP VLV W GWIRE SMOOTH

## (undated) DEVICE — KIT ANGIO W/ AT P65 PREM HND CTRL FOR CNTRST DEL ANGIOTOUCH

## (undated) DEVICE — PAD, DEFIB, ADULT, RADIOTRAN, PHYSIO, LO: Brand: MEDLINE

## (undated) DEVICE — RADIFOCUS GLIDEWIRE ADVANTAGE TRACK GUIDE: Brand: GLIDEWIRE ADVANTAGE TRACK

## (undated) DEVICE — SURGICAL PROCEDURE KIT 3 W

## (undated) DEVICE — HI-TORQUE BALANCE MIDDLEWEIGHT GUIDE WIRE W/HYDROCOAT .014 STRAIGHT TIP 3.0 CM X 190 CM: Brand: HI-TORQUE BALANCE MIDDLEWEIGHT

## (undated) DEVICE — INTRODUCER SHTH THN WALLED 6 FRX10 CM 22 GA ANGLED RAIN SHTH

## (undated) DEVICE — CATHETER GUID 6FR L100CM ID0.071IN COR MP 1 NYL L LUMN MID

## (undated) DEVICE — RADIFOCUS GLIDEWIRE: Brand: GLIDEWIRE

## (undated) DEVICE — CATHETER GUID 6FR L100CM DIA0.071IN NYL SHFT AL1.0 W/O SIDE

## (undated) DEVICE — CATH BLLN ANGIO 3X12MM SC EUPHORA RX

## (undated) DEVICE — CANNULA NSL CANN NSL L25FT TBNG AD O2 SUP SFT UC

## (undated) DEVICE — CATHETER DIAG 6FR L100CM LUMN ID0.056IN JR4 CRV 0 SIDE H

## (undated) DEVICE — INFLATION DEVICE KIT: Brand: ENCORE™ 26 ADVANTAGE KIT

## (undated) DEVICE — TUBING PRSS MON L12IN PVC RIG NONEXPANDING M TO FEM CONN

## (undated) DEVICE — ANGIOPLASTY ADD-ON PACK: Brand: MEDLINE INDUSTRIES, INC.

## (undated) DEVICE — GUIDEWIRE VASC L260CM DIA0.035IN S STL PTFE MOD J TIP HI